# Patient Record
Sex: MALE | Race: WHITE | NOT HISPANIC OR LATINO | Employment: OTHER | URBAN - METROPOLITAN AREA
[De-identification: names, ages, dates, MRNs, and addresses within clinical notes are randomized per-mention and may not be internally consistent; named-entity substitution may affect disease eponyms.]

---

## 2018-05-17 ENCOUNTER — OFFICE VISIT CONVERTED (OUTPATIENT)
Dept: CARDIOLOGY | Facility: CLINIC | Age: 78
End: 2018-05-17
Attending: INTERNAL MEDICINE

## 2018-05-24 ENCOUNTER — OFFICE VISIT CONVERTED (OUTPATIENT)
Dept: PODIATRY | Facility: CLINIC | Age: 78
End: 2018-05-24
Attending: PODIATRIST

## 2018-08-30 ENCOUNTER — OFFICE VISIT CONVERTED (OUTPATIENT)
Dept: SURGERY | Facility: CLINIC | Age: 78
End: 2018-08-30
Attending: NURSE PRACTITIONER

## 2018-12-06 ENCOUNTER — OFFICE VISIT CONVERTED (OUTPATIENT)
Dept: CARDIOLOGY | Facility: CLINIC | Age: 78
End: 2018-12-06
Attending: INTERNAL MEDICINE

## 2019-07-26 ENCOUNTER — HOSPITAL ENCOUNTER (OUTPATIENT)
Dept: URGENT CARE | Facility: CLINIC | Age: 79
Discharge: HOME | End: 2019-07-26
Attending: NURSE PRACTITIONER

## 2019-08-02 ENCOUNTER — OFFICE VISIT CONVERTED (OUTPATIENT)
Dept: CARDIOLOGY | Facility: CLINIC | Age: 79
End: 2019-08-02
Attending: INTERNAL MEDICINE

## 2020-02-26 ENCOUNTER — OFFICE VISIT CONVERTED (OUTPATIENT)
Dept: ORTHOPEDIC SURGERY | Facility: CLINIC | Age: 80
End: 2020-02-26
Attending: ORTHOPAEDIC SURGERY

## 2020-03-03 ENCOUNTER — OFFICE VISIT CONVERTED (OUTPATIENT)
Dept: CARDIOLOGY | Facility: CLINIC | Age: 80
End: 2020-03-03
Attending: INTERNAL MEDICINE

## 2020-03-03 ENCOUNTER — CONVERSION ENCOUNTER (OUTPATIENT)
Dept: CARDIOLOGY | Facility: CLINIC | Age: 80
End: 2020-03-03
Attending: INTERNAL MEDICINE

## 2020-03-05 ENCOUNTER — HOSPITAL ENCOUNTER (OUTPATIENT)
Dept: CARDIOLOGY | Facility: HOSPITAL | Age: 80
Discharge: HOME | End: 2020-03-05
Attending: INTERNAL MEDICINE

## 2020-04-06 ENCOUNTER — TELEMEDICINE CONVERTED (OUTPATIENT)
Dept: ORTHOPEDIC SURGERY | Facility: CLINIC | Age: 80
End: 2020-04-06
Attending: ORTHOPAEDIC SURGERY

## 2020-04-13 ENCOUNTER — OFFICE VISIT CONVERTED (OUTPATIENT)
Dept: ORTHOPEDIC SURGERY | Facility: CLINIC | Age: 80
End: 2020-04-13
Attending: ORTHOPAEDIC SURGERY

## 2020-04-22 ENCOUNTER — HOSPITAL ENCOUNTER (OUTPATIENT)
Dept: GENERAL RADIOLOGY | Facility: HOSPITAL | Age: 80
Discharge: HOME | End: 2020-04-22
Attending: ORTHOPAEDIC SURGERY

## 2020-04-27 ENCOUNTER — OFFICE VISIT CONVERTED (OUTPATIENT)
Dept: ORTHOPEDIC SURGERY | Facility: CLINIC | Age: 80
End: 2020-04-27
Attending: ORTHOPAEDIC SURGERY

## 2020-05-13 ENCOUNTER — OFFICE VISIT CONVERTED (OUTPATIENT)
Dept: NEUROSURGERY | Facility: CLINIC | Age: 80
End: 2020-05-13
Attending: PHYSICIAN ASSISTANT

## 2020-05-13 ENCOUNTER — OFFICE VISIT CONVERTED (OUTPATIENT)
Dept: ORTHOPEDIC SURGERY | Facility: CLINIC | Age: 80
End: 2020-05-13
Attending: ORTHOPAEDIC SURGERY

## 2020-05-15 ENCOUNTER — HOSPITAL ENCOUNTER (OUTPATIENT)
Dept: PERIOP | Facility: HOSPITAL | Age: 80
Setting detail: HOSPITAL OUTPATIENT SURGERY
Discharge: HOME | End: 2020-05-15
Attending: ORTHOPAEDIC SURGERY

## 2020-06-01 ENCOUNTER — HOSPITAL ENCOUNTER (OUTPATIENT)
Dept: ORTHOPEDIC SURGERY | Facility: CLINIC | Age: 80
Setting detail: RECURRING SERIES
Discharge: HOME | End: 2020-07-07
Attending: ORTHOPAEDIC SURGERY

## 2020-06-09 ENCOUNTER — OFFICE VISIT CONVERTED (OUTPATIENT)
Dept: NEUROSURGERY | Facility: CLINIC | Age: 80
End: 2020-06-09
Attending: NEUROLOGICAL SURGERY

## 2020-06-23 ENCOUNTER — HOSPITAL ENCOUNTER (OUTPATIENT)
Dept: PREADMISSION TESTING | Facility: HOSPITAL | Age: 80
Discharge: HOME | End: 2020-06-23
Attending: NEUROLOGICAL SURGERY

## 2020-06-26 ENCOUNTER — HOSPITAL ENCOUNTER (OUTPATIENT)
Dept: PERIOP | Facility: HOSPITAL | Age: 80
Setting detail: HOSPITAL OUTPATIENT SURGERY
Discharge: HOME | End: 2020-06-26
Attending: NEUROLOGICAL SURGERY

## 2020-06-26 LAB — SARS-COV-2 RNA SPEC QL NAA+PROBE: NOT DETECTED

## 2020-07-14 ENCOUNTER — OFFICE VISIT CONVERTED (OUTPATIENT)
Dept: NEUROSURGERY | Facility: CLINIC | Age: 80
End: 2020-07-14
Attending: NEUROLOGICAL SURGERY

## 2020-08-04 ENCOUNTER — OFFICE VISIT CONVERTED (OUTPATIENT)
Dept: NEUROSURGERY | Facility: CLINIC | Age: 80
End: 2020-08-04
Attending: NEUROLOGICAL SURGERY

## 2020-09-16 ENCOUNTER — OFFICE VISIT CONVERTED (OUTPATIENT)
Dept: CARDIOLOGY | Facility: CLINIC | Age: 80
End: 2020-09-16
Attending: INTERNAL MEDICINE

## 2020-09-21 ENCOUNTER — HOSPITAL ENCOUNTER (OUTPATIENT)
Dept: NUCLEAR MEDICINE | Facility: HOSPITAL | Age: 80
Discharge: HOME | End: 2020-09-21
Attending: INTERNAL MEDICINE

## 2020-10-06 ENCOUNTER — OFFICE VISIT CONVERTED (OUTPATIENT)
Dept: NEUROSURGERY | Facility: CLINIC | Age: 80
End: 2020-10-06
Attending: PHYSICIAN ASSISTANT

## 2020-10-23 ENCOUNTER — HOSPITAL ENCOUNTER (OUTPATIENT)
Dept: GENERAL RADIOLOGY | Facility: HOSPITAL | Age: 80
Discharge: HOME | End: 2020-10-23
Attending: INTERNAL MEDICINE

## 2020-10-23 LAB
CREAT BLD-MCNC: 0.9 MG/DL (ref 0.6–1.4)
GFR SERPLBLD BASED ON 1.73 SQ M-ARVRAT: >60 ML/MIN/{1.73_M2}

## 2020-11-03 ENCOUNTER — OFFICE VISIT CONVERTED (OUTPATIENT)
Dept: NEUROSURGERY | Facility: CLINIC | Age: 80
End: 2020-11-03
Attending: PHYSICIAN ASSISTANT

## 2020-12-09 ENCOUNTER — HOSPITAL ENCOUNTER (OUTPATIENT)
Dept: GENERAL RADIOLOGY | Facility: HOSPITAL | Age: 80
Discharge: HOME | End: 2020-12-09
Attending: INTERNAL MEDICINE

## 2021-01-04 ENCOUNTER — OFFICE VISIT CONVERTED (OUTPATIENT)
Dept: PODIATRY | Facility: CLINIC | Age: 81
End: 2021-01-04
Attending: PODIATRIST

## 2021-01-04 ENCOUNTER — CONVERSION ENCOUNTER (OUTPATIENT)
Dept: PODIATRY | Facility: CLINIC | Age: 81
End: 2021-01-04

## 2021-01-26 ENCOUNTER — HOSPITAL ENCOUNTER (OUTPATIENT)
Dept: OTHER | Facility: HOSPITAL | Age: 81
Discharge: HOME | End: 2021-01-26
Attending: INTERNAL MEDICINE

## 2021-02-19 ENCOUNTER — HOSPITAL ENCOUNTER (OUTPATIENT)
Dept: VACCINE CLINIC | Facility: HOSPITAL | Age: 81
Discharge: HOME | End: 2021-02-19
Attending: INTERNAL MEDICINE

## 2021-04-14 ENCOUNTER — OFFICE VISIT CONVERTED (OUTPATIENT)
Dept: CARDIOLOGY | Facility: CLINIC | Age: 81
End: 2021-04-14
Attending: INTERNAL MEDICINE

## 2021-05-14 VITALS
SYSTOLIC BLOOD PRESSURE: 126 MMHG | HEIGHT: 70 IN | BODY MASS INDEX: 26.48 KG/M2 | DIASTOLIC BLOOD PRESSURE: 76 MMHG | WEIGHT: 185 LBS | HEART RATE: 84 BPM

## 2021-05-14 VITALS
HEIGHT: 70 IN | WEIGHT: 193 LBS | SYSTOLIC BLOOD PRESSURE: 137 MMHG | DIASTOLIC BLOOD PRESSURE: 81 MMHG | TEMPERATURE: 97.5 F | HEART RATE: 75 BPM | BODY MASS INDEX: 27.63 KG/M2 | OXYGEN SATURATION: 99 %

## 2021-05-14 VITALS — WEIGHT: 185.31 LBS | HEIGHT: 70 IN | BODY MASS INDEX: 26.53 KG/M2 | TEMPERATURE: 97.5 F

## 2021-05-14 VITALS
DIASTOLIC BLOOD PRESSURE: 88 MMHG | HEIGHT: 70 IN | HEART RATE: 80 BPM | WEIGHT: 190 LBS | SYSTOLIC BLOOD PRESSURE: 136 MMHG | BODY MASS INDEX: 27.2 KG/M2

## 2021-05-14 VITALS — BODY MASS INDEX: 27.09 KG/M2 | TEMPERATURE: 96.9 F | WEIGHT: 189.19 LBS | HEIGHT: 70 IN

## 2021-05-15 VITALS — WEIGHT: 180 LBS | HEIGHT: 71 IN | BODY MASS INDEX: 25.2 KG/M2

## 2021-05-15 VITALS — OXYGEN SATURATION: 97 % | HEIGHT: 70 IN | BODY MASS INDEX: 26.77 KG/M2 | WEIGHT: 187 LBS | HEART RATE: 64 BPM

## 2021-05-15 VITALS — TEMPERATURE: 97.3 F | BODY MASS INDEX: 26.81 KG/M2 | HEIGHT: 70 IN | WEIGHT: 187.25 LBS

## 2021-05-15 VITALS
SYSTOLIC BLOOD PRESSURE: 130 MMHG | DIASTOLIC BLOOD PRESSURE: 80 MMHG | WEIGHT: 180 LBS | HEIGHT: 71 IN | HEART RATE: 78 BPM | BODY MASS INDEX: 25.2 KG/M2

## 2021-05-15 VITALS — BODY MASS INDEX: 25.05 KG/M2 | WEIGHT: 175 LBS | OXYGEN SATURATION: 97 % | HEART RATE: 77 BPM | HEIGHT: 70 IN

## 2021-05-15 VITALS — HEIGHT: 70 IN | BODY MASS INDEX: 25.77 KG/M2 | WEIGHT: 180 LBS

## 2021-05-15 VITALS — HEIGHT: 71 IN | HEART RATE: 61 BPM | BODY MASS INDEX: 24.78 KG/M2 | WEIGHT: 177 LBS | OXYGEN SATURATION: 97 %

## 2021-05-15 VITALS
HEART RATE: 72 BPM | DIASTOLIC BLOOD PRESSURE: 72 MMHG | SYSTOLIC BLOOD PRESSURE: 116 MMHG | HEIGHT: 71 IN | BODY MASS INDEX: 24.78 KG/M2 | WEIGHT: 177 LBS

## 2021-05-15 VITALS — HEIGHT: 70 IN | WEIGHT: 187.5 LBS | BODY MASS INDEX: 26.84 KG/M2 | TEMPERATURE: 97.5 F

## 2021-05-15 VITALS — TEMPERATURE: 98.2 F | WEIGHT: 186.44 LBS | HEIGHT: 70 IN | BODY MASS INDEX: 26.69 KG/M2

## 2021-05-15 VITALS — TEMPERATURE: 97.6 F | HEIGHT: 70 IN | WEIGHT: 189 LBS | BODY MASS INDEX: 27.06 KG/M2

## 2021-05-16 VITALS
DIASTOLIC BLOOD PRESSURE: 80 MMHG | HEIGHT: 71 IN | BODY MASS INDEX: 27.58 KG/M2 | WEIGHT: 197 LBS | SYSTOLIC BLOOD PRESSURE: 146 MMHG | HEART RATE: 56 BPM

## 2021-05-16 VITALS
DIASTOLIC BLOOD PRESSURE: 66 MMHG | BODY MASS INDEX: 23.8 KG/M2 | HEIGHT: 71 IN | WEIGHT: 170 LBS | HEART RATE: 84 BPM | SYSTOLIC BLOOD PRESSURE: 116 MMHG

## 2021-05-16 VITALS — BODY MASS INDEX: 26.63 KG/M2 | HEIGHT: 70 IN | WEIGHT: 186 LBS | HEART RATE: 67 BPM | OXYGEN SATURATION: 95 %

## 2021-05-16 VITALS
DIASTOLIC BLOOD PRESSURE: 80 MMHG | SYSTOLIC BLOOD PRESSURE: 140 MMHG | WEIGHT: 175 LBS | BODY MASS INDEX: 25.05 KG/M2 | HEIGHT: 70 IN

## 2021-05-23 ENCOUNTER — TRANSCRIBE ORDERS (OUTPATIENT)
Dept: CARDIOLOGY | Facility: CLINIC | Age: 81
End: 2021-05-23

## 2021-05-23 DIAGNOSIS — R60.0 EDEMA LEG: Primary | ICD-10-CM

## 2021-06-15 ENCOUNTER — OFFICE VISIT (OUTPATIENT)
Dept: UROLOGY | Facility: CLINIC | Age: 81
End: 2021-06-15

## 2021-06-15 VITALS — BODY MASS INDEX: 27.2 KG/M2 | HEIGHT: 70 IN | WEIGHT: 190 LBS | RESPIRATION RATE: 17 BRPM

## 2021-06-15 DIAGNOSIS — N28.1 RENAL CYST: Primary | ICD-10-CM

## 2021-06-15 DIAGNOSIS — N20.0 NEPHROLITHIASIS: ICD-10-CM

## 2021-06-15 LAB
BILIRUB BLD-MCNC: NEGATIVE MG/DL
CLARITY, POC: CLEAR
COLOR UR: YELLOW
GLUCOSE UR STRIP-MCNC: NEGATIVE MG/DL
KETONES UR QL: NEGATIVE
LEUKOCYTE EST, POC: NEGATIVE
NITRITE UR-MCNC: NEGATIVE MG/ML
PH UR: 5.5 [PH] (ref 5–8)
PROT UR STRIP-MCNC: NEGATIVE MG/DL
RBC # UR STRIP: NEGATIVE /UL
SP GR UR: 1.02 (ref 1–1.03)
SPECIMEN VOL 24H UR: 50 L
UROBILINOGEN UR QL: NORMAL

## 2021-06-15 PROCEDURE — 51798 US URINE CAPACITY MEASURE: CPT | Performed by: UROLOGY

## 2021-06-15 PROCEDURE — 99204 OFFICE O/P NEW MOD 45 MIN: CPT | Performed by: UROLOGY

## 2021-06-15 PROCEDURE — 81003 URINALYSIS AUTO W/O SCOPE: CPT | Performed by: UROLOGY

## 2021-06-15 RX ORDER — CARVEDILOL 12.5 MG/1
TABLET ORAL
COMMUNITY
End: 2021-10-26

## 2021-06-15 RX ORDER — AMLODIPINE BESYLATE 5 MG/1
TABLET ORAL
COMMUNITY
End: 2021-07-28

## 2021-06-15 RX ORDER — DICYCLOMINE HYDROCHLORIDE 10 MG/1
CAPSULE ORAL
COMMUNITY
Start: 2021-06-04 | End: 2021-07-30

## 2021-06-15 RX ORDER — ROSUVASTATIN CALCIUM 10 MG/1
10 TABLET, COATED ORAL NIGHTLY
COMMUNITY
Start: 2021-04-30 | End: 2021-10-26

## 2021-06-15 RX ORDER — VALSARTAN 160 MG/1
TABLET ORAL
COMMUNITY
Start: 2021-04-07 | End: 2021-08-05 | Stop reason: SDUPTHER

## 2021-06-22 ENCOUNTER — OFFICE VISIT (OUTPATIENT)
Dept: NEUROSURGERY | Facility: CLINIC | Age: 81
End: 2021-06-22

## 2021-06-22 VITALS
TEMPERATURE: 96.8 F | DIASTOLIC BLOOD PRESSURE: 83 MMHG | BODY MASS INDEX: 27 KG/M2 | OXYGEN SATURATION: 99 % | HEIGHT: 70 IN | WEIGHT: 188.6 LBS | SYSTOLIC BLOOD PRESSURE: 127 MMHG | HEART RATE: 81 BPM

## 2021-06-22 DIAGNOSIS — M48.061 SPINAL STENOSIS, LUMBAR REGION, WITHOUT NEUROGENIC CLAUDICATION: ICD-10-CM

## 2021-06-22 DIAGNOSIS — M54.16 LUMBAR RADICULOPATHY, RIGHT: ICD-10-CM

## 2021-06-22 DIAGNOSIS — Z98.890 HISTORY OF LUMBAR LAMINECTOMY: Primary | ICD-10-CM

## 2021-06-22 PROCEDURE — 99213 OFFICE O/P EST LOW 20 MIN: CPT | Performed by: NURSE PRACTITIONER

## 2021-06-22 RX ORDER — ROSUVASTATIN CALCIUM 10 MG/1
TABLET, COATED ORAL
COMMUNITY
End: 2021-08-05

## 2021-06-22 RX ORDER — APIXABAN 5 MG/1
TABLET, FILM COATED ORAL
Qty: 180 TABLET | Refills: 3 | Status: SHIPPED | OUTPATIENT
Start: 2021-06-22 | End: 2021-08-05

## 2021-06-22 RX ORDER — CARVEDILOL 12.5 MG/1
TABLET ORAL
COMMUNITY
End: 2021-08-05

## 2021-06-22 RX ORDER — VALSARTAN AND HYDROCHLOROTHIAZIDE 160; 25 MG/1; MG/1
1 TABLET ORAL DAILY
COMMUNITY
Start: 2021-04-14 | End: 2022-04-05

## 2021-07-08 ENCOUNTER — HOSPITAL ENCOUNTER (OUTPATIENT)
Dept: MRI IMAGING | Facility: HOSPITAL | Age: 81
Discharge: HOME | End: 2021-07-08
Admitting: NURSE PRACTITIONER

## 2021-07-08 DIAGNOSIS — M54.16 LUMBAR RADICULOPATHY, RIGHT: ICD-10-CM

## 2021-07-08 DIAGNOSIS — Z98.890 HISTORY OF LUMBAR LAMINECTOMY: ICD-10-CM

## 2021-07-08 DIAGNOSIS — M48.061 SPINAL STENOSIS, LUMBAR REGION, WITHOUT NEUROGENIC CLAUDICATION: ICD-10-CM

## 2021-07-08 LAB — CREAT BLDA-MCNC: 1.3 MG/DL

## 2021-07-08 PROCEDURE — 0 GADOBENATE DIMEGLUMINE 529 MG/ML SOLUTION: Performed by: NURSE PRACTITIONER

## 2021-07-08 PROCEDURE — A9577 INJ MULTIHANCE: HCPCS | Performed by: NURSE PRACTITIONER

## 2021-07-08 PROCEDURE — 82565 ASSAY OF CREATININE: CPT

## 2021-07-08 PROCEDURE — 72158 MRI LUMBAR SPINE W/O & W/DYE: CPT

## 2021-07-08 RX ADMIN — GADOBENATE DIMEGLUMINE 18 ML: 529 INJECTION, SOLUTION INTRAVENOUS at 13:51

## 2021-07-12 ENCOUNTER — HOSPITAL ENCOUNTER (EMERGENCY)
Facility: HOSPITAL | Age: 81
Discharge: HOME | End: 2021-07-12
Attending: EMERGENCY MEDICINE | Admitting: EMERGENCY MEDICINE

## 2021-07-12 ENCOUNTER — APPOINTMENT (OUTPATIENT)
Dept: CT IMAGING | Facility: HOSPITAL | Age: 81
End: 2021-07-12

## 2021-07-12 VITALS
BODY MASS INDEX: 26.51 KG/M2 | TEMPERATURE: 98.2 F | DIASTOLIC BLOOD PRESSURE: 84 MMHG | RESPIRATION RATE: 18 BRPM | OXYGEN SATURATION: 97 % | HEART RATE: 110 BPM | WEIGHT: 185.19 LBS | SYSTOLIC BLOOD PRESSURE: 117 MMHG | HEIGHT: 70 IN

## 2021-07-12 DIAGNOSIS — R19.7 DIARRHEA, UNSPECIFIED TYPE: Primary | ICD-10-CM

## 2021-07-12 DIAGNOSIS — D72.829 LEUKOCYTOSIS, UNSPECIFIED TYPE: ICD-10-CM

## 2021-07-12 LAB
ALBUMIN SERPL-MCNC: 4 G/DL (ref 3.5–5.2)
ALBUMIN/GLOB SERPL: 1.7 G/DL
ALP SERPL-CCNC: 78 U/L (ref 39–117)
ALT SERPL W P-5'-P-CCNC: 89 U/L (ref 1–41)
ANION GAP SERPL CALCULATED.3IONS-SCNC: 6.3 MMOL/L (ref 5–15)
AST SERPL-CCNC: 32 U/L (ref 1–40)
BACTERIA UR QL AUTO: ABNORMAL /HPF
BILIRUB SERPL-MCNC: 0.7 MG/DL (ref 0–1.2)
BILIRUB UR QL STRIP: NEGATIVE
BUN SERPL-MCNC: 31 MG/DL (ref 8–23)
BUN/CREAT SERPL: 29 (ref 7–25)
CALCIUM SPEC-SCNC: 9.2 MG/DL (ref 8.6–10.5)
CHLORIDE SERPL-SCNC: 104 MMOL/L (ref 98–107)
CLARITY UR: CLEAR
CO2 SERPL-SCNC: 27.7 MMOL/L (ref 22–29)
COLOR UR: YELLOW
CREAT SERPL-MCNC: 1.07 MG/DL (ref 0.76–1.27)
DEPRECATED RDW RBC AUTO: 43.8 FL (ref 37–54)
EOSINOPHIL # BLD MANUAL: 0.22 10*3/MM3 (ref 0–0.4)
EOSINOPHIL NFR BLD MANUAL: 1 % (ref 0.3–6.2)
ERYTHROCYTE [DISTWIDTH] IN BLOOD BY AUTOMATED COUNT: 12.9 % (ref 12.3–15.4)
GFR SERPL CREATININE-BSD FRML MDRD: 67 ML/MIN/1.73
GLOBULIN UR ELPH-MCNC: 2.4 GM/DL
GLUCOSE SERPL-MCNC: 102 MG/DL (ref 65–99)
GLUCOSE UR STRIP-MCNC: NEGATIVE MG/DL
HCT VFR BLD AUTO: 48.8 % (ref 37.5–51)
HGB BLD-MCNC: 16.2 G/DL (ref 13–17.7)
HGB UR QL STRIP.AUTO: NEGATIVE
HOLD SPECIMEN: NORMAL
HOLD SPECIMEN: NORMAL
HYALINE CASTS UR QL AUTO: ABNORMAL /LPF
KETONES UR QL STRIP: NEGATIVE
LEUKOCYTE ESTERASE UR QL STRIP.AUTO: NEGATIVE
LIPASE SERPL-CCNC: 36 U/L (ref 13–60)
LYMPHOCYTES # BLD MANUAL: 1.11 10*3/MM3 (ref 0.7–3.1)
LYMPHOCYTES NFR BLD MANUAL: 5 % (ref 19.6–45.3)
LYMPHOCYTES NFR BLD MANUAL: 7 % (ref 5–12)
MCH RBC QN AUTO: 30.6 PG (ref 26.6–33)
MCHC RBC AUTO-ENTMCNC: 33.2 G/DL (ref 31.5–35.7)
MCV RBC AUTO: 92.1 FL (ref 79–97)
MONOCYTES # BLD AUTO: 1.55 10*3/MM3 (ref 0.1–0.9)
MYELOCYTES NFR BLD MANUAL: 2 % (ref 0–0)
NEUTROPHILS # BLD AUTO: 18.81 10*3/MM3 (ref 1.7–7)
NEUTROPHILS NFR BLD MANUAL: 83 % (ref 42.7–76)
NEUTS BAND NFR BLD MANUAL: 2 % (ref 0–5)
NITRITE UR QL STRIP: NEGATIVE
PH UR STRIP.AUTO: 5.5 [PH] (ref 5–8)
PLATELET # BLD AUTO: 230 10*3/MM3 (ref 140–450)
PMV BLD AUTO: 9.3 FL (ref 6–12)
POTASSIUM SERPL-SCNC: 4.9 MMOL/L (ref 3.5–5.2)
PROT SERPL-MCNC: 6.4 G/DL (ref 6–8.5)
PROT UR QL STRIP: NEGATIVE
RBC # BLD AUTO: 5.3 10*6/MM3 (ref 4.14–5.8)
RBC # UR: ABNORMAL /HPF
RBC MORPH BLD: NORMAL
REF LAB TEST METHOD: ABNORMAL
SCAN SLIDE: NORMAL
SMALL PLATELETS BLD QL SMEAR: ADEQUATE
SODIUM SERPL-SCNC: 138 MMOL/L (ref 136–145)
SP GR UR STRIP: 1.02 (ref 1–1.03)
SQUAMOUS #/AREA URNS HPF: ABNORMAL /HPF
UROBILINOGEN UR QL STRIP: NORMAL
WBC # BLD AUTO: 22.13 10*3/MM3 (ref 3.4–10.8)
WBC MORPH BLD: NORMAL
WBC UR QL AUTO: ABNORMAL /HPF
WHOLE BLOOD HOLD SPECIMEN: NORMAL

## 2021-07-12 PROCEDURE — 74177 CT ABD & PELVIS W/CONTRAST: CPT | Performed by: RADIOLOGY

## 2021-07-12 PROCEDURE — 85007 BL SMEAR W/DIFF WBC COUNT: CPT

## 2021-07-12 PROCEDURE — 81001 URINALYSIS AUTO W/SCOPE: CPT | Performed by: EMERGENCY MEDICINE

## 2021-07-12 PROCEDURE — 83690 ASSAY OF LIPASE: CPT

## 2021-07-12 PROCEDURE — 74177 CT ABD & PELVIS W/CONTRAST: CPT

## 2021-07-12 PROCEDURE — 80053 COMPREHEN METABOLIC PANEL: CPT

## 2021-07-12 PROCEDURE — 85025 COMPLETE CBC W/AUTO DIFF WBC: CPT

## 2021-07-12 PROCEDURE — 0 IOPAMIDOL PER 1 ML: Performed by: EMERGENCY MEDICINE

## 2021-07-12 PROCEDURE — 87505 NFCT AGENT DETECTION GI: CPT | Performed by: NURSE PRACTITIONER

## 2021-07-12 PROCEDURE — 99283 EMERGENCY DEPT VISIT LOW MDM: CPT

## 2021-07-12 RX ORDER — SODIUM CHLORIDE 0.9 % (FLUSH) 0.9 %
10 SYRINGE (ML) INJECTION AS NEEDED
Status: DISCONTINUED | OUTPATIENT
Start: 2021-07-12 | End: 2021-07-12 | Stop reason: HOSPADM

## 2021-07-12 RX ORDER — DIPHENOXYLATE HYDROCHLORIDE AND ATROPINE SULFATE 2.5; .025 MG/1; MG/1
1 TABLET ORAL 4 TIMES DAILY PRN
Qty: 12 TABLET | Refills: 0 | Status: SHIPPED | OUTPATIENT
Start: 2021-07-12 | End: 2021-07-15

## 2021-07-12 RX ADMIN — IOPAMIDOL 100 ML: 755 INJECTION, SOLUTION INTRAVENOUS at 16:51

## 2021-07-12 RX ADMIN — SODIUM CHLORIDE 1000 ML: 9 INJECTION, SOLUTION INTRAVENOUS at 16:04

## 2021-07-13 ENCOUNTER — TELEPHONE (OUTPATIENT)
Dept: UROLOGY | Facility: CLINIC | Age: 81
End: 2021-07-13

## 2021-07-14 LAB — LACTOFERRIN STL QL LA: NEGATIVE

## 2021-07-14 PROCEDURE — 83630 LACTOFERRIN FECAL (QUAL): CPT | Performed by: NURSE PRACTITIONER

## 2021-07-15 ENCOUNTER — APPOINTMENT (OUTPATIENT)
Dept: ULTRASOUND IMAGING | Facility: HOSPITAL | Age: 81
End: 2021-07-15

## 2021-07-15 LAB
C COLI+JEJ+UPSA DNA STL QL NAA+NON-PROBE: NOT DETECTED
EC STX1+STX2 GENES STL QL NAA+NON-PROBE: NOT DETECTED
S ENT+BONG DNA STL QL NAA+NON-PROBE: NOT DETECTED
SHIGELLA SP+EIEC IPAH ST NAA+NON-PROBE: NOT DETECTED

## 2021-07-21 PROBLEM — R35.0 BENIGN PROSTATIC HYPERPLASIA WITH URINARY FREQUENCY: Status: ACTIVE | Noted: 2021-07-21

## 2021-07-21 PROBLEM — N40.1 BENIGN PROSTATIC HYPERPLASIA WITH URINARY FREQUENCY: Status: ACTIVE | Noted: 2021-07-21

## 2021-07-23 ENCOUNTER — OFFICE VISIT (OUTPATIENT)
Dept: UROLOGY | Facility: CLINIC | Age: 81
End: 2021-07-23

## 2021-07-23 ENCOUNTER — TELEPHONE (OUTPATIENT)
Dept: INTERNAL MEDICINE | Facility: CLINIC | Age: 81
End: 2021-07-23

## 2021-07-23 VITALS — WEIGHT: 185 LBS | RESPIRATION RATE: 17 BRPM | BODY MASS INDEX: 26.48 KG/M2 | HEIGHT: 70 IN

## 2021-07-23 DIAGNOSIS — N40.1 BENIGN PROSTATIC HYPERPLASIA WITH URINARY FREQUENCY: ICD-10-CM

## 2021-07-23 DIAGNOSIS — R35.0 BENIGN PROSTATIC HYPERPLASIA WITH URINARY FREQUENCY: ICD-10-CM

## 2021-07-23 DIAGNOSIS — N28.1 RENAL CYST: Primary | ICD-10-CM

## 2021-07-23 DIAGNOSIS — D72.829 LEUKOCYTOSIS, UNSPECIFIED TYPE: Primary | ICD-10-CM

## 2021-07-23 PROCEDURE — 99212 OFFICE O/P EST SF 10 MIN: CPT | Performed by: UROLOGY

## 2021-07-26 ENCOUNTER — LAB (OUTPATIENT)
Dept: LAB | Facility: HOSPITAL | Age: 81
End: 2021-07-26

## 2021-07-26 DIAGNOSIS — D72.829 LEUKOCYTOSIS, UNSPECIFIED TYPE: ICD-10-CM

## 2021-07-26 LAB
BASOPHILS # BLD AUTO: 0.07 10*3/MM3 (ref 0–0.2)
BASOPHILS NFR BLD AUTO: 0.6 % (ref 0–1.5)
DEPRECATED RDW RBC AUTO: 41.8 FL (ref 37–54)
EOSINOPHIL # BLD AUTO: 0.13 10*3/MM3 (ref 0–0.4)
EOSINOPHIL NFR BLD AUTO: 1.1 % (ref 0.3–6.2)
ERYTHROCYTE [DISTWIDTH] IN BLOOD BY AUTOMATED COUNT: 12.7 % (ref 12.3–15.4)
HCT VFR BLD AUTO: 44 % (ref 37.5–51)
HGB BLD-MCNC: 14.7 G/DL (ref 13–17.7)
IMM GRANULOCYTES # BLD AUTO: 0.34 10*3/MM3 (ref 0–0.05)
IMM GRANULOCYTES NFR BLD AUTO: 3 % (ref 0–0.5)
LYMPHOCYTES # BLD AUTO: 1.49 10*3/MM3 (ref 0.7–3.1)
LYMPHOCYTES NFR BLD AUTO: 13 % (ref 19.6–45.3)
MCH RBC QN AUTO: 30.4 PG (ref 26.6–33)
MCHC RBC AUTO-ENTMCNC: 33.4 G/DL (ref 31.5–35.7)
MCV RBC AUTO: 91.1 FL (ref 79–97)
MONOCYTES # BLD AUTO: 0.97 10*3/MM3 (ref 0.1–0.9)
MONOCYTES NFR BLD AUTO: 8.5 % (ref 5–12)
NEUTROPHILS NFR BLD AUTO: 73.8 % (ref 42.7–76)
NEUTROPHILS NFR BLD AUTO: 8.46 10*3/MM3 (ref 1.7–7)
NRBC BLD AUTO-RTO: 0 /100 WBC (ref 0–0.2)
PLATELET # BLD AUTO: 192 10*3/MM3 (ref 140–450)
PMV BLD AUTO: 9.5 FL (ref 6–12)
RBC # BLD AUTO: 4.83 10*6/MM3 (ref 4.14–5.8)
WBC # BLD AUTO: 11.46 10*3/MM3 (ref 3.4–10.8)

## 2021-07-26 PROCEDURE — 85025 COMPLETE CBC W/AUTO DIFF WBC: CPT

## 2021-07-26 PROCEDURE — 36415 COLL VENOUS BLD VENIPUNCTURE: CPT

## 2021-07-28 RX ORDER — AMLODIPINE BESYLATE 5 MG/1
TABLET ORAL
Qty: 90 TABLET | Refills: 0 | Status: SHIPPED | OUTPATIENT
Start: 2021-07-28 | End: 2021-10-25

## 2021-07-30 RX ORDER — DICYCLOMINE HYDROCHLORIDE 10 MG/1
CAPSULE ORAL
Qty: 60 CAPSULE | Refills: 2 | Status: SHIPPED | OUTPATIENT
Start: 2021-07-30 | End: 2021-08-05 | Stop reason: SDUPTHER

## 2021-08-05 ENCOUNTER — HOSPITAL ENCOUNTER (OUTPATIENT)
Dept: GENERAL RADIOLOGY | Facility: HOSPITAL | Age: 81
Discharge: HOME | End: 2021-08-05
Admitting: NURSE PRACTITIONER

## 2021-08-05 ENCOUNTER — OFFICE VISIT (OUTPATIENT)
Dept: NEUROSURGERY | Facility: CLINIC | Age: 81
End: 2021-08-05

## 2021-08-05 VITALS
DIASTOLIC BLOOD PRESSURE: 90 MMHG | HEART RATE: 93 BPM | SYSTOLIC BLOOD PRESSURE: 138 MMHG | HEIGHT: 70 IN | WEIGHT: 190 LBS | BODY MASS INDEX: 27.2 KG/M2

## 2021-08-05 DIAGNOSIS — M25.551 HIP PAIN, RIGHT: ICD-10-CM

## 2021-08-05 DIAGNOSIS — M54.16 LUMBAR RADICULOPATHY, RIGHT: ICD-10-CM

## 2021-08-05 DIAGNOSIS — M62.838 MUSCLE SPASM: ICD-10-CM

## 2021-08-05 DIAGNOSIS — Z98.890 HISTORY OF LUMBAR LAMINECTOMY: Primary | ICD-10-CM

## 2021-08-05 DIAGNOSIS — M48.061 SPINAL STENOSIS, LUMBAR REGION, WITHOUT NEUROGENIC CLAUDICATION: ICD-10-CM

## 2021-08-05 PROCEDURE — 73502 X-RAY EXAM HIP UNI 2-3 VIEWS: CPT

## 2021-08-05 PROCEDURE — 99214 OFFICE O/P EST MOD 30 MIN: CPT | Performed by: NURSE PRACTITIONER

## 2021-08-05 RX ORDER — BACLOFEN 10 MG/1
5-10 TABLET ORAL 2 TIMES DAILY PRN
Qty: 60 TABLET | Refills: 1 | Status: SHIPPED | OUTPATIENT
Start: 2021-08-05 | End: 2021-10-14

## 2021-08-17 ENCOUNTER — PREP FOR SURGERY (OUTPATIENT)
Dept: OTHER | Facility: HOSPITAL | Age: 81
End: 2021-08-17

## 2021-08-17 ENCOUNTER — TELEPHONE (OUTPATIENT)
Dept: INTERNAL MEDICINE | Facility: CLINIC | Age: 81
End: 2021-08-17

## 2021-08-17 ENCOUNTER — OFFICE VISIT (OUTPATIENT)
Dept: GASTROENTEROLOGY | Facility: CLINIC | Age: 81
End: 2021-08-17

## 2021-08-17 ENCOUNTER — TELEPHONE (OUTPATIENT)
Dept: CARDIOLOGY | Facility: CLINIC | Age: 81
End: 2021-08-17

## 2021-08-17 VITALS
SYSTOLIC BLOOD PRESSURE: 127 MMHG | BODY MASS INDEX: 26.94 KG/M2 | HEIGHT: 70 IN | TEMPERATURE: 97.3 F | HEART RATE: 92 BPM | WEIGHT: 188.2 LBS | DIASTOLIC BLOOD PRESSURE: 87 MMHG

## 2021-08-17 DIAGNOSIS — R13.10 ODYNOPHAGIA: ICD-10-CM

## 2021-08-17 DIAGNOSIS — R19.7 DIARRHEA, UNSPECIFIED TYPE: ICD-10-CM

## 2021-08-17 DIAGNOSIS — R74.01 ELEVATED ALT MEASUREMENT: ICD-10-CM

## 2021-08-17 DIAGNOSIS — K57.90 DIVERTICULOSIS: ICD-10-CM

## 2021-08-17 DIAGNOSIS — R10.11 RIGHT UPPER QUADRANT ABDOMINAL PAIN: Primary | ICD-10-CM

## 2021-08-17 PROBLEM — E55.9 VITAMIN D DEFICIENCY: Status: ACTIVE | Noted: 2021-08-17

## 2021-08-17 PROBLEM — E78.5 HYPERLIPEMIA: Status: ACTIVE | Noted: 2021-08-17

## 2021-08-17 PROCEDURE — 99214 OFFICE O/P EST MOD 30 MIN: CPT | Performed by: NURSE PRACTITIONER

## 2021-08-17 RX ORDER — POLYETHYLENE GLYCOL 3350, SODIUM SULFATE ANHYDROUS, SODIUM BICARBONATE, SODIUM CHLORIDE, POTASSIUM CHLORIDE 227.1; 21.5; 6.36; 5.53; .754 G/L; G/L; G/L; G/L; G/L
4 POWDER, FOR SOLUTION ORAL DAILY
Qty: 1 EACH | Refills: 0 | Status: SHIPPED | OUTPATIENT
Start: 2021-08-17 | End: 2021-08-18

## 2021-08-20 ENCOUNTER — LAB (OUTPATIENT)
Dept: LAB | Facility: HOSPITAL | Age: 81
End: 2021-08-20

## 2021-08-20 DIAGNOSIS — R10.11 RIGHT UPPER QUADRANT ABDOMINAL PAIN: ICD-10-CM

## 2021-08-20 DIAGNOSIS — R74.01 ELEVATED ALT MEASUREMENT: ICD-10-CM

## 2021-08-20 LAB
ALBUMIN SERPL-MCNC: 3.8 G/DL (ref 3.5–5.2)
ALP SERPL-CCNC: 57 U/L (ref 39–117)
ALT SERPL W P-5'-P-CCNC: 19 U/L (ref 1–41)
AST SERPL-CCNC: 13 U/L (ref 1–40)
BILIRUB CONJ SERPL-MCNC: <0.2 MG/DL (ref 0–0.3)
BILIRUB INDIRECT SERPL-MCNC: NORMAL MG/DL
BILIRUB SERPL-MCNC: 0.5 MG/DL (ref 0–1.2)
PROT SERPL-MCNC: 6.2 G/DL (ref 6–8.5)

## 2021-08-20 PROCEDURE — 80076 HEPATIC FUNCTION PANEL: CPT

## 2021-08-20 PROCEDURE — 36415 COLL VENOUS BLD VENIPUNCTURE: CPT

## 2021-10-04 PROBLEM — R19.7 DIARRHEA: Status: RESOLVED | Noted: 2021-08-17 | Resolved: 2021-10-04

## 2021-10-04 PROBLEM — I48.0 PAROXYSMAL ATRIAL FIBRILLATION: Status: ACTIVE | Noted: 2021-10-04

## 2021-10-04 PROBLEM — R10.11 RIGHT UPPER QUADRANT ABDOMINAL PAIN: Status: RESOLVED | Noted: 2021-08-17 | Resolved: 2021-10-04

## 2021-10-04 PROBLEM — I10 ESSENTIAL HYPERTENSION: Status: ACTIVE | Noted: 2021-10-04

## 2021-10-05 PROBLEM — R19.7 DIARRHEA: Status: ACTIVE | Noted: 2021-08-17

## 2021-10-05 PROBLEM — R10.11 RIGHT UPPER QUADRANT ABDOMINAL PAIN: Status: ACTIVE | Noted: 2021-08-17

## 2021-10-06 ENCOUNTER — TELEPHONE (OUTPATIENT)
Dept: GASTROENTEROLOGY | Facility: CLINIC | Age: 81
End: 2021-10-06

## 2021-10-06 ENCOUNTER — ANESTHESIA EVENT (OUTPATIENT)
Dept: GASTROENTEROLOGY | Facility: HOSPITAL | Age: 81
End: 2021-10-06

## 2021-10-06 ENCOUNTER — ANESTHESIA (OUTPATIENT)
Dept: GASTROENTEROLOGY | Facility: HOSPITAL | Age: 81
End: 2021-10-06

## 2021-10-06 ENCOUNTER — HOSPITAL ENCOUNTER (OUTPATIENT)
Facility: HOSPITAL | Age: 81
Setting detail: HOSPITAL OUTPATIENT SURGERY
Discharge: HOME | End: 2021-10-06
Attending: INTERNAL MEDICINE | Admitting: INTERNAL MEDICINE

## 2021-10-06 VITALS
HEIGHT: 70 IN | RESPIRATION RATE: 14 BRPM | BODY MASS INDEX: 26.7 KG/M2 | OXYGEN SATURATION: 97 % | HEART RATE: 81 BPM | WEIGHT: 186.51 LBS | SYSTOLIC BLOOD PRESSURE: 102 MMHG | TEMPERATURE: 97 F | DIASTOLIC BLOOD PRESSURE: 72 MMHG

## 2021-10-06 DIAGNOSIS — R10.11 RIGHT UPPER QUADRANT ABDOMINAL PAIN: ICD-10-CM

## 2021-10-06 DIAGNOSIS — R13.10 ODYNOPHAGIA: ICD-10-CM

## 2021-10-06 DIAGNOSIS — R19.7 DIARRHEA, UNSPECIFIED TYPE: ICD-10-CM

## 2021-10-06 PROCEDURE — 88342 IMHCHEM/IMCYTCHM 1ST ANTB: CPT | Performed by: INTERNAL MEDICINE

## 2021-10-06 PROCEDURE — 25010000002 PROPOFOL 10 MG/ML EMULSION: Performed by: NURSE ANESTHETIST, CERTIFIED REGISTERED

## 2021-10-06 PROCEDURE — 88305 TISSUE EXAM BY PATHOLOGIST: CPT | Performed by: INTERNAL MEDICINE

## 2021-10-06 PROCEDURE — 45385 COLONOSCOPY W/LESION REMOVAL: CPT | Performed by: INTERNAL MEDICINE

## 2021-10-06 PROCEDURE — 43239 EGD BIOPSY SINGLE/MULTIPLE: CPT | Performed by: INTERNAL MEDICINE

## 2021-10-06 RX ORDER — LIDOCAINE HYDROCHLORIDE 20 MG/ML
INJECTION, SOLUTION INFILTRATION; PERINEURAL AS NEEDED
Status: DISCONTINUED | OUTPATIENT
Start: 2021-10-06 | End: 2021-10-06 | Stop reason: SURG

## 2021-10-06 RX ORDER — PROPOFOL 10 MG/ML
VIAL (ML) INTRAVENOUS AS NEEDED
Status: DISCONTINUED | OUTPATIENT
Start: 2021-10-06 | End: 2021-10-06 | Stop reason: SURG

## 2021-10-06 RX ORDER — SODIUM CHLORIDE, SODIUM LACTATE, POTASSIUM CHLORIDE, CALCIUM CHLORIDE 600; 310; 30; 20 MG/100ML; MG/100ML; MG/100ML; MG/100ML
30 INJECTION, SOLUTION INTRAVENOUS CONTINUOUS
Status: DISCONTINUED | OUTPATIENT
Start: 2021-10-06 | End: 2021-10-06 | Stop reason: HOSPADM

## 2021-10-06 RX ADMIN — LIDOCAINE HYDROCHLORIDE 100 MG: 20 INJECTION, SOLUTION INFILTRATION; PERINEURAL at 07:55

## 2021-10-06 RX ADMIN — PROPOFOL 150 MCG/KG/MIN: 10 INJECTION, EMULSION INTRAVENOUS at 07:55

## 2021-10-06 RX ADMIN — SODIUM CHLORIDE, POTASSIUM CHLORIDE, SODIUM LACTATE AND CALCIUM CHLORIDE 30 ML/HR: 600; 310; 30; 20 INJECTION, SOLUTION INTRAVENOUS at 07:16

## 2021-10-06 RX ADMIN — PROPOFOL 50 MG: 10 INJECTION, EMULSION INTRAVENOUS at 07:55

## 2021-10-07 LAB
CYTO UR: NORMAL
LAB AP CASE REPORT: NORMAL
LAB AP CLINICAL INFORMATION: NORMAL
LAB AP DIAGNOSIS COMMENT: NORMAL
PATH REPORT.FINAL DX SPEC: NORMAL
PATH REPORT.GROSS SPEC: NORMAL

## 2021-10-14 ENCOUNTER — OFFICE VISIT (OUTPATIENT)
Dept: CARDIOLOGY | Facility: CLINIC | Age: 81
End: 2021-10-14

## 2021-10-14 VITALS
HEART RATE: 80 BPM | SYSTOLIC BLOOD PRESSURE: 140 MMHG | WEIGHT: 184 LBS | DIASTOLIC BLOOD PRESSURE: 86 MMHG | BODY MASS INDEX: 26.34 KG/M2 | HEIGHT: 70 IN

## 2021-10-14 DIAGNOSIS — I48.0 PAROXYSMAL ATRIAL FIBRILLATION (HCC): Primary | ICD-10-CM

## 2021-10-14 DIAGNOSIS — I10 ESSENTIAL HYPERTENSION: ICD-10-CM

## 2021-10-14 DIAGNOSIS — E78.5 HYPERLIPIDEMIA LDL GOAL <100: ICD-10-CM

## 2021-10-14 PROCEDURE — 99214 OFFICE O/P EST MOD 30 MIN: CPT | Performed by: INTERNAL MEDICINE

## 2021-10-25 RX ORDER — AMLODIPINE BESYLATE 5 MG/1
TABLET ORAL
Qty: 90 TABLET | Refills: 0 | Status: SHIPPED | OUTPATIENT
Start: 2021-10-25 | End: 2022-01-21

## 2021-10-26 RX ORDER — ROSUVASTATIN CALCIUM 10 MG/1
TABLET, COATED ORAL
Qty: 90 TABLET | Refills: 0 | Status: SHIPPED | OUTPATIENT
Start: 2021-10-26 | End: 2022-01-24

## 2021-10-26 RX ORDER — CARVEDILOL 12.5 MG/1
TABLET ORAL
Qty: 180 TABLET | Refills: 0 | Status: SHIPPED | OUTPATIENT
Start: 2021-10-26 | End: 2021-12-06 | Stop reason: SDUPTHER

## 2021-11-30 ENCOUNTER — LAB (OUTPATIENT)
Dept: LAB | Facility: HOSPITAL | Age: 81
End: 2021-11-30

## 2021-11-30 ENCOUNTER — TRANSCRIBE ORDERS (OUTPATIENT)
Dept: ADMINISTRATIVE | Facility: HOSPITAL | Age: 81
End: 2021-11-30

## 2021-11-30 DIAGNOSIS — E78.2 MIXED HYPERLIPIDEMIA: ICD-10-CM

## 2021-11-30 DIAGNOSIS — G93.32 CHRONIC FATIGUE SYNDROME: ICD-10-CM

## 2021-11-30 DIAGNOSIS — E55.9 VITAMIN D DEFICIENCY, UNSPECIFIED: ICD-10-CM

## 2021-11-30 DIAGNOSIS — I10 HYPERTENSION, ESSENTIAL: ICD-10-CM

## 2021-11-30 DIAGNOSIS — I48.0 PAROXYSMAL ATRIAL FIBRILLATION (HCC): ICD-10-CM

## 2021-11-30 DIAGNOSIS — E78.2 MIXED HYPERLIPIDEMIA: Primary | ICD-10-CM

## 2021-11-30 LAB
25(OH)D3 SERPL-MCNC: 28.9 NG/ML (ref 30–100)
ALBUMIN SERPL-MCNC: 3.9 G/DL (ref 3.5–5.2)
ALBUMIN/GLOB SERPL: 1.4 G/DL
ALP SERPL-CCNC: 64 U/L (ref 39–117)
ALT SERPL W P-5'-P-CCNC: 27 U/L (ref 1–41)
ANION GAP SERPL CALCULATED.3IONS-SCNC: 7.3 MMOL/L (ref 5–15)
AST SERPL-CCNC: 25 U/L (ref 1–40)
BASOPHILS # BLD AUTO: 0.07 10*3/MM3 (ref 0–0.2)
BASOPHILS NFR BLD AUTO: 0.7 % (ref 0–1.5)
BILIRUB SERPL-MCNC: 0.4 MG/DL (ref 0–1.2)
BUN SERPL-MCNC: 24 MG/DL (ref 8–23)
BUN/CREAT SERPL: 23.8 (ref 7–25)
CALCIUM SPEC-SCNC: 9.7 MG/DL (ref 8.6–10.5)
CHLORIDE SERPL-SCNC: 100 MMOL/L (ref 98–107)
CHOLEST SERPL-MCNC: 143 MG/DL (ref 0–200)
CO2 SERPL-SCNC: 30.7 MMOL/L (ref 22–29)
CREAT SERPL-MCNC: 1.01 MG/DL (ref 0.76–1.27)
DEPRECATED RDW RBC AUTO: 40.4 FL (ref 37–54)
EOSINOPHIL # BLD AUTO: 0.14 10*3/MM3 (ref 0–0.4)
EOSINOPHIL NFR BLD AUTO: 1.3 % (ref 0.3–6.2)
ERYTHROCYTE [DISTWIDTH] IN BLOOD BY AUTOMATED COUNT: 12.2 % (ref 12.3–15.4)
FOLATE SERPL-MCNC: 13.8 NG/ML (ref 4.78–24.2)
GFR SERPL CREATININE-BSD FRML MDRD: 71 ML/MIN/1.73
GLOBULIN UR ELPH-MCNC: 2.8 GM/DL
GLUCOSE SERPL-MCNC: 100 MG/DL (ref 65–99)
HCT VFR BLD AUTO: 45.9 % (ref 37.5–51)
HDLC SERPL-MCNC: 43 MG/DL (ref 40–60)
HGB BLD-MCNC: 15.4 G/DL (ref 13–17.7)
IMM GRANULOCYTES # BLD AUTO: 0.18 10*3/MM3 (ref 0–0.05)
IMM GRANULOCYTES NFR BLD AUTO: 1.7 % (ref 0–0.5)
IRON 24H UR-MRATE: 68 MCG/DL (ref 59–158)
IRON SATN MFR SERPL: 16 % (ref 20–50)
LDLC SERPL CALC-MCNC: 84 MG/DL (ref 0–100)
LDLC/HDLC SERPL: 1.94 {RATIO}
LYMPHOCYTES # BLD AUTO: 1.49 10*3/MM3 (ref 0.7–3.1)
LYMPHOCYTES NFR BLD AUTO: 14 % (ref 19.6–45.3)
MCH RBC QN AUTO: 30.7 PG (ref 26.6–33)
MCHC RBC AUTO-ENTMCNC: 33.6 G/DL (ref 31.5–35.7)
MCV RBC AUTO: 91.4 FL (ref 79–97)
MONOCYTES # BLD AUTO: 0.92 10*3/MM3 (ref 0.1–0.9)
MONOCYTES NFR BLD AUTO: 8.7 % (ref 5–12)
NEUTROPHILS NFR BLD AUTO: 7.82 10*3/MM3 (ref 1.7–7)
NEUTROPHILS NFR BLD AUTO: 73.6 % (ref 42.7–76)
NRBC BLD AUTO-RTO: 0 /100 WBC (ref 0–0.2)
PLATELET # BLD AUTO: 244 10*3/MM3 (ref 140–450)
PMV BLD AUTO: 9.9 FL (ref 6–12)
POTASSIUM SERPL-SCNC: 4.1 MMOL/L (ref 3.5–5.2)
PROT SERPL-MCNC: 6.7 G/DL (ref 6–8.5)
RBC # BLD AUTO: 5.02 10*6/MM3 (ref 4.14–5.8)
SODIUM SERPL-SCNC: 138 MMOL/L (ref 136–145)
T4 FREE SERPL-MCNC: 1.35 NG/DL (ref 0.93–1.7)
TIBC SERPL-MCNC: 437 MCG/DL (ref 298–536)
TRANSFERRIN SERPL-MCNC: 293 MG/DL (ref 200–360)
TRIGL SERPL-MCNC: 82 MG/DL (ref 0–150)
TSH SERPL DL<=0.05 MIU/L-ACNC: 1.17 UIU/ML (ref 0.27–4.2)
VIT B12 BLD-MCNC: 376 PG/ML (ref 211–946)
VLDLC SERPL-MCNC: 16 MG/DL (ref 5–40)
WBC NRBC COR # BLD: 10.62 10*3/MM3 (ref 3.4–10.8)

## 2021-11-30 PROCEDURE — 83540 ASSAY OF IRON: CPT

## 2021-11-30 PROCEDURE — 80053 COMPREHEN METABOLIC PANEL: CPT

## 2021-11-30 PROCEDURE — 85025 COMPLETE CBC W/AUTO DIFF WBC: CPT

## 2021-11-30 PROCEDURE — 36415 COLL VENOUS BLD VENIPUNCTURE: CPT

## 2021-11-30 PROCEDURE — 82306 VITAMIN D 25 HYDROXY: CPT

## 2021-11-30 PROCEDURE — 82746 ASSAY OF FOLIC ACID SERUM: CPT

## 2021-11-30 PROCEDURE — 84466 ASSAY OF TRANSFERRIN: CPT

## 2021-11-30 PROCEDURE — 84439 ASSAY OF FREE THYROXINE: CPT

## 2021-11-30 PROCEDURE — 82607 VITAMIN B-12: CPT

## 2021-11-30 PROCEDURE — 84443 ASSAY THYROID STIM HORMONE: CPT

## 2021-11-30 PROCEDURE — 80061 LIPID PANEL: CPT

## 2021-12-05 PROBLEM — K58.0 IRRITABLE BOWEL SYNDROME WITH DIARRHEA: Status: ACTIVE | Noted: 2021-12-05

## 2021-12-05 PROBLEM — M65.319 SNAPPING THUMB SYNDROME: Status: ACTIVE | Noted: 2017-11-01

## 2021-12-05 PROBLEM — M19.90 IDIOPATHIC OSTEOARTHRITIS: Status: ACTIVE | Noted: 2021-12-05

## 2021-12-05 PROBLEM — M48.061 SPINAL STENOSIS OF LUMBAR REGION: Status: ACTIVE | Noted: 2020-07-14

## 2021-12-05 PROBLEM — R19.7 DIARRHEA: Status: RESOLVED | Noted: 2021-08-17 | Resolved: 2021-12-05

## 2021-12-05 PROBLEM — M51.26 DISPLACEMENT OF LUMBAR INTERVERTEBRAL DISC WITHOUT MYELOPATHY: Status: ACTIVE | Noted: 2020-07-14

## 2021-12-06 ENCOUNTER — OFFICE VISIT (OUTPATIENT)
Dept: INTERNAL MEDICINE | Facility: CLINIC | Age: 81
End: 2021-12-06

## 2021-12-06 ENCOUNTER — OFFICE VISIT (OUTPATIENT)
Dept: NEUROSURGERY | Facility: CLINIC | Age: 81
End: 2021-12-06

## 2021-12-06 VITALS
DIASTOLIC BLOOD PRESSURE: 88 MMHG | WEIGHT: 187.4 LBS | HEART RATE: 81 BPM | HEIGHT: 70 IN | TEMPERATURE: 98.4 F | SYSTOLIC BLOOD PRESSURE: 144 MMHG | BODY MASS INDEX: 26.83 KG/M2 | OXYGEN SATURATION: 98 %

## 2021-12-06 VITALS
DIASTOLIC BLOOD PRESSURE: 72 MMHG | BODY MASS INDEX: 26.92 KG/M2 | SYSTOLIC BLOOD PRESSURE: 127 MMHG | HEIGHT: 70 IN | WEIGHT: 188 LBS | HEART RATE: 84 BPM

## 2021-12-06 DIAGNOSIS — I10 ESSENTIAL HYPERTENSION: Primary | ICD-10-CM

## 2021-12-06 DIAGNOSIS — I48.0 PAROXYSMAL ATRIAL FIBRILLATION (HCC): ICD-10-CM

## 2021-12-06 DIAGNOSIS — Z98.890 HISTORY OF LUMBAR LAMINECTOMY: ICD-10-CM

## 2021-12-06 DIAGNOSIS — E55.9 VITAMIN D DEFICIENCY: ICD-10-CM

## 2021-12-06 DIAGNOSIS — G89.29 CHRONIC RIGHT FLANK PAIN: ICD-10-CM

## 2021-12-06 DIAGNOSIS — K29.30 CHRONIC SUPERFICIAL GASTRITIS WITHOUT BLEEDING: ICD-10-CM

## 2021-12-06 DIAGNOSIS — R10.9 CHRONIC RIGHT FLANK PAIN: ICD-10-CM

## 2021-12-06 DIAGNOSIS — M46.1 SACROILIAC INFLAMMATION (HCC): Primary | ICD-10-CM

## 2021-12-06 DIAGNOSIS — E78.5 HYPERLIPIDEMIA LDL GOAL <100: ICD-10-CM

## 2021-12-06 DIAGNOSIS — M54.16 LUMBAR RADICULOPATHY, RIGHT: ICD-10-CM

## 2021-12-06 PROCEDURE — 99214 OFFICE O/P EST MOD 30 MIN: CPT | Performed by: INTERNAL MEDICINE

## 2021-12-06 PROCEDURE — 99213 OFFICE O/P EST LOW 20 MIN: CPT | Performed by: NURSE PRACTITIONER

## 2021-12-06 RX ORDER — CARVEDILOL 12.5 MG/1
TABLET ORAL
Qty: 270 TABLET | Refills: 1 | Status: SHIPPED | OUTPATIENT
Start: 2021-12-06 | End: 2022-01-24

## 2021-12-06 RX ORDER — OMEPRAZOLE 20 MG/1
20 CAPSULE, DELAYED RELEASE ORAL DAILY
COMMUNITY
End: 2022-01-31

## 2021-12-29 ENCOUNTER — OFFICE VISIT (OUTPATIENT)
Dept: GASTROENTEROLOGY | Facility: CLINIC | Age: 81
End: 2021-12-29

## 2021-12-29 VITALS
DIASTOLIC BLOOD PRESSURE: 71 MMHG | HEART RATE: 99 BPM | WEIGHT: 192.8 LBS | BODY MASS INDEX: 27.6 KG/M2 | HEIGHT: 70 IN | SYSTOLIC BLOOD PRESSURE: 121 MMHG

## 2021-12-29 DIAGNOSIS — R15.2 FECAL URGENCY: ICD-10-CM

## 2021-12-29 DIAGNOSIS — R10.84 GENERALIZED ABDOMINAL PAIN: ICD-10-CM

## 2021-12-29 DIAGNOSIS — D12.2 ADENOMATOUS POLYP OF ASCENDING COLON: ICD-10-CM

## 2021-12-29 DIAGNOSIS — R13.10 ODYNOPHAGIA: ICD-10-CM

## 2021-12-29 DIAGNOSIS — K58.0 IRRITABLE BOWEL SYNDROME WITH DIARRHEA: ICD-10-CM

## 2021-12-29 DIAGNOSIS — D12.4 ADENOMATOUS POLYP OF DESCENDING COLON: ICD-10-CM

## 2021-12-29 DIAGNOSIS — R19.7 DIARRHEA, UNSPECIFIED TYPE: Primary | ICD-10-CM

## 2021-12-29 PROCEDURE — 99214 OFFICE O/P EST MOD 30 MIN: CPT | Performed by: NURSE PRACTITIONER

## 2021-12-30 ENCOUNTER — LAB (OUTPATIENT)
Dept: LAB | Facility: HOSPITAL | Age: 81
End: 2021-12-30

## 2021-12-30 DIAGNOSIS — R19.7 DIARRHEA, UNSPECIFIED TYPE: ICD-10-CM

## 2021-12-30 LAB
027 TOXIN: NORMAL
C DIFF TOX GENS STL QL NAA+PROBE: NEGATIVE
LACTOFERRIN STL QL LA: NEGATIVE

## 2021-12-30 PROCEDURE — 87493 C DIFF AMPLIFIED PROBE: CPT

## 2021-12-30 PROCEDURE — 87506 IADNA-DNA/RNA PROBE TQ 6-11: CPT

## 2021-12-30 PROCEDURE — 83630 LACTOFERRIN FECAL (QUAL): CPT

## 2021-12-31 LAB
C COLI+JEJ+UPSA DNA STL QL NAA+NON-PROBE: NOT DETECTED
CRYPTOSP DNA STL QL NAA+NON-PROBE: NOT DETECTED
E HISTOLYT DNA STL QL NAA+NON-PROBE: NOT DETECTED
EC STX1+STX2 GENES STL QL NAA+NON-PROBE: NOT DETECTED
G LAMBLIA DNA STL QL NAA+NON-PROBE: NOT DETECTED
S ENT+BONG DNA STL QL NAA+NON-PROBE: NOT DETECTED
SHIGELLA SP+EIEC IPAH ST NAA+NON-PROBE: NOT DETECTED

## 2022-01-14 ENCOUNTER — TELEPHONE (OUTPATIENT)
Dept: GASTROENTEROLOGY | Facility: CLINIC | Age: 82
End: 2022-01-14

## 2022-01-21 RX ORDER — AMLODIPINE BESYLATE 5 MG/1
5 TABLET ORAL DAILY
Qty: 90 TABLET | Refills: 1 | Status: SHIPPED | OUTPATIENT
Start: 2022-01-21 | End: 2022-07-19

## 2022-01-24 RX ORDER — CARVEDILOL 12.5 MG/1
TABLET ORAL
Qty: 180 TABLET | Refills: 1 | Status: SHIPPED | OUTPATIENT
Start: 2022-01-24 | End: 2022-03-25 | Stop reason: SDUPTHER

## 2022-01-24 RX ORDER — ROSUVASTATIN CALCIUM 10 MG/1
10 TABLET, COATED ORAL NIGHTLY
Qty: 90 TABLET | Refills: 1 | Status: SHIPPED | OUTPATIENT
Start: 2022-01-24 | End: 2022-07-20

## 2022-01-31 ENCOUNTER — OFFICE VISIT (OUTPATIENT)
Dept: NEUROSURGERY | Facility: CLINIC | Age: 82
End: 2022-01-31

## 2022-01-31 VITALS — WEIGHT: 195 LBS | BODY MASS INDEX: 27.92 KG/M2 | HEIGHT: 70 IN

## 2022-01-31 DIAGNOSIS — M25.551 HIP PAIN, RIGHT: ICD-10-CM

## 2022-01-31 DIAGNOSIS — M47.817 SPONDYLOSIS OF LUMBOSACRAL REGION WITHOUT MYELOPATHY OR RADICULOPATHY: Primary | ICD-10-CM

## 2022-01-31 DIAGNOSIS — Z98.890 HISTORY OF LUMBAR LAMINECTOMY: ICD-10-CM

## 2022-01-31 DIAGNOSIS — M48.061 SPINAL STENOSIS, LUMBAR REGION, WITHOUT NEUROGENIC CLAUDICATION: ICD-10-CM

## 2022-01-31 PROCEDURE — 99213 OFFICE O/P EST LOW 20 MIN: CPT | Performed by: NURSE PRACTITIONER

## 2022-02-01 ENCOUNTER — OFFICE VISIT (OUTPATIENT)
Dept: GASTROENTEROLOGY | Facility: CLINIC | Age: 82
End: 2022-02-01

## 2022-02-01 VITALS
BODY MASS INDEX: 28.12 KG/M2 | OXYGEN SATURATION: 97 % | HEIGHT: 70 IN | WEIGHT: 196.4 LBS | DIASTOLIC BLOOD PRESSURE: 74 MMHG | SYSTOLIC BLOOD PRESSURE: 120 MMHG | HEART RATE: 102 BPM

## 2022-02-01 DIAGNOSIS — R19.7 DIARRHEA, UNSPECIFIED TYPE: ICD-10-CM

## 2022-02-01 DIAGNOSIS — R13.19 ESOPHAGEAL DYSPHAGIA: ICD-10-CM

## 2022-02-01 DIAGNOSIS — Z86.010 HISTORY OF COLON POLYPS: ICD-10-CM

## 2022-02-01 DIAGNOSIS — K58.0 IRRITABLE BOWEL SYNDROME WITH DIARRHEA: Primary | ICD-10-CM

## 2022-02-01 PROCEDURE — 99213 OFFICE O/P EST LOW 20 MIN: CPT | Performed by: NURSE PRACTITIONER

## 2022-03-21 ENCOUNTER — TELEPHONE (OUTPATIENT)
Dept: INTERNAL MEDICINE | Facility: CLINIC | Age: 82
End: 2022-03-21

## 2022-03-22 ENCOUNTER — HOSPITAL ENCOUNTER (EMERGENCY)
Facility: HOSPITAL | Age: 82
Discharge: HOME | End: 2022-03-22
Attending: EMERGENCY MEDICINE | Admitting: EMERGENCY MEDICINE

## 2022-03-22 ENCOUNTER — APPOINTMENT (OUTPATIENT)
Dept: GENERAL RADIOLOGY | Facility: HOSPITAL | Age: 82
End: 2022-03-22

## 2022-03-22 VITALS
OXYGEN SATURATION: 97 % | DIASTOLIC BLOOD PRESSURE: 96 MMHG | HEART RATE: 86 BPM | RESPIRATION RATE: 16 BRPM | BODY MASS INDEX: 27.71 KG/M2 | HEIGHT: 70 IN | SYSTOLIC BLOOD PRESSURE: 143 MMHG | TEMPERATURE: 98 F | WEIGHT: 193.56 LBS

## 2022-03-22 DIAGNOSIS — R07.9 CHEST PAIN IN ADULT: ICD-10-CM

## 2022-03-22 DIAGNOSIS — R20.0 BILATERAL LEG NUMBNESS: Primary | ICD-10-CM

## 2022-03-22 LAB
ALBUMIN SERPL-MCNC: 3.7 G/DL (ref 3.5–5.2)
ALBUMIN/GLOB SERPL: 1.3 G/DL
ALP SERPL-CCNC: 77 U/L (ref 39–117)
ALT SERPL W P-5'-P-CCNC: 39 U/L (ref 1–41)
ANION GAP SERPL CALCULATED.3IONS-SCNC: 10.3 MMOL/L (ref 5–15)
AST SERPL-CCNC: 23 U/L (ref 1–40)
BASOPHILS # BLD AUTO: 0.12 10*3/MM3 (ref 0–0.2)
BASOPHILS NFR BLD AUTO: 0.9 % (ref 0–1.5)
BILIRUB SERPL-MCNC: 0.5 MG/DL (ref 0–1.2)
BUN SERPL-MCNC: 26 MG/DL (ref 8–23)
BUN/CREAT SERPL: 25.5 (ref 7–25)
CALCIUM SPEC-SCNC: 9.5 MG/DL (ref 8.6–10.5)
CHLORIDE SERPL-SCNC: 98 MMOL/L (ref 98–107)
CK MB SERPL-CCNC: 1.81 NG/ML
CK SERPL-CCNC: 45 U/L (ref 20–200)
CO2 SERPL-SCNC: 26.7 MMOL/L (ref 22–29)
CREAT SERPL-MCNC: 1.02 MG/DL (ref 0.76–1.27)
DEPRECATED RDW RBC AUTO: 48.6 FL (ref 37–54)
EGFRCR SERPLBLD CKD-EPI 2021: 74.8 ML/MIN/1.73
EOSINOPHIL # BLD AUTO: 0.09 10*3/MM3 (ref 0–0.4)
EOSINOPHIL NFR BLD AUTO: 0.6 % (ref 0.3–6.2)
ERYTHROCYTE [DISTWIDTH] IN BLOOD BY AUTOMATED COUNT: 14.3 % (ref 12.3–15.4)
GLOBULIN UR ELPH-MCNC: 2.8 GM/DL
GLUCOSE SERPL-MCNC: 151 MG/DL (ref 65–99)
HCT VFR BLD AUTO: 44.9 % (ref 37.5–51)
HGB BLD-MCNC: 15.2 G/DL (ref 13–17.7)
HOLD SPECIMEN: NORMAL
IMM GRANULOCYTES # BLD AUTO: 0.98 10*3/MM3 (ref 0–0.05)
IMM GRANULOCYTES NFR BLD AUTO: 7.1 % (ref 0–0.5)
LARGE PLATELETS: NORMAL
LIPASE SERPL-CCNC: 30 U/L (ref 13–60)
LYMPHOCYTES # BLD AUTO: 1.55 10*3/MM3 (ref 0.7–3.1)
LYMPHOCYTES NFR BLD AUTO: 11.2 % (ref 19.6–45.3)
MAGNESIUM SERPL-MCNC: 1.5 MG/DL (ref 1.6–2.4)
MCH RBC QN AUTO: 31.4 PG (ref 26.6–33)
MCHC RBC AUTO-ENTMCNC: 33.9 G/DL (ref 31.5–35.7)
MCV RBC AUTO: 92.8 FL (ref 79–97)
MONOCYTES # BLD AUTO: 1.04 10*3/MM3 (ref 0.1–0.9)
MONOCYTES NFR BLD AUTO: 7.5 % (ref 5–12)
NEUTROPHILS NFR BLD AUTO: 10.07 10*3/MM3 (ref 1.7–7)
NEUTROPHILS NFR BLD AUTO: 72.7 % (ref 42.7–76)
NRBC BLD AUTO-RTO: 0 /100 WBC (ref 0–0.2)
NT-PROBNP SERPL-MCNC: 421.7 PG/ML (ref 0–1800)
PLATELET # BLD AUTO: 291 10*3/MM3 (ref 140–450)
PMV BLD AUTO: 8.8 FL (ref 6–12)
POTASSIUM SERPL-SCNC: 3.7 MMOL/L (ref 3.5–5.2)
PROT SERPL-MCNC: 6.5 G/DL (ref 6–8.5)
QT INTERVAL: 326 MS
QT INTERVAL: 355 MS
RBC # BLD AUTO: 4.84 10*6/MM3 (ref 4.14–5.8)
RBC MORPH BLD: NORMAL
SMALL PLATELETS BLD QL SMEAR: ADEQUATE
SODIUM SERPL-SCNC: 135 MMOL/L (ref 136–145)
TROPONIN I SERPL-MCNC: 0 NG/ML (ref 0–0.6)
TROPONIN I SERPL-MCNC: 0 NG/ML (ref 0–0.6)
WBC MORPH BLD: NORMAL
WBC NRBC COR # BLD: 13.85 10*3/MM3 (ref 3.4–10.8)
WHOLE BLOOD HOLD SPECIMEN: NORMAL
WHOLE BLOOD HOLD SPECIMEN: NORMAL

## 2022-03-22 PROCEDURE — 83880 ASSAY OF NATRIURETIC PEPTIDE: CPT | Performed by: EMERGENCY MEDICINE

## 2022-03-22 PROCEDURE — 93005 ELECTROCARDIOGRAM TRACING: CPT

## 2022-03-22 PROCEDURE — 99284 EMERGENCY DEPT VISIT MOD MDM: CPT

## 2022-03-22 PROCEDURE — 82553 CREATINE MB FRACTION: CPT | Performed by: EMERGENCY MEDICINE

## 2022-03-22 PROCEDURE — 71045 X-RAY EXAM CHEST 1 VIEW: CPT

## 2022-03-22 PROCEDURE — 84484 ASSAY OF TROPONIN QUANT: CPT

## 2022-03-22 PROCEDURE — 85007 BL SMEAR W/DIFF WBC COUNT: CPT | Performed by: EMERGENCY MEDICINE

## 2022-03-22 PROCEDURE — 80053 COMPREHEN METABOLIC PANEL: CPT | Performed by: EMERGENCY MEDICINE

## 2022-03-22 PROCEDURE — 93010 ELECTROCARDIOGRAM REPORT: CPT | Performed by: INTERNAL MEDICINE

## 2022-03-22 PROCEDURE — 93005 ELECTROCARDIOGRAM TRACING: CPT | Performed by: EMERGENCY MEDICINE

## 2022-03-22 PROCEDURE — 83735 ASSAY OF MAGNESIUM: CPT | Performed by: EMERGENCY MEDICINE

## 2022-03-22 PROCEDURE — 83690 ASSAY OF LIPASE: CPT | Performed by: EMERGENCY MEDICINE

## 2022-03-22 PROCEDURE — 82550 ASSAY OF CK (CPK): CPT | Performed by: EMERGENCY MEDICINE

## 2022-03-22 PROCEDURE — 85025 COMPLETE CBC W/AUTO DIFF WBC: CPT | Performed by: EMERGENCY MEDICINE

## 2022-03-22 PROCEDURE — 36415 COLL VENOUS BLD VENIPUNCTURE: CPT

## 2022-03-22 RX ORDER — ASPIRIN 81 MG/1
324 TABLET, CHEWABLE ORAL ONCE
Status: COMPLETED | OUTPATIENT
Start: 2022-03-22 | End: 2022-03-22

## 2022-03-22 RX ORDER — SODIUM CHLORIDE 0.9 % (FLUSH) 0.9 %
10 SYRINGE (ML) INJECTION AS NEEDED
Status: DISCONTINUED | OUTPATIENT
Start: 2022-03-22 | End: 2022-03-22 | Stop reason: HOSPADM

## 2022-03-22 RX ADMIN — ASPIRIN 81 MG CHEWABLE TABLET 324 MG: 81 TABLET CHEWABLE at 09:13

## 2022-03-25 ENCOUNTER — OFFICE VISIT (OUTPATIENT)
Dept: INTERNAL MEDICINE | Facility: CLINIC | Age: 82
End: 2022-03-25

## 2022-03-25 VITALS
HEART RATE: 90 BPM | TEMPERATURE: 98.2 F | BODY MASS INDEX: 27.69 KG/M2 | SYSTOLIC BLOOD PRESSURE: 133 MMHG | DIASTOLIC BLOOD PRESSURE: 87 MMHG | HEIGHT: 70 IN | OXYGEN SATURATION: 100 % | WEIGHT: 193.4 LBS

## 2022-03-25 DIAGNOSIS — E53.8 VITAMIN B12 DEFICIENCY: ICD-10-CM

## 2022-03-25 DIAGNOSIS — R07.89 OTHER CHEST PAIN: ICD-10-CM

## 2022-03-25 DIAGNOSIS — G62.9 NEUROPATHY: ICD-10-CM

## 2022-03-25 DIAGNOSIS — I10 ESSENTIAL HYPERTENSION: ICD-10-CM

## 2022-03-25 DIAGNOSIS — I48.0 PAROXYSMAL ATRIAL FIBRILLATION: Primary | ICD-10-CM

## 2022-03-25 PROBLEM — M65.319 SNAPPING THUMB SYNDROME: Status: RESOLVED | Noted: 2017-11-01 | Resolved: 2022-03-25

## 2022-03-25 PROBLEM — M51.369 DEGENERATION OF LUMBAR INTERVERTEBRAL DISC: Status: ACTIVE | Noted: 2021-12-21

## 2022-03-25 PROBLEM — M47.816 LUMBAR SPONDYLOSIS: Status: ACTIVE | Noted: 2020-07-14

## 2022-03-25 PROBLEM — M51.36 DEGENERATION OF LUMBAR INTERVERTEBRAL DISC: Status: ACTIVE | Noted: 2021-12-21

## 2022-03-25 PROBLEM — R10.11 RIGHT UPPER QUADRANT ABDOMINAL PAIN: Status: RESOLVED | Noted: 2021-08-17 | Resolved: 2022-03-25

## 2022-03-25 PROBLEM — M46.1 INFLAMMATION OF SACROILIAC JOINT: Status: ACTIVE | Noted: 2021-12-21

## 2022-03-25 PROCEDURE — 99214 OFFICE O/P EST MOD 30 MIN: CPT | Performed by: INTERNAL MEDICINE

## 2022-03-25 RX ORDER — CARVEDILOL 25 MG/1
TABLET ORAL
Qty: 180 TABLET | Refills: 1 | Status: SHIPPED | OUTPATIENT
Start: 2022-03-25 | End: 2023-01-12 | Stop reason: SDUPTHER

## 2022-03-25 RX ORDER — ALBUTEROL SULFATE 90 UG/1
AEROSOL, METERED RESPIRATORY (INHALATION)
COMMUNITY
Start: 2022-03-12 | End: 2022-04-27

## 2022-03-25 RX ORDER — BROMPHENIRAMINE MALEATE, PSEUDOEPHEDRINE HYDROCHLORIDE, AND DEXTROMETHORPHAN HYDROBROMIDE 2; 30; 10 MG/5ML; MG/5ML; MG/5ML
5-10 SYRUP ORAL
COMMUNITY
Start: 2022-03-12 | End: 2022-04-27

## 2022-04-05 RX ORDER — VALSARTAN AND HYDROCHLOROTHIAZIDE 160; 25 MG/1; MG/1
TABLET ORAL
Qty: 90 TABLET | Refills: 0 | Status: SHIPPED | OUTPATIENT
Start: 2022-04-05 | End: 2022-07-01

## 2022-04-27 ENCOUNTER — OFFICE VISIT (OUTPATIENT)
Dept: CARDIOLOGY | Facility: CLINIC | Age: 82
End: 2022-04-27

## 2022-04-27 VITALS
SYSTOLIC BLOOD PRESSURE: 132 MMHG | DIASTOLIC BLOOD PRESSURE: 79 MMHG | HEIGHT: 70 IN | WEIGHT: 196 LBS | BODY MASS INDEX: 28.06 KG/M2 | HEART RATE: 92 BPM

## 2022-04-27 DIAGNOSIS — E78.5 HYPERLIPIDEMIA LDL GOAL <100: ICD-10-CM

## 2022-04-27 DIAGNOSIS — I10 ESSENTIAL HYPERTENSION: ICD-10-CM

## 2022-04-27 DIAGNOSIS — I48.19 ATRIAL FIBRILLATION, PERSISTENT: Primary | ICD-10-CM

## 2022-04-27 DIAGNOSIS — R60.0 LOWER EXTREMITY EDEMA: ICD-10-CM

## 2022-04-27 PROCEDURE — 93000 ELECTROCARDIOGRAM COMPLETE: CPT | Performed by: INTERNAL MEDICINE

## 2022-04-27 PROCEDURE — 99214 OFFICE O/P EST MOD 30 MIN: CPT | Performed by: INTERNAL MEDICINE

## 2022-04-27 RX ORDER — POTASSIUM CHLORIDE 750 MG/1
10 TABLET, FILM COATED, EXTENDED RELEASE ORAL DAILY PRN
Qty: 30 TABLET | Refills: 1 | Status: SHIPPED | OUTPATIENT
Start: 2022-04-27 | End: 2022-06-06 | Stop reason: SDUPTHER

## 2022-04-27 RX ORDER — FUROSEMIDE 20 MG/1
20 TABLET ORAL DAILY PRN
Qty: 30 TABLET | Refills: 1 | Status: SHIPPED | OUTPATIENT
Start: 2022-04-27 | End: 2022-06-06 | Stop reason: SDUPTHER

## 2022-06-01 ENCOUNTER — LAB (OUTPATIENT)
Dept: LAB | Facility: HOSPITAL | Age: 82
End: 2022-06-01

## 2022-06-01 DIAGNOSIS — E55.9 VITAMIN D DEFICIENCY: ICD-10-CM

## 2022-06-01 DIAGNOSIS — I10 ESSENTIAL HYPERTENSION: ICD-10-CM

## 2022-06-01 DIAGNOSIS — E78.5 HYPERLIPIDEMIA LDL GOAL <100: ICD-10-CM

## 2022-06-01 DIAGNOSIS — E53.8 VITAMIN B12 DEFICIENCY: ICD-10-CM

## 2022-06-01 LAB
ALBUMIN SERPL-MCNC: 3.9 G/DL (ref 3.5–5.2)
ALBUMIN/GLOB SERPL: 1.6 G/DL
ALP SERPL-CCNC: 63 U/L (ref 39–117)
ALT SERPL W P-5'-P-CCNC: 17 U/L (ref 1–41)
ANION GAP SERPL CALCULATED.3IONS-SCNC: 11 MMOL/L (ref 5–15)
AST SERPL-CCNC: 19 U/L (ref 1–40)
BILIRUB SERPL-MCNC: 0.5 MG/DL (ref 0–1.2)
BUN SERPL-MCNC: 24 MG/DL (ref 8–23)
BUN/CREAT SERPL: 24.5 (ref 7–25)
CALCIUM SPEC-SCNC: 9.6 MG/DL (ref 8.6–10.5)
CHLORIDE SERPL-SCNC: 102 MMOL/L (ref 98–107)
CHOLEST SERPL-MCNC: 129 MG/DL (ref 0–200)
CO2 SERPL-SCNC: 27 MMOL/L (ref 22–29)
CREAT SERPL-MCNC: 0.98 MG/DL (ref 0.76–1.27)
EGFRCR SERPLBLD CKD-EPI 2021: 78.4 ML/MIN/1.73
FOLATE SERPL-MCNC: >20 NG/ML (ref 4.78–24.2)
GLOBULIN UR ELPH-MCNC: 2.5 GM/DL
GLUCOSE SERPL-MCNC: 104 MG/DL (ref 65–99)
HDLC SERPL-MCNC: 37 MG/DL (ref 40–60)
LDLC SERPL CALC-MCNC: 72 MG/DL (ref 0–100)
LDLC/HDLC SERPL: 1.92 {RATIO}
POTASSIUM SERPL-SCNC: 3.6 MMOL/L (ref 3.5–5.2)
PROT SERPL-MCNC: 6.4 G/DL (ref 6–8.5)
SODIUM SERPL-SCNC: 140 MMOL/L (ref 136–145)
TRIGL SERPL-MCNC: 105 MG/DL (ref 0–150)
VIT B12 BLD-MCNC: 1092 PG/ML (ref 211–946)
VLDLC SERPL-MCNC: 20 MG/DL (ref 5–40)

## 2022-06-01 PROCEDURE — 80053 COMPREHEN METABOLIC PANEL: CPT

## 2022-06-01 PROCEDURE — 82652 VIT D 1 25-DIHYDROXY: CPT

## 2022-06-01 PROCEDURE — 80061 LIPID PANEL: CPT

## 2022-06-01 PROCEDURE — 36415 COLL VENOUS BLD VENIPUNCTURE: CPT

## 2022-06-01 PROCEDURE — 82607 VITAMIN B-12: CPT

## 2022-06-01 PROCEDURE — 82746 ASSAY OF FOLIC ACID SERUM: CPT

## 2022-06-04 LAB — 1,25(OH)2D SERPL-MCNC: 28.3 PG/ML (ref 19.9–79.3)

## 2022-06-06 ENCOUNTER — OFFICE VISIT (OUTPATIENT)
Dept: INTERNAL MEDICINE | Facility: CLINIC | Age: 82
End: 2022-06-06

## 2022-06-06 VITALS
TEMPERATURE: 98 F | DIASTOLIC BLOOD PRESSURE: 83 MMHG | WEIGHT: 194.2 LBS | HEART RATE: 92 BPM | BODY MASS INDEX: 27.8 KG/M2 | HEIGHT: 70 IN | SYSTOLIC BLOOD PRESSURE: 131 MMHG | OXYGEN SATURATION: 98 %

## 2022-06-06 DIAGNOSIS — Z12.5 PROSTATE CANCER SCREENING: ICD-10-CM

## 2022-06-06 DIAGNOSIS — I48.19 ATRIAL FIBRILLATION, PERSISTENT: ICD-10-CM

## 2022-06-06 DIAGNOSIS — E78.5 HYPERLIPIDEMIA LDL GOAL <100: Primary | ICD-10-CM

## 2022-06-06 DIAGNOSIS — I10 ESSENTIAL HYPERTENSION: ICD-10-CM

## 2022-06-06 DIAGNOSIS — E55.9 VITAMIN D DEFICIENCY: ICD-10-CM

## 2022-06-06 DIAGNOSIS — R53.83 OTHER FATIGUE: ICD-10-CM

## 2022-06-06 PROBLEM — D12.6 ADENOMATOUS POLYP OF COLON: Status: ACTIVE | Noted: 2022-06-06

## 2022-06-06 PROCEDURE — 99214 OFFICE O/P EST MOD 30 MIN: CPT | Performed by: INTERNAL MEDICINE

## 2022-06-06 RX ORDER — POTASSIUM CHLORIDE 750 MG/1
10 TABLET, FILM COATED, EXTENDED RELEASE ORAL DAILY
Qty: 90 TABLET | Refills: 1 | Status: SHIPPED | OUTPATIENT
Start: 2022-06-06 | End: 2022-10-25

## 2022-06-06 RX ORDER — FUROSEMIDE 20 MG/1
20 TABLET ORAL DAILY
Qty: 90 TABLET | Refills: 1 | Status: SHIPPED | OUTPATIENT
Start: 2022-06-06 | End: 2022-10-25

## 2022-06-06 RX ORDER — OFLOXACIN 3 MG/ML
SOLUTION/ DROPS OPHTHALMIC
COMMUNITY
Start: 2022-05-06 | End: 2022-08-21

## 2022-06-15 ENCOUNTER — OFFICE VISIT (OUTPATIENT)
Dept: NEUROLOGY | Facility: CLINIC | Age: 82
End: 2022-06-15

## 2022-06-15 VITALS
HEART RATE: 90 BPM | SYSTOLIC BLOOD PRESSURE: 128 MMHG | WEIGHT: 196 LBS | BODY MASS INDEX: 28.06 KG/M2 | HEIGHT: 70 IN | DIASTOLIC BLOOD PRESSURE: 81 MMHG

## 2022-06-15 DIAGNOSIS — M76.899 ADDUCTOR TENDINITIS: ICD-10-CM

## 2022-06-15 DIAGNOSIS — G62.9 SENSORY NEUROPATHY: Primary | ICD-10-CM

## 2022-06-15 PROCEDURE — 95886 MUSC TEST DONE W/N TEST COMP: CPT | Performed by: PSYCHIATRY & NEUROLOGY

## 2022-06-15 PROCEDURE — 95885 MUSC TST DONE W/NERV TST LIM: CPT | Performed by: PSYCHIATRY & NEUROLOGY

## 2022-06-15 PROCEDURE — 99203 OFFICE O/P NEW LOW 30 MIN: CPT | Performed by: PSYCHIATRY & NEUROLOGY

## 2022-06-15 PROCEDURE — 95910 NRV CNDJ TEST 7-8 STUDIES: CPT | Performed by: PSYCHIATRY & NEUROLOGY

## 2022-06-29 ENCOUNTER — OFFICE VISIT (OUTPATIENT)
Dept: ORTHOPEDIC SURGERY | Facility: CLINIC | Age: 82
End: 2022-06-29

## 2022-06-29 VITALS — HEIGHT: 70 IN | BODY MASS INDEX: 27.2 KG/M2 | WEIGHT: 190 LBS

## 2022-06-29 DIAGNOSIS — M25.562 LEFT KNEE PAIN, UNSPECIFIED CHRONICITY: Primary | ICD-10-CM

## 2022-06-29 DIAGNOSIS — M70.42 PREPATELLAR BURSITIS OF LEFT KNEE: ICD-10-CM

## 2022-06-29 PROCEDURE — 99213 OFFICE O/P EST LOW 20 MIN: CPT | Performed by: ORTHOPAEDIC SURGERY

## 2022-07-01 RX ORDER — VALSARTAN AND HYDROCHLOROTHIAZIDE 160; 25 MG/1; MG/1
TABLET ORAL
Qty: 90 TABLET | Refills: 1 | Status: SHIPPED | OUTPATIENT
Start: 2022-07-01 | End: 2022-12-28

## 2022-07-19 RX ORDER — AMLODIPINE BESYLATE 5 MG/1
TABLET ORAL
Qty: 90 TABLET | Refills: 1 | Status: SHIPPED | OUTPATIENT
Start: 2022-07-19 | End: 2022-09-20

## 2022-07-20 RX ORDER — CARVEDILOL 12.5 MG/1
TABLET ORAL
Qty: 180 TABLET | Refills: 1 | Status: SHIPPED | OUTPATIENT
Start: 2022-07-20 | End: 2022-08-21

## 2022-07-20 RX ORDER — ROSUVASTATIN CALCIUM 10 MG/1
TABLET, COATED ORAL
Qty: 90 TABLET | Refills: 1 | Status: SHIPPED | OUTPATIENT
Start: 2022-07-20 | End: 2022-08-25 | Stop reason: SDUPTHER

## 2022-07-31 ENCOUNTER — HOSPITAL ENCOUNTER (EMERGENCY)
Facility: HOSPITAL | Age: 82
Discharge: HOME | End: 2022-07-31
Attending: EMERGENCY MEDICINE | Admitting: EMERGENCY MEDICINE

## 2022-07-31 ENCOUNTER — APPOINTMENT (OUTPATIENT)
Dept: GENERAL RADIOLOGY | Facility: HOSPITAL | Age: 82
End: 2022-07-31

## 2022-07-31 VITALS
HEART RATE: 89 BPM | DIASTOLIC BLOOD PRESSURE: 74 MMHG | TEMPERATURE: 97.5 F | WEIGHT: 188 LBS | BODY MASS INDEX: 26.92 KG/M2 | SYSTOLIC BLOOD PRESSURE: 112 MMHG | RESPIRATION RATE: 20 BRPM | OXYGEN SATURATION: 95 % | HEIGHT: 70 IN

## 2022-07-31 DIAGNOSIS — U07.1 COVID-19: Primary | ICD-10-CM

## 2022-07-31 DIAGNOSIS — E86.0 DEHYDRATION: ICD-10-CM

## 2022-07-31 LAB
ALBUMIN SERPL-MCNC: 3.5 G/DL (ref 3.5–5.2)
ALBUMIN/GLOB SERPL: 1.2 G/DL
ALP SERPL-CCNC: 59 U/L (ref 39–117)
ALT SERPL W P-5'-P-CCNC: 26 U/L (ref 1–41)
ANION GAP SERPL CALCULATED.3IONS-SCNC: 11.4 MMOL/L (ref 5–15)
AST SERPL-CCNC: 29 U/L (ref 1–40)
BASOPHILS # BLD AUTO: 0.04 10*3/MM3 (ref 0–0.2)
BASOPHILS NFR BLD AUTO: 0.5 % (ref 0–1.5)
BILIRUB SERPL-MCNC: 0.4 MG/DL (ref 0–1.2)
BILIRUB UR QL STRIP: NEGATIVE
BUN SERPL-MCNC: 25 MG/DL (ref 8–23)
BUN/CREAT SERPL: 15.9 (ref 7–25)
CALCIUM SPEC-SCNC: 9.7 MG/DL (ref 8.6–10.5)
CHLORIDE SERPL-SCNC: 92 MMOL/L (ref 98–107)
CLARITY UR: CLEAR
CO2 SERPL-SCNC: 28.6 MMOL/L (ref 22–29)
COLOR UR: YELLOW
CREAT SERPL-MCNC: 1.57 MG/DL (ref 0.76–1.27)
D-LACTATE SERPL-SCNC: 1.6 MMOL/L (ref 0.5–2)
DEPRECATED RDW RBC AUTO: 46.5 FL (ref 37–54)
EGFRCR SERPLBLD CKD-EPI 2021: 44.6 ML/MIN/1.73
EOSINOPHIL # BLD AUTO: 0.01 10*3/MM3 (ref 0–0.4)
EOSINOPHIL NFR BLD AUTO: 0.1 % (ref 0.3–6.2)
ERYTHROCYTE [DISTWIDTH] IN BLOOD BY AUTOMATED COUNT: 14 % (ref 12.3–15.4)
GLOBULIN UR ELPH-MCNC: 3 GM/DL
GLUCOSE SERPL-MCNC: 118 MG/DL (ref 65–99)
GLUCOSE UR STRIP-MCNC: NEGATIVE MG/DL
HCT VFR BLD AUTO: 43.4 % (ref 37.5–51)
HGB BLD-MCNC: 14.7 G/DL (ref 13–17.7)
HGB UR QL STRIP.AUTO: NEGATIVE
HOLD SPECIMEN: NORMAL
HOLD SPECIMEN: NORMAL
IMM GRANULOCYTES # BLD AUTO: 0.37 10*3/MM3 (ref 0–0.05)
IMM GRANULOCYTES NFR BLD AUTO: 4.2 % (ref 0–0.5)
KETONES UR QL STRIP: NEGATIVE
LEUKOCYTE ESTERASE UR QL STRIP.AUTO: NEGATIVE
LIPASE SERPL-CCNC: 33 U/L (ref 13–60)
LYMPHOCYTES # BLD AUTO: 0.8 10*3/MM3 (ref 0.7–3.1)
LYMPHOCYTES NFR BLD AUTO: 9.1 % (ref 19.6–45.3)
MCH RBC QN AUTO: 30.7 PG (ref 26.6–33)
MCHC RBC AUTO-ENTMCNC: 33.9 G/DL (ref 31.5–35.7)
MCV RBC AUTO: 90.6 FL (ref 79–97)
MONOCYTES # BLD AUTO: 1.31 10*3/MM3 (ref 0.1–0.9)
MONOCYTES NFR BLD AUTO: 14.9 % (ref 5–12)
NEUTROPHILS NFR BLD AUTO: 6.28 10*3/MM3 (ref 1.7–7)
NEUTROPHILS NFR BLD AUTO: 71.2 % (ref 42.7–76)
NITRITE UR QL STRIP: NEGATIVE
NRBC BLD AUTO-RTO: 0 /100 WBC (ref 0–0.2)
PH UR STRIP.AUTO: <=5 [PH] (ref 5–8)
PLATELET # BLD AUTO: 195 10*3/MM3 (ref 140–450)
PMV BLD AUTO: 9.3 FL (ref 6–12)
POTASSIUM SERPL-SCNC: 4.3 MMOL/L (ref 3.5–5.2)
PROT SERPL-MCNC: 6.5 G/DL (ref 6–8.5)
PROT UR QL STRIP: ABNORMAL
RBC # BLD AUTO: 4.79 10*6/MM3 (ref 4.14–5.8)
SODIUM SERPL-SCNC: 132 MMOL/L (ref 136–145)
SP GR UR STRIP: 1.02 (ref 1–1.03)
UROBILINOGEN UR QL STRIP: ABNORMAL
WBC NRBC COR # BLD: 8.81 10*3/MM3 (ref 3.4–10.8)
WHOLE BLOOD HOLD COAG: NORMAL
WHOLE BLOOD HOLD SPECIMEN: NORMAL

## 2022-07-31 PROCEDURE — 83605 ASSAY OF LACTIC ACID: CPT

## 2022-07-31 PROCEDURE — 96374 THER/PROPH/DIAG INJ IV PUSH: CPT

## 2022-07-31 PROCEDURE — 81003 URINALYSIS AUTO W/O SCOPE: CPT | Performed by: EMERGENCY MEDICINE

## 2022-07-31 PROCEDURE — 83690 ASSAY OF LIPASE: CPT

## 2022-07-31 PROCEDURE — 80053 COMPREHEN METABOLIC PANEL: CPT

## 2022-07-31 PROCEDURE — 85025 COMPLETE CBC W/AUTO DIFF WBC: CPT

## 2022-07-31 PROCEDURE — 25010000002 ONDANSETRON PER 1 MG: Performed by: EMERGENCY MEDICINE

## 2022-07-31 PROCEDURE — 36415 COLL VENOUS BLD VENIPUNCTURE: CPT

## 2022-07-31 PROCEDURE — 99283 EMERGENCY DEPT VISIT LOW MDM: CPT

## 2022-07-31 PROCEDURE — 71045 X-RAY EXAM CHEST 1 VIEW: CPT

## 2022-07-31 RX ORDER — SODIUM CHLORIDE 0.9 % (FLUSH) 0.9 %
10 SYRINGE (ML) INJECTION AS NEEDED
Status: DISCONTINUED | OUTPATIENT
Start: 2022-07-31 | End: 2022-07-31 | Stop reason: HOSPADM

## 2022-07-31 RX ORDER — ONDANSETRON 2 MG/ML
4 INJECTION INTRAMUSCULAR; INTRAVENOUS ONCE
Status: COMPLETED | OUTPATIENT
Start: 2022-07-31 | End: 2022-07-31

## 2022-07-31 RX ADMIN — ONDANSETRON 4 MG: 2 INJECTION INTRAMUSCULAR; INTRAVENOUS at 17:37

## 2022-07-31 RX ADMIN — SODIUM CHLORIDE 1000 ML: 9 INJECTION, SOLUTION INTRAVENOUS at 17:36

## 2022-08-01 ENCOUNTER — TRANSCRIBE ORDERS (OUTPATIENT)
Dept: ADMINISTRATIVE | Facility: HOSPITAL | Age: 82
End: 2022-08-01

## 2022-08-01 DIAGNOSIS — U07.1 COVID-19: Primary | ICD-10-CM

## 2022-08-01 RX ORDER — EPINEPHRINE 0.1 MG/ML
0.3 SYRINGE (ML) INJECTION AS NEEDED
Status: CANCELLED | OUTPATIENT
Start: 2022-08-02

## 2022-08-01 RX ORDER — DIPHENHYDRAMINE HCL 50 MG
50 CAPSULE ORAL ONCE AS NEEDED
Status: CANCELLED | OUTPATIENT
Start: 2022-08-02

## 2022-08-01 RX ORDER — METHYLPREDNISOLONE SODIUM SUCCINATE 125 MG/2ML
125 INJECTION, POWDER, LYOPHILIZED, FOR SOLUTION INTRAMUSCULAR; INTRAVENOUS AS NEEDED
Status: CANCELLED | OUTPATIENT
Start: 2022-08-02

## 2022-08-01 RX ORDER — DIPHENHYDRAMINE HYDROCHLORIDE 50 MG/ML
50 INJECTION INTRAMUSCULAR; INTRAVENOUS ONCE AS NEEDED
Status: CANCELLED | OUTPATIENT
Start: 2022-08-02

## 2022-08-02 ENCOUNTER — HOSPITAL ENCOUNTER (OUTPATIENT)
Dept: INFUSION THERAPY | Facility: HOSPITAL | Age: 82
Discharge: HOME | End: 2022-08-02
Admitting: EMERGENCY MEDICINE

## 2022-08-02 VITALS
SYSTOLIC BLOOD PRESSURE: 132 MMHG | TEMPERATURE: 98.3 F | RESPIRATION RATE: 18 BRPM | HEART RATE: 82 BPM | DIASTOLIC BLOOD PRESSURE: 72 MMHG

## 2022-08-02 DIAGNOSIS — U07.1 CLINICAL DIAGNOSIS OF SEVERE ACUTE RESPIRATORY SYNDROME CORONAVIRUS 2 (SARS-COV-2) DISEASE: Primary | ICD-10-CM

## 2022-08-02 PROCEDURE — 25010000002 INJECTION, BEBTELOVIMAB, 175 MG: Performed by: EMERGENCY MEDICINE

## 2022-08-02 PROCEDURE — M0222 HC INJECTION BEBTELOVIMAB: HCPCS | Performed by: EMERGENCY MEDICINE

## 2022-08-02 PROCEDURE — 96374 THER/PROPH/DIAG INJ IV PUSH: CPT

## 2022-08-02 RX ORDER — METHYLPREDNISOLONE SODIUM SUCCINATE 125 MG/2ML
125 INJECTION, POWDER, LYOPHILIZED, FOR SOLUTION INTRAMUSCULAR; INTRAVENOUS AS NEEDED
OUTPATIENT
Start: 2022-08-02

## 2022-08-02 RX ORDER — EPINEPHRINE 0.1 MG/ML
0.3 SYRINGE (ML) INJECTION AS NEEDED
OUTPATIENT
Start: 2022-08-02

## 2022-08-02 RX ORDER — DIPHENHYDRAMINE HYDROCHLORIDE 50 MG/ML
50 INJECTION INTRAMUSCULAR; INTRAVENOUS ONCE AS NEEDED
OUTPATIENT
Start: 2022-08-02

## 2022-08-02 RX ORDER — BEBTELOVIMAB 87.5 MG/ML
175 INJECTION, SOLUTION INTRAVENOUS ONCE
Status: COMPLETED | OUTPATIENT
Start: 2022-08-02 | End: 2022-08-02

## 2022-08-02 RX ORDER — DIPHENHYDRAMINE HCL 50 MG
50 CAPSULE ORAL ONCE AS NEEDED
OUTPATIENT
Start: 2022-08-02

## 2022-08-02 RX ORDER — BEBTELOVIMAB 87.5 MG/ML
175 INJECTION, SOLUTION INTRAVENOUS ONCE
Status: CANCELLED | OUTPATIENT
Start: 2022-08-02

## 2022-08-02 RX ADMIN — BEBTELOVIMAB 175 MG: 87.5 INJECTION, SOLUTION INTRAVENOUS at 17:22

## 2022-08-21 ENCOUNTER — HOSPITAL ENCOUNTER (INPATIENT)
Facility: HOSPITAL | Age: 82
LOS: 1 days | Discharge: HOME | End: 2022-08-24
Attending: EMERGENCY MEDICINE | Admitting: INTERNAL MEDICINE

## 2022-08-21 ENCOUNTER — APPOINTMENT (OUTPATIENT)
Dept: NUCLEAR MEDICINE | Facility: HOSPITAL | Age: 82
End: 2022-08-21

## 2022-08-21 ENCOUNTER — APPOINTMENT (OUTPATIENT)
Dept: GENERAL RADIOLOGY | Facility: HOSPITAL | Age: 82
End: 2022-08-21

## 2022-08-21 DIAGNOSIS — R07.9 CHEST PAIN, UNSPECIFIED TYPE: Primary | ICD-10-CM

## 2022-08-21 PROBLEM — M79.89 SWELLING OF LOWER EXTREMITY: Status: ACTIVE | Noted: 2022-08-21

## 2022-08-21 PROBLEM — R73.9 HYPERGLYCEMIA: Status: ACTIVE | Noted: 2022-08-21

## 2022-08-21 PROBLEM — I48.0 PAROXYSMAL ATRIAL FIBRILLATION (HCC): Status: ACTIVE | Noted: 2022-08-21

## 2022-08-21 LAB
ALBUMIN SERPL-MCNC: 4.2 G/DL (ref 3.5–5.2)
ALBUMIN/GLOB SERPL: 1.6 G/DL
ALP SERPL-CCNC: 85 U/L (ref 39–117)
ALT SERPL W P-5'-P-CCNC: 40 U/L (ref 1–41)
ANION GAP SERPL CALCULATED.3IONS-SCNC: 10.6 MMOL/L (ref 5–15)
ANION GAP SERPL CALCULATED.3IONS-SCNC: 12.5 MMOL/L (ref 5–15)
ANISOCYTOSIS BLD QL: NORMAL
AST SERPL-CCNC: 28 U/L (ref 1–40)
BASOPHILS # BLD AUTO: 0.08 10*3/MM3 (ref 0–0.2)
BASOPHILS # BLD MANUAL: 0.3 10*3/MM3 (ref 0–0.2)
BASOPHILS NFR BLD AUTO: 0.6 % (ref 0–1.5)
BASOPHILS NFR BLD MANUAL: 2 % (ref 0–1.5)
BILIRUB SERPL-MCNC: 0.4 MG/DL (ref 0–1.2)
BUN SERPL-MCNC: 36 MG/DL (ref 8–23)
BUN SERPL-MCNC: 41 MG/DL (ref 8–23)
BUN/CREAT SERPL: 36.9 (ref 7–25)
BUN/CREAT SERPL: 40 (ref 7–25)
CALCIUM SPEC-SCNC: 9.8 MG/DL (ref 8.6–10.5)
CALCIUM SPEC-SCNC: 9.8 MG/DL (ref 8.6–10.5)
CHLORIDE SERPL-SCNC: 103 MMOL/L (ref 98–107)
CHLORIDE SERPL-SCNC: 99 MMOL/L (ref 98–107)
CK MB SERPL-CCNC: 2.1 NG/ML
CK SERPL-CCNC: 54 U/L (ref 20–200)
CO2 SERPL-SCNC: 25.4 MMOL/L (ref 22–29)
CO2 SERPL-SCNC: 28.5 MMOL/L (ref 22–29)
CREAT SERPL-MCNC: 0.9 MG/DL (ref 0.76–1.27)
CREAT SERPL-MCNC: 1.11 MG/DL (ref 0.76–1.27)
DEPRECATED RDW RBC AUTO: 47.4 FL (ref 37–54)
DEPRECATED RDW RBC AUTO: 47.7 FL (ref 37–54)
EGFRCR SERPLBLD CKD-EPI 2021: 67.5 ML/MIN/1.73
EGFRCR SERPLBLD CKD-EPI 2021: 86.9 ML/MIN/1.73
EOSINOPHIL # BLD AUTO: 0.04 10*3/MM3 (ref 0–0.4)
EOSINOPHIL # BLD MANUAL: 0.15 10*3/MM3 (ref 0–0.4)
EOSINOPHIL NFR BLD AUTO: 0.3 % (ref 0.3–6.2)
EOSINOPHIL NFR BLD MANUAL: 1 % (ref 0.3–6.2)
ERYTHROCYTE [DISTWIDTH] IN BLOOD BY AUTOMATED COUNT: 14.1 % (ref 12.3–15.4)
ERYTHROCYTE [DISTWIDTH] IN BLOOD BY AUTOMATED COUNT: 14.3 % (ref 12.3–15.4)
GLOBULIN UR ELPH-MCNC: 2.7 GM/DL
GLUCOSE SERPL-MCNC: 143 MG/DL (ref 65–99)
GLUCOSE SERPL-MCNC: 232 MG/DL (ref 65–99)
HBA1C MFR BLD: 7.3 % (ref 4.8–5.6)
HCT VFR BLD AUTO: 40.3 % (ref 37.5–51)
HCT VFR BLD AUTO: 42.8 % (ref 37.5–51)
HGB BLD-MCNC: 13.5 G/DL (ref 13–17.7)
HGB BLD-MCNC: 14.6 G/DL (ref 13–17.7)
HOLD SPECIMEN: 11
HOLD SPECIMEN: 11
IMM GRANULOCYTES # BLD AUTO: 0.62 10*3/MM3 (ref 0–0.05)
IMM GRANULOCYTES NFR BLD AUTO: 4.4 % (ref 0–0.5)
LARGE PLATELETS: NORMAL
LIPASE SERPL-CCNC: 41 U/L (ref 13–60)
LYMPHOCYTES # BLD AUTO: 1.28 10*3/MM3 (ref 0.7–3.1)
LYMPHOCYTES # BLD MANUAL: 1.82 10*3/MM3 (ref 0.7–3.1)
LYMPHOCYTES NFR BLD AUTO: 9.1 % (ref 19.6–45.3)
LYMPHOCYTES NFR BLD MANUAL: 10 % (ref 5–12)
MAGNESIUM SERPL-MCNC: 1.6 MG/DL (ref 1.6–2.4)
MCH RBC QN AUTO: 31 PG (ref 26.6–33)
MCH RBC QN AUTO: 31.1 PG (ref 26.6–33)
MCHC RBC AUTO-ENTMCNC: 33.5 G/DL (ref 31.5–35.7)
MCHC RBC AUTO-ENTMCNC: 34.1 G/DL (ref 31.5–35.7)
MCV RBC AUTO: 91.3 FL (ref 79–97)
MCV RBC AUTO: 92.4 FL (ref 79–97)
MONOCYTES # BLD AUTO: 1.32 10*3/MM3 (ref 0.1–0.9)
MONOCYTES # BLD: 1.52 10*3/MM3 (ref 0.1–0.9)
MONOCYTES NFR BLD AUTO: 9.4 % (ref 5–12)
MYELOCYTES NFR BLD MANUAL: 3 % (ref 0–0)
NEUTROPHILS # BLD AUTO: 10.94 10*3/MM3 (ref 1.7–7)
NEUTROPHILS NFR BLD AUTO: 10.76 10*3/MM3 (ref 1.7–7)
NEUTROPHILS NFR BLD AUTO: 76.2 % (ref 42.7–76)
NEUTROPHILS NFR BLD MANUAL: 71 % (ref 42.7–76)
NEUTS BAND NFR BLD MANUAL: 1 % (ref 0–5)
NRBC BLD AUTO-RTO: 0 /100 WBC (ref 0–0.2)
NT-PROBNP SERPL-MCNC: 682.4 PG/ML (ref 0–1800)
PLATELET # BLD AUTO: 269 10*3/MM3 (ref 140–450)
PLATELET # BLD AUTO: 299 10*3/MM3 (ref 140–450)
PMV BLD AUTO: 9.6 FL (ref 6–12)
PMV BLD AUTO: 9.9 FL (ref 6–12)
POTASSIUM SERPL-SCNC: 3.8 MMOL/L (ref 3.5–5.2)
POTASSIUM SERPL-SCNC: 4 MMOL/L (ref 3.5–5.2)
PROT SERPL-MCNC: 6.9 G/DL (ref 6–8.5)
QT INTERVAL: 304 MS
QT INTERVAL: 338 MS
QT INTERVAL: 346 MS
QT INTERVAL: 370 MS
RBC # BLD AUTO: 4.36 10*6/MM3 (ref 4.14–5.8)
RBC # BLD AUTO: 4.69 10*6/MM3 (ref 4.14–5.8)
RBC MORPH BLD: NORMAL
SMALL PLATELETS BLD QL SMEAR: ADEQUATE
SMALL PLATELETS BLD QL SMEAR: ADEQUATE
SODIUM SERPL-SCNC: 139 MMOL/L (ref 136–145)
SODIUM SERPL-SCNC: 140 MMOL/L (ref 136–145)
TROPONIN I SERPL-MCNC: 0 NG/ML (ref 0–0.08)
TROPONIN I SERPL-MCNC: 0.01 NG/ML (ref 0–0.08)
TROPONIN T SERPL-MCNC: 0.02 NG/ML (ref 0–0.03)
VARIANT LYMPHS NFR BLD MANUAL: 12 % (ref 19.6–45.3)
WBC MORPH BLD: NORMAL
WBC MORPH BLD: NORMAL
WBC NRBC COR # BLD: 14.1 10*3/MM3 (ref 3.4–10.8)
WBC NRBC COR # BLD: 15.2 10*3/MM3 (ref 3.4–10.8)
WHOLE BLOOD HOLD COAG: NORMAL
WHOLE BLOOD HOLD SPECIMEN: NORMAL

## 2022-08-21 PROCEDURE — 93010 ELECTROCARDIOGRAM REPORT: CPT | Performed by: INTERNAL MEDICINE

## 2022-08-21 PROCEDURE — 82550 ASSAY OF CK (CPK): CPT

## 2022-08-21 PROCEDURE — G0378 HOSPITAL OBSERVATION PER HR: HCPCS

## 2022-08-21 PROCEDURE — 99284 EMERGENCY DEPT VISIT MOD MDM: CPT

## 2022-08-21 PROCEDURE — 99223 1ST HOSP IP/OBS HIGH 75: CPT | Performed by: HOSPITALIST

## 2022-08-21 PROCEDURE — 0 TECHNETIUM TETROFOSMIN KIT: Performed by: STUDENT IN AN ORGANIZED HEALTH CARE EDUCATION/TRAINING PROGRAM

## 2022-08-21 PROCEDURE — 85007 BL SMEAR W/DIFF WBC COUNT: CPT

## 2022-08-21 PROCEDURE — A9502 TC99M TETROFOSMIN: HCPCS | Performed by: STUDENT IN AN ORGANIZED HEALTH CARE EDUCATION/TRAINING PROGRAM

## 2022-08-21 PROCEDURE — 93018 CV STRESS TEST I&R ONLY: CPT | Performed by: INTERNAL MEDICINE

## 2022-08-21 PROCEDURE — 99214 OFFICE O/P EST MOD 30 MIN: CPT | Performed by: INTERNAL MEDICINE

## 2022-08-21 PROCEDURE — 93005 ELECTROCARDIOGRAM TRACING: CPT | Performed by: HOSPITALIST

## 2022-08-21 PROCEDURE — 82553 CREATINE MB FRACTION: CPT

## 2022-08-21 PROCEDURE — 83735 ASSAY OF MAGNESIUM: CPT

## 2022-08-21 PROCEDURE — 78452 HT MUSCLE IMAGE SPECT MULT: CPT

## 2022-08-21 PROCEDURE — 85027 COMPLETE CBC AUTOMATED: CPT | Performed by: HOSPITALIST

## 2022-08-21 PROCEDURE — 85025 COMPLETE CBC W/AUTO DIFF WBC: CPT

## 2022-08-21 PROCEDURE — 83880 ASSAY OF NATRIURETIC PEPTIDE: CPT

## 2022-08-21 PROCEDURE — 71045 X-RAY EXAM CHEST 1 VIEW: CPT

## 2022-08-21 PROCEDURE — 93016 CV STRESS TEST SUPVJ ONLY: CPT | Performed by: NURSE PRACTITIONER

## 2022-08-21 PROCEDURE — 93005 ELECTROCARDIOGRAM TRACING: CPT

## 2022-08-21 PROCEDURE — 93017 CV STRESS TEST TRACING ONLY: CPT

## 2022-08-21 PROCEDURE — 78452 HT MUSCLE IMAGE SPECT MULT: CPT | Performed by: INTERNAL MEDICINE

## 2022-08-21 PROCEDURE — 83690 ASSAY OF LIPASE: CPT

## 2022-08-21 PROCEDURE — 84484 ASSAY OF TROPONIN QUANT: CPT | Performed by: HOSPITALIST

## 2022-08-21 PROCEDURE — 85007 BL SMEAR W/DIFF WBC COUNT: CPT | Performed by: HOSPITALIST

## 2022-08-21 PROCEDURE — 36415 COLL VENOUS BLD VENIPUNCTURE: CPT

## 2022-08-21 PROCEDURE — 84484 ASSAY OF TROPONIN QUANT: CPT

## 2022-08-21 PROCEDURE — 83036 HEMOGLOBIN GLYCOSYLATED A1C: CPT | Performed by: HOSPITALIST

## 2022-08-21 PROCEDURE — 80053 COMPREHEN METABOLIC PANEL: CPT

## 2022-08-21 RX ORDER — ONDANSETRON 2 MG/ML
4 INJECTION INTRAMUSCULAR; INTRAVENOUS EVERY 6 HOURS PRN
Status: DISCONTINUED | OUTPATIENT
Start: 2022-08-21 | End: 2022-08-24 | Stop reason: HOSPADM

## 2022-08-21 RX ORDER — ACETAMINOPHEN 325 MG/1
650 TABLET ORAL EVERY 4 HOURS PRN
Status: DISCONTINUED | OUTPATIENT
Start: 2022-08-21 | End: 2022-08-23 | Stop reason: SDUPTHER

## 2022-08-21 RX ORDER — ASPIRIN 81 MG/1
324 TABLET, CHEWABLE ORAL ONCE
Status: DISCONTINUED | OUTPATIENT
Start: 2022-08-21 | End: 2022-08-23 | Stop reason: SDUPTHER

## 2022-08-21 RX ORDER — AMOXICILLIN 250 MG
2 CAPSULE ORAL 2 TIMES DAILY
Status: DISCONTINUED | OUTPATIENT
Start: 2022-08-21 | End: 2022-08-24 | Stop reason: HOSPADM

## 2022-08-21 RX ORDER — BISACODYL 5 MG/1
5 TABLET, DELAYED RELEASE ORAL DAILY PRN
Status: DISCONTINUED | OUTPATIENT
Start: 2022-08-21 | End: 2022-08-24 | Stop reason: HOSPADM

## 2022-08-21 RX ORDER — CARVEDILOL 25 MG/1
25 TABLET ORAL 2 TIMES DAILY WITH MEALS
Status: DISCONTINUED | OUTPATIENT
Start: 2022-08-21 | End: 2022-08-24 | Stop reason: HOSPADM

## 2022-08-21 RX ORDER — BISACODYL 10 MG
10 SUPPOSITORY, RECTAL RECTAL DAILY PRN
Status: DISCONTINUED | OUTPATIENT
Start: 2022-08-21 | End: 2022-08-24 | Stop reason: HOSPADM

## 2022-08-21 RX ORDER — FUROSEMIDE 20 MG/1
20 TABLET ORAL DAILY
Status: DISCONTINUED | OUTPATIENT
Start: 2022-08-21 | End: 2022-08-24 | Stop reason: HOSPADM

## 2022-08-21 RX ORDER — POTASSIUM CHLORIDE 750 MG/1
10 CAPSULE, EXTENDED RELEASE ORAL DAILY
Status: DISCONTINUED | OUTPATIENT
Start: 2022-08-21 | End: 2022-08-24 | Stop reason: HOSPADM

## 2022-08-21 RX ORDER — SODIUM CHLORIDE 0.9 % (FLUSH) 0.9 %
10 SYRINGE (ML) INJECTION AS NEEDED
Status: DISCONTINUED | OUTPATIENT
Start: 2022-08-21 | End: 2022-08-24 | Stop reason: HOSPADM

## 2022-08-21 RX ORDER — AMLODIPINE BESYLATE 5 MG/1
5 TABLET ORAL DAILY
Status: DISCONTINUED | OUTPATIENT
Start: 2022-08-21 | End: 2022-08-24 | Stop reason: HOSPADM

## 2022-08-21 RX ORDER — ONDANSETRON 4 MG/1
4 TABLET, FILM COATED ORAL EVERY 6 HOURS PRN
Status: DISCONTINUED | OUTPATIENT
Start: 2022-08-21 | End: 2022-08-24 | Stop reason: HOSPADM

## 2022-08-21 RX ORDER — ASPIRIN 81 MG/1
81 TABLET ORAL DAILY
Status: DISCONTINUED | OUTPATIENT
Start: 2022-08-22 | End: 2022-08-23 | Stop reason: SDUPTHER

## 2022-08-21 RX ORDER — SODIUM CHLORIDE 9 MG/ML
40 INJECTION, SOLUTION INTRAVENOUS AS NEEDED
Status: DISCONTINUED | OUTPATIENT
Start: 2022-08-21 | End: 2022-08-24 | Stop reason: HOSPADM

## 2022-08-21 RX ORDER — POLYETHYLENE GLYCOL 3350 17 G/17G
17 POWDER, FOR SOLUTION ORAL DAILY PRN
Status: DISCONTINUED | OUTPATIENT
Start: 2022-08-21 | End: 2022-08-24 | Stop reason: HOSPADM

## 2022-08-21 RX ORDER — ROSUVASTATIN CALCIUM 5 MG/1
10 TABLET, COATED ORAL NIGHTLY
Status: DISCONTINUED | OUTPATIENT
Start: 2022-08-21 | End: 2022-08-24 | Stop reason: HOSPADM

## 2022-08-21 RX ORDER — SODIUM CHLORIDE 0.9 % (FLUSH) 0.9 %
10 SYRINGE (ML) INJECTION EVERY 12 HOURS SCHEDULED
Status: DISCONTINUED | OUTPATIENT
Start: 2022-08-21 | End: 2022-08-24 | Stop reason: HOSPADM

## 2022-08-21 RX ORDER — OFLOXACIN 3 MG/ML
1 SOLUTION/ DROPS OPHTHALMIC 4 TIMES DAILY
Status: DISCONTINUED | OUTPATIENT
Start: 2022-08-21 | End: 2022-08-21

## 2022-08-21 RX ADMIN — FUROSEMIDE 20 MG: 20 TABLET ORAL at 10:38

## 2022-08-21 RX ADMIN — NITROGLYCERIN 0.5 INCH: 20 OINTMENT TOPICAL at 02:35

## 2022-08-21 RX ADMIN — APIXABAN 5 MG: 5 TABLET, FILM COATED ORAL at 10:38

## 2022-08-21 RX ADMIN — HYDROCHLOROTHIAZIDE: 25 TABLET ORAL at 10:39

## 2022-08-21 RX ADMIN — APIXABAN 5 MG: 5 TABLET, FILM COATED ORAL at 20:21

## 2022-08-21 RX ADMIN — CARVEDILOL 25 MG: 25 TABLET, FILM COATED ORAL at 17:13

## 2022-08-21 RX ADMIN — TETROFOSMIN 1 DOSE: 1.38 INJECTION, POWDER, LYOPHILIZED, FOR SOLUTION INTRAVENOUS at 11:24

## 2022-08-21 RX ADMIN — CARVEDILOL 25 MG: 25 TABLET, FILM COATED ORAL at 07:33

## 2022-08-21 RX ADMIN — POTASSIUM CHLORIDE 10 MEQ: 750 CAPSULE, EXTENDED RELEASE ORAL at 10:38

## 2022-08-21 RX ADMIN — Medication 10 ML: at 20:21

## 2022-08-21 RX ADMIN — ROSUVASTATIN CALCIUM 10 MG: 5 TABLET, FILM COATED ORAL at 20:21

## 2022-08-21 RX ADMIN — AMLODIPINE BESYLATE 5 MG: 5 TABLET ORAL at 10:37

## 2022-08-22 ENCOUNTER — APPOINTMENT (OUTPATIENT)
Dept: CARDIOLOGY | Facility: HOSPITAL | Age: 82
End: 2022-08-22

## 2022-08-22 LAB
ALBUMIN SERPL-MCNC: 3.7 G/DL (ref 3.5–5.2)
ALBUMIN/GLOB SERPL: 1.3 G/DL
ALP SERPL-CCNC: 64 U/L (ref 39–117)
ALT SERPL W P-5'-P-CCNC: 31 U/L (ref 1–41)
ANION GAP SERPL CALCULATED.3IONS-SCNC: 12 MMOL/L (ref 5–15)
ASCENDING AORTA: 3.3 CM
AST SERPL-CCNC: 19 U/L (ref 1–40)
BASOPHILS # BLD AUTO: 0.07 10*3/MM3 (ref 0–0.2)
BASOPHILS NFR BLD AUTO: 0.4 % (ref 0–1.5)
BH CV ECHO MEAS - AO ROOT DIAM: 3.8 CM
BH CV ECHO MEAS - EDV(MOD-SP2): 58 ML
BH CV ECHO MEAS - EDV(MOD-SP4): 56 ML
BH CV ECHO MEAS - EF(MOD-BP): 53 %
BH CV ECHO MEAS - ESV(MOD-SP2): 28 ML
BH CV ECHO MEAS - ESV(MOD-SP4): 26 ML
BH CV ECHO MEAS - IVSD: 1.2 CM
BH CV ECHO MEAS - LA DIMENSION: 3.7 CM
BH CV ECHO MEAS - LAT PEAK E' VEL: 6.6 CM/SEC
BH CV ECHO MEAS - LVIDD: 3.7 CM
BH CV ECHO MEAS - LVIDS: 2.4 CM
BH CV ECHO MEAS - LVOT DIAM: 2 CM
BH CV ECHO MEAS - LVPWD: 1.1 CM
BH CV ECHO MEAS - MED PEAK E' VEL: 8.38 CM/SEC
BH CV ECHO MEAS - MV A MAX VEL: 37 CM/SEC
BH CV ECHO MEAS - MV DEC TIME: 137 MSEC
BH CV ECHO MEAS - MV E MAX VEL: 136 CM/SEC
BH CV ECHO MEAS - MV E/A: 3.6
BH CV ECHO MEAS - RAP SYSTOLE: 3 MMHG
BH CV ECHO MEAS - RVDD: 2.5 CM
BH CV ECHO MEAS - RVSP: 29 MMHG
BH CV ECHO MEAS - TR MAX PG: 26 MMHG
BH CV ECHO MEAS - TR MAX VEL: 257 CM/SEC
BH CV ECHO MEASUREMENTS AVERAGE E/E' RATIO: 18.16
BH CV IMMEDIATE POST TECH DATA BLOOD PRESSURE: NORMAL MMHG
BH CV IMMEDIATE POST TECH DATA HEART RATE: 103 BPM
BH CV IMMEDIATE POST TECH DATA OXYGEN SATS: 98 %
BH CV REST NUCLEAR ISOTOPE DOSE: 30.9 MCI
BH CV SIX MINUTE RECOVERY TECH DATA BLOOD PRESSURE: NORMAL
BH CV SIX MINUTE RECOVERY TECH DATA HEART RATE: 98 BPM
BH CV SIX MINUTE RECOVERY TECH DATA OXYGEN SATURATION: 98 %
BH CV STRESS BP STAGE 1: NORMAL
BH CV STRESS COMMENTS STAGE 1: NORMAL
BH CV STRESS DOSE REGADENOSON STAGE 1: 0.4
BH CV STRESS DURATION MIN STAGE 1: 0
BH CV STRESS DURATION SEC STAGE 1: 10
BH CV STRESS HR STAGE 1: 117
BH CV STRESS NUCLEAR ISOTOPE DOSE: 30.9 MCI
BH CV STRESS O2 STAGE 1: 98
BH CV STRESS PROTOCOL 1: NORMAL
BH CV STRESS RECOVERY BP: NORMAL MMHG
BH CV STRESS RECOVERY O2: 98 %
BH CV STRESS STAGE 1: 1
BH CV THREE MINUTE POST TECH DATA BLOOD PRESSURE: NORMAL MMHG
BH CV THREE MINUTE POST TECH DATA HEART RATE: 100 BPM
BH CV THREE MINUTE POST TECH DATA OXYGEN SATURATION: 98 %
BILIRUB SERPL-MCNC: 0.7 MG/DL (ref 0–1.2)
BUN SERPL-MCNC: 25 MG/DL (ref 8–23)
BUN/CREAT SERPL: 28.1 (ref 7–25)
CALCIUM SPEC-SCNC: 10 MG/DL (ref 8.6–10.5)
CHLORIDE SERPL-SCNC: 102 MMOL/L (ref 98–107)
CO2 SERPL-SCNC: 24 MMOL/L (ref 22–29)
CREAT SERPL-MCNC: 0.89 MG/DL (ref 0.76–1.27)
DEPRECATED RDW RBC AUTO: 46 FL (ref 37–54)
EGFRCR SERPLBLD CKD-EPI 2021: 87.2 ML/MIN/1.73
EOSINOPHIL # BLD AUTO: 0.04 10*3/MM3 (ref 0–0.4)
EOSINOPHIL NFR BLD AUTO: 0.2 % (ref 0.3–6.2)
ERYTHROCYTE [DISTWIDTH] IN BLOOD BY AUTOMATED COUNT: 14 % (ref 12.3–15.4)
GLOBULIN UR ELPH-MCNC: 2.9 GM/DL
GLUCOSE SERPL-MCNC: 135 MG/DL (ref 65–99)
HCT VFR BLD AUTO: 43.6 % (ref 37.5–51)
HGB BLD-MCNC: 14.8 G/DL (ref 13–17.7)
IMM GRANULOCYTES # BLD AUTO: 0.7 10*3/MM3 (ref 0–0.05)
IMM GRANULOCYTES NFR BLD AUTO: 3.9 % (ref 0–0.5)
IVRT: 92 MSEC
LEFT ATRIUM VOLUME INDEX: 38.4 ML/M2
LV EF NUC BP: 48 %
LYMPHOCYTES # BLD AUTO: 1.35 10*3/MM3 (ref 0.7–3.1)
LYMPHOCYTES NFR BLD AUTO: 7.5 % (ref 19.6–45.3)
MAGNESIUM SERPL-MCNC: 1.3 MG/DL (ref 1.6–2.4)
MAXIMAL PREDICTED HEART RATE: 141 BPM
MAXIMAL PREDICTED HEART RATE: 141 BPM
MCH RBC QN AUTO: 30.7 PG (ref 26.6–33)
MCHC RBC AUTO-ENTMCNC: 33.9 G/DL (ref 31.5–35.7)
MCV RBC AUTO: 90.5 FL (ref 79–97)
MONOCYTES # BLD AUTO: 1.67 10*3/MM3 (ref 0.1–0.9)
MONOCYTES NFR BLD AUTO: 9.3 % (ref 5–12)
NEUTROPHILS NFR BLD AUTO: 14.17 10*3/MM3 (ref 1.7–7)
NEUTROPHILS NFR BLD AUTO: 78.7 % (ref 42.7–76)
NRBC BLD AUTO-RTO: 0 /100 WBC (ref 0–0.2)
PERCENT MAX PREDICTED HR: 82.98 %
PHOSPHATE SERPL-MCNC: 3 MG/DL (ref 2.5–4.5)
PLATELET # BLD AUTO: 262 10*3/MM3 (ref 140–450)
PMV BLD AUTO: 9.7 FL (ref 6–12)
POTASSIUM SERPL-SCNC: 3.8 MMOL/L (ref 3.5–5.2)
PROT SERPL-MCNC: 6.6 G/DL (ref 6–8.5)
RBC # BLD AUTO: 4.82 10*6/MM3 (ref 4.14–5.8)
RBC MORPH BLD: NORMAL
SMALL PLATELETS BLD QL SMEAR: ADEQUATE
SODIUM SERPL-SCNC: 138 MMOL/L (ref 136–145)
STRESS BASELINE BP: NORMAL MMHG
STRESS BASELINE HR: 86 BPM
STRESS O2 SAT REST: 98 %
STRESS PERCENT HR: 98 %
STRESS POST O2 SAT PEAK: 98 %
STRESS POST PEAK BP: NORMAL MMHG
STRESS POST PEAK HR: 117 BPM
STRESS TARGET HR: 120 BPM
STRESS TARGET HR: 120 BPM
WBC MORPH BLD: NORMAL
WBC NRBC COR # BLD: 18 10*3/MM3 (ref 3.4–10.8)

## 2022-08-22 PROCEDURE — 83735 ASSAY OF MAGNESIUM: CPT | Performed by: STUDENT IN AN ORGANIZED HEALTH CARE EDUCATION/TRAINING PROGRAM

## 2022-08-22 PROCEDURE — 0 TECHNETIUM TETROFOSMIN KIT: Performed by: STUDENT IN AN ORGANIZED HEALTH CARE EDUCATION/TRAINING PROGRAM

## 2022-08-22 PROCEDURE — 84100 ASSAY OF PHOSPHORUS: CPT | Performed by: STUDENT IN AN ORGANIZED HEALTH CARE EDUCATION/TRAINING PROGRAM

## 2022-08-22 PROCEDURE — 25010000002 MAGNESIUM SULFATE IN D5W 1G/100ML (PREMIX) 1-5 GM/100ML-% SOLUTION: Performed by: STUDENT IN AN ORGANIZED HEALTH CARE EDUCATION/TRAINING PROGRAM

## 2022-08-22 PROCEDURE — 85025 COMPLETE CBC W/AUTO DIFF WBC: CPT | Performed by: STUDENT IN AN ORGANIZED HEALTH CARE EDUCATION/TRAINING PROGRAM

## 2022-08-22 PROCEDURE — 85007 BL SMEAR W/DIFF WBC COUNT: CPT | Performed by: STUDENT IN AN ORGANIZED HEALTH CARE EDUCATION/TRAINING PROGRAM

## 2022-08-22 PROCEDURE — 93306 TTE W/DOPPLER COMPLETE: CPT | Performed by: INTERNAL MEDICINE

## 2022-08-22 PROCEDURE — G0378 HOSPITAL OBSERVATION PER HR: HCPCS

## 2022-08-22 PROCEDURE — 80053 COMPREHEN METABOLIC PANEL: CPT | Performed by: STUDENT IN AN ORGANIZED HEALTH CARE EDUCATION/TRAINING PROGRAM

## 2022-08-22 PROCEDURE — 99214 OFFICE O/P EST MOD 30 MIN: CPT | Performed by: INTERNAL MEDICINE

## 2022-08-22 PROCEDURE — A9502 TC99M TETROFOSMIN: HCPCS | Performed by: STUDENT IN AN ORGANIZED HEALTH CARE EDUCATION/TRAINING PROGRAM

## 2022-08-22 PROCEDURE — 99233 SBSQ HOSP IP/OBS HIGH 50: CPT | Performed by: STUDENT IN AN ORGANIZED HEALTH CARE EDUCATION/TRAINING PROGRAM

## 2022-08-22 PROCEDURE — 25010000002 REGADENOSON 0.4 MG/5ML SOLUTION: Performed by: STUDENT IN AN ORGANIZED HEALTH CARE EDUCATION/TRAINING PROGRAM

## 2022-08-22 PROCEDURE — 93306 TTE W/DOPPLER COMPLETE: CPT

## 2022-08-22 RX ORDER — MAGNESIUM SULFATE 1 G/100ML
1 INJECTION INTRAVENOUS ONCE
Status: COMPLETED | OUTPATIENT
Start: 2022-08-22 | End: 2022-08-22

## 2022-08-22 RX ORDER — SODIUM CHLORIDE 9 MG/ML
1-3 INJECTION, SOLUTION INTRAVENOUS CONTINUOUS
Status: DISCONTINUED | OUTPATIENT
Start: 2022-08-23 | End: 2022-08-24 | Stop reason: HOSPADM

## 2022-08-22 RX ORDER — ASPIRIN 325 MG
325 TABLET, DELAYED RELEASE (ENTERIC COATED) ORAL ONCE
Status: COMPLETED | OUTPATIENT
Start: 2022-08-22 | End: 2022-08-22

## 2022-08-22 RX ORDER — CALCIUM CARBONATE 200(500)MG
2 TABLET,CHEWABLE ORAL 3 TIMES DAILY PRN
Status: DISCONTINUED | OUTPATIENT
Start: 2022-08-22 | End: 2022-08-24 | Stop reason: HOSPADM

## 2022-08-22 RX ORDER — SODIUM CHLORIDE 9 MG/ML
1-3 INJECTION, SOLUTION INTRAVENOUS CONTINUOUS
Status: DISCONTINUED | OUTPATIENT
Start: 2022-08-22 | End: 2022-08-22

## 2022-08-22 RX ORDER — ALUMINA, MAGNESIA, AND SIMETHICONE 2400; 2400; 240 MG/30ML; MG/30ML; MG/30ML
15 SUSPENSION ORAL EVERY 6 HOURS PRN
Status: DISCONTINUED | OUTPATIENT
Start: 2022-08-22 | End: 2022-08-24 | Stop reason: HOSPADM

## 2022-08-22 RX ADMIN — CARVEDILOL 25 MG: 25 TABLET, FILM COATED ORAL at 10:40

## 2022-08-22 RX ADMIN — ASPIRIN 325 MG: 325 TABLET, COATED ORAL at 13:57

## 2022-08-22 RX ADMIN — CARVEDILOL 25 MG: 25 TABLET, FILM COATED ORAL at 17:08

## 2022-08-22 RX ADMIN — REGADENOSON 0.4 MG: 0.08 INJECTION, SOLUTION INTRAVENOUS at 08:50

## 2022-08-22 RX ADMIN — HYDROCHLOROTHIAZIDE: 25 TABLET ORAL at 10:39

## 2022-08-22 RX ADMIN — POTASSIUM CHLORIDE 10 MEQ: 750 CAPSULE, EXTENDED RELEASE ORAL at 10:39

## 2022-08-22 RX ADMIN — Medication 10 ML: at 21:04

## 2022-08-22 RX ADMIN — NITROGLYCERIN 1 INCH: 20 OINTMENT TOPICAL at 17:08

## 2022-08-22 RX ADMIN — ROSUVASTATIN CALCIUM 10 MG: 5 TABLET, FILM COATED ORAL at 21:04

## 2022-08-22 RX ADMIN — FUROSEMIDE 20 MG: 20 TABLET ORAL at 10:40

## 2022-08-22 RX ADMIN — AMLODIPINE BESYLATE 5 MG: 5 TABLET ORAL at 10:40

## 2022-08-22 RX ADMIN — MAGNESIUM SULFATE HEPTAHYDRATE 1 G: 10 INJECTION, SOLUTION INTRAVENOUS at 15:24

## 2022-08-22 RX ADMIN — TETROFOSMIN 1 DOSE: 1.38 INJECTION, POWDER, LYOPHILIZED, FOR SOLUTION INTRAVENOUS at 08:50

## 2022-08-22 RX ADMIN — APIXABAN 5 MG: 5 TABLET, FILM COATED ORAL at 10:37

## 2022-08-23 PROBLEM — I24.9 ACS (ACUTE CORONARY SYNDROME) (HCC): Status: ACTIVE | Noted: 2022-08-23

## 2022-08-23 LAB
ACT BLD: 266 SECONDS (ref 89–137)
ACT BLD: 271 SECONDS (ref 89–137)
ACT BLD: 323 SECONDS (ref 89–137)
ALBUMIN SERPL-MCNC: 3.3 G/DL (ref 3.5–5.2)
ALBUMIN/GLOB SERPL: 1.3 G/DL
ALP SERPL-CCNC: 61 U/L (ref 39–117)
ALT SERPL W P-5'-P-CCNC: 26 U/L (ref 1–41)
ANION GAP SERPL CALCULATED.3IONS-SCNC: 10.3 MMOL/L (ref 5–15)
AST SERPL-CCNC: 16 U/L (ref 1–40)
BASOPHILS # BLD MANUAL: 0.16 10*3/MM3 (ref 0–0.2)
BASOPHILS NFR BLD MANUAL: 1 % (ref 0–1.5)
BILIRUB SERPL-MCNC: 0.5 MG/DL (ref 0–1.2)
BUN SERPL-MCNC: 37 MG/DL (ref 8–23)
BUN/CREAT SERPL: 33 (ref 7–25)
CALCIUM SPEC-SCNC: 9.3 MG/DL (ref 8.6–10.5)
CHLORIDE SERPL-SCNC: 102 MMOL/L (ref 98–107)
CO2 SERPL-SCNC: 23.7 MMOL/L (ref 22–29)
CREAT SERPL-MCNC: 1.12 MG/DL (ref 0.76–1.27)
DEPRECATED RDW RBC AUTO: 46.6 FL (ref 37–54)
EGFRCR SERPLBLD CKD-EPI 2021: 66.8 ML/MIN/1.73
EOSINOPHIL # BLD MANUAL: 0.32 10*3/MM3 (ref 0–0.4)
EOSINOPHIL NFR BLD MANUAL: 2 % (ref 0.3–6.2)
ERYTHROCYTE [DISTWIDTH] IN BLOOD BY AUTOMATED COUNT: 14.3 % (ref 12.3–15.4)
GLOBULIN UR ELPH-MCNC: 2.5 GM/DL
GLUCOSE SERPL-MCNC: 142 MG/DL (ref 65–99)
HCT VFR BLD AUTO: 38.7 % (ref 37.5–51)
HGB BLD-MCNC: 13.7 G/DL (ref 13–17.7)
LYMPHOCYTES # BLD MANUAL: 1.43 10*3/MM3 (ref 0.7–3.1)
LYMPHOCYTES NFR BLD MANUAL: 4 % (ref 5–12)
MAGNESIUM SERPL-MCNC: 1.7 MG/DL (ref 1.6–2.4)
MCH RBC QN AUTO: 32 PG (ref 26.6–33)
MCHC RBC AUTO-ENTMCNC: 35.4 G/DL (ref 31.5–35.7)
MCV RBC AUTO: 90.4 FL (ref 79–97)
MONOCYTES # BLD: 0.63 10*3/MM3 (ref 0.1–0.9)
MYELOCYTES NFR BLD MANUAL: 3 % (ref 0–0)
NEUTROPHILS # BLD AUTO: 12.83 10*3/MM3 (ref 1.7–7)
NEUTROPHILS NFR BLD MANUAL: 80 % (ref 42.7–76)
NEUTS BAND NFR BLD MANUAL: 1 % (ref 0–5)
PHOSPHATE SERPL-MCNC: 4.2 MG/DL (ref 2.5–4.5)
PLAT MORPH BLD: NORMAL
PLATELET # BLD AUTO: 241 10*3/MM3 (ref 140–450)
PMV BLD AUTO: 10.1 FL (ref 6–12)
POTASSIUM SERPL-SCNC: 3.9 MMOL/L (ref 3.5–5.2)
PROT SERPL-MCNC: 5.8 G/DL (ref 6–8.5)
RBC # BLD AUTO: 4.28 10*6/MM3 (ref 4.14–5.8)
RBC MORPH BLD: NORMAL
SCAN SLIDE: NORMAL
SODIUM SERPL-SCNC: 136 MMOL/L (ref 136–145)
VARIANT LYMPHS NFR BLD MANUAL: 9 % (ref 19.6–45.3)
WBC MORPH BLD: NORMAL
WBC NRBC COR # BLD: 15.84 10*3/MM3 (ref 3.4–10.8)

## 2022-08-23 PROCEDURE — C1725 CATH, TRANSLUMIN NON-LASER: HCPCS | Performed by: INTERNAL MEDICINE

## 2022-08-23 PROCEDURE — 92928 PRQ TCAT PLMT NTRAC ST 1 LES: CPT | Performed by: INTERNAL MEDICINE

## 2022-08-23 PROCEDURE — 84100 ASSAY OF PHOSPHORUS: CPT | Performed by: STUDENT IN AN ORGANIZED HEALTH CARE EDUCATION/TRAINING PROGRAM

## 2022-08-23 PROCEDURE — C1894 INTRO/SHEATH, NON-LASER: HCPCS | Performed by: INTERNAL MEDICINE

## 2022-08-23 PROCEDURE — C1769 GUIDE WIRE: HCPCS | Performed by: INTERNAL MEDICINE

## 2022-08-23 PROCEDURE — 85007 BL SMEAR W/DIFF WBC COUNT: CPT | Performed by: STUDENT IN AN ORGANIZED HEALTH CARE EDUCATION/TRAINING PROGRAM

## 2022-08-23 PROCEDURE — 99232 SBSQ HOSP IP/OBS MODERATE 35: CPT | Performed by: INTERNAL MEDICINE

## 2022-08-23 PROCEDURE — B2111ZZ FLUOROSCOPY OF MULTIPLE CORONARY ARTERIES USING LOW OSMOLAR CONTRAST: ICD-10-PCS | Performed by: INTERNAL MEDICINE

## 2022-08-23 PROCEDURE — 93458 L HRT ARTERY/VENTRICLE ANGIO: CPT | Performed by: INTERNAL MEDICINE

## 2022-08-23 PROCEDURE — C9600 PERC DRUG-EL COR STENT SING: HCPCS | Performed by: INTERNAL MEDICINE

## 2022-08-23 PROCEDURE — 92978 ENDOLUMINL IVUS OCT C 1ST: CPT | Performed by: INTERNAL MEDICINE

## 2022-08-23 PROCEDURE — 99152 MOD SED SAME PHYS/QHP 5/>YRS: CPT | Performed by: INTERNAL MEDICINE

## 2022-08-23 PROCEDURE — 25010000002 FENTANYL CITRATE (PF) 100 MCG/2ML SOLUTION: Performed by: INTERNAL MEDICINE

## 2022-08-23 PROCEDURE — C1753 CATH, INTRAVAS ULTRASOUND: HCPCS | Performed by: INTERNAL MEDICINE

## 2022-08-23 PROCEDURE — 83735 ASSAY OF MAGNESIUM: CPT | Performed by: STUDENT IN AN ORGANIZED HEALTH CARE EDUCATION/TRAINING PROGRAM

## 2022-08-23 PROCEDURE — C1874 STENT, COATED/COV W/DEL SYS: HCPCS | Performed by: INTERNAL MEDICINE

## 2022-08-23 PROCEDURE — 25010000002 MIDAZOLAM PER 1 MG: Performed by: INTERNAL MEDICINE

## 2022-08-23 PROCEDURE — 80053 COMPREHEN METABOLIC PANEL: CPT | Performed by: STUDENT IN AN ORGANIZED HEALTH CARE EDUCATION/TRAINING PROGRAM

## 2022-08-23 PROCEDURE — 85347 COAGULATION TIME ACTIVATED: CPT

## 2022-08-23 PROCEDURE — C1887 CATHETER, GUIDING: HCPCS | Performed by: INTERNAL MEDICINE

## 2022-08-23 PROCEDURE — 4A023N7 MEASUREMENT OF CARDIAC SAMPLING AND PRESSURE, LEFT HEART, PERCUTANEOUS APPROACH: ICD-10-PCS | Performed by: INTERNAL MEDICINE

## 2022-08-23 PROCEDURE — B240ZZ3 ULTRASONOGRAPHY OF SINGLE CORONARY ARTERY, INTRAVASCULAR: ICD-10-PCS | Performed by: INTERNAL MEDICINE

## 2022-08-23 PROCEDURE — 027034Z DILATION OF CORONARY ARTERY, ONE ARTERY WITH DRUG-ELUTING INTRALUMINAL DEVICE, PERCUTANEOUS APPROACH: ICD-10-PCS | Performed by: INTERNAL MEDICINE

## 2022-08-23 PROCEDURE — 99153 MOD SED SAME PHYS/QHP EA: CPT | Performed by: INTERNAL MEDICINE

## 2022-08-23 PROCEDURE — 0 IOPAMIDOL PER 1 ML: Performed by: INTERNAL MEDICINE

## 2022-08-23 PROCEDURE — 99233 SBSQ HOSP IP/OBS HIGH 50: CPT | Performed by: INTERNAL MEDICINE

## 2022-08-23 PROCEDURE — 85025 COMPLETE CBC W/AUTO DIFF WBC: CPT | Performed by: STUDENT IN AN ORGANIZED HEALTH CARE EDUCATION/TRAINING PROGRAM

## 2022-08-23 PROCEDURE — 25010000002 HEPARIN (PORCINE) PER 1000 UNITS: Performed by: INTERNAL MEDICINE

## 2022-08-23 PROCEDURE — 02C03ZZ EXTIRPATION OF MATTER FROM CORONARY ARTERY, ONE ARTERY, PERCUTANEOUS APPROACH: ICD-10-PCS | Performed by: INTERNAL MEDICINE

## 2022-08-23 DEVICE — XIENCE SKYPOINT™ EVEROLIMUS ELUTING CORONARY STENT SYSTEM 3.25 MM X 23 MM / RAPID-EXCHANGE
Type: IMPLANTABLE DEVICE | Status: FUNCTIONAL
Brand: XIENCE SKYPOINT™

## 2022-08-23 RX ORDER — HEPARIN SODIUM 1000 [USP'U]/ML
INJECTION, SOLUTION INTRAVENOUS; SUBCUTANEOUS AS NEEDED
Status: DISCONTINUED | OUTPATIENT
Start: 2022-08-23 | End: 2022-08-23 | Stop reason: HOSPADM

## 2022-08-23 RX ORDER — CLOPIDOGREL BISULFATE 75 MG/1
TABLET ORAL AS NEEDED
Status: DISCONTINUED | OUTPATIENT
Start: 2022-08-23 | End: 2022-08-23 | Stop reason: HOSPADM

## 2022-08-23 RX ORDER — FENTANYL CITRATE 50 UG/ML
INJECTION, SOLUTION INTRAMUSCULAR; INTRAVENOUS AS NEEDED
Status: DISCONTINUED | OUTPATIENT
Start: 2022-08-23 | End: 2022-08-23 | Stop reason: HOSPADM

## 2022-08-23 RX ORDER — VERAPAMIL HYDROCHLORIDE 2.5 MG/ML
INJECTION, SOLUTION INTRAVENOUS AS NEEDED
Status: DISCONTINUED | OUTPATIENT
Start: 2022-08-23 | End: 2022-08-23 | Stop reason: HOSPADM

## 2022-08-23 RX ORDER — ROSUVASTATIN CALCIUM 5 MG/1
10 TABLET, COATED ORAL NIGHTLY
Status: DISCONTINUED | OUTPATIENT
Start: 2022-08-23 | End: 2022-08-24 | Stop reason: HOSPADM

## 2022-08-23 RX ORDER — DIAPER,BRIEF,INFANT-TODD,DISP
1 EACH MISCELLANEOUS ONCE
Status: COMPLETED | OUTPATIENT
Start: 2022-08-23 | End: 2022-08-23

## 2022-08-23 RX ORDER — MIDAZOLAM HYDROCHLORIDE 1 MG/ML
INJECTION INTRAMUSCULAR; INTRAVENOUS AS NEEDED
Status: DISCONTINUED | OUTPATIENT
Start: 2022-08-23 | End: 2022-08-23 | Stop reason: HOSPADM

## 2022-08-23 RX ORDER — CLOPIDOGREL BISULFATE 75 MG/1
75 TABLET ORAL DAILY
Status: DISCONTINUED | OUTPATIENT
Start: 2022-08-24 | End: 2022-08-24 | Stop reason: HOSPADM

## 2022-08-23 RX ORDER — ACETAMINOPHEN 325 MG/1
650 TABLET ORAL EVERY 4 HOURS PRN
Status: DISCONTINUED | OUTPATIENT
Start: 2022-08-23 | End: 2022-08-24 | Stop reason: HOSPADM

## 2022-08-23 RX ORDER — ASPIRIN 81 MG/1
81 TABLET ORAL DAILY
Status: DISCONTINUED | OUTPATIENT
Start: 2022-08-23 | End: 2022-08-24 | Stop reason: HOSPADM

## 2022-08-23 RX ORDER — LIDOCAINE HYDROCHLORIDE 20 MG/ML
INJECTION, SOLUTION INFILTRATION; PERINEURAL AS NEEDED
Status: DISCONTINUED | OUTPATIENT
Start: 2022-08-23 | End: 2022-08-23 | Stop reason: HOSPADM

## 2022-08-23 RX ADMIN — FUROSEMIDE 20 MG: 20 TABLET ORAL at 10:28

## 2022-08-23 RX ADMIN — HYDROCHLOROTHIAZIDE: 25 TABLET ORAL at 10:27

## 2022-08-23 RX ADMIN — Medication 1 APPLICATION: at 13:02

## 2022-08-23 RX ADMIN — SODIUM CHLORIDE 1.17 ML/KG/HR: 9 INJECTION, SOLUTION INTRAVENOUS at 10:29

## 2022-08-23 RX ADMIN — NITROGLYCERIN 1 INCH: 20 OINTMENT TOPICAL at 02:00

## 2022-08-23 RX ADMIN — POTASSIUM CHLORIDE 10 MEQ: 750 CAPSULE, EXTENDED RELEASE ORAL at 10:27

## 2022-08-23 RX ADMIN — Medication 10 ML: at 20:03

## 2022-08-23 RX ADMIN — CARVEDILOL 25 MG: 25 TABLET, FILM COATED ORAL at 17:22

## 2022-08-23 RX ADMIN — ROSUVASTATIN CALCIUM 10 MG: 5 TABLET, FILM COATED ORAL at 20:02

## 2022-08-23 RX ADMIN — NITROGLYCERIN 1 INCH: 20 OINTMENT TOPICAL at 06:07

## 2022-08-23 RX ADMIN — CARVEDILOL 25 MG: 25 TABLET, FILM COATED ORAL at 10:27

## 2022-08-23 RX ADMIN — ASPIRIN 81 MG: 81 TABLET, COATED ORAL at 10:27

## 2022-08-24 ENCOUNTER — READMISSION MANAGEMENT (OUTPATIENT)
Dept: CALL CENTER | Facility: HOSPITAL | Age: 82
End: 2022-08-24

## 2022-08-24 VITALS
SYSTOLIC BLOOD PRESSURE: 106 MMHG | HEIGHT: 70 IN | BODY MASS INDEX: 26.99 KG/M2 | OXYGEN SATURATION: 97 % | TEMPERATURE: 97.8 F | HEART RATE: 83 BPM | WEIGHT: 188.49 LBS | RESPIRATION RATE: 17 BRPM | DIASTOLIC BLOOD PRESSURE: 72 MMHG

## 2022-08-24 LAB
ANION GAP SERPL CALCULATED.3IONS-SCNC: 11.1 MMOL/L (ref 5–15)
BUN SERPL-MCNC: 31 MG/DL (ref 8–23)
BUN/CREAT SERPL: 29 (ref 7–25)
CALCIUM SPEC-SCNC: 9 MG/DL (ref 8.6–10.5)
CHLORIDE SERPL-SCNC: 98 MMOL/L (ref 98–107)
CHOLEST SERPL-MCNC: 131 MG/DL (ref 0–200)
CO2 SERPL-SCNC: 25.9 MMOL/L (ref 22–29)
CREAT SERPL-MCNC: 1.07 MG/DL (ref 0.76–1.27)
DEPRECATED RDW RBC AUTO: 46.4 FL (ref 37–54)
EGFRCR SERPLBLD CKD-EPI 2021: 70.6 ML/MIN/1.73
ERYTHROCYTE [DISTWIDTH] IN BLOOD BY AUTOMATED COUNT: 14 % (ref 12.3–15.4)
GLUCOSE SERPL-MCNC: 148 MG/DL (ref 65–99)
HCT VFR BLD AUTO: 40.8 % (ref 37.5–51)
HDLC SERPL-MCNC: 40 MG/DL (ref 40–60)
HGB BLD-MCNC: 14.1 G/DL (ref 13–17.7)
LDLC SERPL CALC-MCNC: 72 MG/DL (ref 0–100)
LDLC/HDLC SERPL: 1.76 {RATIO}
MAGNESIUM SERPL-MCNC: 1.7 MG/DL (ref 1.6–2.4)
MCH RBC QN AUTO: 31.3 PG (ref 26.6–33)
MCHC RBC AUTO-ENTMCNC: 34.6 G/DL (ref 31.5–35.7)
MCV RBC AUTO: 90.5 FL (ref 79–97)
PLATELET # BLD AUTO: 237 10*3/MM3 (ref 140–450)
PMV BLD AUTO: 9.8 FL (ref 6–12)
POTASSIUM SERPL-SCNC: 4.3 MMOL/L (ref 3.5–5.2)
QT INTERVAL: 349 MS
RBC # BLD AUTO: 4.51 10*6/MM3 (ref 4.14–5.8)
SODIUM SERPL-SCNC: 135 MMOL/L (ref 136–145)
TRIGL SERPL-MCNC: 103 MG/DL (ref 0–150)
VLDLC SERPL-MCNC: 19 MG/DL (ref 5–40)
WBC NRBC COR # BLD: 16.57 10*3/MM3 (ref 3.4–10.8)

## 2022-08-24 PROCEDURE — 83735 ASSAY OF MAGNESIUM: CPT | Performed by: INTERNAL MEDICINE

## 2022-08-24 PROCEDURE — 80061 LIPID PANEL: CPT | Performed by: INTERNAL MEDICINE

## 2022-08-24 PROCEDURE — 99239 HOSP IP/OBS DSCHRG MGMT >30: CPT | Performed by: INTERNAL MEDICINE

## 2022-08-24 PROCEDURE — 99231 SBSQ HOSP IP/OBS SF/LOW 25: CPT | Performed by: INTERNAL MEDICINE

## 2022-08-24 PROCEDURE — 93005 ELECTROCARDIOGRAM TRACING: CPT | Performed by: INTERNAL MEDICINE

## 2022-08-24 PROCEDURE — 85027 COMPLETE CBC AUTOMATED: CPT | Performed by: INTERNAL MEDICINE

## 2022-08-24 PROCEDURE — 80048 BASIC METABOLIC PNL TOTAL CA: CPT | Performed by: INTERNAL MEDICINE

## 2022-08-24 RX ORDER — CLOPIDOGREL BISULFATE 75 MG/1
75 TABLET ORAL DAILY
Qty: 90 TABLET | Refills: 0 | Status: SHIPPED | OUTPATIENT
Start: 2022-08-25 | End: 2022-11-23

## 2022-08-24 RX ORDER — CLOPIDOGREL BISULFATE 75 MG/1
75 TABLET ORAL DAILY
Qty: 90 TABLET | Refills: 3 | OUTPATIENT
Start: 2022-08-24

## 2022-08-24 RX ORDER — ASPIRIN 81 MG/1
81 TABLET ORAL DAILY
Qty: 30 TABLET | Refills: 0 | OUTPATIENT
Start: 2022-08-24

## 2022-08-24 RX ORDER — ASPIRIN 81 MG/1
81 TABLET ORAL DAILY
Start: 2022-08-25 | End: 2022-10-25

## 2022-08-24 RX ADMIN — ASPIRIN 81 MG: 81 TABLET, COATED ORAL at 09:15

## 2022-08-24 RX ADMIN — Medication 10 ML: at 09:16

## 2022-08-24 RX ADMIN — APIXABAN 5 MG: 5 TABLET, FILM COATED ORAL at 09:15

## 2022-08-24 RX ADMIN — AMLODIPINE BESYLATE 5 MG: 5 TABLET ORAL at 09:15

## 2022-08-24 RX ADMIN — POTASSIUM CHLORIDE 10 MEQ: 750 CAPSULE, EXTENDED RELEASE ORAL at 09:15

## 2022-08-24 RX ADMIN — HYDROCHLOROTHIAZIDE: 25 TABLET ORAL at 09:15

## 2022-08-24 RX ADMIN — CLOPIDOGREL BISULFATE 75 MG: 75 TABLET ORAL at 09:15

## 2022-08-24 RX ADMIN — CARVEDILOL 25 MG: 25 TABLET, FILM COATED ORAL at 09:15

## 2022-08-24 RX ADMIN — FUROSEMIDE 20 MG: 20 TABLET ORAL at 09:15

## 2022-08-25 ENCOUNTER — TRANSITIONAL CARE MANAGEMENT TELEPHONE ENCOUNTER (OUTPATIENT)
Dept: CALL CENTER | Facility: HOSPITAL | Age: 82
End: 2022-08-25

## 2022-08-25 ENCOUNTER — OFFICE VISIT (OUTPATIENT)
Dept: INTERNAL MEDICINE | Facility: CLINIC | Age: 82
End: 2022-08-25

## 2022-08-25 VITALS
DIASTOLIC BLOOD PRESSURE: 66 MMHG | WEIGHT: 186 LBS | SYSTOLIC BLOOD PRESSURE: 101 MMHG | HEIGHT: 70 IN | BODY MASS INDEX: 26.63 KG/M2 | HEART RATE: 91 BPM | TEMPERATURE: 97.2 F

## 2022-08-25 DIAGNOSIS — Z09 HOSPITAL DISCHARGE FOLLOW-UP: ICD-10-CM

## 2022-08-25 DIAGNOSIS — I48.19 ATRIAL FIBRILLATION, PERSISTENT: ICD-10-CM

## 2022-08-25 DIAGNOSIS — I10 ESSENTIAL HYPERTENSION: Primary | ICD-10-CM

## 2022-08-25 DIAGNOSIS — I24.9 ACS (ACUTE CORONARY SYNDROME): ICD-10-CM

## 2022-08-25 DIAGNOSIS — E78.5 HYPERLIPIDEMIA LDL GOAL <100: ICD-10-CM

## 2022-08-25 DIAGNOSIS — E11.9 TYPE 2 DIABETES MELLITUS WITHOUT COMPLICATION, WITHOUT LONG-TERM CURRENT USE OF INSULIN: ICD-10-CM

## 2022-08-25 PROBLEM — R07.9 CHEST PAIN, UNSPECIFIED TYPE: Status: RESOLVED | Noted: 2022-08-21 | Resolved: 2022-08-25

## 2022-08-25 PROCEDURE — 99215 OFFICE O/P EST HI 40 MIN: CPT | Performed by: INTERNAL MEDICINE

## 2022-08-25 RX ORDER — ROSUVASTATIN CALCIUM 20 MG/1
20 TABLET, COATED ORAL NIGHTLY
Qty: 90 TABLET | Refills: 1 | Status: SHIPPED | OUTPATIENT
Start: 2022-08-25 | End: 2023-01-12 | Stop reason: SDUPTHER

## 2022-09-06 ENCOUNTER — OFFICE VISIT (OUTPATIENT)
Dept: CARDIOLOGY | Facility: CLINIC | Age: 82
End: 2022-09-06

## 2022-09-06 VITALS
BODY MASS INDEX: 26.14 KG/M2 | WEIGHT: 182.6 LBS | HEART RATE: 79 BPM | HEIGHT: 70 IN | SYSTOLIC BLOOD PRESSURE: 115 MMHG | DIASTOLIC BLOOD PRESSURE: 72 MMHG

## 2022-09-06 DIAGNOSIS — E78.5 HYPERLIPIDEMIA LDL GOAL <70: ICD-10-CM

## 2022-09-06 DIAGNOSIS — R60.0 LOWER EXTREMITY EDEMA: Primary | ICD-10-CM

## 2022-09-06 DIAGNOSIS — I10 ESSENTIAL HYPERTENSION: ICD-10-CM

## 2022-09-06 DIAGNOSIS — I48.19 ATRIAL FIBRILLATION, PERSISTENT: ICD-10-CM

## 2022-09-06 DIAGNOSIS — Z98.61 CAD S/P PERCUTANEOUS CORONARY ANGIOPLASTY: ICD-10-CM

## 2022-09-06 DIAGNOSIS — I25.10 CAD S/P PERCUTANEOUS CORONARY ANGIOPLASTY: ICD-10-CM

## 2022-09-06 PROCEDURE — 99214 OFFICE O/P EST MOD 30 MIN: CPT | Performed by: INTERNAL MEDICINE

## 2022-09-09 ENCOUNTER — READMISSION MANAGEMENT (OUTPATIENT)
Dept: CALL CENTER | Facility: HOSPITAL | Age: 82
End: 2022-09-09

## 2022-09-15 ENCOUNTER — OFFICE VISIT (OUTPATIENT)
Dept: CARDIAC REHAB | Facility: HOSPITAL | Age: 82
End: 2022-09-15

## 2022-09-15 VITALS
OXYGEN SATURATION: 97 % | DIASTOLIC BLOOD PRESSURE: 70 MMHG | BODY MASS INDEX: 26.26 KG/M2 | WEIGHT: 183.42 LBS | HEIGHT: 70 IN | SYSTOLIC BLOOD PRESSURE: 110 MMHG | HEART RATE: 110 BPM | RESPIRATION RATE: 18 BRPM

## 2022-09-15 DIAGNOSIS — Z98.61 CAD S/P PERCUTANEOUS CORONARY ANGIOPLASTY: Primary | ICD-10-CM

## 2022-09-15 DIAGNOSIS — I25.10 CAD S/P PERCUTANEOUS CORONARY ANGIOPLASTY: Primary | ICD-10-CM

## 2022-09-15 PROCEDURE — 93798 PHYS/QHP OP CAR RHAB W/ECG: CPT

## 2022-09-15 RX ORDER — ALBUTEROL SULFATE 90 UG/1
2 AEROSOL, METERED RESPIRATORY (INHALATION) EVERY 6 HOURS PRN
COMMUNITY
End: 2022-10-25

## 2022-09-20 ENCOUNTER — TREATMENT (OUTPATIENT)
Dept: CARDIAC REHAB | Facility: HOSPITAL | Age: 82
End: 2022-09-20

## 2022-09-20 ENCOUNTER — TELEPHONE (OUTPATIENT)
Dept: CARDIOLOGY | Facility: CLINIC | Age: 82
End: 2022-09-20

## 2022-09-20 DIAGNOSIS — Z98.61 CAD S/P PERCUTANEOUS CORONARY ANGIOPLASTY: Primary | ICD-10-CM

## 2022-09-20 DIAGNOSIS — I25.10 CAD S/P PERCUTANEOUS CORONARY ANGIOPLASTY: Primary | ICD-10-CM

## 2022-09-20 PROCEDURE — 93798 PHYS/QHP OP CAR RHAB W/ECG: CPT

## 2022-09-20 RX ORDER — DILTIAZEM HYDROCHLORIDE 120 MG/1
120 CAPSULE, EXTENDED RELEASE ORAL DAILY
Qty: 90 CAPSULE | Refills: 3 | Status: SHIPPED | OUTPATIENT
Start: 2022-09-20

## 2022-09-22 ENCOUNTER — TREATMENT (OUTPATIENT)
Dept: CARDIAC REHAB | Facility: HOSPITAL | Age: 82
End: 2022-09-22

## 2022-09-22 DIAGNOSIS — I25.10 CAD S/P PERCUTANEOUS CORONARY ANGIOPLASTY: Primary | ICD-10-CM

## 2022-09-22 DIAGNOSIS — Z98.61 CAD S/P PERCUTANEOUS CORONARY ANGIOPLASTY: Primary | ICD-10-CM

## 2022-09-22 PROCEDURE — 93798 PHYS/QHP OP CAR RHAB W/ECG: CPT

## 2022-09-26 RX ORDER — APIXABAN 5 MG/1
TABLET, FILM COATED ORAL
Qty: 180 TABLET | Refills: 0 | Status: SHIPPED | OUTPATIENT
Start: 2022-09-26 | End: 2022-12-21

## 2022-09-27 ENCOUNTER — TREATMENT (OUTPATIENT)
Dept: CARDIAC REHAB | Facility: HOSPITAL | Age: 82
End: 2022-09-27

## 2022-09-27 DIAGNOSIS — I25.10 CAD S/P PERCUTANEOUS CORONARY ANGIOPLASTY: Primary | ICD-10-CM

## 2022-09-27 DIAGNOSIS — Z98.61 CAD S/P PERCUTANEOUS CORONARY ANGIOPLASTY: Primary | ICD-10-CM

## 2022-09-27 PROCEDURE — 93798 PHYS/QHP OP CAR RHAB W/ECG: CPT

## 2022-09-29 ENCOUNTER — TREATMENT (OUTPATIENT)
Dept: CARDIAC REHAB | Facility: HOSPITAL | Age: 82
End: 2022-09-29

## 2022-09-29 ENCOUNTER — TELEPHONE (OUTPATIENT)
Dept: INTERNAL MEDICINE | Facility: CLINIC | Age: 82
End: 2022-09-29

## 2022-09-29 DIAGNOSIS — I25.10 CAD S/P PERCUTANEOUS CORONARY ANGIOPLASTY: Primary | ICD-10-CM

## 2022-09-29 DIAGNOSIS — Z98.61 CAD S/P PERCUTANEOUS CORONARY ANGIOPLASTY: Primary | ICD-10-CM

## 2022-09-29 PROCEDURE — 93798 PHYS/QHP OP CAR RHAB W/ECG: CPT

## 2022-10-04 ENCOUNTER — TELEPHONE (OUTPATIENT)
Dept: INTERNAL MEDICINE | Facility: CLINIC | Age: 82
End: 2022-10-04

## 2022-10-04 ENCOUNTER — TREATMENT (OUTPATIENT)
Dept: CARDIAC REHAB | Facility: HOSPITAL | Age: 82
End: 2022-10-04

## 2022-10-04 DIAGNOSIS — Z98.61 CAD S/P PERCUTANEOUS CORONARY ANGIOPLASTY: Primary | ICD-10-CM

## 2022-10-04 DIAGNOSIS — I25.10 CAD S/P PERCUTANEOUS CORONARY ANGIOPLASTY: Primary | ICD-10-CM

## 2022-10-04 PROCEDURE — 93798 PHYS/QHP OP CAR RHAB W/ECG: CPT

## 2022-10-06 ENCOUNTER — TREATMENT (OUTPATIENT)
Dept: CARDIAC REHAB | Facility: HOSPITAL | Age: 82
End: 2022-10-06

## 2022-10-06 DIAGNOSIS — I25.10 CAD S/P PERCUTANEOUS CORONARY ANGIOPLASTY: Primary | ICD-10-CM

## 2022-10-06 DIAGNOSIS — Z98.61 CAD S/P PERCUTANEOUS CORONARY ANGIOPLASTY: Primary | ICD-10-CM

## 2022-10-06 PROCEDURE — 93798 PHYS/QHP OP CAR RHAB W/ECG: CPT

## 2022-10-11 ENCOUNTER — TREATMENT (OUTPATIENT)
Dept: CARDIAC REHAB | Facility: HOSPITAL | Age: 82
End: 2022-10-11

## 2022-10-11 DIAGNOSIS — I25.10 CAD S/P PERCUTANEOUS CORONARY ANGIOPLASTY: Primary | ICD-10-CM

## 2022-10-11 DIAGNOSIS — Z98.61 CAD S/P PERCUTANEOUS CORONARY ANGIOPLASTY: Primary | ICD-10-CM

## 2022-10-11 PROCEDURE — 93798 PHYS/QHP OP CAR RHAB W/ECG: CPT

## 2022-10-12 ENCOUNTER — LAB (OUTPATIENT)
Dept: LAB | Facility: HOSPITAL | Age: 82
End: 2022-10-12

## 2022-10-12 DIAGNOSIS — E78.5 HYPERLIPIDEMIA LDL GOAL <100: ICD-10-CM

## 2022-10-12 DIAGNOSIS — E11.9 TYPE 2 DIABETES MELLITUS WITHOUT COMPLICATION, WITHOUT LONG-TERM CURRENT USE OF INSULIN: ICD-10-CM

## 2022-10-12 DIAGNOSIS — I10 ESSENTIAL HYPERTENSION: ICD-10-CM

## 2022-10-12 LAB
ALBUMIN SERPL-MCNC: 3.5 G/DL (ref 3.5–5.2)
ALBUMIN/GLOB SERPL: 1.5 G/DL
ALP SERPL-CCNC: 57 U/L (ref 39–117)
ALT SERPL W P-5'-P-CCNC: 23 U/L (ref 1–41)
ANION GAP SERPL CALCULATED.3IONS-SCNC: 5.9 MMOL/L (ref 5–15)
AST SERPL-CCNC: 19 U/L (ref 1–40)
BILIRUB SERPL-MCNC: 0.3 MG/DL (ref 0–1.2)
BUN SERPL-MCNC: 22 MG/DL (ref 8–23)
BUN/CREAT SERPL: 19.6 (ref 7–25)
CALCIUM SPEC-SCNC: 9.7 MG/DL (ref 8.6–10.5)
CHLORIDE SERPL-SCNC: 107 MMOL/L (ref 98–107)
CHOLEST SERPL-MCNC: 122 MG/DL (ref 0–200)
CO2 SERPL-SCNC: 27.1 MMOL/L (ref 22–29)
CREAT SERPL-MCNC: 1.12 MG/DL (ref 0.76–1.27)
EGFRCR SERPLBLD CKD-EPI 2021: 66.4 ML/MIN/1.73
GLOBULIN UR ELPH-MCNC: 2.3 GM/DL
GLUCOSE SERPL-MCNC: 98 MG/DL (ref 65–99)
HBA1C MFR BLD: 6.3 % (ref 4.8–5.6)
HDLC SERPL-MCNC: 40 MG/DL (ref 40–60)
LDLC SERPL CALC-MCNC: 64 MG/DL (ref 0–100)
LDLC/HDLC SERPL: 1.6 {RATIO}
POTASSIUM SERPL-SCNC: 3.7 MMOL/L (ref 3.5–5.2)
PROT SERPL-MCNC: 5.8 G/DL (ref 6–8.5)
SODIUM SERPL-SCNC: 140 MMOL/L (ref 136–145)
TRIGL SERPL-MCNC: 91 MG/DL (ref 0–150)
VLDLC SERPL-MCNC: 18 MG/DL (ref 5–40)

## 2022-10-12 PROCEDURE — 83036 HEMOGLOBIN GLYCOSYLATED A1C: CPT

## 2022-10-12 PROCEDURE — 36415 COLL VENOUS BLD VENIPUNCTURE: CPT

## 2022-10-12 PROCEDURE — 80053 COMPREHEN METABOLIC PANEL: CPT

## 2022-10-12 PROCEDURE — 80061 LIPID PANEL: CPT

## 2022-10-13 ENCOUNTER — TREATMENT (OUTPATIENT)
Dept: CARDIAC REHAB | Facility: HOSPITAL | Age: 82
End: 2022-10-13

## 2022-10-13 DIAGNOSIS — Z98.61 CAD S/P PERCUTANEOUS CORONARY ANGIOPLASTY: Primary | ICD-10-CM

## 2022-10-13 DIAGNOSIS — I25.10 CAD S/P PERCUTANEOUS CORONARY ANGIOPLASTY: Primary | ICD-10-CM

## 2022-10-13 PROCEDURE — 93798 PHYS/QHP OP CAR RHAB W/ECG: CPT

## 2022-10-18 ENCOUNTER — TREATMENT (OUTPATIENT)
Dept: CARDIAC REHAB | Facility: HOSPITAL | Age: 82
End: 2022-10-18

## 2022-10-18 DIAGNOSIS — Z98.61 CAD S/P PERCUTANEOUS CORONARY ANGIOPLASTY: Primary | ICD-10-CM

## 2022-10-18 DIAGNOSIS — I25.10 CAD S/P PERCUTANEOUS CORONARY ANGIOPLASTY: Primary | ICD-10-CM

## 2022-10-18 PROCEDURE — 93798 PHYS/QHP OP CAR RHAB W/ECG: CPT

## 2022-10-20 ENCOUNTER — TREATMENT (OUTPATIENT)
Dept: CARDIAC REHAB | Facility: HOSPITAL | Age: 82
End: 2022-10-20

## 2022-10-20 DIAGNOSIS — Z98.61 CAD S/P PERCUTANEOUS CORONARY ANGIOPLASTY: Primary | ICD-10-CM

## 2022-10-20 DIAGNOSIS — I25.10 CAD S/P PERCUTANEOUS CORONARY ANGIOPLASTY: Primary | ICD-10-CM

## 2022-10-20 PROCEDURE — 93798 PHYS/QHP OP CAR RHAB W/ECG: CPT

## 2022-10-25 ENCOUNTER — TREATMENT (OUTPATIENT)
Dept: CARDIAC REHAB | Facility: HOSPITAL | Age: 82
End: 2022-10-25

## 2022-10-25 ENCOUNTER — OFFICE VISIT (OUTPATIENT)
Dept: INTERNAL MEDICINE | Facility: CLINIC | Age: 82
End: 2022-10-25

## 2022-10-25 VITALS
OXYGEN SATURATION: 99 % | BODY MASS INDEX: 25.2 KG/M2 | SYSTOLIC BLOOD PRESSURE: 119 MMHG | HEART RATE: 80 BPM | DIASTOLIC BLOOD PRESSURE: 80 MMHG | WEIGHT: 176 LBS | TEMPERATURE: 98.1 F | HEIGHT: 70 IN

## 2022-10-25 DIAGNOSIS — Z23 NEED FOR VACCINATION: ICD-10-CM

## 2022-10-25 DIAGNOSIS — I25.10 CAD S/P PERCUTANEOUS CORONARY ANGIOPLASTY: Primary | ICD-10-CM

## 2022-10-25 DIAGNOSIS — E11.9 TYPE 2 DIABETES MELLITUS WITHOUT COMPLICATION, WITHOUT LONG-TERM CURRENT USE OF INSULIN: ICD-10-CM

## 2022-10-25 DIAGNOSIS — I24.9 ACS (ACUTE CORONARY SYNDROME): Primary | ICD-10-CM

## 2022-10-25 DIAGNOSIS — I10 ESSENTIAL HYPERTENSION: ICD-10-CM

## 2022-10-25 DIAGNOSIS — Z98.61 CAD S/P PERCUTANEOUS CORONARY ANGIOPLASTY: Primary | ICD-10-CM

## 2022-10-25 DIAGNOSIS — I48.19 ATRIAL FIBRILLATION, PERSISTENT: ICD-10-CM

## 2022-10-25 DIAGNOSIS — E78.5 HYPERLIPIDEMIA LDL GOAL <70: ICD-10-CM

## 2022-10-25 PROCEDURE — 99214 OFFICE O/P EST MOD 30 MIN: CPT | Performed by: INTERNAL MEDICINE

## 2022-10-25 PROCEDURE — 91312 COVID-19 (PFIZER) BIVALENT BOOSTER 12+YRS: CPT | Performed by: INTERNAL MEDICINE

## 2022-10-25 PROCEDURE — 93798 PHYS/QHP OP CAR RHAB W/ECG: CPT

## 2022-10-25 PROCEDURE — 0124A COVID-19 (PFIZER) BIVALENT BOOSTER 12+YRS: CPT | Performed by: INTERNAL MEDICINE

## 2022-10-27 ENCOUNTER — TREATMENT (OUTPATIENT)
Dept: CARDIAC REHAB | Facility: HOSPITAL | Age: 82
End: 2022-10-27

## 2022-10-27 DIAGNOSIS — I25.10 CAD S/P PERCUTANEOUS CORONARY ANGIOPLASTY: Primary | ICD-10-CM

## 2022-10-27 DIAGNOSIS — Z98.61 CAD S/P PERCUTANEOUS CORONARY ANGIOPLASTY: Primary | ICD-10-CM

## 2022-10-27 PROCEDURE — 93798 PHYS/QHP OP CAR RHAB W/ECG: CPT

## 2022-11-01 ENCOUNTER — TREATMENT (OUTPATIENT)
Dept: CARDIAC REHAB | Facility: HOSPITAL | Age: 82
End: 2022-11-01

## 2022-11-01 DIAGNOSIS — I25.10 CAD S/P PERCUTANEOUS CORONARY ANGIOPLASTY: Primary | ICD-10-CM

## 2022-11-01 DIAGNOSIS — Z98.61 CAD S/P PERCUTANEOUS CORONARY ANGIOPLASTY: Primary | ICD-10-CM

## 2022-11-01 PROCEDURE — 93798 PHYS/QHP OP CAR RHAB W/ECG: CPT

## 2022-11-03 ENCOUNTER — TREATMENT (OUTPATIENT)
Dept: CARDIAC REHAB | Facility: HOSPITAL | Age: 82
End: 2022-11-03

## 2022-11-03 DIAGNOSIS — Z98.61 CAD S/P PERCUTANEOUS CORONARY ANGIOPLASTY: Primary | ICD-10-CM

## 2022-11-03 DIAGNOSIS — I25.10 CAD S/P PERCUTANEOUS CORONARY ANGIOPLASTY: Primary | ICD-10-CM

## 2022-11-03 PROCEDURE — 93798 PHYS/QHP OP CAR RHAB W/ECG: CPT

## 2022-11-08 ENCOUNTER — TREATMENT (OUTPATIENT)
Dept: CARDIAC REHAB | Facility: HOSPITAL | Age: 82
End: 2022-11-08

## 2022-11-08 DIAGNOSIS — Z98.61 CAD S/P PERCUTANEOUS CORONARY ANGIOPLASTY: Primary | ICD-10-CM

## 2022-11-08 DIAGNOSIS — I25.10 CAD S/P PERCUTANEOUS CORONARY ANGIOPLASTY: Primary | ICD-10-CM

## 2022-11-08 PROCEDURE — 93798 PHYS/QHP OP CAR RHAB W/ECG: CPT

## 2022-11-09 ENCOUNTER — OFFICE VISIT (OUTPATIENT)
Dept: CARDIOLOGY | Facility: CLINIC | Age: 82
End: 2022-11-09

## 2022-11-09 VITALS
HEIGHT: 70 IN | SYSTOLIC BLOOD PRESSURE: 122 MMHG | HEART RATE: 85 BPM | WEIGHT: 173.6 LBS | BODY MASS INDEX: 24.85 KG/M2 | DIASTOLIC BLOOD PRESSURE: 74 MMHG

## 2022-11-09 DIAGNOSIS — I25.10 CAD S/P PERCUTANEOUS CORONARY ANGIOPLASTY: ICD-10-CM

## 2022-11-09 DIAGNOSIS — I10 ESSENTIAL HYPERTENSION: ICD-10-CM

## 2022-11-09 DIAGNOSIS — E78.5 HYPERLIPIDEMIA LDL GOAL <70: ICD-10-CM

## 2022-11-09 DIAGNOSIS — R60.0 LOWER EXTREMITY EDEMA: ICD-10-CM

## 2022-11-09 DIAGNOSIS — Z98.61 CAD S/P PERCUTANEOUS CORONARY ANGIOPLASTY: ICD-10-CM

## 2022-11-09 DIAGNOSIS — I48.19 ATRIAL FIBRILLATION, PERSISTENT: Primary | ICD-10-CM

## 2022-11-09 PROCEDURE — 99214 OFFICE O/P EST MOD 30 MIN: CPT | Performed by: INTERNAL MEDICINE

## 2022-11-10 ENCOUNTER — TREATMENT (OUTPATIENT)
Dept: CARDIAC REHAB | Facility: HOSPITAL | Age: 82
End: 2022-11-10

## 2022-11-10 DIAGNOSIS — Z98.61 CAD S/P PERCUTANEOUS CORONARY ANGIOPLASTY: Primary | ICD-10-CM

## 2022-11-10 DIAGNOSIS — I25.10 CAD S/P PERCUTANEOUS CORONARY ANGIOPLASTY: Primary | ICD-10-CM

## 2022-11-10 PROCEDURE — 93798 PHYS/QHP OP CAR RHAB W/ECG: CPT

## 2022-11-15 ENCOUNTER — TREATMENT (OUTPATIENT)
Dept: CARDIAC REHAB | Facility: HOSPITAL | Age: 82
End: 2022-11-15

## 2022-11-15 DIAGNOSIS — I25.10 CAD S/P PERCUTANEOUS CORONARY ANGIOPLASTY: Primary | ICD-10-CM

## 2022-11-15 DIAGNOSIS — Z98.61 CAD S/P PERCUTANEOUS CORONARY ANGIOPLASTY: Primary | ICD-10-CM

## 2022-11-15 PROCEDURE — 93798 PHYS/QHP OP CAR RHAB W/ECG: CPT

## 2022-11-17 ENCOUNTER — TREATMENT (OUTPATIENT)
Dept: CARDIAC REHAB | Facility: HOSPITAL | Age: 82
End: 2022-11-17

## 2022-11-17 DIAGNOSIS — I25.10 CAD S/P PERCUTANEOUS CORONARY ANGIOPLASTY: Primary | ICD-10-CM

## 2022-11-17 DIAGNOSIS — Z98.61 CAD S/P PERCUTANEOUS CORONARY ANGIOPLASTY: Primary | ICD-10-CM

## 2022-11-17 PROCEDURE — 93798 PHYS/QHP OP CAR RHAB W/ECG: CPT

## 2022-11-22 ENCOUNTER — TREATMENT (OUTPATIENT)
Dept: CARDIAC REHAB | Facility: HOSPITAL | Age: 82
End: 2022-11-22

## 2022-11-22 DIAGNOSIS — I25.10 CAD S/P PERCUTANEOUS CORONARY ANGIOPLASTY: Primary | ICD-10-CM

## 2022-11-22 DIAGNOSIS — Z98.61 CAD S/P PERCUTANEOUS CORONARY ANGIOPLASTY: Primary | ICD-10-CM

## 2022-11-22 PROCEDURE — 93798 PHYS/QHP OP CAR RHAB W/ECG: CPT

## 2022-11-23 RX ORDER — CLOPIDOGREL BISULFATE 75 MG/1
75 TABLET ORAL DAILY
Qty: 90 TABLET | Refills: 3 | Status: SHIPPED | OUTPATIENT
Start: 2022-11-23 | End: 2023-01-12 | Stop reason: SDUPTHER

## 2022-11-24 ENCOUNTER — APPOINTMENT (OUTPATIENT)
Dept: CARDIAC REHAB | Facility: HOSPITAL | Age: 82
End: 2022-11-24

## 2022-11-29 ENCOUNTER — TREATMENT (OUTPATIENT)
Dept: CARDIAC REHAB | Facility: HOSPITAL | Age: 82
End: 2022-11-29

## 2022-11-29 DIAGNOSIS — I25.10 CAD S/P PERCUTANEOUS CORONARY ANGIOPLASTY: Primary | ICD-10-CM

## 2022-11-29 DIAGNOSIS — Z98.61 CAD S/P PERCUTANEOUS CORONARY ANGIOPLASTY: Primary | ICD-10-CM

## 2022-11-29 PROCEDURE — 93798 PHYS/QHP OP CAR RHAB W/ECG: CPT

## 2022-12-01 ENCOUNTER — TREATMENT (OUTPATIENT)
Dept: CARDIAC REHAB | Facility: HOSPITAL | Age: 82
End: 2022-12-01
Payer: MEDICARE

## 2022-12-01 DIAGNOSIS — I25.10 CAD S/P PERCUTANEOUS CORONARY ANGIOPLASTY: Primary | ICD-10-CM

## 2022-12-01 DIAGNOSIS — Z98.61 CAD S/P PERCUTANEOUS CORONARY ANGIOPLASTY: Primary | ICD-10-CM

## 2022-12-01 PROCEDURE — 93798 PHYS/QHP OP CAR RHAB W/ECG: CPT

## 2022-12-06 ENCOUNTER — TREATMENT (OUTPATIENT)
Dept: CARDIAC REHAB | Facility: HOSPITAL | Age: 82
End: 2022-12-06
Payer: MEDICARE

## 2022-12-06 DIAGNOSIS — I25.10 CAD S/P PERCUTANEOUS CORONARY ANGIOPLASTY: Primary | ICD-10-CM

## 2022-12-06 DIAGNOSIS — Z98.61 CAD S/P PERCUTANEOUS CORONARY ANGIOPLASTY: Primary | ICD-10-CM

## 2022-12-06 PROCEDURE — 93798 PHYS/QHP OP CAR RHAB W/ECG: CPT

## 2022-12-08 ENCOUNTER — APPOINTMENT (OUTPATIENT)
Dept: CARDIAC REHAB | Facility: HOSPITAL | Age: 82
End: 2022-12-08
Payer: MEDICARE

## 2022-12-13 ENCOUNTER — TREATMENT (OUTPATIENT)
Dept: CARDIAC REHAB | Facility: HOSPITAL | Age: 82
End: 2022-12-13
Payer: MEDICARE

## 2022-12-13 DIAGNOSIS — I25.10 CAD S/P PERCUTANEOUS CORONARY ANGIOPLASTY: Primary | ICD-10-CM

## 2022-12-13 DIAGNOSIS — Z98.61 CAD S/P PERCUTANEOUS CORONARY ANGIOPLASTY: Primary | ICD-10-CM

## 2022-12-13 PROCEDURE — 93798 PHYS/QHP OP CAR RHAB W/ECG: CPT

## 2022-12-15 ENCOUNTER — TREATMENT (OUTPATIENT)
Dept: CARDIAC REHAB | Facility: HOSPITAL | Age: 82
End: 2022-12-15
Payer: MEDICARE

## 2022-12-15 DIAGNOSIS — I25.10 CAD S/P PERCUTANEOUS CORONARY ANGIOPLASTY: Primary | ICD-10-CM

## 2022-12-15 DIAGNOSIS — Z98.61 CAD S/P PERCUTANEOUS CORONARY ANGIOPLASTY: Primary | ICD-10-CM

## 2022-12-15 PROCEDURE — 93798 PHYS/QHP OP CAR RHAB W/ECG: CPT

## 2022-12-20 ENCOUNTER — TREATMENT (OUTPATIENT)
Dept: CARDIAC REHAB | Facility: HOSPITAL | Age: 82
End: 2022-12-20
Payer: MEDICARE

## 2022-12-20 DIAGNOSIS — I25.10 CAD S/P PERCUTANEOUS CORONARY ANGIOPLASTY: Primary | ICD-10-CM

## 2022-12-20 DIAGNOSIS — Z98.61 CAD S/P PERCUTANEOUS CORONARY ANGIOPLASTY: Primary | ICD-10-CM

## 2022-12-20 PROCEDURE — 93798 PHYS/QHP OP CAR RHAB W/ECG: CPT

## 2022-12-21 RX ORDER — APIXABAN 5 MG/1
TABLET, FILM COATED ORAL
Qty: 180 TABLET | Refills: 1 | Status: SHIPPED | OUTPATIENT
Start: 2022-12-21

## 2022-12-22 ENCOUNTER — TREATMENT (OUTPATIENT)
Dept: CARDIAC REHAB | Facility: HOSPITAL | Age: 82
End: 2022-12-22
Payer: MEDICARE

## 2022-12-22 DIAGNOSIS — I25.10 CAD S/P PERCUTANEOUS CORONARY ANGIOPLASTY: Primary | ICD-10-CM

## 2022-12-22 DIAGNOSIS — Z98.61 CAD S/P PERCUTANEOUS CORONARY ANGIOPLASTY: Primary | ICD-10-CM

## 2022-12-22 PROCEDURE — 93798 PHYS/QHP OP CAR RHAB W/ECG: CPT

## 2022-12-27 ENCOUNTER — TREATMENT (OUTPATIENT)
Dept: CARDIAC REHAB | Facility: HOSPITAL | Age: 82
End: 2022-12-27
Payer: MEDICARE

## 2022-12-27 DIAGNOSIS — Z98.61 CAD S/P PERCUTANEOUS CORONARY ANGIOPLASTY: Primary | ICD-10-CM

## 2022-12-27 DIAGNOSIS — I25.10 CAD S/P PERCUTANEOUS CORONARY ANGIOPLASTY: Primary | ICD-10-CM

## 2022-12-27 PROCEDURE — 93798 PHYS/QHP OP CAR RHAB W/ECG: CPT

## 2022-12-27 RX ORDER — FUROSEMIDE 20 MG/1
TABLET ORAL
Qty: 90 TABLET | Refills: 1 | Status: SHIPPED | OUTPATIENT
Start: 2022-12-27 | End: 2023-01-12

## 2022-12-28 RX ORDER — VALSARTAN AND HYDROCHLOROTHIAZIDE 160; 25 MG/1; MG/1
TABLET ORAL
Qty: 90 TABLET | Refills: 1 | Status: SHIPPED | OUTPATIENT
Start: 2022-12-28 | End: 2023-01-12 | Stop reason: SDUPTHER

## 2022-12-29 ENCOUNTER — TREATMENT (OUTPATIENT)
Dept: CARDIAC REHAB | Facility: HOSPITAL | Age: 82
End: 2022-12-29
Payer: MEDICARE

## 2022-12-29 DIAGNOSIS — Z98.61 CAD S/P PERCUTANEOUS CORONARY ANGIOPLASTY: Primary | ICD-10-CM

## 2022-12-29 DIAGNOSIS — I25.10 CAD S/P PERCUTANEOUS CORONARY ANGIOPLASTY: Primary | ICD-10-CM

## 2022-12-29 PROCEDURE — 93798 PHYS/QHP OP CAR RHAB W/ECG: CPT

## 2023-01-03 ENCOUNTER — TREATMENT (OUTPATIENT)
Dept: CARDIAC REHAB | Facility: HOSPITAL | Age: 83
End: 2023-01-03
Payer: MEDICARE

## 2023-01-03 DIAGNOSIS — Z98.61 CAD S/P PERCUTANEOUS CORONARY ANGIOPLASTY: Primary | ICD-10-CM

## 2023-01-03 DIAGNOSIS — I25.10 CAD S/P PERCUTANEOUS CORONARY ANGIOPLASTY: Primary | ICD-10-CM

## 2023-01-03 PROCEDURE — 93798 PHYS/QHP OP CAR RHAB W/ECG: CPT

## 2023-01-05 ENCOUNTER — TREATMENT (OUTPATIENT)
Dept: CARDIAC REHAB | Facility: HOSPITAL | Age: 83
End: 2023-01-05
Payer: MEDICARE

## 2023-01-05 DIAGNOSIS — I25.10 CAD S/P PERCUTANEOUS CORONARY ANGIOPLASTY: Primary | ICD-10-CM

## 2023-01-05 DIAGNOSIS — Z98.61 CAD S/P PERCUTANEOUS CORONARY ANGIOPLASTY: Primary | ICD-10-CM

## 2023-01-05 PROCEDURE — 93798 PHYS/QHP OP CAR RHAB W/ECG: CPT

## 2023-01-10 ENCOUNTER — TELEPHONE (OUTPATIENT)
Dept: INTERNAL MEDICINE | Facility: CLINIC | Age: 83
End: 2023-01-10

## 2023-01-10 ENCOUNTER — TREATMENT (OUTPATIENT)
Dept: CARDIAC REHAB | Facility: HOSPITAL | Age: 83
End: 2023-01-10
Payer: MEDICARE

## 2023-01-10 DIAGNOSIS — I25.10 CAD S/P PERCUTANEOUS CORONARY ANGIOPLASTY: Primary | ICD-10-CM

## 2023-01-10 DIAGNOSIS — Z98.61 CAD S/P PERCUTANEOUS CORONARY ANGIOPLASTY: Primary | ICD-10-CM

## 2023-01-10 PROCEDURE — 93798 PHYS/QHP OP CAR RHAB W/ECG: CPT

## 2023-01-11 ENCOUNTER — LAB (OUTPATIENT)
Dept: LAB | Facility: HOSPITAL | Age: 83
End: 2023-01-11
Payer: MEDICARE

## 2023-01-11 DIAGNOSIS — E55.9 VITAMIN D DEFICIENCY: ICD-10-CM

## 2023-01-11 DIAGNOSIS — R53.83 OTHER FATIGUE: ICD-10-CM

## 2023-01-11 DIAGNOSIS — Z12.5 PROSTATE CANCER SCREENING: ICD-10-CM

## 2023-01-11 DIAGNOSIS — E78.5 HYPERLIPIDEMIA LDL GOAL <100: ICD-10-CM

## 2023-01-11 DIAGNOSIS — I10 ESSENTIAL HYPERTENSION: ICD-10-CM

## 2023-01-11 DIAGNOSIS — E11.9 TYPE 2 DIABETES MELLITUS WITHOUT COMPLICATION, WITHOUT LONG-TERM CURRENT USE OF INSULIN: ICD-10-CM

## 2023-01-11 LAB
25(OH)D3 SERPL-MCNC: 29.8 NG/ML (ref 30–100)
ALBUMIN SERPL-MCNC: 3.9 G/DL (ref 3.5–5.2)
ALBUMIN UR-MCNC: <1.2 MG/DL
ALBUMIN/GLOB SERPL: 1.6 G/DL
ALP SERPL-CCNC: 68 U/L (ref 39–117)
ALT SERPL W P-5'-P-CCNC: 39 U/L (ref 1–41)
ANION GAP SERPL CALCULATED.3IONS-SCNC: 7.9 MMOL/L (ref 5–15)
AST SERPL-CCNC: 22 U/L (ref 1–40)
BASOPHILS # BLD AUTO: 0.06 10*3/MM3 (ref 0–0.2)
BASOPHILS NFR BLD AUTO: 0.5 % (ref 0–1.5)
BILIRUB SERPL-MCNC: 0.5 MG/DL (ref 0–1.2)
BUN SERPL-MCNC: 30 MG/DL (ref 8–23)
BUN/CREAT SERPL: 27.8 (ref 7–25)
CALCIUM SPEC-SCNC: 9.4 MG/DL (ref 8.6–10.5)
CHLORIDE SERPL-SCNC: 105 MMOL/L (ref 98–107)
CHOLEST SERPL-MCNC: 129 MG/DL (ref 0–200)
CO2 SERPL-SCNC: 31.1 MMOL/L (ref 22–29)
CREAT SERPL-MCNC: 1.08 MG/DL (ref 0.76–1.27)
CREAT UR-MCNC: 65.8 MG/DL
DEPRECATED RDW RBC AUTO: 41.6 FL (ref 37–54)
EGFRCR SERPLBLD CKD-EPI 2021: 69.4 ML/MIN/1.73
EOSINOPHIL # BLD AUTO: 0.13 10*3/MM3 (ref 0–0.4)
EOSINOPHIL NFR BLD AUTO: 1.1 % (ref 0.3–6.2)
ERYTHROCYTE [DISTWIDTH] IN BLOOD BY AUTOMATED COUNT: 12.5 % (ref 12.3–15.4)
GLOBULIN UR ELPH-MCNC: 2.4 GM/DL
GLUCOSE SERPL-MCNC: 98 MG/DL (ref 65–99)
HBA1C MFR BLD: 6.8 % (ref 4.8–5.6)
HCT VFR BLD AUTO: 48.2 % (ref 37.5–51)
HDLC SERPL-MCNC: 43 MG/DL (ref 40–60)
HGB BLD-MCNC: 16.1 G/DL (ref 13–17.7)
IMM GRANULOCYTES # BLD AUTO: 0.27 10*3/MM3 (ref 0–0.05)
IMM GRANULOCYTES NFR BLD AUTO: 2.3 % (ref 0–0.5)
LDLC SERPL CALC-MCNC: 70 MG/DL (ref 0–100)
LDLC/HDLC SERPL: 1.62 {RATIO}
LYMPHOCYTES # BLD AUTO: 1.22 10*3/MM3 (ref 0.7–3.1)
LYMPHOCYTES NFR BLD AUTO: 10.3 % (ref 19.6–45.3)
MCH RBC QN AUTO: 30.4 PG (ref 26.6–33)
MCHC RBC AUTO-ENTMCNC: 33.4 G/DL (ref 31.5–35.7)
MCV RBC AUTO: 91.1 FL (ref 79–97)
MICROALBUMIN/CREAT UR: NORMAL MG/G{CREAT}
MONOCYTES # BLD AUTO: 0.93 10*3/MM3 (ref 0.1–0.9)
MONOCYTES NFR BLD AUTO: 7.9 % (ref 5–12)
NEUTROPHILS NFR BLD AUTO: 77.9 % (ref 42.7–76)
NEUTROPHILS NFR BLD AUTO: 9.19 10*3/MM3 (ref 1.7–7)
NRBC BLD AUTO-RTO: 0 /100 WBC (ref 0–0.2)
PLATELET # BLD AUTO: 181 10*3/MM3 (ref 140–450)
PMV BLD AUTO: 10.2 FL (ref 6–12)
POTASSIUM SERPL-SCNC: 4.6 MMOL/L (ref 3.5–5.2)
PROT SERPL-MCNC: 6.3 G/DL (ref 6–8.5)
PSA SERPL-MCNC: 1.69 NG/ML (ref 0–4)
RBC # BLD AUTO: 5.29 10*6/MM3 (ref 4.14–5.8)
SODIUM SERPL-SCNC: 144 MMOL/L (ref 136–145)
T4 FREE SERPL-MCNC: 1.39 NG/DL (ref 0.93–1.7)
TRIGL SERPL-MCNC: 81 MG/DL (ref 0–150)
TSH SERPL DL<=0.05 MIU/L-ACNC: 0.65 UIU/ML (ref 0.27–4.2)
VLDLC SERPL-MCNC: 16 MG/DL (ref 5–40)
WBC NRBC COR # BLD: 11.8 10*3/MM3 (ref 3.4–10.8)

## 2023-01-11 PROCEDURE — 36415 COLL VENOUS BLD VENIPUNCTURE: CPT

## 2023-01-11 PROCEDURE — 85025 COMPLETE CBC W/AUTO DIFF WBC: CPT

## 2023-01-11 PROCEDURE — 83036 HEMOGLOBIN GLYCOSYLATED A1C: CPT

## 2023-01-11 PROCEDURE — 80053 COMPREHEN METABOLIC PANEL: CPT

## 2023-01-11 PROCEDURE — 80061 LIPID PANEL: CPT

## 2023-01-11 PROCEDURE — G0103 PSA SCREENING: HCPCS

## 2023-01-11 PROCEDURE — 82043 UR ALBUMIN QUANTITATIVE: CPT

## 2023-01-11 PROCEDURE — 84439 ASSAY OF FREE THYROXINE: CPT

## 2023-01-11 PROCEDURE — 82306 VITAMIN D 25 HYDROXY: CPT

## 2023-01-11 PROCEDURE — 82570 ASSAY OF URINE CREATININE: CPT

## 2023-01-11 PROCEDURE — 84443 ASSAY THYROID STIM HORMONE: CPT

## 2023-01-12 ENCOUNTER — OFFICE VISIT (OUTPATIENT)
Dept: INTERNAL MEDICINE | Facility: CLINIC | Age: 83
End: 2023-01-12
Payer: MEDICARE

## 2023-01-12 ENCOUNTER — TREATMENT (OUTPATIENT)
Dept: CARDIAC REHAB | Facility: HOSPITAL | Age: 83
End: 2023-01-12
Payer: MEDICARE

## 2023-01-12 VITALS
DIASTOLIC BLOOD PRESSURE: 68 MMHG | HEIGHT: 70 IN | OXYGEN SATURATION: 96 % | WEIGHT: 168 LBS | TEMPERATURE: 97.7 F | SYSTOLIC BLOOD PRESSURE: 111 MMHG | HEART RATE: 90 BPM | BODY MASS INDEX: 24.05 KG/M2

## 2023-01-12 DIAGNOSIS — I25.10 CAD S/P PERCUTANEOUS CORONARY ANGIOPLASTY: Primary | ICD-10-CM

## 2023-01-12 DIAGNOSIS — E78.5 HYPERLIPIDEMIA LDL GOAL <70: ICD-10-CM

## 2023-01-12 DIAGNOSIS — K29.30 CHRONIC SUPERFICIAL GASTRITIS WITHOUT BLEEDING: ICD-10-CM

## 2023-01-12 DIAGNOSIS — E11.9 TYPE 2 DIABETES MELLITUS WITHOUT COMPLICATION, WITHOUT LONG-TERM CURRENT USE OF INSULIN: ICD-10-CM

## 2023-01-12 DIAGNOSIS — E55.9 VITAMIN D DEFICIENCY: ICD-10-CM

## 2023-01-12 DIAGNOSIS — I10 ESSENTIAL HYPERTENSION: Primary | ICD-10-CM

## 2023-01-12 DIAGNOSIS — Z98.61 CAD S/P PERCUTANEOUS CORONARY ANGIOPLASTY: Primary | ICD-10-CM

## 2023-01-12 DIAGNOSIS — R63.4 WEIGHT LOSS: ICD-10-CM

## 2023-01-12 DIAGNOSIS — I48.19 ATRIAL FIBRILLATION, PERSISTENT: ICD-10-CM

## 2023-01-12 PROCEDURE — 93798 PHYS/QHP OP CAR RHAB W/ECG: CPT

## 2023-01-12 PROCEDURE — 99214 OFFICE O/P EST MOD 30 MIN: CPT | Performed by: INTERNAL MEDICINE

## 2023-01-12 RX ORDER — CARVEDILOL 25 MG/1
TABLET ORAL
Qty: 180 TABLET | Refills: 1 | Status: SHIPPED | OUTPATIENT
Start: 2023-01-12

## 2023-01-12 RX ORDER — VALSARTAN AND HYDROCHLOROTHIAZIDE 160; 25 MG/1; MG/1
1 TABLET ORAL DAILY
Qty: 90 TABLET | Refills: 1 | Status: SHIPPED | OUTPATIENT
Start: 2023-01-12 | End: 2023-03-13

## 2023-01-12 RX ORDER — CLOPIDOGREL BISULFATE 75 MG/1
75 TABLET ORAL DAILY
Qty: 90 TABLET | Refills: 1 | Status: SHIPPED | OUTPATIENT
Start: 2023-01-12

## 2023-01-12 RX ORDER — ROSUVASTATIN CALCIUM 20 MG/1
20 TABLET, COATED ORAL NIGHTLY
Qty: 90 TABLET | Refills: 1 | Status: SHIPPED | OUTPATIENT
Start: 2023-01-12

## 2023-01-13 RX ORDER — CARVEDILOL 12.5 MG/1
TABLET ORAL
Qty: 180 TABLET | OUTPATIENT
Start: 2023-01-13

## 2023-01-13 RX ORDER — ROSUVASTATIN CALCIUM 10 MG/1
TABLET, COATED ORAL
Qty: 90 TABLET | OUTPATIENT
Start: 2023-01-13

## 2023-01-17 ENCOUNTER — TREATMENT (OUTPATIENT)
Dept: CARDIAC REHAB | Facility: HOSPITAL | Age: 83
End: 2023-01-17
Payer: MEDICARE

## 2023-01-17 DIAGNOSIS — Z98.61 CAD S/P PERCUTANEOUS CORONARY ANGIOPLASTY: Primary | ICD-10-CM

## 2023-01-17 DIAGNOSIS — I25.10 CAD S/P PERCUTANEOUS CORONARY ANGIOPLASTY: Primary | ICD-10-CM

## 2023-01-17 PROCEDURE — 93798 PHYS/QHP OP CAR RHAB W/ECG: CPT

## 2023-01-19 ENCOUNTER — TREATMENT (OUTPATIENT)
Dept: CARDIAC REHAB | Facility: HOSPITAL | Age: 83
End: 2023-01-19
Payer: MEDICARE

## 2023-01-19 DIAGNOSIS — I25.10 CAD S/P PERCUTANEOUS CORONARY ANGIOPLASTY: Primary | ICD-10-CM

## 2023-01-19 DIAGNOSIS — Z98.61 CAD S/P PERCUTANEOUS CORONARY ANGIOPLASTY: Primary | ICD-10-CM

## 2023-01-19 PROCEDURE — 93798 PHYS/QHP OP CAR RHAB W/ECG: CPT

## 2023-01-24 ENCOUNTER — TREATMENT (OUTPATIENT)
Dept: CARDIAC REHAB | Facility: HOSPITAL | Age: 83
End: 2023-01-24
Payer: MEDICARE

## 2023-01-24 DIAGNOSIS — I25.10 CAD S/P PERCUTANEOUS CORONARY ANGIOPLASTY: Primary | ICD-10-CM

## 2023-01-24 DIAGNOSIS — Z98.61 CAD S/P PERCUTANEOUS CORONARY ANGIOPLASTY: Primary | ICD-10-CM

## 2023-01-24 PROCEDURE — 93798 PHYS/QHP OP CAR RHAB W/ECG: CPT

## 2023-01-26 ENCOUNTER — APPOINTMENT (OUTPATIENT)
Dept: CARDIAC REHAB | Facility: HOSPITAL | Age: 83
End: 2023-01-26
Payer: MEDICARE

## 2023-01-31 ENCOUNTER — APPOINTMENT (OUTPATIENT)
Dept: CARDIAC REHAB | Facility: HOSPITAL | Age: 83
End: 2023-01-31
Payer: MEDICARE

## 2023-02-27 ENCOUNTER — TELEPHONE (OUTPATIENT)
Dept: INTERNAL MEDICINE | Facility: CLINIC | Age: 83
End: 2023-02-27
Payer: MEDICARE

## 2023-02-27 DIAGNOSIS — M25.552 HIP PAIN, LEFT: Primary | ICD-10-CM

## 2023-03-07 ENCOUNTER — LAB (OUTPATIENT)
Dept: LAB | Facility: HOSPITAL | Age: 83
End: 2023-03-07
Payer: MEDICARE

## 2023-03-07 DIAGNOSIS — E11.9 TYPE 2 DIABETES MELLITUS WITHOUT COMPLICATION, WITHOUT LONG-TERM CURRENT USE OF INSULIN: ICD-10-CM

## 2023-03-07 DIAGNOSIS — I10 ESSENTIAL HYPERTENSION: ICD-10-CM

## 2023-03-07 LAB
ANION GAP SERPL CALCULATED.3IONS-SCNC: 6.5 MMOL/L (ref 5–15)
BUN SERPL-MCNC: 27 MG/DL (ref 8–23)
BUN/CREAT SERPL: 30 (ref 7–25)
CALCIUM SPEC-SCNC: 9.2 MG/DL (ref 8.6–10.5)
CHLORIDE SERPL-SCNC: 105 MMOL/L (ref 98–107)
CO2 SERPL-SCNC: 26.5 MMOL/L (ref 22–29)
CREAT SERPL-MCNC: 0.9 MG/DL (ref 0.76–1.27)
EGFRCR SERPLBLD CKD-EPI 2021: 86.3 ML/MIN/1.73
GLUCOSE SERPL-MCNC: 127 MG/DL (ref 65–99)
HBA1C MFR BLD: 7.1 % (ref 4.8–5.6)
POTASSIUM SERPL-SCNC: 4.4 MMOL/L (ref 3.5–5.2)
SODIUM SERPL-SCNC: 138 MMOL/L (ref 136–145)

## 2023-03-07 PROCEDURE — 83036 HEMOGLOBIN GLYCOSYLATED A1C: CPT

## 2023-03-07 PROCEDURE — 82985 ASSAY OF GLYCATED PROTEIN: CPT

## 2023-03-07 PROCEDURE — 36415 COLL VENOUS BLD VENIPUNCTURE: CPT

## 2023-03-07 PROCEDURE — 80048 BASIC METABOLIC PNL TOTAL CA: CPT

## 2023-03-08 LAB — FRUCTOSAMINE SERPL-SCNC: 223 UMOL/L (ref 0–285)

## 2023-03-13 ENCOUNTER — OFFICE VISIT (OUTPATIENT)
Dept: INTERNAL MEDICINE | Facility: CLINIC | Age: 83
End: 2023-03-13
Payer: MEDICARE

## 2023-03-13 VITALS
DIASTOLIC BLOOD PRESSURE: 80 MMHG | TEMPERATURE: 97.8 F | HEIGHT: 70 IN | WEIGHT: 171 LBS | BODY MASS INDEX: 24.48 KG/M2 | OXYGEN SATURATION: 99 % | SYSTOLIC BLOOD PRESSURE: 126 MMHG | RESPIRATION RATE: 18 BRPM | HEART RATE: 91 BPM

## 2023-03-13 DIAGNOSIS — I48.19 ATRIAL FIBRILLATION, PERSISTENT: ICD-10-CM

## 2023-03-13 DIAGNOSIS — E78.5 HYPERLIPIDEMIA LDL GOAL <70: ICD-10-CM

## 2023-03-13 DIAGNOSIS — I10 ESSENTIAL HYPERTENSION: ICD-10-CM

## 2023-03-13 DIAGNOSIS — K29.30 CHRONIC SUPERFICIAL GASTRITIS WITHOUT BLEEDING: ICD-10-CM

## 2023-03-13 DIAGNOSIS — E11.9 TYPE 2 DIABETES MELLITUS WITHOUT COMPLICATION, WITHOUT LONG-TERM CURRENT USE OF INSULIN: ICD-10-CM

## 2023-03-13 DIAGNOSIS — Z00.00 MEDICARE ANNUAL WELLNESS VISIT, SUBSEQUENT: Primary | ICD-10-CM

## 2023-03-13 PROCEDURE — 99214 OFFICE O/P EST MOD 30 MIN: CPT | Performed by: INTERNAL MEDICINE

## 2023-03-13 PROCEDURE — 1170F FXNL STATUS ASSESSED: CPT | Performed by: INTERNAL MEDICINE

## 2023-03-13 PROCEDURE — 1159F MED LIST DOCD IN RCRD: CPT | Performed by: INTERNAL MEDICINE

## 2023-03-13 PROCEDURE — G0439 PPPS, SUBSEQ VISIT: HCPCS | Performed by: INTERNAL MEDICINE

## 2023-03-13 RX ORDER — VALSARTAN AND HYDROCHLOROTHIAZIDE 320; 25 MG/1; MG/1
1 TABLET, FILM COATED ORAL DAILY
Qty: 90 TABLET | Refills: 1 | Status: SHIPPED | OUTPATIENT
Start: 2023-03-13 | End: 2024-03-12

## 2023-03-14 ENCOUNTER — APPOINTMENT (OUTPATIENT)
Dept: GENERAL RADIOLOGY | Facility: HOSPITAL | Age: 83
End: 2023-03-14
Payer: MEDICARE

## 2023-03-14 ENCOUNTER — APPOINTMENT (OUTPATIENT)
Dept: CT IMAGING | Facility: HOSPITAL | Age: 83
End: 2023-03-14
Payer: MEDICARE

## 2023-03-14 ENCOUNTER — HOSPITAL ENCOUNTER (EMERGENCY)
Facility: HOSPITAL | Age: 83
Discharge: HOME | End: 2023-03-15
Attending: EMERGENCY MEDICINE | Admitting: EMERGENCY MEDICINE
Payer: MEDICARE

## 2023-03-14 DIAGNOSIS — S81.812A LACERATION OF LEFT LOWER EXTREMITY, INITIAL ENCOUNTER: ICD-10-CM

## 2023-03-14 DIAGNOSIS — S22.42XA CLOSED FRACTURE OF MULTIPLE RIBS OF LEFT SIDE, INITIAL ENCOUNTER: ICD-10-CM
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
## 2023-03-14 DIAGNOSIS — S81.812A SKIN TEAR OF LEFT LOWER LEG WITHOUT COMPLICATION, INITIAL ENCOUNTER: ICD-10-CM

## 2023-03-14 LAB
ALBUMIN SERPL-MCNC: 3.4 G/DL (ref 3.5–5.2)
ALBUMIN/GLOB SERPL: 1.5 G/DL
ALP SERPL-CCNC: 72 U/L (ref 39–117)
ALT SERPL W P-5'-P-CCNC: 62 U/L (ref 1–41)
ANION GAP SERPL CALCULATED.3IONS-SCNC: 9.3 MMOL/L (ref 5–15)
APTT PPP: 29.1 SECONDS (ref 24.2–34.2)
AST SERPL-CCNC: 28 U/L (ref 1–40)
BASOPHILS # BLD AUTO: 0.14 10*3/MM3 (ref 0–0.2)
BASOPHILS NFR BLD AUTO: 0.9 % (ref 0–1.5)
BILIRUB SERPL-MCNC: 0.3 MG/DL (ref 0–1.2)
BUN SERPL-MCNC: 39 MG/DL (ref 8–23)
BUN/CREAT SERPL: 33.1 (ref 7–25)
CALCIUM SPEC-SCNC: 9.1 MG/DL (ref 8.6–10.5)
CHLORIDE SERPL-SCNC: 100 MMOL/L (ref 98–107)
CO2 SERPL-SCNC: 29.7 MMOL/L (ref 22–29)
CREAT SERPL-MCNC: 1.18 MG/DL (ref 0.76–1.27)
DEPRECATED RDW RBC AUTO: 51 FL (ref 37–54)
EGFRCR SERPLBLD CKD-EPI 2021: 62.4 ML/MIN/1.73
EOSINOPHIL # BLD AUTO: 0.05 10*3/MM3 (ref 0–0.4)
EOSINOPHIL NFR BLD AUTO: 0.3 % (ref 0.3–6.2)
ERYTHROCYTE [DISTWIDTH] IN BLOOD BY AUTOMATED COUNT: 15.2 % (ref 12.3–15.4)
GLOBULIN UR ELPH-MCNC: 2.3 GM/DL
GLUCOSE SERPL-MCNC: 310 MG/DL (ref 65–99)
HCT VFR BLD AUTO: 41 % (ref 37.5–51)
HGB BLD-MCNC: 14.1 G/DL (ref 13–17.7)
IMM GRANULOCYTES # BLD AUTO: 0.9 10*3/MM3 (ref 0–0.05)
IMM GRANULOCYTES NFR BLD AUTO: 5.5 % (ref 0–0.5)
INR PPP: 1.54 (ref 0.86–1.15)
LYMPHOCYTES # BLD AUTO: 1.03 10*3/MM3 (ref 0.7–3.1)
LYMPHOCYTES NFR BLD AUTO: 6.3 % (ref 19.6–45.3)
MCH RBC QN AUTO: 31.8 PG (ref 26.6–33)
MCHC RBC AUTO-ENTMCNC: 34.4 G/DL (ref 31.5–35.7)
MCV RBC AUTO: 92.3 FL (ref 79–97)
MONOCYTES # BLD AUTO: 1.15 10*3/MM3 (ref 0.1–0.9)
MONOCYTES NFR BLD AUTO: 7 % (ref 5–12)
NEUTROPHILS NFR BLD AUTO: 13.19 10*3/MM3 (ref 1.7–7)
NEUTROPHILS NFR BLD AUTO: 80 % (ref 42.7–76)
NRBC BLD AUTO-RTO: 0 /100 WBC (ref 0–0.2)
PLAT MORPH BLD: NORMAL
PLATELET # BLD AUTO: 183 10*3/MM3 (ref 140–450)
PMV BLD AUTO: 9.6 FL (ref 6–12)
POTASSIUM SERPL-SCNC: 4.5 MMOL/L (ref 3.5–5.2)
PROT SERPL-MCNC: 5.7 G/DL (ref 6–8.5)
PROTHROMBIN TIME: 18.4 SECONDS (ref 11.8–14.9)
RBC # BLD AUTO: 4.44 10*6/MM3 (ref 4.14–5.8)
RBC MORPH BLD: NORMAL
SODIUM SERPL-SCNC: 139 MMOL/L (ref 136–145)
WBC MORPH BLD: NORMAL
WBC NRBC COR # BLD: 16.46 10*3/MM3 (ref 3.4–10.8)

## 2023-03-14 PROCEDURE — 70450 CT HEAD/BRAIN W/O DYE: CPT

## 2023-03-14 PROCEDURE — 85025 COMPLETE CBC W/AUTO DIFF WBC: CPT | Performed by: PHYSICIAN ASSISTANT

## 2023-03-14 PROCEDURE — 36415 COLL VENOUS BLD VENIPUNCTURE: CPT | Performed by: PHYSICIAN ASSISTANT

## 2023-03-14 PROCEDURE — 85007 BL SMEAR W/DIFF WBC COUNT: CPT | Performed by: PHYSICIAN ASSISTANT

## 2023-03-14 PROCEDURE — 85730 THROMBOPLASTIN TIME PARTIAL: CPT | Performed by: PHYSICIAN ASSISTANT

## 2023-03-14 PROCEDURE — 99282 EMERGENCY DEPT VISIT SF MDM: CPT

## 2023-03-14 PROCEDURE — 71101 X-RAY EXAM UNILAT RIBS/CHEST: CPT

## 2023-03-14 PROCEDURE — 80053 COMPREHEN METABOLIC PANEL: CPT | Performed by: PHYSICIAN ASSISTANT

## 2023-03-14 PROCEDURE — 85610 PROTHROMBIN TIME: CPT | Performed by: PHYSICIAN ASSISTANT

## 2023-03-14 PROCEDURE — 73590 X-RAY EXAM OF LOWER LEG: CPT

## 2023-03-15 VITALS
WEIGHT: 164.9 LBS | RESPIRATION RATE: 18 BRPM | DIASTOLIC BLOOD PRESSURE: 114 MMHG | HEIGHT: 70 IN | HEART RATE: 91 BPM | BODY MASS INDEX: 23.61 KG/M2 | OXYGEN SATURATION: 96 % | TEMPERATURE: 98.1 F | SYSTOLIC BLOOD PRESSURE: 160 MMHG

## 2023-03-15 RX ORDER — LIDOCAINE HYDROCHLORIDE AND EPINEPHRINE 10; 10 MG/ML; UG/ML
10 INJECTION, SOLUTION INFILTRATION; PERINEURAL ONCE
Status: COMPLETED | OUTPATIENT
Start: 2023-03-15 | End: 2023-03-15

## 2023-03-15 RX ORDER — HYDROCODONE BITARTRATE AND ACETAMINOPHEN 7.5; 325 MG/1; MG/1
1 TABLET ORAL EVERY 8 HOURS PRN
Qty: 12 TABLET | Refills: 0 | Status: SHIPPED | OUTPATIENT
Start: 2023-03-15

## 2023-03-15 RX ORDER — HYDROCODONE BITARTRATE AND ACETAMINOPHEN 5; 325 MG/1; MG/1
1 TABLET ORAL EVERY 6 HOURS PRN
Qty: 15 TABLET | Refills: 0 | Status: SHIPPED | OUTPATIENT
Start: 2023-03-15 | End: 2023-03-15

## 2023-03-15 RX ADMIN — LIDOCAINE HYDROCHLORIDE,EPINEPHRINE BITARTRATE 10 ML: 10; .01 INJECTION, SOLUTION INFILTRATION; PERINEURAL at 02:19

## 2023-03-18 ENCOUNTER — HOSPITAL ENCOUNTER (EMERGENCY)
Facility: HOSPITAL | Age: 83
Discharge: HOME | End: 2023-03-18
Attending: EMERGENCY MEDICINE | Admitting: EMERGENCY MEDICINE
Payer: MEDICARE

## 2023-03-18 VITALS
HEART RATE: 82 BPM | SYSTOLIC BLOOD PRESSURE: 135 MMHG | OXYGEN SATURATION: 98 % | RESPIRATION RATE: 18 BRPM | TEMPERATURE: 98.6 F | BODY MASS INDEX: 24.62 KG/M2 | WEIGHT: 171.96 LBS | DIASTOLIC BLOOD PRESSURE: 91 MMHG | HEIGHT: 70 IN

## 2023-03-18 DIAGNOSIS — S81.812D SKIN TEAR OF LEFT LOWER LEG WITHOUT COMPLICATION, SUBSEQUENT ENCOUNTER: Primary | ICD-10-CM

## 2023-03-18 PROCEDURE — 99283 EMERGENCY DEPT VISIT LOW MDM: CPT

## 2023-03-18 RX ADMIN — SILVER NITRATE APPLICATORS 1 APPLICATION: 25; 75 STICK TOPICAL at 10:32

## 2023-03-21 PROBLEM — Z48.02 VISIT FOR SUTURE REMOVAL: Status: ACTIVE | Noted: 2023-03-21
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
## 2023-03-22 ENCOUNTER — OFFICE VISIT (OUTPATIENT)
Dept: INTERNAL MEDICINE | Facility: CLINIC | Age: 83
End: 2023-03-22
Payer: MEDICARE

## 2023-03-22 VITALS
BODY MASS INDEX: 25.11 KG/M2 | SYSTOLIC BLOOD PRESSURE: 130 MMHG | HEIGHT: 70 IN | DIASTOLIC BLOOD PRESSURE: 76 MMHG | OXYGEN SATURATION: 98 % | HEART RATE: 90 BPM | WEIGHT: 175.4 LBS | TEMPERATURE: 98.4 F

## 2023-03-22 DIAGNOSIS — Z48.02 VISIT FOR SUTURE REMOVAL: ICD-10-CM

## 2023-03-22 DIAGNOSIS — I10 ESSENTIAL HYPERTENSION: ICD-10-CM
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
## 2023-04-17 ENCOUNTER — TELEPHONE (OUTPATIENT)
Dept: INTERNAL MEDICINE | Facility: CLINIC | Age: 83
End: 2023-04-17

## 2023-05-23 ENCOUNTER — LAB (OUTPATIENT)
Dept: LAB | Facility: HOSPITAL | Age: 83
End: 2023-05-23
Payer: MEDICARE

## 2023-05-23 DIAGNOSIS — E78.5 HYPERLIPIDEMIA LDL GOAL <70: ICD-10-CM

## 2023-05-23 LAB
ALBUMIN SERPL-MCNC: 3.6 G/DL (ref 3.5–5.2)
ALP SERPL-CCNC: 56 U/L (ref 39–117)
ALT SERPL W P-5'-P-CCNC: 45 U/L (ref 1–41)
AST SERPL-CCNC: 21 U/L (ref 1–40)
BILIRUB CONJ SERPL-MCNC: <0.2 MG/DL (ref 0–0.3)
BILIRUB INDIRECT SERPL-MCNC: ABNORMAL MG/DL
BILIRUB SERPL-MCNC: 0.5 MG/DL (ref 0–1.2)
CHOLEST SERPL-MCNC: 155 MG/DL (ref 0–200)
HDLC SERPL-MCNC: 53 MG/DL (ref 40–60)
LDLC SERPL CALC-MCNC: 85 MG/DL (ref 0–100)
LDLC/HDLC SERPL: 1.58 {RATIO}
PROT SERPL-MCNC: 5.7 G/DL (ref 6–8.5)
TRIGL SERPL-MCNC: 91 MG/DL (ref 0–150)
VLDLC SERPL-MCNC: 17 MG/DL (ref 5–40)

## 2023-05-23 PROCEDURE — 36415 COLL VENOUS BLD VENIPUNCTURE: CPT

## 2023-05-23 PROCEDURE — 80061 LIPID PANEL: CPT

## 2023-05-23 PROCEDURE — 80076 HEPATIC FUNCTION PANEL: CPT

## 2023-05-24 ENCOUNTER — TELEPHONE (OUTPATIENT)
Dept: CARDIOLOGY | Facility: CLINIC | Age: 83
End: 2023-05-24
Payer: MEDICARE

## 2023-05-24 DIAGNOSIS — E78.5 HYPERLIPIDEMIA LDL GOAL <70: Primary | ICD-10-CM

## 2023-05-24 RX ORDER — EZETIMIBE 10 MG/1
10 TABLET ORAL DAILY
Qty: 90 TABLET | Refills: 3 | Status: SHIPPED | OUTPATIENT
Start: 2023-05-24

## 2023-05-25 RX ORDER — APIXABAN 5 MG/1
TABLET, FILM COATED ORAL
Qty: 180 TABLET | Refills: 1 | Status: SHIPPED | OUTPATIENT
Start: 2023-05-25

## 2023-06-01 ENCOUNTER — OFFICE VISIT (OUTPATIENT)
Dept: INTERNAL MEDICINE | Facility: CLINIC | Age: 83
End: 2023-06-01

## 2023-06-01 VITALS
DIASTOLIC BLOOD PRESSURE: 78 MMHG | HEIGHT: 70 IN | OXYGEN SATURATION: 97 % | WEIGHT: 176.2 LBS | SYSTOLIC BLOOD PRESSURE: 116 MMHG | BODY MASS INDEX: 25.22 KG/M2 | RESPIRATION RATE: 18 BRPM | TEMPERATURE: 97.8 F | HEART RATE: 68 BPM

## 2023-06-01 DIAGNOSIS — R05.8 PRODUCTIVE COUGH: ICD-10-CM

## 2023-06-01 DIAGNOSIS — J40 BRONCHITIS: Primary | ICD-10-CM

## 2023-06-01 LAB
EXPIRATION DATE: NORMAL
FLUAV AG UPPER RESP QL IA.RAPID: NOT DETECTED
FLUBV AG UPPER RESP QL IA.RAPID: NOT DETECTED
INTERNAL CONTROL: NORMAL
Lab: NORMAL
SARS-COV-2 AG UPPER RESP QL IA.RAPID: NOT DETECTED

## 2023-06-01 PROCEDURE — 3078F DIAST BP <80 MM HG: CPT | Performed by: INTERNAL MEDICINE

## 2023-06-01 PROCEDURE — 1160F RVW MEDS BY RX/DR IN RCRD: CPT | Performed by: INTERNAL MEDICINE

## 2023-06-01 PROCEDURE — 3074F SYST BP LT 130 MM HG: CPT | Performed by: INTERNAL MEDICINE

## 2023-06-01 PROCEDURE — 99213 OFFICE O/P EST LOW 20 MIN: CPT | Performed by: INTERNAL MEDICINE

## 2023-06-01 PROCEDURE — 1159F MED LIST DOCD IN RCRD: CPT | Performed by: INTERNAL MEDICINE

## 2023-06-01 PROCEDURE — 87428 SARSCOV & INF VIR A&B AG IA: CPT | Performed by: INTERNAL MEDICINE

## 2023-06-01 RX ORDER — METHYLPREDNISOLONE 4 MG/1
TABLET ORAL
Qty: 21 TABLET | Refills: 0 | Status: SHIPPED | OUTPATIENT
Start: 2023-06-01 | End: 2023-06-07

## 2023-06-01 RX ORDER — ALBUTEROL SULFATE 90 UG/1
2 AEROSOL, METERED RESPIRATORY (INHALATION) EVERY 4 HOURS PRN
Qty: 18 G | Refills: 1 | Status: SHIPPED | OUTPATIENT
Start: 2023-06-01 | End: 2023-07-01

## 2023-06-01 RX ORDER — LEVOFLOXACIN 500 MG/1
500 TABLET, FILM COATED ORAL DAILY
Qty: 7 TABLET | Refills: 0 | Status: SHIPPED | OUTPATIENT
Start: 2023-06-01

## 2023-06-02 ENCOUNTER — HOSPITAL ENCOUNTER (OUTPATIENT)
Dept: GENERAL RADIOLOGY | Facility: HOSPITAL | Age: 83
Discharge: HOME | End: 2023-06-02

## 2023-06-02 ENCOUNTER — OFFICE VISIT (OUTPATIENT)
Dept: CARDIOLOGY | Facility: CLINIC | Age: 83
End: 2023-06-02
Payer: MEDICARE

## 2023-06-02 VITALS
DIASTOLIC BLOOD PRESSURE: 69 MMHG | SYSTOLIC BLOOD PRESSURE: 126 MMHG | HEART RATE: 98 BPM | HEIGHT: 70 IN | BODY MASS INDEX: 25.25 KG/M2 | WEIGHT: 176.4 LBS

## 2023-06-02 DIAGNOSIS — R05.8 PRODUCTIVE COUGH: ICD-10-CM

## 2023-06-02 DIAGNOSIS — I10 ESSENTIAL HYPERTENSION: ICD-10-CM

## 2023-06-02 DIAGNOSIS — E78.5 HYPERLIPIDEMIA LDL GOAL <70: ICD-10-CM

## 2023-06-02 DIAGNOSIS — I25.10 CAD S/P PERCUTANEOUS CORONARY ANGIOPLASTY: Primary | ICD-10-CM

## 2023-06-02 DIAGNOSIS — Z98.61 CAD S/P PERCUTANEOUS CORONARY ANGIOPLASTY: Primary | ICD-10-CM

## 2023-06-02 DIAGNOSIS — J40 BRONCHITIS: ICD-10-CM

## 2023-06-02 DIAGNOSIS — I48.19 ATRIAL FIBRILLATION, PERSISTENT: ICD-10-CM

## 2023-06-02 DIAGNOSIS — I50.32 CHRONIC HEART FAILURE WITH PRESERVED EJECTION FRACTION (HFPEF): ICD-10-CM

## 2023-06-02 PROBLEM — R60.0 LOWER EXTREMITY EDEMA: Status: RESOLVED | Noted: 2022-04-27 | Resolved: 2023-06-02

## 2023-06-02 PROBLEM — M79.89 SWELLING OF LOWER EXTREMITY: Status: RESOLVED | Noted: 2022-08-21 | Resolved: 2023-06-02

## 2023-06-02 PROBLEM — R73.9 HYPERGLYCEMIA: Status: RESOLVED | Noted: 2022-08-21 | Resolved: 2023-06-02

## 2023-06-02 PROBLEM — Z48.02 VISIT FOR SUTURE REMOVAL: Status: RESOLVED | Noted: 2023-03-21 | Resolved: 2023-06-02

## 2023-06-02 PROBLEM — Z09 HOSPITAL DISCHARGE FOLLOW-UP: Status: RESOLVED | Noted: 2022-08-25 | Resolved: 2023-06-02

## 2023-06-02 PROBLEM — I48.0 PAROXYSMAL ATRIAL FIBRILLATION: Status: RESOLVED | Noted: 2022-08-21 | Resolved: 2023-06-02

## 2023-06-02 PROBLEM — I24.9 ACS (ACUTE CORONARY SYNDROME): Status: RESOLVED | Noted: 2022-08-23 | Resolved: 2023-06-02

## 2023-06-02 PROCEDURE — 71046 X-RAY EXAM CHEST 2 VIEWS: CPT

## 2023-06-02 RX ORDER — FUROSEMIDE 20 MG/1
1 TABLET ORAL DAILY
COMMUNITY
Start: 2023-03-26

## 2023-06-02 RX ORDER — POTASSIUM CHLORIDE 750 MG/1
1 TABLET, FILM COATED, EXTENDED RELEASE ORAL DAILY
COMMUNITY
Start: 2023-03-23

## 2023-06-06 ENCOUNTER — TELEPHONE (OUTPATIENT)
Dept: INTERNAL MEDICINE | Facility: CLINIC | Age: 83
End: 2023-06-06
Payer: MEDICARE

## 2023-06-06 ENCOUNTER — LAB (OUTPATIENT)
Dept: LAB | Facility: HOSPITAL | Age: 83
End: 2023-06-06
Payer: MEDICARE

## 2023-06-06 DIAGNOSIS — E11.9 TYPE 2 DIABETES MELLITUS WITHOUT COMPLICATION, WITHOUT LONG-TERM CURRENT USE OF INSULIN: ICD-10-CM

## 2023-06-06 DIAGNOSIS — E78.5 HYPERLIPIDEMIA LDL GOAL <70: ICD-10-CM

## 2023-06-06 DIAGNOSIS — I10 ESSENTIAL HYPERTENSION: ICD-10-CM

## 2023-06-06 LAB
ALBUMIN SERPL-MCNC: 3.5 G/DL (ref 3.5–5.2)
ALBUMIN/GLOB SERPL: 1.8 G/DL
ALP SERPL-CCNC: 71 U/L (ref 39–117)
ALT SERPL W P-5'-P-CCNC: 99 U/L (ref 1–41)
ANION GAP SERPL CALCULATED.3IONS-SCNC: 9.1 MMOL/L (ref 5–15)
AST SERPL-CCNC: 45 U/L (ref 1–40)
BILIRUB SERPL-MCNC: 0.4 MG/DL (ref 0–1.2)
BUN SERPL-MCNC: 66 MG/DL (ref 8–23)
BUN/CREAT SERPL: 53.2 (ref 7–25)
CALCIUM SPEC-SCNC: 9.7 MG/DL (ref 8.6–10.5)
CHLORIDE SERPL-SCNC: 102 MMOL/L (ref 98–107)
CHOLEST SERPL-MCNC: 112 MG/DL (ref 0–200)
CO2 SERPL-SCNC: 26.9 MMOL/L (ref 22–29)
CREAT SERPL-MCNC: 1.24 MG/DL (ref 0.76–1.27)
EGFRCR SERPLBLD CKD-EPI 2021: 58.8 ML/MIN/1.73
GLOBULIN UR ELPH-MCNC: 2 GM/DL
GLUCOSE SERPL-MCNC: 225 MG/DL (ref 65–99)
HBA1C MFR BLD: 8.4 % (ref 4.8–5.6)
HDLC SERPL-MCNC: 45 MG/DL (ref 40–60)
LDLC SERPL CALC-MCNC: 50 MG/DL (ref 0–100)
LDLC/HDLC SERPL: 1.09 {RATIO}
POTASSIUM SERPL-SCNC: 4.8 MMOL/L (ref 3.5–5.2)
PROT SERPL-MCNC: 5.5 G/DL (ref 6–8.5)
SODIUM SERPL-SCNC: 138 MMOL/L (ref 136–145)
TRIGL SERPL-MCNC: 90 MG/DL (ref 0–150)
VLDLC SERPL-MCNC: 17 MG/DL (ref 5–40)

## 2023-06-06 PROCEDURE — 80061 LIPID PANEL: CPT

## 2023-06-06 PROCEDURE — 80053 COMPREHEN METABOLIC PANEL: CPT

## 2023-06-06 PROCEDURE — 83036 HEMOGLOBIN GLYCOSYLATED A1C: CPT

## 2023-06-06 PROCEDURE — 36415 COLL VENOUS BLD VENIPUNCTURE: CPT

## 2023-06-13 ENCOUNTER — OFFICE VISIT (OUTPATIENT)
Dept: INTERNAL MEDICINE | Facility: CLINIC | Age: 83
End: 2023-06-13
Payer: MEDICARE

## 2023-06-13 VITALS
BODY MASS INDEX: 24.65 KG/M2 | HEIGHT: 70 IN | DIASTOLIC BLOOD PRESSURE: 83 MMHG | TEMPERATURE: 97.7 F | SYSTOLIC BLOOD PRESSURE: 125 MMHG | HEART RATE: 95 BPM | WEIGHT: 172.2 LBS | OXYGEN SATURATION: 98 %

## 2023-06-13 DIAGNOSIS — R74.8 ELEVATED LIVER ENZYMES: ICD-10-CM

## 2023-06-13 DIAGNOSIS — R79.89 ELEVATED LFTS: ICD-10-CM

## 2023-06-13 DIAGNOSIS — R06.09 DYSPNEA ON EXERTION: ICD-10-CM

## 2023-06-13 DIAGNOSIS — R79.89 PRERENAL AZOTEMIA: ICD-10-CM

## 2023-06-13 DIAGNOSIS — K75.9 HEPATITIS: ICD-10-CM

## 2023-06-13 DIAGNOSIS — I48.19 ATRIAL FIBRILLATION, PERSISTENT: ICD-10-CM

## 2023-06-13 DIAGNOSIS — I10 ESSENTIAL HYPERTENSION: ICD-10-CM

## 2023-06-13 DIAGNOSIS — E11.9 TYPE 2 DIABETES MELLITUS WITHOUT COMPLICATION, WITHOUT LONG-TERM CURRENT USE OF INSULIN: Primary | ICD-10-CM

## 2023-06-13 RX ORDER — GLIMEPIRIDE 1 MG/1
1 TABLET ORAL
Qty: 90 TABLET | Refills: 1 | Status: SHIPPED | OUTPATIENT
Start: 2023-06-13

## 2023-06-15 ENCOUNTER — OFFICE VISIT (OUTPATIENT)
Dept: GASTROENTEROLOGY | Facility: CLINIC | Age: 83
End: 2023-06-15
Payer: MEDICARE

## 2023-06-15 ENCOUNTER — PREP FOR SURGERY (OUTPATIENT)
Dept: GASTROENTEROLOGY | Facility: CLINIC | Age: 83
End: 2023-06-15

## 2023-06-15 VITALS
OXYGEN SATURATION: 98 % | BODY MASS INDEX: 25.51 KG/M2 | DIASTOLIC BLOOD PRESSURE: 51 MMHG | SYSTOLIC BLOOD PRESSURE: 108 MMHG | WEIGHT: 178.2 LBS | HEART RATE: 62 BPM | HEIGHT: 70 IN

## 2023-06-15 DIAGNOSIS — K21.9 GASTROESOPHAGEAL REFLUX DISEASE, UNSPECIFIED WHETHER ESOPHAGITIS PRESENT: ICD-10-CM

## 2023-06-15 DIAGNOSIS — R07.89 ATYPICAL CHEST PAIN: ICD-10-CM

## 2023-06-15 DIAGNOSIS — Z86.010 HISTORY OF COLON POLYPS: ICD-10-CM

## 2023-06-15 DIAGNOSIS — R74.9 ABNORMAL SERUM ENZYME LEVEL, UNSPECIFIED: ICD-10-CM

## 2023-06-15 DIAGNOSIS — R13.10 DYSPHAGIA, UNSPECIFIED TYPE: Primary | ICD-10-CM

## 2023-06-15 DIAGNOSIS — R94.5 ABNORMAL RESULTS OF LIVER FUNCTION STUDIES: ICD-10-CM

## 2023-06-15 DIAGNOSIS — Z12.11 ENCOUNTER FOR SCREENING FOR MALIGNANT NEOPLASM OF COLON: ICD-10-CM

## 2023-06-15 DIAGNOSIS — R93.2 ABNORMAL FINDINGS ON DIAGNOSTIC IMAGING OF LIVER AND BILIARY TRACT: ICD-10-CM

## 2023-06-15 DIAGNOSIS — R74.8 ELEVATED LIVER ENZYMES: ICD-10-CM

## 2023-06-15 PROBLEM — Z86.0100 HISTORY OF COLON POLYPS: Status: ACTIVE | Noted: 2023-06-15

## 2023-06-15 RX ORDER — POLYETHYLENE GLYCOL 3350, SODIUM SULFATE ANHYDROUS, SODIUM BICARBONATE, SODIUM CHLORIDE, POTASSIUM CHLORIDE 227.1; 21.5; 6.36; 5.53; .754 G/L; G/L; G/L; G/L; G/L
4000 POWDER, FOR SOLUTION ORAL ONCE
Qty: 1 EACH | Refills: 0 | Status: SHIPPED | OUTPATIENT
Start: 2023-06-15 | End: 2023-06-15

## 2023-06-15 RX ORDER — PANTOPRAZOLE SODIUM 40 MG/1
40 TABLET, DELAYED RELEASE ORAL DAILY
Qty: 90 TABLET | Refills: 0 | Status: SHIPPED | OUTPATIENT
Start: 2023-06-15 | End: 2023-09-13

## 2023-06-29 PROBLEM — N28.1 RENAL CYST: Status: RESOLVED | Noted: 2021-06-15 | Resolved: 2023-06-29

## 2023-06-29 PROBLEM — U07.1 CLINICAL DIAGNOSIS OF SEVERE ACUTE RESPIRATORY SYNDROME CORONAVIRUS 2 (SARS-COV-2) DISEASE: Status: RESOLVED | Noted: 2022-08-01 | Resolved: 2023-06-29

## 2023-06-29 PROBLEM — K58.0 IRRITABLE BOWEL SYNDROME WITH DIARRHEA: Status: RESOLVED | Noted: 2021-12-05 | Resolved: 2023-06-29

## 2023-06-29 PROBLEM — G62.9 SENSORY NEUROPATHY: Status: RESOLVED | Noted: 2022-06-15 | Resolved: 2023-06-29

## 2023-06-29 PROBLEM — M19.90 IDIOPATHIC OSTEOARTHRITIS: Status: RESOLVED | Noted: 2021-12-05 | Resolved: 2023-06-29

## 2023-06-29 PROBLEM — N40.1 BENIGN PROSTATIC HYPERPLASIA WITH URINARY FREQUENCY: Status: RESOLVED | Noted: 2021-07-21 | Resolved: 2023-06-29

## 2023-06-29 PROBLEM — L03.116 CELLULITIS OF LEFT LOWER EXTREMITY: Status: ACTIVE | Noted: 2023-06-29

## 2023-06-29 PROBLEM — M51.369 DEGENERATION OF LUMBAR INTERVERTEBRAL DISC: Status: RESOLVED | Noted: 2021-12-21 | Resolved: 2023-06-29

## 2023-06-29 PROBLEM — R13.10 ODYNOPHAGIA: Status: RESOLVED | Noted: 2021-08-17 | Resolved: 2023-06-29

## 2023-06-29 PROBLEM — M51.36 DEGENERATION OF LUMBAR INTERVERTEBRAL DISC: Status: RESOLVED | Noted: 2021-12-21 | Resolved: 2023-06-29

## 2023-06-29 PROBLEM — R35.0 BENIGN PROSTATIC HYPERPLASIA WITH URINARY FREQUENCY: Status: RESOLVED | Noted: 2021-07-21 | Resolved: 2023-06-29

## 2023-06-29 PROBLEM — M76.899 ADDUCTOR TENDINITIS: Status: RESOLVED | Noted: 2022-06-15 | Resolved: 2023-06-29

## 2023-06-29 PROBLEM — M47.816 LUMBAR SPONDYLOSIS: Status: RESOLVED | Noted: 2020-07-14 | Resolved: 2023-06-29

## 2023-06-29 PROBLEM — M51.26 DISPLACEMENT OF LUMBAR INTERVERTEBRAL DISC WITHOUT MYELOPATHY: Status: RESOLVED | Noted: 2020-07-14 | Resolved: 2023-06-29

## 2023-06-29 PROBLEM — M46.1 INFLAMMATION OF SACROILIAC JOINT: Status: RESOLVED | Noted: 2021-12-21 | Resolved: 2023-06-29

## 2023-07-03 PROBLEM — R13.10 DYSPHAGIA: Status: ACTIVE | Noted: 2023-06-15

## 2023-07-25 ENCOUNTER — OFFICE VISIT (OUTPATIENT)
Dept: INTERNAL MEDICINE | Facility: CLINIC | Age: 83
End: 2023-07-25
Payer: MEDICARE

## 2023-07-25 VITALS
HEART RATE: 82 BPM | DIASTOLIC BLOOD PRESSURE: 82 MMHG | TEMPERATURE: 98 F | SYSTOLIC BLOOD PRESSURE: 124 MMHG | OXYGEN SATURATION: 98 % | HEIGHT: 70 IN | BODY MASS INDEX: 25.2 KG/M2 | WEIGHT: 176 LBS

## 2023-07-25 DIAGNOSIS — L03.116 CELLULITIS OF LEFT LOWER EXTREMITY: ICD-10-CM

## 2023-07-25 DIAGNOSIS — I10 ESSENTIAL HYPERTENSION: ICD-10-CM

## 2023-07-25 DIAGNOSIS — E11.9 TYPE 2 DIABETES MELLITUS WITHOUT COMPLICATION, WITHOUT LONG-TERM CURRENT USE OF INSULIN: Primary | ICD-10-CM

## 2023-07-25 DIAGNOSIS — I48.19 ATRIAL FIBRILLATION, PERSISTENT: ICD-10-CM

## 2023-07-25 DIAGNOSIS — N18.31 STAGE 3A CHRONIC KIDNEY DISEASE: ICD-10-CM

## 2023-07-25 DIAGNOSIS — D12.6 ADENOMATOUS POLYP OF COLON, UNSPECIFIED PART OF COLON: ICD-10-CM

## 2023-07-25 DIAGNOSIS — R63.4 WEIGHT LOSS: ICD-10-CM

## 2023-07-25 PROBLEM — R07.89 ATYPICAL CHEST PAIN: Status: RESOLVED | Noted: 2023-06-15 | Resolved: 2023-07-25

## 2023-07-25 PROBLEM — R13.10 DYSPHAGIA: Status: RESOLVED | Noted: 2023-06-15 | Resolved: 2023-07-25

## 2023-07-25 PROBLEM — K21.9 GASTROESOPHAGEAL REFLUX DISEASE: Status: RESOLVED | Noted: 2023-06-15 | Resolved: 2023-07-25

## 2023-07-25 PROBLEM — Z86.010 HISTORY OF COLON POLYPS: Status: RESOLVED | Noted: 2023-06-15 | Resolved: 2023-07-25

## 2023-07-25 PROBLEM — Z86.0100 HISTORY OF COLON POLYPS: Status: RESOLVED | Noted: 2023-06-15 | Resolved: 2023-07-25

## 2023-07-25 PROCEDURE — 99215 OFFICE O/P EST HI 40 MIN: CPT | Performed by: INTERNAL MEDICINE

## 2023-07-25 PROCEDURE — 1170F FXNL STATUS ASSESSED: CPT | Performed by: INTERNAL MEDICINE

## 2023-07-25 PROCEDURE — 1159F MED LIST DOCD IN RCRD: CPT | Performed by: INTERNAL MEDICINE

## 2023-07-25 PROCEDURE — 1160F RVW MEDS BY RX/DR IN RCRD: CPT | Performed by: INTERNAL MEDICINE

## 2023-07-25 PROCEDURE — 3079F DIAST BP 80-89 MM HG: CPT | Performed by: INTERNAL MEDICINE

## 2023-07-25 PROCEDURE — 3074F SYST BP LT 130 MM HG: CPT | Performed by: INTERNAL MEDICINE

## 2023-08-02 ENCOUNTER — HOSPITAL ENCOUNTER (OUTPATIENT)
Dept: ULTRASOUND IMAGING | Facility: HOSPITAL | Age: 83
Discharge: HOME | End: 2023-08-02
Payer: MEDICARE

## 2023-08-02 DIAGNOSIS — R74.8 ELEVATED LIVER ENZYMES: ICD-10-CM

## 2023-08-02 PROCEDURE — 76705 ECHO EXAM OF ABDOMEN: CPT

## 2023-08-04 ENCOUNTER — TELEPHONE (OUTPATIENT)
Dept: GASTROENTEROLOGY | Facility: CLINIC | Age: 83
End: 2023-08-04
Payer: MEDICARE

## 2023-08-24 ENCOUNTER — LAB (OUTPATIENT)
Dept: LAB | Facility: HOSPITAL | Age: 83
End: 2023-08-24
Payer: MEDICARE

## 2023-08-24 DIAGNOSIS — N18.31 STAGE 3A CHRONIC KIDNEY DISEASE: ICD-10-CM

## 2023-08-24 LAB
ALBUMIN SERPL-MCNC: 3.6 G/DL (ref 3.5–5.2)
ANION GAP SERPL CALCULATED.3IONS-SCNC: 10 MMOL/L (ref 5–15)
BUN SERPL-MCNC: 37 MG/DL (ref 8–23)
BUN/CREAT SERPL: 32.7 (ref 7–25)
CALCIUM SPEC-SCNC: 9.2 MG/DL (ref 8.6–10.5)
CHLORIDE SERPL-SCNC: 104 MMOL/L (ref 98–107)
CO2 SERPL-SCNC: 28 MMOL/L (ref 22–29)
CREAT SERPL-MCNC: 1.13 MG/DL (ref 0.76–1.27)
EGFRCR SERPLBLD CKD-EPI 2021: 65.7 ML/MIN/1.73
GLUCOSE SERPL-MCNC: 170 MG/DL (ref 65–99)
PHOSPHATE SERPL-MCNC: 3.6 MG/DL (ref 2.5–4.5)
POTASSIUM SERPL-SCNC: 3.8 MMOL/L (ref 3.5–5.2)
SODIUM SERPL-SCNC: 142 MMOL/L (ref 136–145)

## 2023-08-24 PROCEDURE — 36415 COLL VENOUS BLD VENIPUNCTURE: CPT

## 2023-08-24 PROCEDURE — 80069 RENAL FUNCTION PANEL: CPT

## 2023-09-07 RX ORDER — VALSARTAN AND HYDROCHLOROTHIAZIDE 320; 25 MG/1; MG/1
TABLET, FILM COATED ORAL
Qty: 90 TABLET | Refills: 1 | Status: SHIPPED | OUTPATIENT
Start: 2023-09-07

## 2023-09-08 RX ORDER — PANTOPRAZOLE SODIUM 40 MG/1
TABLET, DELAYED RELEASE ORAL
Qty: 90 TABLET | Refills: 1 | Status: SHIPPED | OUTPATIENT
Start: 2023-09-08

## 2023-09-18 RX ORDER — DILTIAZEM HYDROCHLORIDE 120 MG/1
CAPSULE, EXTENDED RELEASE ORAL
Qty: 90 CAPSULE | Refills: 3 | Status: SHIPPED | OUTPATIENT
Start: 2023-09-18

## 2023-10-05 RX ORDER — CLOPIDOGREL BISULFATE 75 MG/1
TABLET ORAL
Qty: 90 TABLET | Refills: 1 | Status: SHIPPED | OUTPATIENT
Start: 2023-10-05

## 2023-10-16 RX ORDER — CARVEDILOL 25 MG/1
TABLET ORAL
Qty: 180 TABLET | Refills: 1 | Status: SHIPPED | OUTPATIENT
Start: 2023-10-16

## 2023-10-18 ENCOUNTER — LAB (OUTPATIENT)
Dept: LAB | Facility: HOSPITAL | Age: 83
End: 2023-10-18
Payer: MEDICARE

## 2023-10-18 DIAGNOSIS — I10 ESSENTIAL HYPERTENSION: ICD-10-CM

## 2023-10-18 DIAGNOSIS — E11.9 TYPE 2 DIABETES MELLITUS WITHOUT COMPLICATION, WITHOUT LONG-TERM CURRENT USE OF INSULIN: ICD-10-CM

## 2023-10-18 LAB
ANION GAP SERPL CALCULATED.3IONS-SCNC: 6.6 MMOL/L (ref 5–15)
BUN SERPL-MCNC: 30 MG/DL (ref 8–23)
BUN/CREAT SERPL: 24.4 (ref 7–25)
CALCIUM SPEC-SCNC: 8.9 MG/DL (ref 8.6–10.5)
CHLORIDE SERPL-SCNC: 108 MMOL/L (ref 98–107)
CO2 SERPL-SCNC: 29.4 MMOL/L (ref 22–29)
CREAT SERPL-MCNC: 1.23 MG/DL (ref 0.76–1.27)
EGFRCR SERPLBLD CKD-EPI 2021: 59 ML/MIN/1.73
GLUCOSE SERPL-MCNC: 122 MG/DL (ref 65–99)
HBA1C MFR BLD: 7.5 % (ref 4.8–5.6)
POTASSIUM SERPL-SCNC: 4.5 MMOL/L (ref 3.5–5.2)
SODIUM SERPL-SCNC: 144 MMOL/L (ref 136–145)

## 2023-10-18 PROCEDURE — 80048 BASIC METABOLIC PNL TOTAL CA: CPT

## 2023-10-18 PROCEDURE — 36415 COLL VENOUS BLD VENIPUNCTURE: CPT

## 2023-10-18 PROCEDURE — 83036 HEMOGLOBIN GLYCOSYLATED A1C: CPT
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
## 2023-10-24 PROBLEM — L03.116 CELLULITIS OF LEFT LOWER EXTREMITY: Status: RESOLVED | Noted: 2023-06-29 | Resolved: 2023-10-24

## 2023-10-24 PROBLEM — N20.0 NEPHROLITHIASIS: Status: RESOLVED | Noted: 2021-06-15 | Resolved: 2023-10-24

## 2023-10-25 ENCOUNTER — OFFICE VISIT (OUTPATIENT)
Dept: INTERNAL MEDICINE | Facility: CLINIC | Age: 83
End: 2023-10-25
Payer: MEDICARE

## 2023-10-25 VITALS
DIASTOLIC BLOOD PRESSURE: 80 MMHG | RESPIRATION RATE: 18 BRPM | TEMPERATURE: 95.3 F | OXYGEN SATURATION: 98 % | HEIGHT: 70 IN | BODY MASS INDEX: 25.94 KG/M2 | WEIGHT: 181.2 LBS | SYSTOLIC BLOOD PRESSURE: 120 MMHG | HEART RATE: 92 BPM

## 2023-10-25 DIAGNOSIS — I25.10 CAD S/P PERCUTANEOUS CORONARY ANGIOPLASTY: ICD-10-CM

## 2023-10-25 DIAGNOSIS — N18.31 STAGE 3A CHRONIC KIDNEY DISEASE: ICD-10-CM

## 2023-10-25 DIAGNOSIS — E78.5 HYPERLIPIDEMIA LDL GOAL <70: ICD-10-CM

## 2023-10-25 DIAGNOSIS — Z98.61 CAD S/P PERCUTANEOUS CORONARY ANGIOPLASTY: ICD-10-CM

## 2023-10-25 DIAGNOSIS — I50.32 CHRONIC HEART FAILURE WITH PRESERVED EJECTION FRACTION (HFPEF): Primary | ICD-10-CM

## 2023-10-25 DIAGNOSIS — I10 ESSENTIAL HYPERTENSION: ICD-10-CM

## 2023-10-25 DIAGNOSIS — I48.19 ATRIAL FIBRILLATION, PERSISTENT: ICD-10-CM

## 2023-10-25 DIAGNOSIS — E11.9 TYPE 2 DIABETES MELLITUS WITHOUT COMPLICATION, WITHOUT LONG-TERM CURRENT USE OF INSULIN: ICD-10-CM

## 2023-11-16 ENCOUNTER — LAB (OUTPATIENT)
Dept: LAB | Facility: HOSPITAL | Age: 83
End: 2023-11-16
Payer: MEDICARE

## 2023-11-16 DIAGNOSIS — N18.31 STAGE 3A CHRONIC KIDNEY DISEASE: ICD-10-CM

## 2023-11-16 DIAGNOSIS — E11.9 TYPE 2 DIABETES MELLITUS WITHOUT COMPLICATION, WITHOUT LONG-TERM CURRENT USE OF INSULIN: ICD-10-CM

## 2023-11-16 DIAGNOSIS — I50.32 CHRONIC HEART FAILURE WITH PRESERVED EJECTION FRACTION (HFPEF): ICD-10-CM

## 2023-11-16 DIAGNOSIS — E78.5 HYPERLIPIDEMIA LDL GOAL <70: ICD-10-CM

## 2023-11-16 LAB
ALBUMIN SERPL-MCNC: 3.4 G/DL (ref 3.5–5.2)
ALBUMIN/GLOB SERPL: 1.6 G/DL
ALP SERPL-CCNC: 63 U/L (ref 39–117)
ALT SERPL W P-5'-P-CCNC: 51 U/L (ref 1–41)
ANION GAP SERPL CALCULATED.3IONS-SCNC: 8 MMOL/L (ref 5–15)
AST SERPL-CCNC: 27 U/L (ref 1–40)
BILIRUB CONJ SERPL-MCNC: <0.2 MG/DL (ref 0–0.3)
BILIRUB SERPL-MCNC: 0.4 MG/DL (ref 0–1.2)
BUN SERPL-MCNC: 40 MG/DL (ref 8–23)
BUN/CREAT SERPL: 41.2 (ref 7–25)
CALCIUM SPEC-SCNC: 9.3 MG/DL (ref 8.6–10.5)
CHLORIDE SERPL-SCNC: 106 MMOL/L (ref 98–107)
CHOLEST SERPL-MCNC: 104 MG/DL (ref 0–200)
CO2 SERPL-SCNC: 27 MMOL/L (ref 22–29)
CREAT SERPL-MCNC: 0.97 MG/DL (ref 0.76–1.27)
EGFRCR SERPLBLD CKD-EPI 2021: 78.4 ML/MIN/1.73
GLOBULIN UR ELPH-MCNC: 2.1 GM/DL
GLUCOSE SERPL-MCNC: 89 MG/DL (ref 65–99)
HBA1C MFR BLD: 7.8 % (ref 4.8–5.6)
HDLC SERPL-MCNC: 40 MG/DL (ref 40–60)
LDLC SERPL CALC-MCNC: 47 MG/DL (ref 0–100)
LDLC/HDLC SERPL: 1.19 {RATIO}
NT-PROBNP SERPL-MCNC: 451 PG/ML (ref 0–1800)
POTASSIUM SERPL-SCNC: 3.9 MMOL/L (ref 3.5–5.2)
PROT SERPL-MCNC: 5.5 G/DL (ref 6–8.5)
SODIUM SERPL-SCNC: 141 MMOL/L (ref 136–145)
TRIGL SERPL-MCNC: 82 MG/DL (ref 0–150)
VLDLC SERPL-MCNC: 17 MG/DL (ref 5–40)

## 2023-11-16 PROCEDURE — 83036 HEMOGLOBIN GLYCOSYLATED A1C: CPT

## 2023-11-16 PROCEDURE — 82248 BILIRUBIN DIRECT: CPT

## 2023-11-16 PROCEDURE — 80053 COMPREHEN METABOLIC PANEL: CPT

## 2023-11-16 PROCEDURE — 80061 LIPID PANEL: CPT

## 2023-11-16 PROCEDURE — 83880 ASSAY OF NATRIURETIC PEPTIDE: CPT

## 2023-11-16 PROCEDURE — 36415 COLL VENOUS BLD VENIPUNCTURE: CPT

## 2023-11-17 ENCOUNTER — TELEPHONE (OUTPATIENT)
Dept: CARDIOLOGY | Facility: CLINIC | Age: 83
End: 2023-11-17
Payer: MEDICARE

## 2023-11-20 ENCOUNTER — PROCEDURE VISIT (OUTPATIENT)
Dept: OTOLARYNGOLOGY | Facility: CLINIC | Age: 83
End: 2023-11-20
Payer: MEDICARE

## 2023-11-20 ENCOUNTER — OFFICE VISIT (OUTPATIENT)
Dept: OTOLARYNGOLOGY | Facility: CLINIC | Age: 83
End: 2023-11-20
Payer: MEDICARE

## 2023-11-20 VITALS
SYSTOLIC BLOOD PRESSURE: 110 MMHG | BODY MASS INDEX: 26 KG/M2 | TEMPERATURE: 97.8 F | DIASTOLIC BLOOD PRESSURE: 69 MMHG | HEIGHT: 70 IN | HEART RATE: 111 BPM

## 2023-11-20 DIAGNOSIS — H90.3 ASYMMETRICAL SENSORINEURAL HEARING LOSS: ICD-10-CM

## 2023-11-20 DIAGNOSIS — H90.A32 MIXED CONDUCTIVE AND SENSORINEURAL HEARING LOSS OF LEFT EAR WITH RESTRICTED HEARING OF RIGHT EAR: ICD-10-CM

## 2023-11-20 DIAGNOSIS — H90.3 SENSORINEURAL HEARING LOSS, BILATERAL: ICD-10-CM

## 2023-11-20 DIAGNOSIS — H90.A21 SENSORINEURAL HEARING LOSS (SNHL) OF RIGHT EAR WITH RESTRICTED HEARING OF LEFT EAR: Primary | ICD-10-CM

## 2023-11-20 DIAGNOSIS — H69.93 EUSTACHIAN TUBE DYSFUNCTION, BILATERAL: Primary | ICD-10-CM

## 2023-11-20 PROCEDURE — 92557 COMPREHENSIVE HEARING TEST: CPT | Performed by: AUDIOLOGIST

## 2023-11-20 PROCEDURE — 3074F SYST BP LT 130 MM HG: CPT | Performed by: OTOLARYNGOLOGY

## 2023-11-20 PROCEDURE — 92567 TYMPANOMETRY: CPT | Performed by: AUDIOLOGIST

## 2023-11-20 PROCEDURE — 1159F MED LIST DOCD IN RCRD: CPT | Performed by: OTOLARYNGOLOGY

## 2023-11-20 PROCEDURE — 1160F RVW MEDS BY RX/DR IN RCRD: CPT | Performed by: OTOLARYNGOLOGY

## 2023-11-20 PROCEDURE — 3078F DIAST BP <80 MM HG: CPT | Performed by: OTOLARYNGOLOGY

## 2023-11-20 PROCEDURE — 99213 OFFICE O/P EST LOW 20 MIN: CPT | Performed by: OTOLARYNGOLOGY

## 2023-11-20 RX ORDER — AMLODIPINE BESYLATE 5 MG/1
TABLET ORAL
COMMUNITY

## 2023-11-20 RX ORDER — ALBUTEROL SULFATE 90 UG/1
2 AEROSOL, METERED RESPIRATORY (INHALATION) 4 TIMES DAILY
COMMUNITY
Start: 2023-05-28

## 2023-11-27 ENCOUNTER — OFFICE VISIT (OUTPATIENT)
Dept: GASTROENTEROLOGY | Facility: CLINIC | Age: 83
End: 2023-11-27
Payer: MEDICARE

## 2023-11-27 VITALS
DIASTOLIC BLOOD PRESSURE: 63 MMHG | HEIGHT: 70 IN | HEART RATE: 99 BPM | SYSTOLIC BLOOD PRESSURE: 102 MMHG | WEIGHT: 182.8 LBS | BODY MASS INDEX: 26.17 KG/M2

## 2023-11-27 DIAGNOSIS — R13.10 DYSPHAGIA, UNSPECIFIED TYPE: Primary | ICD-10-CM

## 2023-11-27 DIAGNOSIS — R74.01 ELEVATED ALT MEASUREMENT: ICD-10-CM

## 2023-11-27 DIAGNOSIS — K22.2 SCHATZKI'S RING: ICD-10-CM

## 2023-11-27 DIAGNOSIS — Z86.010 HISTORY OF COLON POLYPS: ICD-10-CM

## 2023-11-27 DIAGNOSIS — K44.9 HIATAL HERNIA: ICD-10-CM

## 2023-11-27 DIAGNOSIS — R93.2 ABNORMAL GALLBLADDER ULTRASOUND: ICD-10-CM

## 2023-11-27 PROCEDURE — 1159F MED LIST DOCD IN RCRD: CPT | Performed by: NURSE PRACTITIONER

## 2023-11-27 PROCEDURE — 3074F SYST BP LT 130 MM HG: CPT | Performed by: NURSE PRACTITIONER

## 2023-11-27 PROCEDURE — 99214 OFFICE O/P EST MOD 30 MIN: CPT | Performed by: NURSE PRACTITIONER

## 2023-11-27 PROCEDURE — 3078F DIAST BP <80 MM HG: CPT | Performed by: NURSE PRACTITIONER

## 2023-11-27 PROCEDURE — 1160F RVW MEDS BY RX/DR IN RCRD: CPT | Performed by: NURSE PRACTITIONER

## 2023-11-29 ENCOUNTER — OFFICE VISIT (OUTPATIENT)
Dept: CARDIOLOGY | Facility: CLINIC | Age: 83
End: 2023-11-29
Payer: MEDICARE

## 2023-11-29 VITALS
HEART RATE: 85 BPM | SYSTOLIC BLOOD PRESSURE: 109 MMHG | WEIGHT: 179 LBS | BODY MASS INDEX: 25.62 KG/M2 | DIASTOLIC BLOOD PRESSURE: 67 MMHG | HEIGHT: 70 IN

## 2023-11-29 DIAGNOSIS — I10 ESSENTIAL HYPERTENSION: ICD-10-CM

## 2023-11-29 DIAGNOSIS — I25.10 CAD S/P PERCUTANEOUS CORONARY ANGIOPLASTY: ICD-10-CM

## 2023-11-29 DIAGNOSIS — Z98.61 CAD S/P PERCUTANEOUS CORONARY ANGIOPLASTY: ICD-10-CM

## 2023-11-29 DIAGNOSIS — I48.19 ATRIAL FIBRILLATION, PERSISTENT: ICD-10-CM

## 2023-11-29 DIAGNOSIS — E78.5 HYPERLIPIDEMIA LDL GOAL <70: ICD-10-CM

## 2023-11-29 DIAGNOSIS — I50.32 CHRONIC HEART FAILURE WITH PRESERVED EJECTION FRACTION (HFPEF): ICD-10-CM

## 2023-11-29 DIAGNOSIS — R06.09 DOE (DYSPNEA ON EXERTION): Primary | ICD-10-CM

## 2023-11-29 RX ORDER — DILTIAZEM HYDROCHLORIDE 120 MG/1
120 CAPSULE, COATED, EXTENDED RELEASE ORAL DAILY
COMMUNITY
End: 2023-11-29 | Stop reason: ALTCHOICE

## 2023-11-30 ENCOUNTER — TELEPHONE (OUTPATIENT)
Dept: INTERNAL MEDICINE | Facility: CLINIC | Age: 83
End: 2023-11-30
Payer: MEDICARE

## 2023-11-30 DIAGNOSIS — G89.29 OTHER CHRONIC BACK PAIN: ICD-10-CM

## 2023-11-30 DIAGNOSIS — M25.552 HIP PAIN, LEFT: Primary | ICD-10-CM

## 2023-11-30 DIAGNOSIS — M54.9 OTHER CHRONIC BACK PAIN: ICD-10-CM

## 2023-12-01 ENCOUNTER — HOSPITAL ENCOUNTER (OUTPATIENT)
Dept: GENERAL RADIOLOGY | Facility: HOSPITAL | Age: 83
Discharge: HOME | End: 2023-12-01
Admitting: INTERNAL MEDICINE
Payer: MEDICARE

## 2023-12-01 DIAGNOSIS — R06.09 DOE (DYSPNEA ON EXERTION): ICD-10-CM

## 2023-12-01 PROCEDURE — 71046 X-RAY EXAM CHEST 2 VIEWS: CPT

## 2023-12-01 RX ORDER — AZITHROMYCIN 1 G/1
1 POWDER, FOR SUSPENSION ORAL ONCE
Qty: 1 PACKET | Refills: 0 | Status: SHIPPED | OUTPATIENT
Start: 2023-12-01 | End: 2023-12-01

## 2023-12-01 RX ORDER — ROSUVASTATIN CALCIUM 20 MG/1
20 TABLET, COATED ORAL
Qty: 90 TABLET | Refills: 1 | Status: SHIPPED | OUTPATIENT
Start: 2023-12-01

## 2023-12-01 RX ORDER — METHYLPREDNISOLONE 4 MG/1
TABLET ORAL
Qty: 21 TABLET | Refills: 0 | Status: SHIPPED | OUTPATIENT
Start: 2023-12-01

## 2023-12-04 ENCOUNTER — TELEPHONE (OUTPATIENT)
Dept: INTERNAL MEDICINE | Facility: CLINIC | Age: 83
End: 2023-12-04
Payer: MEDICARE

## 2023-12-04 RX ORDER — GLIMEPIRIDE 1 MG/1
1 TABLET ORAL
Qty: 90 TABLET | Refills: 1 | Status: SHIPPED | OUTPATIENT
Start: 2023-12-04

## 2023-12-12 ENCOUNTER — OFFICE VISIT (OUTPATIENT)
Dept: INTERNAL MEDICINE | Facility: CLINIC | Age: 83
End: 2023-12-12
Payer: MEDICARE

## 2023-12-12 ENCOUNTER — LAB (OUTPATIENT)
Dept: LAB | Facility: HOSPITAL | Age: 83
End: 2023-12-12
Payer: MEDICARE

## 2023-12-12 ENCOUNTER — HOSPITAL ENCOUNTER (OUTPATIENT)
Dept: GENERAL RADIOLOGY | Facility: HOSPITAL | Age: 83
Discharge: HOME | End: 2023-12-12
Payer: MEDICARE

## 2023-12-12 VITALS
DIASTOLIC BLOOD PRESSURE: 58 MMHG | OXYGEN SATURATION: 97 % | HEIGHT: 70 IN | WEIGHT: 177.6 LBS | HEART RATE: 108 BPM | TEMPERATURE: 98.4 F | BODY MASS INDEX: 25.43 KG/M2 | SYSTOLIC BLOOD PRESSURE: 90 MMHG

## 2023-12-12 DIAGNOSIS — I10 ESSENTIAL HYPERTENSION: ICD-10-CM

## 2023-12-12 DIAGNOSIS — I50.32 CHRONIC HEART FAILURE WITH PRESERVED EJECTION FRACTION (HFPEF): ICD-10-CM

## 2023-12-12 DIAGNOSIS — M25.572 ACUTE LEFT ANKLE PAIN: ICD-10-CM

## 2023-12-12 DIAGNOSIS — R06.09 DOE (DYSPNEA ON EXERTION): ICD-10-CM

## 2023-12-12 DIAGNOSIS — E11.9 TYPE 2 DIABETES MELLITUS WITHOUT COMPLICATION, WITHOUT LONG-TERM CURRENT USE OF INSULIN: ICD-10-CM

## 2023-12-12 DIAGNOSIS — J18.9 PNEUMONIA OF RIGHT LOWER LOBE DUE TO INFECTIOUS ORGANISM: ICD-10-CM

## 2023-12-12 DIAGNOSIS — R06.09 DYSPNEA ON EXERTION: ICD-10-CM

## 2023-12-12 DIAGNOSIS — J18.9 PNEUMONIA OF RIGHT LOWER LOBE DUE TO INFECTIOUS ORGANISM: Primary | ICD-10-CM

## 2023-12-12 LAB
DEPRECATED RDW RBC AUTO: 48.7 FL (ref 37–54)
ERYTHROCYTE [DISTWIDTH] IN BLOOD BY AUTOMATED COUNT: 14.9 % (ref 12.3–15.4)
HCT VFR BLD AUTO: 41 % (ref 37.5–51)
HGB BLD-MCNC: 13.3 G/DL (ref 13–17.7)
MCH RBC QN AUTO: 29.6 PG (ref 26.6–33)
MCHC RBC AUTO-ENTMCNC: 32.4 G/DL (ref 31.5–35.7)
MCV RBC AUTO: 91.1 FL (ref 79–97)
PLATELET # BLD AUTO: 194 10*3/MM3 (ref 140–450)
PMV BLD AUTO: 10.1 FL (ref 6–12)
RBC # BLD AUTO: 4.5 10*6/MM3 (ref 4.14–5.8)
T-UPTAKE NFR SERPL: 0.94 TBI (ref 0.8–1.3)
T4 SERPL-MCNC: 6.59 MCG/DL (ref 4.5–11.7)
TSH SERPL DL<=0.05 MIU/L-ACNC: 0.53 UIU/ML (ref 0.27–4.2)
WBC NRBC COR # BLD AUTO: 15.27 10*3/MM3 (ref 3.4–10.8)

## 2023-12-12 PROCEDURE — 36415 COLL VENOUS BLD VENIPUNCTURE: CPT

## 2023-12-12 PROCEDURE — 71046 X-RAY EXAM CHEST 2 VIEWS: CPT

## 2023-12-12 PROCEDURE — 84436 ASSAY OF TOTAL THYROXINE: CPT

## 2023-12-12 PROCEDURE — 85027 COMPLETE CBC AUTOMATED: CPT

## 2023-12-12 PROCEDURE — 84443 ASSAY THYROID STIM HORMONE: CPT

## 2023-12-12 PROCEDURE — 84479 ASSAY OF THYROID (T3 OR T4): CPT

## 2023-12-12 RX ORDER — METFORMIN HYDROCHLORIDE 500 MG/1
TABLET, EXTENDED RELEASE ORAL
Qty: 60 TABLET | Refills: 1 | Status: SHIPPED | OUTPATIENT
Start: 2023-12-12

## 2023-12-12 RX ORDER — SPIRONOLACTONE 25 MG/1
25 TABLET ORAL DAILY
Qty: 90 TABLET | Refills: 1 | Status: SHIPPED | OUTPATIENT
Start: 2023-12-12

## 2023-12-12 RX ORDER — VALSARTAN AND HYDROCHLOROTHIAZIDE 320; 25 MG/1; MG/1
0.5 TABLET, FILM COATED ORAL DAILY
Qty: 90 TABLET | Refills: 1 | Status: SHIPPED | OUTPATIENT
Start: 2023-12-12

## 2023-12-13 ENCOUNTER — TELEPHONE (OUTPATIENT)
Dept: CARDIOLOGY | Facility: CLINIC | Age: 83
End: 2023-12-13
Payer: MEDICARE

## 2023-12-14 ENCOUNTER — HOSPITAL ENCOUNTER (OUTPATIENT)
Dept: RESPIRATORY THERAPY | Facility: HOSPITAL | Age: 83
Discharge: HOME | End: 2023-12-14
Payer: MEDICARE

## 2023-12-14 ENCOUNTER — HOSPITAL ENCOUNTER (OUTPATIENT)
Dept: ULTRASOUND IMAGING | Facility: HOSPITAL | Age: 83
Discharge: HOME | End: 2023-12-14
Payer: MEDICARE

## 2023-12-14 DIAGNOSIS — R06.09 DOE (DYSPNEA ON EXERTION): ICD-10-CM

## 2023-12-14 DIAGNOSIS — R93.2 ABNORMAL GALLBLADDER ULTRASOUND: ICD-10-CM

## 2023-12-14 PROCEDURE — 94060 EVALUATION OF WHEEZING: CPT

## 2023-12-14 PROCEDURE — 94729 DIFFUSING CAPACITY: CPT

## 2023-12-14 PROCEDURE — 76705 ECHO EXAM OF ABDOMEN: CPT

## 2023-12-14 PROCEDURE — 94726 PLETHYSMOGRAPHY LUNG VOLUMES: CPT

## 2023-12-14 RX ORDER — ALBUTEROL SULFATE 2.5 MG/3ML
2.5 SOLUTION RESPIRATORY (INHALATION) ONCE
Status: COMPLETED | OUTPATIENT
Start: 2023-12-14 | End: 2023-12-14

## 2023-12-14 RX ADMIN — ALBUTEROL SULFATE 2.5 MG: 2.5 SOLUTION RESPIRATORY (INHALATION) at 10:15

## 2023-12-18 ENCOUNTER — TELEPHONE (OUTPATIENT)
Dept: CARDIOLOGY | Facility: CLINIC | Age: 83
End: 2023-12-18
Payer: MEDICARE

## 2023-12-18 DIAGNOSIS — J44.9 COPD SUGGESTED BY INITIAL EVALUATION: Primary | ICD-10-CM

## 2023-12-19 ENCOUNTER — TELEPHONE (OUTPATIENT)
Dept: GASTROENTEROLOGY | Facility: CLINIC | Age: 83
End: 2023-12-19
Payer: MEDICARE

## 2024-01-02 ENCOUNTER — TELEPHONE (OUTPATIENT)
Dept: CARDIOLOGY | Facility: CLINIC | Age: 84
End: 2024-01-02
Payer: MEDICARE

## 2024-01-02 ENCOUNTER — HOSPITAL ENCOUNTER (OUTPATIENT)
Dept: NUCLEAR MEDICINE | Facility: HOSPITAL | Age: 84
Discharge: HOME | End: 2024-01-02
Payer: MEDICARE

## 2024-01-02 ENCOUNTER — HOSPITAL ENCOUNTER (OUTPATIENT)
Dept: CARDIOLOGY | Facility: HOSPITAL | Age: 84
Discharge: HOME | End: 2024-01-02
Payer: MEDICARE

## 2024-01-02 DIAGNOSIS — R06.09 DOE (DYSPNEA ON EXERTION): ICD-10-CM

## 2024-01-02 LAB
BH CV ECHO MEAS - ACS: 1.7 CM
BH CV ECHO MEAS - AO MAX PG: 5 MMHG
BH CV ECHO MEAS - AO MEAN PG: 3 MMHG
BH CV ECHO MEAS - AO ROOT DIAM: 3.5 CM
BH CV ECHO MEAS - AO V2 MAX: 117 CM/SEC
BH CV ECHO MEAS - AO V2 VTI: 25.1 CM
BH CV ECHO MEAS - AVA(I,D): 1.79 CM2
BH CV ECHO MEAS - EDV(CUBED): 64 ML
BH CV ECHO MEAS - EDV(MOD-SP2): 58.8 ML
BH CV ECHO MEAS - EDV(MOD-SP4): 42.3 ML
BH CV ECHO MEAS - EF(MOD-BP): 57.4 %
BH CV ECHO MEAS - EF(MOD-SP2): 58.3 %
BH CV ECHO MEAS - EF(MOD-SP4): 56.7 %
BH CV ECHO MEAS - ESV(CUBED): 22 ML
BH CV ECHO MEAS - ESV(MOD-SP2): 24.5 ML
BH CV ECHO MEAS - ESV(MOD-SP4): 18.3 ML
BH CV ECHO MEAS - FS: 30 %
BH CV ECHO MEAS - IVS/LVPW: 0.92 CM
BH CV ECHO MEAS - IVSD: 1.1 CM
BH CV ECHO MEAS - LA DIMENSION: 3.8 CM
BH CV ECHO MEAS - LAT PEAK E' VEL: 8.5 CM/SEC
BH CV ECHO MEAS - LV DIASTOLIC VOL/BSA (35-75): 21.2 CM2
BH CV ECHO MEAS - LV MASS(C)D: 155.4 GRAMS
BH CV ECHO MEAS - LV MAX PG: 2.16 MMHG
BH CV ECHO MEAS - LV MEAN PG: 1 MMHG
BH CV ECHO MEAS - LV SYSTOLIC VOL/BSA (12-30): 9.2 CM2
BH CV ECHO MEAS - LV V1 MAX: 73.4 CM/SEC
BH CV ECHO MEAS - LV V1 VTI: 14.3 CM
BH CV ECHO MEAS - LVIDD: 4 CM
BH CV ECHO MEAS - LVIDS: 2.8 CM
BH CV ECHO MEAS - LVOT AREA: 3.1 CM2
BH CV ECHO MEAS - LVOT DIAM: 2 CM
BH CV ECHO MEAS - LVPWD: 1.2 CM
BH CV ECHO MEAS - MED PEAK E' VEL: 8.6 CM/SEC
BH CV ECHO MEAS - MV A MAX VEL: 56.1 CM/SEC
BH CV ECHO MEAS - MV DEC TIME: 0.14 SEC
BH CV ECHO MEAS - MV E MAX VEL: 128 CM/SEC
BH CV ECHO MEAS - MV E/A: 2.28
BH CV ECHO MEAS - SI(MOD-SP2): 17.2 ML/M2
BH CV ECHO MEAS - SI(MOD-SP4): 12 ML/M2
BH CV ECHO MEAS - SV(LVOT): 44.9 ML
BH CV ECHO MEAS - SV(MOD-SP2): 34.3 ML
BH CV ECHO MEAS - SV(MOD-SP4): 24 ML
BH CV ECHO MEAS - TAPSE (>1.6): 1.65 CM
BH CV ECHO MEASUREMENTS AVERAGE E/E' RATIO: 14.97
BH CV IMMEDIATE POST TECH DATA BLOOD PRESSURE: NORMAL MMHG
BH CV IMMEDIATE POST TECH DATA HEART RATE: 95 BPM
BH CV IMMEDIATE POST TECH DATA OXYGEN SATS: 98 %
BH CV REST NUCLEAR ISOTOPE DOSE: 9.3 MCI
BH CV SIX MINUTE RECOVERY TECH DATA BLOOD PRESSURE: NORMAL
BH CV SIX MINUTE RECOVERY TECH DATA HEART RATE: 86 BPM
BH CV SIX MINUTE RECOVERY TECH DATA OXYGEN SATURATION: 98 %
BH CV STRESS BP STAGE 1: NORMAL
BH CV STRESS COMMENTS STAGE 1: NORMAL
BH CV STRESS DOSE REGADENOSON STAGE 1: 0.4
BH CV STRESS DURATION MIN STAGE 1: 0
BH CV STRESS DURATION SEC STAGE 1: 10
BH CV STRESS HR STAGE 1: 95
BH CV STRESS NUCLEAR ISOTOPE DOSE: 34.1 MCI
BH CV STRESS O2 STAGE 1: 98
BH CV STRESS PROTOCOL 1: NORMAL
BH CV STRESS RECOVERY BP: NORMAL MMHG
BH CV STRESS RECOVERY HR: 86 BPM
BH CV STRESS RECOVERY O2: 96 %
BH CV STRESS STAGE 1: 1
BH CV THREE MINUTE POST TECH DATA BLOOD PRESSURE: NORMAL MMHG
BH CV THREE MINUTE POST TECH DATA HEART RATE: 86 BPM
BH CV THREE MINUTE POST TECH DATA OXYGEN SATURATION: 96 %
LEFT ATRIUM VOLUME INDEX: 29.6 ML/M2
LV EF NUC BP: 55 %
MAXIMAL PREDICTED HEART RATE: 139 BPM
PERCENT MAX PREDICTED HR: 68.35 %
STRESS BASELINE BP: NORMAL MMHG
STRESS BASELINE HR: 76 BPM
STRESS O2 SAT REST: 98 %
STRESS PERCENT HR: 80 %
STRESS POST O2 SAT PEAK: 98 %
STRESS POST PEAK BP: NORMAL MMHG
STRESS POST PEAK HR: 95 BPM
STRESS TARGET HR: 118 BPM

## 2024-01-02 PROCEDURE — 25010000002 REGADENOSON 0.4 MG/5ML SOLUTION: Performed by: INTERNAL MEDICINE

## 2024-01-02 PROCEDURE — 0 TECHNETIUM TETROFOSMIN KIT: Performed by: INTERNAL MEDICINE

## 2024-01-02 PROCEDURE — 78452 HT MUSCLE IMAGE SPECT MULT: CPT

## 2024-01-02 PROCEDURE — 93017 CV STRESS TEST TRACING ONLY: CPT

## 2024-01-02 PROCEDURE — 93306 TTE W/DOPPLER COMPLETE: CPT

## 2024-01-02 PROCEDURE — A9502 TC99M TETROFOSMIN: HCPCS | Performed by: INTERNAL MEDICINE

## 2024-01-02 RX ORDER — REGADENOSON 0.08 MG/ML
0.4 INJECTION, SOLUTION INTRAVENOUS
Status: COMPLETED | OUTPATIENT
Start: 2024-01-02 | End: 2024-01-02

## 2024-01-02 RX ADMIN — REGADENOSON 0.4 MG: 0.08 INJECTION, SOLUTION INTRAVENOUS at 09:03

## 2024-01-02 RX ADMIN — TETROFOSMIN 1 DOSE: 1.38 INJECTION, POWDER, LYOPHILIZED, FOR SOLUTION INTRAVENOUS at 09:03

## 2024-01-02 RX ADMIN — TETROFOSMIN 1 DOSE: 1.38 INJECTION, POWDER, LYOPHILIZED, FOR SOLUTION INTRAVENOUS at 07:52

## 2024-01-03 ENCOUNTER — TELEPHONE (OUTPATIENT)
Dept: INTERNAL MEDICINE | Facility: CLINIC | Age: 84
End: 2024-01-03
Payer: MEDICARE

## 2024-01-03 ENCOUNTER — OFFICE VISIT (OUTPATIENT)
Dept: CARDIOLOGY | Facility: CLINIC | Age: 84
End: 2024-01-03
Payer: MEDICARE

## 2024-01-03 VITALS
BODY MASS INDEX: 25.31 KG/M2 | SYSTOLIC BLOOD PRESSURE: 131 MMHG | HEART RATE: 88 BPM | WEIGHT: 176.8 LBS | DIASTOLIC BLOOD PRESSURE: 74 MMHG | HEIGHT: 70 IN

## 2024-01-03 DIAGNOSIS — I31.39 PERICARDIAL EFFUSION: Primary | ICD-10-CM

## 2024-01-10 ENCOUNTER — OFFICE VISIT (OUTPATIENT)
Dept: PULMONOLOGY | Facility: CLINIC | Age: 84
End: 2024-01-10
Payer: MEDICARE

## 2024-01-10 VITALS
HEART RATE: 94 BPM | HEIGHT: 70 IN | DIASTOLIC BLOOD PRESSURE: 85 MMHG | SYSTOLIC BLOOD PRESSURE: 126 MMHG | BODY MASS INDEX: 25.62 KG/M2 | WEIGHT: 179 LBS | RESPIRATION RATE: 18 BRPM | OXYGEN SATURATION: 99 % | TEMPERATURE: 97.3 F

## 2024-01-10 DIAGNOSIS — G47.33 OBSTRUCTIVE SLEEP APNEA: ICD-10-CM

## 2024-01-10 DIAGNOSIS — R06.09 DYSPNEA ON EXERTION: Primary | ICD-10-CM

## 2024-01-10 DIAGNOSIS — I48.20 CHRONIC ATRIAL FIBRILLATION: ICD-10-CM

## 2024-01-10 PROCEDURE — 3074F SYST BP LT 130 MM HG: CPT | Performed by: INTERNAL MEDICINE

## 2024-01-10 PROCEDURE — 1159F MED LIST DOCD IN RCRD: CPT | Performed by: INTERNAL MEDICINE

## 2024-01-10 PROCEDURE — 99203 OFFICE O/P NEW LOW 30 MIN: CPT | Performed by: INTERNAL MEDICINE

## 2024-01-10 PROCEDURE — 3079F DIAST BP 80-89 MM HG: CPT | Performed by: INTERNAL MEDICINE

## 2024-01-10 PROCEDURE — 1160F RVW MEDS BY RX/DR IN RCRD: CPT | Performed by: INTERNAL MEDICINE

## 2024-01-18 ENCOUNTER — HOSPITAL ENCOUNTER (OUTPATIENT)
Dept: SLEEP MEDICINE | Facility: HOSPITAL | Age: 84
Discharge: HOME | End: 2024-01-18
Admitting: INTERNAL MEDICINE
Payer: MEDICARE

## 2024-01-18 ENCOUNTER — HOSPITAL ENCOUNTER (OUTPATIENT)
Dept: GENERAL RADIOLOGY | Facility: HOSPITAL | Age: 84
Discharge: HOME | End: 2024-01-18
Payer: MEDICARE

## 2024-01-18 DIAGNOSIS — R06.09 DYSPNEA ON EXERTION: ICD-10-CM

## 2024-01-18 DIAGNOSIS — G47.33 OBSTRUCTIVE SLEEP APNEA: ICD-10-CM

## 2024-01-18 DIAGNOSIS — I48.20 CHRONIC ATRIAL FIBRILLATION: ICD-10-CM

## 2024-01-18 PROCEDURE — 71046 X-RAY EXAM CHEST 2 VIEWS: CPT

## 2024-01-25 DIAGNOSIS — R06.09 DYSPNEA ON EXERTION: ICD-10-CM

## 2024-01-25 DIAGNOSIS — G47.33 OBSTRUCTIVE SLEEP APNEA: Primary | ICD-10-CM

## 2024-01-29 ENCOUNTER — OFFICE VISIT (OUTPATIENT)
Dept: INTERNAL MEDICINE | Facility: CLINIC | Age: 84
End: 2024-01-29
Payer: MEDICARE

## 2024-01-29 VITALS
HEART RATE: 108 BPM | DIASTOLIC BLOOD PRESSURE: 56 MMHG | HEIGHT: 70 IN | TEMPERATURE: 98.4 F | WEIGHT: 175 LBS | SYSTOLIC BLOOD PRESSURE: 80 MMHG | BODY MASS INDEX: 25.05 KG/M2 | OXYGEN SATURATION: 97 %

## 2024-01-29 DIAGNOSIS — R09.89 ABSENT PERIPHERAL PULSE: ICD-10-CM

## 2024-01-29 DIAGNOSIS — I10 ESSENTIAL HYPERTENSION: Primary | ICD-10-CM

## 2024-01-29 DIAGNOSIS — N18.31 STAGE 3A CHRONIC KIDNEY DISEASE: ICD-10-CM

## 2024-01-29 DIAGNOSIS — I50.32 CHRONIC HEART FAILURE WITH PRESERVED EJECTION FRACTION (HFPEF): ICD-10-CM

## 2024-01-29 PROBLEM — L03.116 CELLULITIS OF LEFT LOWER EXTREMITY: Status: RESOLVED | Noted: 2023-06-29 | Resolved: 2024-01-29

## 2024-01-29 PROCEDURE — 3078F DIAST BP <80 MM HG: CPT | Performed by: INTERNAL MEDICINE

## 2024-01-29 PROCEDURE — 1159F MED LIST DOCD IN RCRD: CPT | Performed by: INTERNAL MEDICINE

## 2024-01-29 PROCEDURE — 3074F SYST BP LT 130 MM HG: CPT | Performed by: INTERNAL MEDICINE

## 2024-01-29 PROCEDURE — 1160F RVW MEDS BY RX/DR IN RCRD: CPT | Performed by: INTERNAL MEDICINE

## 2024-01-29 PROCEDURE — 99214 OFFICE O/P EST MOD 30 MIN: CPT | Performed by: INTERNAL MEDICINE

## 2024-02-16 ENCOUNTER — TELEPHONE (OUTPATIENT)
Dept: INTERNAL MEDICINE | Facility: CLINIC | Age: 84
End: 2024-02-16
Payer: MEDICARE

## 2024-02-19 ENCOUNTER — LAB (OUTPATIENT)
Dept: LAB | Facility: HOSPITAL | Age: 84
End: 2024-02-19
Payer: MEDICARE

## 2024-02-19 DIAGNOSIS — I50.32 CHRONIC HEART FAILURE WITH PRESERVED EJECTION FRACTION (HFPEF): ICD-10-CM

## 2024-02-19 DIAGNOSIS — I10 ESSENTIAL HYPERTENSION: ICD-10-CM

## 2024-02-19 DIAGNOSIS — E11.9 TYPE 2 DIABETES MELLITUS WITHOUT COMPLICATION, WITHOUT LONG-TERM CURRENT USE OF INSULIN: ICD-10-CM

## 2024-02-19 DIAGNOSIS — N18.31 STAGE 3A CHRONIC KIDNEY DISEASE: ICD-10-CM

## 2024-02-19 DIAGNOSIS — I48.19 ATRIAL FIBRILLATION, PERSISTENT: ICD-10-CM

## 2024-02-19 DIAGNOSIS — R06.09 DYSPNEA ON EXERTION: ICD-10-CM

## 2024-02-19 DIAGNOSIS — M25.572 ACUTE LEFT ANKLE PAIN: ICD-10-CM

## 2024-02-19 DIAGNOSIS — E78.5 HYPERLIPIDEMIA LDL GOAL <70: ICD-10-CM

## 2024-02-19 LAB
ALBUMIN SERPL-MCNC: 3.4 G/DL (ref 3.5–5.2)
ALBUMIN/GLOB SERPL: 1.4 G/DL
ALP SERPL-CCNC: 62 U/L (ref 39–117)
ALT SERPL W P-5'-P-CCNC: 21 U/L (ref 1–41)
ANION GAP SERPL CALCULATED.3IONS-SCNC: 7.8 MMOL/L (ref 5–15)
ANISOCYTOSIS BLD QL: ABNORMAL
AST SERPL-CCNC: 17 U/L (ref 1–40)
BILIRUB SERPL-MCNC: 0.3 MG/DL (ref 0–1.2)
BUN SERPL-MCNC: 33 MG/DL (ref 8–23)
BUN/CREAT SERPL: 29.2 (ref 7–25)
BURR CELLS BLD QL SMEAR: ABNORMAL
CALCIUM SPEC-SCNC: 9.3 MG/DL (ref 8.6–10.5)
CHLORIDE SERPL-SCNC: 107 MMOL/L (ref 98–107)
CHOLEST SERPL-MCNC: 96 MG/DL (ref 0–200)
CO2 SERPL-SCNC: 26.2 MMOL/L (ref 22–29)
CREAT SERPL-MCNC: 1.13 MG/DL (ref 0.76–1.27)
DEPRECATED RDW RBC AUTO: 46.4 FL (ref 37–54)
EGFRCR SERPLBLD CKD-EPI 2021: 65.3 ML/MIN/1.73
ERYTHROCYTE [DISTWIDTH] IN BLOOD BY AUTOMATED COUNT: 13.9 % (ref 12.3–15.4)
GLOBULIN UR ELPH-MCNC: 2.4 GM/DL
GLUCOSE SERPL-MCNC: 83 MG/DL (ref 65–99)
HBA1C MFR BLD: 7.1 % (ref 4.8–5.6)
HCT VFR BLD AUTO: 40.4 % (ref 37.5–51)
HDLC SERPL-MCNC: 42 MG/DL (ref 40–60)
HGB BLD-MCNC: 13.1 G/DL (ref 13–17.7)
LDLC SERPL CALC-MCNC: 40 MG/DL (ref 0–100)
LDLC/HDLC SERPL: 1 {RATIO}
LYMPHOCYTES # BLD MANUAL: 1.2 10*3/MM3 (ref 0.7–3.1)
LYMPHOCYTES NFR BLD MANUAL: 15 % (ref 5–12)
MCH RBC QN AUTO: 29.8 PG (ref 26.6–33)
MCHC RBC AUTO-ENTMCNC: 32.4 G/DL (ref 31.5–35.7)
MCV RBC AUTO: 91.8 FL (ref 79–97)
MICROCYTES BLD QL: ABNORMAL
MONOCYTES # BLD: 1.79 10*3/MM3 (ref 0.1–0.9)
MYELOCYTES NFR BLD MANUAL: 2 % (ref 0–0)
NEUTROPHILS # BLD AUTO: 8.72 10*3/MM3 (ref 1.7–7)
NEUTROPHILS NFR BLD MANUAL: 73 % (ref 42.7–76)
NT-PROBNP SERPL-MCNC: 698 PG/ML (ref 0–1800)
OVALOCYTES BLD QL SMEAR: ABNORMAL
PHOSPHATE SERPL-MCNC: 3.3 MG/DL (ref 2.5–4.5)
PLAT MORPH BLD: NORMAL
PLATELET # BLD AUTO: 264 10*3/MM3 (ref 140–450)
PMV BLD AUTO: 9.5 FL (ref 6–12)
POIKILOCYTOSIS BLD QL SMEAR: ABNORMAL
POTASSIUM SERPL-SCNC: 5 MMOL/L (ref 3.5–5.2)
PROT SERPL-MCNC: 5.8 G/DL (ref 6–8.5)
RBC # BLD AUTO: 4.4 10*6/MM3 (ref 4.14–5.8)
SODIUM SERPL-SCNC: 141 MMOL/L (ref 136–145)
TRIGL SERPL-MCNC: 60 MG/DL (ref 0–150)
TSH SERPL DL<=0.05 MIU/L-ACNC: 0.76 UIU/ML (ref 0.27–4.2)
URATE SERPL-MCNC: 7.8 MG/DL (ref 3.4–7)
VARIANT LYMPHS NFR BLD MANUAL: 10 % (ref 19.6–45.3)
VLDLC SERPL-MCNC: 14 MG/DL (ref 5–40)
WBC MORPH BLD: NORMAL
WBC NRBC COR # BLD AUTO: 11.95 10*3/MM3 (ref 3.4–10.8)

## 2024-02-19 PROCEDURE — 84550 ASSAY OF BLOOD/URIC ACID: CPT

## 2024-02-19 PROCEDURE — 85025 COMPLETE CBC W/AUTO DIFF WBC: CPT

## 2024-02-19 PROCEDURE — 36415 COLL VENOUS BLD VENIPUNCTURE: CPT

## 2024-02-19 PROCEDURE — 80061 LIPID PANEL: CPT

## 2024-02-19 PROCEDURE — 83880 ASSAY OF NATRIURETIC PEPTIDE: CPT

## 2024-02-19 PROCEDURE — 84100 ASSAY OF PHOSPHORUS: CPT

## 2024-02-19 PROCEDURE — 83036 HEMOGLOBIN GLYCOSYLATED A1C: CPT

## 2024-02-19 PROCEDURE — 85007 BL SMEAR W/DIFF WBC COUNT: CPT

## 2024-02-19 PROCEDURE — 80053 COMPREHEN METABOLIC PANEL: CPT

## 2024-02-19 PROCEDURE — 84443 ASSAY THYROID STIM HORMONE: CPT

## 2024-02-20 ENCOUNTER — OFFICE VISIT (OUTPATIENT)
Dept: INTERNAL MEDICINE | Facility: CLINIC | Age: 84
End: 2024-02-20
Payer: MEDICARE

## 2024-02-20 VITALS
OXYGEN SATURATION: 98 % | HEART RATE: 91 BPM | SYSTOLIC BLOOD PRESSURE: 108 MMHG | BODY MASS INDEX: 24.97 KG/M2 | DIASTOLIC BLOOD PRESSURE: 70 MMHG | WEIGHT: 174.4 LBS | HEIGHT: 70 IN | TEMPERATURE: 98.2 F

## 2024-02-20 DIAGNOSIS — I50.32 CHRONIC HEART FAILURE WITH PRESERVED EJECTION FRACTION (HFPEF): ICD-10-CM

## 2024-02-20 DIAGNOSIS — I10 ESSENTIAL HYPERTENSION: ICD-10-CM

## 2024-02-20 DIAGNOSIS — N18.31 STAGE 3A CHRONIC KIDNEY DISEASE: ICD-10-CM

## 2024-02-20 DIAGNOSIS — E11.9 TYPE 2 DIABETES MELLITUS WITHOUT COMPLICATION, WITHOUT LONG-TERM CURRENT USE OF INSULIN: Primary | ICD-10-CM

## 2024-02-20 DIAGNOSIS — I48.19 ATRIAL FIBRILLATION, PERSISTENT: ICD-10-CM

## 2024-02-20 DIAGNOSIS — E78.5 HYPERLIPIDEMIA LDL GOAL <70: ICD-10-CM

## 2024-02-20 DIAGNOSIS — L03.116 CELLULITIS OF LEFT LOWER EXTREMITY: ICD-10-CM

## 2024-02-20 DIAGNOSIS — R09.89 ABSENT PERIPHERAL PULSE: ICD-10-CM

## 2024-02-20 RX ORDER — FUROSEMIDE 20 MG/1
40 TABLET ORAL DAILY
Qty: 180 TABLET | Refills: 1 | Status: SHIPPED | OUTPATIENT
Start: 2024-02-20 | End: 2024-08-18

## 2024-02-20 RX ORDER — VALSARTAN 40 MG/1
TABLET ORAL
Qty: 30 TABLET | Refills: 1 | Status: SHIPPED | OUTPATIENT
Start: 2024-02-20

## 2024-02-22 ENCOUNTER — HOSPITAL ENCOUNTER (OUTPATIENT)
Dept: CARDIOLOGY | Facility: HOSPITAL | Age: 84
Discharge: HOME | End: 2024-02-22
Payer: MEDICARE

## 2024-02-22 DIAGNOSIS — R09.89 ABSENT PERIPHERAL PULSE: ICD-10-CM

## 2024-02-22 LAB
BH CV LOWER ARTERIAL LEFT ABI RATIO: 1.28
BH CV LOWER ARTERIAL LEFT DORSALIS PEDIS SYS MAX: 167
BH CV LOWER ARTERIAL LEFT GREAT TOE SYS MAX: 117
BH CV LOWER ARTERIAL LEFT POST TIBIAL SYS MAX: 167
BH CV LOWER ARTERIAL LEFT TBI RATIO: 0.9
BH CV LOWER ARTERIAL RIGHT ABI RATIO: 1.3
BH CV LOWER ARTERIAL RIGHT DORSALIS PEDIS SYS MAX: 147
BH CV LOWER ARTERIAL RIGHT GREAT TOE SYS MAX: 149
BH CV LOWER ARTERIAL RIGHT POST TIBIAL SYS MAX: 169
BH CV LOWER ARTERIAL RIGHT TBI RATIO: 1.15
UPPER ARTERIAL LEFT ARM BRACHIAL SYS MAX: 128
UPPER ARTERIAL RIGHT ARM BRACHIAL SYS MAX: 130

## 2024-02-22 PROCEDURE — 93922 UPR/L XTREMITY ART 2 LEVELS: CPT

## 2024-02-27 ENCOUNTER — OFFICE VISIT (OUTPATIENT)
Dept: GASTROENTEROLOGY | Facility: CLINIC | Age: 84
End: 2024-02-27
Payer: MEDICARE

## 2024-02-27 VITALS
WEIGHT: 178.8 LBS | SYSTOLIC BLOOD PRESSURE: 113 MMHG | DIASTOLIC BLOOD PRESSURE: 73 MMHG | HEIGHT: 70 IN | BODY MASS INDEX: 25.6 KG/M2 | HEART RATE: 91 BPM

## 2024-02-27 DIAGNOSIS — K22.2 SCHATZKI'S RING: ICD-10-CM

## 2024-02-27 DIAGNOSIS — R93.2 ABNORMAL GALLBLADDER ULTRASOUND: Primary | ICD-10-CM

## 2024-02-27 DIAGNOSIS — R13.10 DYSPHAGIA, UNSPECIFIED TYPE: ICD-10-CM

## 2024-02-27 DIAGNOSIS — Z86.010 HISTORY OF COLON POLYPS: ICD-10-CM

## 2024-02-27 PROBLEM — Z86.0100 HISTORY OF COLON POLYPS: Status: ACTIVE | Noted: 2024-02-27

## 2024-02-27 PROCEDURE — 3078F DIAST BP <80 MM HG: CPT | Performed by: NURSE PRACTITIONER

## 2024-02-27 PROCEDURE — 99214 OFFICE O/P EST MOD 30 MIN: CPT | Performed by: NURSE PRACTITIONER

## 2024-02-27 PROCEDURE — 1159F MED LIST DOCD IN RCRD: CPT | Performed by: NURSE PRACTITIONER

## 2024-02-27 PROCEDURE — 1160F RVW MEDS BY RX/DR IN RCRD: CPT | Performed by: NURSE PRACTITIONER

## 2024-02-27 PROCEDURE — 3074F SYST BP LT 130 MM HG: CPT | Performed by: NURSE PRACTITIONER

## 2024-03-07 ENCOUNTER — LAB (OUTPATIENT)
Dept: LAB | Facility: HOSPITAL | Age: 84
End: 2024-03-07
Payer: MEDICARE

## 2024-03-07 DIAGNOSIS — N18.31 STAGE 3A CHRONIC KIDNEY DISEASE: ICD-10-CM

## 2024-03-07 DIAGNOSIS — L03.116 CELLULITIS OF LEFT LOWER EXTREMITY: ICD-10-CM

## 2024-03-07 LAB
ALBUMIN SERPL-MCNC: 3.7 G/DL (ref 3.5–5.2)
ANION GAP SERPL CALCULATED.3IONS-SCNC: 8.4 MMOL/L (ref 5–15)
BUN SERPL-MCNC: 53 MG/DL (ref 8–23)
BUN/CREAT SERPL: 39 (ref 7–25)
CALCIUM SPEC-SCNC: 9.2 MG/DL (ref 8.6–10.5)
CHLORIDE SERPL-SCNC: 102 MMOL/L (ref 98–107)
CO2 SERPL-SCNC: 24.6 MMOL/L (ref 22–29)
CREAT SERPL-MCNC: 1.36 MG/DL (ref 0.76–1.27)
EGFRCR SERPLBLD CKD-EPI 2021: 52.3 ML/MIN/1.73
GLUCOSE SERPL-MCNC: 107 MG/DL (ref 65–99)
PHOSPHATE SERPL-MCNC: 3.4 MG/DL (ref 2.5–4.5)
POTASSIUM SERPL-SCNC: 5.5 MMOL/L (ref 3.5–5.2)
SODIUM SERPL-SCNC: 135 MMOL/L (ref 136–145)

## 2024-03-07 PROCEDURE — 87040 BLOOD CULTURE FOR BACTERIA: CPT

## 2024-03-07 PROCEDURE — 80069 RENAL FUNCTION PANEL: CPT

## 2024-03-07 PROCEDURE — 36415 COLL VENOUS BLD VENIPUNCTURE: CPT

## 2024-03-07 RX ORDER — PANTOPRAZOLE SODIUM 40 MG/1
TABLET, DELAYED RELEASE ORAL
Qty: 90 TABLET | Refills: 1 | Status: SHIPPED | OUTPATIENT
Start: 2024-03-07

## 2024-03-08 ENCOUNTER — TELEPHONE (OUTPATIENT)
Dept: INTERNAL MEDICINE | Facility: CLINIC | Age: 84
End: 2024-03-08
Payer: MEDICARE

## 2024-03-12 ENCOUNTER — OFFICE VISIT (OUTPATIENT)
Dept: INTERNAL MEDICINE | Age: 84
End: 2024-03-12
Payer: MEDICARE

## 2024-03-12 VITALS
BODY MASS INDEX: 24.79 KG/M2 | SYSTOLIC BLOOD PRESSURE: 130 MMHG | TEMPERATURE: 97.8 F | DIASTOLIC BLOOD PRESSURE: 76 MMHG | HEART RATE: 72 BPM | WEIGHT: 173.2 LBS | OXYGEN SATURATION: 96 % | HEIGHT: 70 IN

## 2024-03-12 DIAGNOSIS — I50.32 CHRONIC HEART FAILURE WITH PRESERVED EJECTION FRACTION (HFPEF): ICD-10-CM

## 2024-03-12 DIAGNOSIS — I48.19 ATRIAL FIBRILLATION, PERSISTENT: ICD-10-CM

## 2024-03-12 DIAGNOSIS — I10 ESSENTIAL HYPERTENSION: ICD-10-CM

## 2024-03-12 DIAGNOSIS — N18.31 STAGE 3A CHRONIC KIDNEY DISEASE: ICD-10-CM

## 2024-03-12 DIAGNOSIS — E11.9 TYPE 2 DIABETES MELLITUS WITHOUT COMPLICATION, WITHOUT LONG-TERM CURRENT USE OF INSULIN: Primary | ICD-10-CM

## 2024-03-12 DIAGNOSIS — L03.116 CELLULITIS OF LEFT LOWER EXTREMITY: ICD-10-CM

## 2024-03-12 DIAGNOSIS — E78.5 HYPERLIPIDEMIA LDL GOAL <70: ICD-10-CM

## 2024-03-12 LAB
ALBUMIN SERPL-MCNC: 3.7 G/DL (ref 3.5–5.2)
ANION GAP SERPL CALCULATED.3IONS-SCNC: 9.7 MMOL/L (ref 5–15)
BACTERIA SPEC AEROBE CULT: NORMAL
BACTERIA SPEC AEROBE CULT: NORMAL
BUN SERPL-MCNC: 42 MG/DL (ref 8–23)
BUN/CREAT SERPL: 35 (ref 7–25)
CALCIUM SPEC-SCNC: 9.1 MG/DL (ref 8.6–10.5)
CHLORIDE SERPL-SCNC: 103 MMOL/L (ref 98–107)
CO2 SERPL-SCNC: 24.3 MMOL/L (ref 22–29)
CREAT SERPL-MCNC: 1.2 MG/DL (ref 0.76–1.27)
EGFRCR SERPLBLD CKD-EPI 2021: 60.8 ML/MIN/1.73
GLUCOSE SERPL-MCNC: 139 MG/DL (ref 65–99)
PHOSPHATE SERPL-MCNC: 2.9 MG/DL (ref 2.5–4.5)
POTASSIUM SERPL-SCNC: 5.1 MMOL/L (ref 3.5–5.2)
SODIUM SERPL-SCNC: 137 MMOL/L (ref 136–145)

## 2024-03-12 PROCEDURE — 80069 RENAL FUNCTION PANEL: CPT | Performed by: INTERNAL MEDICINE

## 2024-03-12 RX ORDER — METFORMIN HYDROCHLORIDE 500 MG/1
500 TABLET, EXTENDED RELEASE ORAL
Qty: 90 TABLET | Refills: 1 | Status: SHIPPED | OUTPATIENT
Start: 2024-03-12

## 2024-03-12 RX ORDER — FUROSEMIDE 20 MG/1
20 TABLET ORAL DAILY
Qty: 90 TABLET | Refills: 1 | Status: SHIPPED | OUTPATIENT
Start: 2024-03-12 | End: 2024-09-08

## 2024-03-13 ENCOUNTER — TELEPHONE (OUTPATIENT)
Dept: INTERNAL MEDICINE | Age: 84
End: 2024-03-13
Payer: MEDICARE

## 2024-03-13 DIAGNOSIS — N18.31 STAGE 3A CHRONIC KIDNEY DISEASE: Primary | ICD-10-CM

## 2024-03-27 RX ORDER — CLOPIDOGREL BISULFATE 75 MG/1
TABLET ORAL
Qty: 90 TABLET | Refills: 1 | Status: SHIPPED | OUTPATIENT
Start: 2024-03-27 | End: 2024-03-29 | Stop reason: SDUPTHER

## 2024-03-28 ENCOUNTER — TELEPHONE (OUTPATIENT)
Dept: INTERNAL MEDICINE | Age: 84
End: 2024-03-28
Payer: MEDICARE

## 2024-03-29 DIAGNOSIS — N18.31 STAGE 3A CHRONIC KIDNEY DISEASE: Primary | ICD-10-CM

## 2024-03-29 RX ORDER — FUROSEMIDE 20 MG/1
TABLET ORAL
Qty: 135 TABLET | Refills: 1 | Status: SHIPPED | OUTPATIENT
Start: 2024-03-29

## 2024-03-29 RX ORDER — GLIMEPIRIDE 2 MG/1
2 TABLET ORAL
Qty: 90 TABLET | Refills: 1 | Status: SHIPPED | OUTPATIENT
Start: 2024-03-29

## 2024-03-29 RX ORDER — CLOPIDOGREL BISULFATE 75 MG/1
75 TABLET ORAL DAILY
Qty: 90 TABLET | Refills: 1 | Status: SHIPPED | OUTPATIENT
Start: 2024-03-29

## 2024-04-04 ENCOUNTER — OFFICE VISIT (OUTPATIENT)
Dept: INTERNAL MEDICINE | Age: 84
End: 2024-04-04
Payer: MEDICARE

## 2024-04-04 VITALS
HEART RATE: 98 BPM | SYSTOLIC BLOOD PRESSURE: 114 MMHG | BODY MASS INDEX: 25.74 KG/M2 | RESPIRATION RATE: 18 BRPM | WEIGHT: 179.8 LBS | OXYGEN SATURATION: 96 % | DIASTOLIC BLOOD PRESSURE: 64 MMHG | TEMPERATURE: 98.4 F | HEIGHT: 70 IN

## 2024-04-04 DIAGNOSIS — I50.32 CHRONIC HEART FAILURE WITH PRESERVED EJECTION FRACTION (HFPEF): Primary | ICD-10-CM

## 2024-04-04 DIAGNOSIS — E11.9 TYPE 2 DIABETES MELLITUS WITHOUT COMPLICATION, WITHOUT LONG-TERM CURRENT USE OF INSULIN: ICD-10-CM

## 2024-04-04 DIAGNOSIS — I87.2 VENOUS INSUFFICIENCY OF BOTH LOWER EXTREMITIES: ICD-10-CM

## 2024-04-04 RX ORDER — METFORMIN HYDROCHLORIDE 500 MG/1
500 TABLET, EXTENDED RELEASE ORAL 2 TIMES DAILY WITH MEALS
Qty: 180 TABLET | Refills: 1 | Status: SHIPPED | OUTPATIENT
Start: 2024-04-04

## 2024-04-04 RX ORDER — PRENATAL VIT 91/IRON/FOLIC/DHA 28-975-200
1 COMBINATION PACKAGE (EA) ORAL 2 TIMES DAILY
Qty: 42 G | Refills: 2 | Status: SHIPPED | OUTPATIENT
Start: 2024-04-04

## 2024-04-04 RX ORDER — FUROSEMIDE 20 MG/1
40 TABLET ORAL DAILY
Qty: 180 TABLET | Refills: 1 | Status: SHIPPED | OUTPATIENT
Start: 2024-04-04

## 2024-04-10 RX ORDER — DILTIAZEM HYDROCHLORIDE 120 MG/1
120 CAPSULE, EXTENDED RELEASE ORAL DAILY
Qty: 90 CAPSULE | Refills: 1 | Status: SHIPPED | OUTPATIENT
Start: 2024-04-10

## 2024-04-10 RX ORDER — CARVEDILOL 25 MG/1
TABLET ORAL
Qty: 180 TABLET | Refills: 1 | Status: SHIPPED | OUTPATIENT
Start: 2024-04-10

## 2024-04-16 ENCOUNTER — LAB (OUTPATIENT)
Dept: INTERNAL MEDICINE | Age: 84
End: 2024-04-16
Payer: MEDICARE

## 2024-04-16 DIAGNOSIS — N18.31 STAGE 3A CHRONIC KIDNEY DISEASE: ICD-10-CM

## 2024-04-16 DIAGNOSIS — I50.32 CHRONIC HEART FAILURE WITH PRESERVED EJECTION FRACTION (HFPEF): ICD-10-CM

## 2024-04-16 LAB
ALBUMIN SERPL-MCNC: 3.4 G/DL (ref 3.5–5.2)
ANION GAP SERPL CALCULATED.3IONS-SCNC: 8.5 MMOL/L (ref 5–15)
BUN SERPL-MCNC: 23 MG/DL (ref 8–23)
BUN/CREAT SERPL: 21.7 (ref 7–25)
CALCIUM SPEC-SCNC: 8.9 MG/DL (ref 8.6–10.5)
CHLORIDE SERPL-SCNC: 104 MMOL/L (ref 98–107)
CO2 SERPL-SCNC: 26.5 MMOL/L (ref 22–29)
CREAT SERPL-MCNC: 1.06 MG/DL (ref 0.76–1.27)
EGFRCR SERPLBLD CKD-EPI 2021: 70.5 ML/MIN/1.73
GLUCOSE SERPL-MCNC: 237 MG/DL (ref 65–99)
NT-PROBNP SERPL-MCNC: 950 PG/ML (ref 0–1800)
PHOSPHATE SERPL-MCNC: 2.9 MG/DL (ref 2.5–4.5)
POTASSIUM SERPL-SCNC: 4.2 MMOL/L (ref 3.5–5.2)
SODIUM SERPL-SCNC: 139 MMOL/L (ref 136–145)

## 2024-04-16 PROCEDURE — 83880 ASSAY OF NATRIURETIC PEPTIDE: CPT | Performed by: INTERNAL MEDICINE

## 2024-04-16 PROCEDURE — 80069 RENAL FUNCTION PANEL: CPT | Performed by: INTERNAL MEDICINE

## 2024-04-16 PROCEDURE — 36415 COLL VENOUS BLD VENIPUNCTURE: CPT | Performed by: INTERNAL MEDICINE

## 2024-05-07 ENCOUNTER — LAB (OUTPATIENT)
Dept: LAB | Facility: HOSPITAL | Age: 84
End: 2024-05-07
Payer: MEDICARE

## 2024-05-07 DIAGNOSIS — E11.9 TYPE 2 DIABETES MELLITUS WITHOUT COMPLICATION, WITHOUT LONG-TERM CURRENT USE OF INSULIN: ICD-10-CM

## 2024-05-07 DIAGNOSIS — I50.32 CHRONIC HEART FAILURE WITH PRESERVED EJECTION FRACTION (HFPEF): ICD-10-CM

## 2024-05-07 DIAGNOSIS — N18.31 STAGE 3A CHRONIC KIDNEY DISEASE: ICD-10-CM

## 2024-05-07 LAB
ANION GAP SERPL CALCULATED.3IONS-SCNC: 10 MMOL/L (ref 5–15)
BUN SERPL-MCNC: 24 MG/DL (ref 8–23)
BUN/CREAT SERPL: 26.1 (ref 7–25)
CALCIUM SPEC-SCNC: 9.8 MG/DL (ref 8.6–10.5)
CHLORIDE SERPL-SCNC: 105 MMOL/L (ref 98–107)
CO2 SERPL-SCNC: 29 MMOL/L (ref 22–29)
CREAT SERPL-MCNC: 0.92 MG/DL (ref 0.76–1.27)
EGFRCR SERPLBLD CKD-EPI 2021: 83.6 ML/MIN/1.73
GLUCOSE SERPL-MCNC: 109 MG/DL (ref 65–99)
HBA1C MFR BLD: 7.6 % (ref 4.8–5.6)
NT-PROBNP SERPL-MCNC: 821 PG/ML (ref 0–1800)
POTASSIUM SERPL-SCNC: 4.5 MMOL/L (ref 3.5–5.2)
SODIUM SERPL-SCNC: 144 MMOL/L (ref 136–145)

## 2024-05-07 PROCEDURE — 80048 BASIC METABOLIC PNL TOTAL CA: CPT

## 2024-05-07 PROCEDURE — 36415 COLL VENOUS BLD VENIPUNCTURE: CPT

## 2024-05-07 PROCEDURE — 83036 HEMOGLOBIN GLYCOSYLATED A1C: CPT

## 2024-05-07 PROCEDURE — 83880 ASSAY OF NATRIURETIC PEPTIDE: CPT

## 2024-05-13 ENCOUNTER — OFFICE VISIT (OUTPATIENT)
Dept: INTERNAL MEDICINE | Age: 84
End: 2024-05-13
Payer: MEDICARE

## 2024-05-13 VITALS
TEMPERATURE: 98.4 F | WEIGHT: 181.2 LBS | BODY MASS INDEX: 25.94 KG/M2 | HEIGHT: 70 IN | DIASTOLIC BLOOD PRESSURE: 82 MMHG | HEART RATE: 91 BPM | SYSTOLIC BLOOD PRESSURE: 118 MMHG | OXYGEN SATURATION: 97 %

## 2024-05-13 DIAGNOSIS — Z00.00 MEDICARE ANNUAL WELLNESS VISIT, SUBSEQUENT: Primary | ICD-10-CM

## 2024-05-13 DIAGNOSIS — E78.5 HYPERLIPIDEMIA LDL GOAL <70: ICD-10-CM

## 2024-05-13 DIAGNOSIS — E11.9 TYPE 2 DIABETES MELLITUS WITHOUT COMPLICATION, WITHOUT LONG-TERM CURRENT USE OF INSULIN: ICD-10-CM

## 2024-05-13 DIAGNOSIS — I87.2 VENOUS INSUFFICIENCY OF BOTH LOWER EXTREMITIES: ICD-10-CM

## 2024-05-13 DIAGNOSIS — N18.31 STAGE 3A CHRONIC KIDNEY DISEASE: ICD-10-CM

## 2024-05-13 DIAGNOSIS — I50.32 CHRONIC HEART FAILURE WITH PRESERVED EJECTION FRACTION (HFPEF): ICD-10-CM

## 2024-05-13 DIAGNOSIS — I10 ESSENTIAL HYPERTENSION: ICD-10-CM

## 2024-05-13 PROCEDURE — 3074F SYST BP LT 130 MM HG: CPT | Performed by: INTERNAL MEDICINE

## 2024-05-13 PROCEDURE — 1160F RVW MEDS BY RX/DR IN RCRD: CPT | Performed by: INTERNAL MEDICINE

## 2024-05-13 PROCEDURE — 1170F FXNL STATUS ASSESSED: CPT | Performed by: INTERNAL MEDICINE

## 2024-05-13 PROCEDURE — 3079F DIAST BP 80-89 MM HG: CPT | Performed by: INTERNAL MEDICINE

## 2024-05-13 PROCEDURE — 1159F MED LIST DOCD IN RCRD: CPT | Performed by: INTERNAL MEDICINE

## 2024-05-13 PROCEDURE — G0439 PPPS, SUBSEQ VISIT: HCPCS | Performed by: INTERNAL MEDICINE

## 2024-05-13 PROCEDURE — 99214 OFFICE O/P EST MOD 30 MIN: CPT | Performed by: INTERNAL MEDICINE

## 2024-05-13 PROCEDURE — 1126F AMNT PAIN NOTED NONE PRSNT: CPT | Performed by: INTERNAL MEDICINE

## 2024-05-13 RX ORDER — CLARITHROMYCIN 500 MG/1
500 TABLET, COATED ORAL 2 TIMES DAILY
COMMUNITY
Start: 2024-05-08

## 2024-05-20 ENCOUNTER — APPOINTMENT (OUTPATIENT)
Dept: GENERAL RADIOLOGY | Facility: HOSPITAL | Age: 84
End: 2024-05-20
Payer: MEDICARE

## 2024-05-20 ENCOUNTER — HOSPITAL ENCOUNTER (INPATIENT)
Facility: HOSPITAL | Age: 84
LOS: 3 days | Discharge: NURSING, REHAB OR LONG-TERM CARE | End: 2024-05-24
Attending: EMERGENCY MEDICINE | Admitting: STUDENT IN AN ORGANIZED HEALTH CARE EDUCATION/TRAINING PROGRAM
Payer: MEDICARE

## 2024-05-20 ENCOUNTER — APPOINTMENT (OUTPATIENT)
Dept: CT IMAGING | Facility: HOSPITAL | Age: 84
End: 2024-05-20
Payer: MEDICARE

## 2024-05-20 DIAGNOSIS — T14.8XXA AVULSION OF SKIN: ICD-10-CM

## 2024-05-20 DIAGNOSIS — S32.591A CLOSED FRACTURE OF RAMUS OF RIGHT PUBIS, INITIAL ENCOUNTER: Primary | ICD-10-CM

## 2024-05-20 DIAGNOSIS — R26.2 DIFFICULTY IN WALKING: ICD-10-CM

## 2024-05-20 PROBLEM — S32.9XXA PELVIC FRACTURE: Status: ACTIVE | Noted: 2024-05-20

## 2024-05-20 PROCEDURE — 73060 X-RAY EXAM OF HUMERUS: CPT

## 2024-05-20 PROCEDURE — 73090 X-RAY EXAM OF FOREARM: CPT

## 2024-05-20 PROCEDURE — 85007 BL SMEAR W/DIFF WBC COUNT: CPT

## 2024-05-20 PROCEDURE — 73502 X-RAY EXAM HIP UNI 2-3 VIEWS: CPT

## 2024-05-20 PROCEDURE — 80053 COMPREHEN METABOLIC PANEL: CPT

## 2024-05-20 PROCEDURE — 99285 EMERGENCY DEPT VISIT HI MDM: CPT

## 2024-05-20 PROCEDURE — 85025 COMPLETE CBC W/AUTO DIFF WBC: CPT

## 2024-05-20 PROCEDURE — 72192 CT PELVIS W/O DYE: CPT

## 2024-05-20 PROCEDURE — 99222 1ST HOSP IP/OBS MODERATE 55: CPT | Performed by: STUDENT IN AN ORGANIZED HEALTH CARE EDUCATION/TRAINING PROGRAM

## 2024-05-20 RX ORDER — HYDROCODONE BITARTRATE AND ACETAMINOPHEN 5; 325 MG/1; MG/1
1 TABLET ORAL EVERY 6 HOURS PRN
Status: DISCONTINUED | OUTPATIENT
Start: 2024-05-20 | End: 2024-05-21

## 2024-05-20 RX ORDER — GINSENG 100 MG
1 CAPSULE ORAL ONCE
Status: COMPLETED | OUTPATIENT
Start: 2024-05-21 | End: 2024-05-20

## 2024-05-20 RX ADMIN — BACITRACIN 0.9 G: 500 OINTMENT TOPICAL at 23:36

## 2024-05-20 RX ADMIN — HYDROCODONE BITARTRATE AND ACETAMINOPHEN 1 TABLET: 5; 325 TABLET ORAL at 23:55

## 2024-05-21 PROBLEM — E11.9 TYPE 2 DIABETES MELLITUS WITHOUT COMPLICATION, WITHOUT LONG-TERM CURRENT USE OF INSULIN: Chronic | Status: ACTIVE | Noted: 2022-08-25

## 2024-05-21 LAB
ALBUMIN SERPL-MCNC: 3.6 G/DL (ref 3.5–5.2)
ALBUMIN/GLOB SERPL: 1.4 G/DL
ALP SERPL-CCNC: 69 U/L (ref 39–117)
ALT SERPL W P-5'-P-CCNC: 17 U/L (ref 1–41)
ANION GAP SERPL CALCULATED.3IONS-SCNC: 11.9 MMOL/L (ref 5–15)
ANION GAP SERPL CALCULATED.3IONS-SCNC: 9.9 MMOL/L (ref 5–15)
AST SERPL-CCNC: 15 U/L (ref 1–40)
BASOPHILS # BLD AUTO: 0.1 10*3/MM3 (ref 0–0.2)
BASOPHILS # BLD AUTO: 0.14 10*3/MM3 (ref 0–0.2)
BASOPHILS NFR BLD AUTO: 0.5 % (ref 0–1.5)
BASOPHILS NFR BLD AUTO: 0.9 % (ref 0–1.5)
BILIRUB SERPL-MCNC: 0.6 MG/DL (ref 0–1.2)
BUN SERPL-MCNC: 25 MG/DL (ref 8–23)
BUN SERPL-MCNC: 26 MG/DL (ref 8–23)
BUN/CREAT SERPL: 26.3 (ref 7–25)
BUN/CREAT SERPL: 28.1 (ref 7–25)
CALCIUM SPEC-SCNC: 9.1 MG/DL (ref 8.6–10.5)
CALCIUM SPEC-SCNC: 9.2 MG/DL (ref 8.6–10.5)
CHLORIDE SERPL-SCNC: 104 MMOL/L (ref 98–107)
CHLORIDE SERPL-SCNC: 104 MMOL/L (ref 98–107)
CO2 SERPL-SCNC: 24.1 MMOL/L (ref 22–29)
CO2 SERPL-SCNC: 27.1 MMOL/L (ref 22–29)
CREAT SERPL-MCNC: 0.89 MG/DL (ref 0.76–1.27)
CREAT SERPL-MCNC: 0.99 MG/DL (ref 0.76–1.27)
DEPRECATED RDW RBC AUTO: 50.2 FL (ref 37–54)
DEPRECATED RDW RBC AUTO: 50.3 FL (ref 37–54)
EGFRCR SERPLBLD CKD-EPI 2021: 76.5 ML/MIN/1.73
EGFRCR SERPLBLD CKD-EPI 2021: 86.1 ML/MIN/1.73
EOSINOPHIL # BLD AUTO: 0.15 10*3/MM3 (ref 0–0.4)
EOSINOPHIL # BLD AUTO: 0.39 10*3/MM3 (ref 0–0.4)
EOSINOPHIL NFR BLD AUTO: 0.8 % (ref 0.3–6.2)
EOSINOPHIL NFR BLD AUTO: 2.4 % (ref 0.3–6.2)
ERYTHROCYTE [DISTWIDTH] IN BLOOD BY AUTOMATED COUNT: 15.2 % (ref 12.3–15.4)
ERYTHROCYTE [DISTWIDTH] IN BLOOD BY AUTOMATED COUNT: 15.3 % (ref 12.3–15.4)
GLOBULIN UR ELPH-MCNC: 2.6 GM/DL
GLUCOSE BLDC GLUCOMTR-MCNC: 101 MG/DL (ref 70–99)
GLUCOSE BLDC GLUCOMTR-MCNC: 84 MG/DL (ref 70–99)
GLUCOSE SERPL-MCNC: 84 MG/DL (ref 65–99)
GLUCOSE SERPL-MCNC: 85 MG/DL (ref 65–99)
HCT VFR BLD AUTO: 34.2 % (ref 37.5–51)
HCT VFR BLD AUTO: 34.5 % (ref 37.5–51)
HGB BLD-MCNC: 10.7 G/DL (ref 13–17.7)
HGB BLD-MCNC: 11 G/DL (ref 13–17.7)
HOLD SPECIMEN: NORMAL
HOLD SPECIMEN: NORMAL
IMM GRANULOCYTES # BLD AUTO: 0.52 10*3/MM3 (ref 0–0.05)
IMM GRANULOCYTES # BLD AUTO: 0.58 10*3/MM3 (ref 0–0.05)
IMM GRANULOCYTES NFR BLD AUTO: 3 % (ref 0–0.5)
IMM GRANULOCYTES NFR BLD AUTO: 3.2 % (ref 0–0.5)
LYMPHOCYTES # BLD AUTO: 0.83 10*3/MM3 (ref 0.7–3.1)
LYMPHOCYTES # BLD AUTO: 1.02 10*3/MM3 (ref 0.7–3.1)
LYMPHOCYTES NFR BLD AUTO: 5.1 % (ref 19.6–45.3)
LYMPHOCYTES NFR BLD AUTO: 5.2 % (ref 19.6–45.3)
MCH RBC QN AUTO: 28.2 PG (ref 26.6–33)
MCH RBC QN AUTO: 28.5 PG (ref 26.6–33)
MCHC RBC AUTO-ENTMCNC: 31.3 G/DL (ref 31.5–35.7)
MCHC RBC AUTO-ENTMCNC: 31.9 G/DL (ref 31.5–35.7)
MCV RBC AUTO: 89.4 FL (ref 79–97)
MCV RBC AUTO: 90 FL (ref 79–97)
MONOCYTES # BLD AUTO: 1.65 10*3/MM3 (ref 0.1–0.9)
MONOCYTES # BLD AUTO: 1.98 10*3/MM3 (ref 0.1–0.9)
MONOCYTES NFR BLD AUTO: 10.1 % (ref 5–12)
MONOCYTES NFR BLD AUTO: 10.2 % (ref 5–12)
NEUTROPHILS NFR BLD AUTO: 12.64 10*3/MM3 (ref 1.7–7)
NEUTROPHILS NFR BLD AUTO: 15.81 10*3/MM3 (ref 1.7–7)
NEUTROPHILS NFR BLD AUTO: 78.2 % (ref 42.7–76)
NEUTROPHILS NFR BLD AUTO: 80.4 % (ref 42.7–76)
NRBC BLD AUTO-RTO: 0 /100 WBC (ref 0–0.2)
NRBC BLD AUTO-RTO: 0 /100 WBC (ref 0–0.2)
PLAT MORPH BLD: NORMAL
PLATELET # BLD AUTO: 298 10*3/MM3 (ref 140–450)
PLATELET # BLD AUTO: 312 10*3/MM3 (ref 140–450)
PMV BLD AUTO: 9.1 FL (ref 6–12)
PMV BLD AUTO: 9.4 FL (ref 6–12)
POTASSIUM SERPL-SCNC: 3.9 MMOL/L (ref 3.5–5.2)
POTASSIUM SERPL-SCNC: 3.9 MMOL/L (ref 3.5–5.2)
PROT SERPL-MCNC: 6.2 G/DL (ref 6–8.5)
RBC # BLD AUTO: 3.8 10*6/MM3 (ref 4.14–5.8)
RBC # BLD AUTO: 3.86 10*6/MM3 (ref 4.14–5.8)
RBC MORPH BLD: NORMAL
SODIUM SERPL-SCNC: 140 MMOL/L (ref 136–145)
SODIUM SERPL-SCNC: 141 MMOL/L (ref 136–145)
WBC MORPH BLD: NORMAL
WBC NRBC COR # BLD AUTO: 16.17 10*3/MM3 (ref 3.4–10.8)
WBC NRBC COR # BLD AUTO: 19.64 10*3/MM3 (ref 3.4–10.8)
WHOLE BLOOD HOLD COAG: NORMAL
WHOLE BLOOD HOLD SPECIMEN: NORMAL

## 2024-05-21 PROCEDURE — 99233 SBSQ HOSP IP/OBS HIGH 50: CPT | Performed by: INTERNAL MEDICINE

## 2024-05-21 PROCEDURE — 97530 THERAPEUTIC ACTIVITIES: CPT

## 2024-05-21 PROCEDURE — 80048 BASIC METABOLIC PNL TOTAL CA: CPT | Performed by: STUDENT IN AN ORGANIZED HEALTH CARE EDUCATION/TRAINING PROGRAM

## 2024-05-21 PROCEDURE — 90715 TDAP VACCINE 7 YRS/> IM: CPT | Performed by: EMERGENCY MEDICINE

## 2024-05-21 PROCEDURE — 97161 PT EVAL LOW COMPLEX 20 MIN: CPT

## 2024-05-21 PROCEDURE — 25010000002 TETANUS-DIPHTH-ACELL PERTUSSIS 5-2.5-18.5 LF-MCG/0.5 SUSPENSION PREFILLED SYRINGE: Performed by: EMERGENCY MEDICINE

## 2024-05-21 PROCEDURE — 97165 OT EVAL LOW COMPLEX 30 MIN: CPT

## 2024-05-21 PROCEDURE — 90471 IMMUNIZATION ADMIN: CPT | Performed by: EMERGENCY MEDICINE

## 2024-05-21 PROCEDURE — 85025 COMPLETE CBC W/AUTO DIFF WBC: CPT | Performed by: STUDENT IN AN ORGANIZED HEALTH CARE EDUCATION/TRAINING PROGRAM

## 2024-05-21 PROCEDURE — 82948 REAGENT STRIP/BLOOD GLUCOSE: CPT | Performed by: INTERNAL MEDICINE

## 2024-05-21 RX ORDER — GLIPIZIDE 5 MG/1
5 TABLET ORAL
Status: DISCONTINUED | OUTPATIENT
Start: 2024-05-21 | End: 2024-05-22

## 2024-05-21 RX ORDER — HYDROCODONE BITARTRATE AND ACETAMINOPHEN 5; 325 MG/1; MG/1
1 TABLET ORAL EVERY 4 HOURS PRN
Status: DISCONTINUED | OUTPATIENT
Start: 2024-05-21 | End: 2024-05-24 | Stop reason: HOSPADM

## 2024-05-21 RX ORDER — SODIUM CHLORIDE 0.9 % (FLUSH) 0.9 %
10 SYRINGE (ML) INJECTION EVERY 12 HOURS SCHEDULED
Status: DISCONTINUED | OUTPATIENT
Start: 2024-05-21 | End: 2024-05-24 | Stop reason: HOSPADM

## 2024-05-21 RX ORDER — SODIUM CHLORIDE 9 MG/ML
40 INJECTION, SOLUTION INTRAVENOUS AS NEEDED
Status: DISCONTINUED | OUTPATIENT
Start: 2024-05-21 | End: 2024-05-24 | Stop reason: HOSPADM

## 2024-05-21 RX ORDER — ONDANSETRON 2 MG/ML
4 INJECTION INTRAMUSCULAR; INTRAVENOUS EVERY 6 HOURS PRN
Status: DISCONTINUED | OUTPATIENT
Start: 2024-05-21 | End: 2024-05-21 | Stop reason: SDUPTHER

## 2024-05-21 RX ORDER — BISACODYL 5 MG/1
5 TABLET, DELAYED RELEASE ORAL DAILY PRN
Status: DISCONTINUED | OUTPATIENT
Start: 2024-05-21 | End: 2024-05-24 | Stop reason: HOSPADM

## 2024-05-21 RX ORDER — POLYETHYLENE GLYCOL 3350 17 G/17G
17 POWDER, FOR SOLUTION ORAL DAILY PRN
Status: DISCONTINUED | OUTPATIENT
Start: 2024-05-21 | End: 2024-05-21

## 2024-05-21 RX ORDER — ONDANSETRON 2 MG/ML
4 INJECTION INTRAMUSCULAR; INTRAVENOUS EVERY 4 HOURS PRN
Status: DISCONTINUED | OUTPATIENT
Start: 2024-05-21 | End: 2024-05-24 | Stop reason: HOSPADM

## 2024-05-21 RX ORDER — FUROSEMIDE 40 MG/1
40 TABLET ORAL DAILY
Status: DISCONTINUED | OUTPATIENT
Start: 2024-05-21 | End: 2024-05-24 | Stop reason: HOSPADM

## 2024-05-21 RX ORDER — NICOTINE 21 MG/24HR
1 PATCH, TRANSDERMAL 24 HOURS TRANSDERMAL DAILY PRN
Status: DISCONTINUED | OUTPATIENT
Start: 2024-05-21 | End: 2024-05-24 | Stop reason: HOSPADM

## 2024-05-21 RX ORDER — POLYETHYLENE GLYCOL 3350 17 G/17G
17 POWDER, FOR SOLUTION ORAL DAILY
Status: DISCONTINUED | OUTPATIENT
Start: 2024-05-21 | End: 2024-05-24 | Stop reason: HOSPADM

## 2024-05-21 RX ORDER — ALUMINA, MAGNESIA, AND SIMETHICONE 2400; 2400; 240 MG/30ML; MG/30ML; MG/30ML
15 SUSPENSION ORAL EVERY 6 HOURS PRN
Status: DISCONTINUED | OUTPATIENT
Start: 2024-05-21 | End: 2024-05-24 | Stop reason: HOSPADM

## 2024-05-21 RX ORDER — SODIUM CHLORIDE 0.9 % (FLUSH) 0.9 %
10 SYRINGE (ML) INJECTION AS NEEDED
Status: DISCONTINUED | OUTPATIENT
Start: 2024-05-21 | End: 2024-05-24 | Stop reason: HOSPADM

## 2024-05-21 RX ORDER — ACETAMINOPHEN 500 MG
1000 TABLET ORAL 3 TIMES DAILY
Status: DISCONTINUED | OUTPATIENT
Start: 2024-05-21 | End: 2024-05-21

## 2024-05-21 RX ORDER — VALSARTAN 80 MG/1
40 TABLET ORAL
Status: DISCONTINUED | OUTPATIENT
Start: 2024-05-21 | End: 2024-05-24 | Stop reason: HOSPADM

## 2024-05-21 RX ORDER — LIDOCAINE 4 G/G
1 PATCH TOPICAL DAILY PRN
Status: DISCONTINUED | OUTPATIENT
Start: 2024-05-21 | End: 2024-05-24 | Stop reason: HOSPADM

## 2024-05-21 RX ORDER — ROSUVASTATIN CALCIUM 20 MG/1
20 TABLET, COATED ORAL NIGHTLY
Status: DISCONTINUED | OUTPATIENT
Start: 2024-05-21 | End: 2024-05-24 | Stop reason: HOSPADM

## 2024-05-21 RX ORDER — HYDROCODONE BITARTRATE AND ACETAMINOPHEN 10; 325 MG/1; MG/1
1 TABLET ORAL EVERY 6 HOURS PRN
Status: DISCONTINUED | OUTPATIENT
Start: 2024-05-21 | End: 2024-05-24 | Stop reason: HOSPADM

## 2024-05-21 RX ORDER — DILTIAZEM HYDROCHLORIDE 120 MG/1
120 CAPSULE, COATED, EXTENDED RELEASE ORAL
Status: DISCONTINUED | OUTPATIENT
Start: 2024-05-21 | End: 2024-05-24 | Stop reason: HOSPADM

## 2024-05-21 RX ORDER — AMOXICILLIN 250 MG
2 CAPSULE ORAL 2 TIMES DAILY PRN
Status: DISCONTINUED | OUTPATIENT
Start: 2024-05-21 | End: 2024-05-21

## 2024-05-21 RX ORDER — ACETAMINOPHEN 500 MG
1000 TABLET ORAL 3 TIMES DAILY
Status: DISCONTINUED | OUTPATIENT
Start: 2024-05-21 | End: 2024-05-24 | Stop reason: HOSPADM

## 2024-05-21 RX ORDER — ACETAMINOPHEN 325 MG/1
650 TABLET ORAL EVERY 6 HOURS PRN
Status: DISCONTINUED | OUTPATIENT
Start: 2024-05-21 | End: 2024-05-24 | Stop reason: HOSPADM

## 2024-05-21 RX ORDER — BISACODYL 10 MG
10 SUPPOSITORY, RECTAL RECTAL DAILY PRN
Status: DISCONTINUED | OUTPATIENT
Start: 2024-05-21 | End: 2024-05-24 | Stop reason: HOSPADM

## 2024-05-21 RX ORDER — ONDANSETRON 4 MG/1
4 TABLET, ORALLY DISINTEGRATING ORAL EVERY 6 HOURS PRN
Status: DISCONTINUED | OUTPATIENT
Start: 2024-05-21 | End: 2024-05-24 | Stop reason: HOSPADM

## 2024-05-21 RX ORDER — PROCHLORPERAZINE EDISYLATE 5 MG/ML
5 INJECTION INTRAMUSCULAR; INTRAVENOUS EVERY 6 HOURS PRN
Status: DISCONTINUED | OUTPATIENT
Start: 2024-05-21 | End: 2024-05-24 | Stop reason: HOSPADM

## 2024-05-21 RX ORDER — CARVEDILOL 25 MG/1
25 TABLET ORAL 2 TIMES DAILY WITH MEALS
Status: DISCONTINUED | OUTPATIENT
Start: 2024-05-21 | End: 2024-05-24 | Stop reason: HOSPADM

## 2024-05-21 RX ORDER — CLOPIDOGREL BISULFATE 75 MG/1
75 TABLET ORAL DAILY
Status: DISCONTINUED | OUTPATIENT
Start: 2024-05-21 | End: 2024-05-24 | Stop reason: HOSPADM

## 2024-05-21 RX ORDER — PANTOPRAZOLE SODIUM 40 MG/1
40 TABLET, DELAYED RELEASE ORAL DAILY
Status: DISCONTINUED | OUTPATIENT
Start: 2024-05-21 | End: 2024-05-24 | Stop reason: HOSPADM

## 2024-05-21 RX ORDER — HYDROXYZINE HYDROCHLORIDE 25 MG/1
25 TABLET, FILM COATED ORAL 3 TIMES DAILY PRN
Status: DISCONTINUED | OUTPATIENT
Start: 2024-05-21 | End: 2024-05-24 | Stop reason: HOSPADM

## 2024-05-21 RX ORDER — UREA 10 %
5 LOTION (ML) TOPICAL NIGHTLY PRN
Status: DISCONTINUED | OUTPATIENT
Start: 2024-05-21 | End: 2024-05-24 | Stop reason: HOSPADM

## 2024-05-21 RX ORDER — POLYETHYLENE GLYCOL 3350 17 G/17G
17 POWDER, FOR SOLUTION ORAL 2 TIMES DAILY PRN
Status: DISCONTINUED | OUTPATIENT
Start: 2024-05-21 | End: 2024-05-24 | Stop reason: HOSPADM

## 2024-05-21 RX ADMIN — ACETAMINOPHEN 1000 MG: 500 TABLET ORAL at 21:09

## 2024-05-21 RX ADMIN — Medication 10 ML: at 21:32

## 2024-05-21 RX ADMIN — APIXABAN 5 MG: 5 TABLET, FILM COATED ORAL at 21:09

## 2024-05-21 RX ADMIN — Medication 10 ML: at 09:09

## 2024-05-21 RX ADMIN — GLIPIZIDE 5 MG: 5 TABLET ORAL at 07:51

## 2024-05-21 RX ADMIN — POLYETHYLENE GLYCOL 3350 17 G: 17 POWDER, FOR SOLUTION ORAL at 09:58

## 2024-05-21 RX ADMIN — HYDROCODONE BITARTRATE AND ACETAMINOPHEN 1 TABLET: 5; 325 TABLET ORAL at 21:09

## 2024-05-21 RX ADMIN — CARVEDILOL 25 MG: 25 TABLET, FILM COATED ORAL at 07:51

## 2024-05-21 RX ADMIN — TETANUS TOXOID, REDUCED DIPHTHERIA TOXOID AND ACELLULAR PERTUSSIS VACCINE, ADSORBED 0.5 ML: 5; 2.5; 8; 8; 2.5 SUSPENSION INTRAMUSCULAR at 00:32

## 2024-05-21 RX ADMIN — POLYETHYLENE GLYCOL 3350 17 G: 17 POWDER, FOR SOLUTION ORAL at 09:17

## 2024-05-21 RX ADMIN — CARVEDILOL 25 MG: 25 TABLET, FILM COATED ORAL at 18:09

## 2024-05-21 RX ADMIN — ACETAMINOPHEN 1000 MG: 500 TABLET ORAL at 16:43

## 2024-05-21 RX ADMIN — APIXABAN 5 MG: 5 TABLET, FILM COATED ORAL at 09:07

## 2024-05-21 RX ADMIN — FUROSEMIDE 40 MG: 40 TABLET ORAL at 09:08

## 2024-05-21 RX ADMIN — PANTOPRAZOLE SODIUM 40 MG: 40 TABLET, DELAYED RELEASE ORAL at 09:06

## 2024-05-21 RX ADMIN — VALSARTAN 40 MG: 80 TABLET, FILM COATED ORAL at 09:05

## 2024-05-21 RX ADMIN — ROSUVASTATIN 20 MG: 20 TABLET, FILM COATED ORAL at 21:09

## 2024-05-21 RX ADMIN — HYDROCODONE BITARTRATE AND ACETAMINOPHEN 1 TABLET: 5; 325 TABLET ORAL at 12:45

## 2024-05-21 RX ADMIN — Medication 10 ML: at 01:34

## 2024-05-21 RX ADMIN — ACETAMINOPHEN 1000 MG: 500 TABLET ORAL at 09:05

## 2024-05-21 RX ADMIN — HYDROCODONE BITARTRATE AND ACETAMINOPHEN 1 TABLET: 5; 325 TABLET ORAL at 08:18

## 2024-05-22 LAB
GLUCOSE BLDC GLUCOMTR-MCNC: 160 MG/DL (ref 70–99)
GLUCOSE BLDC GLUCOMTR-MCNC: 161 MG/DL (ref 70–99)
GLUCOSE BLDC GLUCOMTR-MCNC: 199 MG/DL (ref 70–99)
GLUCOSE BLDC GLUCOMTR-MCNC: 54 MG/DL (ref 70–99)

## 2024-05-22 PROCEDURE — 97530 THERAPEUTIC ACTIVITIES: CPT

## 2024-05-22 PROCEDURE — 99221 1ST HOSP IP/OBS SF/LOW 40: CPT | Performed by: ORTHOPAEDIC SURGERY

## 2024-05-22 PROCEDURE — 82948 REAGENT STRIP/BLOOD GLUCOSE: CPT

## 2024-05-22 PROCEDURE — 82948 REAGENT STRIP/BLOOD GLUCOSE: CPT | Performed by: INTERNAL MEDICINE

## 2024-05-22 PROCEDURE — 97116 GAIT TRAINING THERAPY: CPT

## 2024-05-22 PROCEDURE — 63710000001 INSULIN LISPRO (HUMAN) PER 5 UNITS: Performed by: INTERNAL MEDICINE

## 2024-05-22 PROCEDURE — 99233 SBSQ HOSP IP/OBS HIGH 50: CPT | Performed by: INTERNAL MEDICINE

## 2024-05-22 PROCEDURE — 97110 THERAPEUTIC EXERCISES: CPT

## 2024-05-22 RX ORDER — IBUPROFEN 600 MG/1
1 TABLET ORAL
Status: DISCONTINUED | OUTPATIENT
Start: 2024-05-22 | End: 2024-05-24 | Stop reason: HOSPADM

## 2024-05-22 RX ORDER — ROSUVASTATIN CALCIUM 20 MG/1
20 TABLET, COATED ORAL NIGHTLY
Qty: 90 TABLET | Refills: 1 | Status: ON HOLD | OUTPATIENT
Start: 2024-05-22

## 2024-05-22 RX ORDER — DEXTROSE MONOHYDRATE 25 G/50ML
25 INJECTION, SOLUTION INTRAVENOUS
Status: DISCONTINUED | OUTPATIENT
Start: 2024-05-22 | End: 2024-05-24 | Stop reason: HOSPADM

## 2024-05-22 RX ORDER — AMOXICILLIN 250 MG
2 CAPSULE ORAL 2 TIMES DAILY
Status: DISCONTINUED | OUTPATIENT
Start: 2024-05-22 | End: 2024-05-24 | Stop reason: HOSPADM

## 2024-05-22 RX ORDER — NICOTINE POLACRILEX 4 MG
15 LOZENGE BUCCAL
Status: DISCONTINUED | OUTPATIENT
Start: 2024-05-22 | End: 2024-05-24 | Stop reason: HOSPADM

## 2024-05-22 RX ORDER — INSULIN LISPRO 100 [IU]/ML
2-7 INJECTION, SOLUTION INTRAVENOUS; SUBCUTANEOUS
Status: DISCONTINUED | OUTPATIENT
Start: 2024-05-22 | End: 2024-05-24 | Stop reason: HOSPADM

## 2024-05-22 RX ORDER — EZETIMIBE 10 MG/1
10 TABLET ORAL DAILY
Qty: 90 TABLET | Refills: 3 | Status: ON HOLD | OUTPATIENT
Start: 2024-05-22

## 2024-05-22 RX ADMIN — SENNOSIDES AND DOCUSATE SODIUM 2 TABLET: 50; 8.6 TABLET ORAL at 13:43

## 2024-05-22 RX ADMIN — FUROSEMIDE 40 MG: 40 TABLET ORAL at 08:22

## 2024-05-22 RX ADMIN — CLOPIDOGREL BISULFATE 75 MG: 75 TABLET ORAL at 08:22

## 2024-05-22 RX ADMIN — INSULIN LISPRO 2 UNITS: 100 INJECTION, SOLUTION INTRAVENOUS; SUBCUTANEOUS at 17:45

## 2024-05-22 RX ADMIN — GLIPIZIDE 5 MG: 5 TABLET ORAL at 07:32

## 2024-05-22 RX ADMIN — VALSARTAN 40 MG: 80 TABLET, FILM COATED ORAL at 08:21

## 2024-05-22 RX ADMIN — DEXTROSE AND SODIUM CHLORIDE 100 ML/HR: 5; 450 INJECTION, SOLUTION INTRAVENOUS at 09:46

## 2024-05-22 RX ADMIN — DILTIAZEM HYDROCHLORIDE 120 MG: 120 CAPSULE, EXTENDED RELEASE ORAL at 08:22

## 2024-05-22 RX ADMIN — ACETAMINOPHEN 650 MG: 500 TABLET ORAL at 21:01

## 2024-05-22 RX ADMIN — CARVEDILOL 25 MG: 25 TABLET, FILM COATED ORAL at 17:44

## 2024-05-22 RX ADMIN — PANTOPRAZOLE SODIUM 40 MG: 40 TABLET, DELAYED RELEASE ORAL at 08:22

## 2024-05-22 RX ADMIN — ROSUVASTATIN 20 MG: 20 TABLET, FILM COATED ORAL at 21:01

## 2024-05-22 RX ADMIN — CARVEDILOL 25 MG: 25 TABLET, FILM COATED ORAL at 07:32

## 2024-05-22 RX ADMIN — POLYETHYLENE GLYCOL 3350 17 G: 17 POWDER, FOR SOLUTION ORAL at 08:19

## 2024-05-22 RX ADMIN — APIXABAN 5 MG: 5 TABLET, FILM COATED ORAL at 08:22

## 2024-05-22 RX ADMIN — Medication 10 ML: at 08:21

## 2024-05-22 RX ADMIN — APIXABAN 5 MG: 5 TABLET, FILM COATED ORAL at 21:01

## 2024-05-22 RX ADMIN — ACETAMINOPHEN 1000 MG: 500 TABLET ORAL at 16:27

## 2024-05-22 RX ADMIN — ACETAMINOPHEN 1000 MG: 500 TABLET ORAL at 21:02

## 2024-05-23 LAB
GLUCOSE BLDC GLUCOMTR-MCNC: 115 MG/DL (ref 70–99)
GLUCOSE BLDC GLUCOMTR-MCNC: 162 MG/DL (ref 70–99)
GLUCOSE BLDC GLUCOMTR-MCNC: 188 MG/DL (ref 70–99)
GLUCOSE BLDC GLUCOMTR-MCNC: 50 MG/DL (ref 70–99)
GLUCOSE BLDC GLUCOMTR-MCNC: 77 MG/DL (ref 70–99)

## 2024-05-23 PROCEDURE — 82948 REAGENT STRIP/BLOOD GLUCOSE: CPT | Performed by: INTERNAL MEDICINE

## 2024-05-23 PROCEDURE — 97530 THERAPEUTIC ACTIVITIES: CPT

## 2024-05-23 PROCEDURE — 97116 GAIT TRAINING THERAPY: CPT

## 2024-05-23 PROCEDURE — 63710000001 INSULIN LISPRO (HUMAN) PER 5 UNITS: Performed by: INTERNAL MEDICINE

## 2024-05-23 PROCEDURE — 99232 SBSQ HOSP IP/OBS MODERATE 35: CPT | Performed by: INTERNAL MEDICINE

## 2024-05-23 PROCEDURE — 82948 REAGENT STRIP/BLOOD GLUCOSE: CPT

## 2024-05-23 RX ADMIN — APIXABAN 5 MG: 5 TABLET, FILM COATED ORAL at 08:01

## 2024-05-23 RX ADMIN — DILTIAZEM HYDROCHLORIDE 120 MG: 120 CAPSULE, EXTENDED RELEASE ORAL at 08:01

## 2024-05-23 RX ADMIN — SENNOSIDES AND DOCUSATE SODIUM 2 TABLET: 50; 8.6 TABLET ORAL at 21:23

## 2024-05-23 RX ADMIN — ACETAMINOPHEN 1000 MG: 500 TABLET ORAL at 17:46

## 2024-05-23 RX ADMIN — SENNOSIDES AND DOCUSATE SODIUM 2 TABLET: 50; 8.6 TABLET ORAL at 08:01

## 2024-05-23 RX ADMIN — CLOPIDOGREL BISULFATE 75 MG: 75 TABLET ORAL at 08:02

## 2024-05-23 RX ADMIN — INSULIN LISPRO 2 UNITS: 100 INJECTION, SOLUTION INTRAVENOUS; SUBCUTANEOUS at 21:23

## 2024-05-23 RX ADMIN — AVOBENZONE, HOMOSALATE, OCTISALATE, OCTOCRYLENE, AND OXYBENZONE 1 PACKET: 29.4; 147; 49; 25.4; 58.8 LOTION TOPICAL at 11:10

## 2024-05-23 RX ADMIN — PANTOPRAZOLE SODIUM 40 MG: 40 TABLET, DELAYED RELEASE ORAL at 08:00

## 2024-05-23 RX ADMIN — ACETAMINOPHEN 1000 MG: 500 TABLET ORAL at 08:00

## 2024-05-23 RX ADMIN — Medication 10 ML: at 08:06

## 2024-05-23 RX ADMIN — INSULIN LISPRO 2 UNITS: 100 INJECTION, SOLUTION INTRAVENOUS; SUBCUTANEOUS at 11:59

## 2024-05-23 RX ADMIN — FUROSEMIDE 40 MG: 40 TABLET ORAL at 08:01

## 2024-05-23 RX ADMIN — APIXABAN 5 MG: 5 TABLET, FILM COATED ORAL at 21:23

## 2024-05-23 RX ADMIN — CARVEDILOL 25 MG: 25 TABLET, FILM COATED ORAL at 17:46

## 2024-05-23 RX ADMIN — VALSARTAN 40 MG: 80 TABLET, FILM COATED ORAL at 08:00

## 2024-05-23 RX ADMIN — ACETAMINOPHEN 1000 MG: 500 TABLET ORAL at 21:23

## 2024-05-23 RX ADMIN — ROSUVASTATIN 20 MG: 20 TABLET, FILM COATED ORAL at 21:23

## 2024-05-23 RX ADMIN — CARVEDILOL 25 MG: 25 TABLET, FILM COATED ORAL at 08:00

## 2024-05-24 ENCOUNTER — HOSPITAL ENCOUNTER (INPATIENT)
Facility: HOSPITAL | Age: 84
Discharge: NURSING, REHAB OR LONG-TERM CARE | DRG: 292 | End: 2024-05-24
Attending: INTERNAL MEDICINE | Admitting: FAMILY MEDICINE
Payer: MEDICARE

## 2024-05-24 VITALS
HEIGHT: 70 IN | BODY MASS INDEX: 26.04 KG/M2 | WEIGHT: 181.88 LBS | HEART RATE: 88 BPM | SYSTOLIC BLOOD PRESSURE: 121 MMHG | TEMPERATURE: 98.2 F | OXYGEN SATURATION: 97 % | DIASTOLIC BLOOD PRESSURE: 82 MMHG | RESPIRATION RATE: 18 BRPM

## 2024-05-24 DIAGNOSIS — Z74.09 IMPAIRED MOBILITY AND ADLS: ICD-10-CM

## 2024-05-24 DIAGNOSIS — R26.2 DIFFICULTY WALKING: Primary | ICD-10-CM

## 2024-05-24 DIAGNOSIS — Z78.9 IMPAIRED MOBILITY AND ADLS: ICD-10-CM

## 2024-05-24 PROBLEM — T14.8XXA FRACTURE: Status: ACTIVE | Noted: 2024-05-24

## 2024-05-24 LAB
GLUCOSE BLDC GLUCOMTR-MCNC: 143 MG/DL (ref 70–99)
GLUCOSE BLDC GLUCOMTR-MCNC: 191 MG/DL (ref 70–99)
GLUCOSE BLDC GLUCOMTR-MCNC: 193 MG/DL (ref 70–99)
GLUCOSE BLDC GLUCOMTR-MCNC: 82 MG/DL (ref 70–99)

## 2024-05-24 PROCEDURE — 82948 REAGENT STRIP/BLOOD GLUCOSE: CPT | Performed by: INTERNAL MEDICINE

## 2024-05-24 PROCEDURE — 99239 HOSP IP/OBS DSCHRG MGMT >30: CPT | Performed by: INTERNAL MEDICINE

## 2024-05-24 PROCEDURE — 82948 REAGENT STRIP/BLOOD GLUCOSE: CPT

## 2024-05-24 PROCEDURE — 97116 GAIT TRAINING THERAPY: CPT

## 2024-05-24 PROCEDURE — 97110 THERAPEUTIC EXERCISES: CPT

## 2024-05-24 RX ORDER — NICOTINE POLACRILEX 4 MG
15 LOZENGE BUCCAL
Status: DISCONTINUED | OUTPATIENT
Start: 2024-05-24 | End: 2024-05-24

## 2024-05-24 RX ORDER — CLOPIDOGREL BISULFATE 75 MG/1
75 TABLET ORAL DAILY
Status: DISPENSED | OUTPATIENT
Start: 2024-05-25

## 2024-05-24 RX ORDER — ACETAMINOPHEN 325 MG/1
650 TABLET ORAL EVERY 6 HOURS PRN
Status: CANCELLED | OUTPATIENT
Start: 2024-05-24

## 2024-05-24 RX ORDER — HYDROXYZINE HYDROCHLORIDE 25 MG/1
25 TABLET, FILM COATED ORAL 3 TIMES DAILY PRN
Status: CANCELLED | OUTPATIENT
Start: 2024-05-24

## 2024-05-24 RX ORDER — INSULIN LISPRO 100 [IU]/ML
2-7 INJECTION, SOLUTION INTRAVENOUS; SUBCUTANEOUS
Status: DISPENSED | OUTPATIENT
Start: 2024-05-25

## 2024-05-24 RX ORDER — VALSARTAN 40 MG/1
40 TABLET ORAL
Status: DISPENSED | OUTPATIENT
Start: 2024-05-25

## 2024-05-24 RX ORDER — ALUMINA, MAGNESIA, AND SIMETHICONE 2400; 2400; 240 MG/30ML; MG/30ML; MG/30ML
15 SUSPENSION ORAL EVERY 6 HOURS PRN
Status: CANCELLED | OUTPATIENT
Start: 2024-05-24

## 2024-05-24 RX ORDER — ACETAMINOPHEN 500 MG
1000 TABLET ORAL 3 TIMES DAILY
Status: COMPLETED | OUTPATIENT
Start: 2024-05-24 | End: 2024-05-24

## 2024-05-24 RX ORDER — ONDANSETRON 4 MG/1
4 TABLET, ORALLY DISINTEGRATING ORAL EVERY 6 HOURS PRN
Status: ACTIVE | OUTPATIENT
Start: 2024-05-24

## 2024-05-24 RX ORDER — VALSARTAN 80 MG/1
40 TABLET ORAL
Status: CANCELLED | OUTPATIENT
Start: 2024-05-25

## 2024-05-24 RX ORDER — ACETAMINOPHEN 500 MG
1000 TABLET ORAL 3 TIMES DAILY
Status: CANCELLED | OUTPATIENT
Start: 2024-05-24 | End: 2024-05-25

## 2024-05-24 RX ORDER — IBUPROFEN 600 MG/1
1 TABLET ORAL
Status: ACTIVE | OUTPATIENT
Start: 2024-05-24

## 2024-05-24 RX ORDER — IBUPROFEN 600 MG/1
1 TABLET ORAL
Status: DISCONTINUED | OUTPATIENT
Start: 2024-05-24 | End: 2024-05-24

## 2024-05-24 RX ORDER — ALUMINA, MAGNESIA, AND SIMETHICONE 2400; 2400; 240 MG/30ML; MG/30ML; MG/30ML
15 SUSPENSION ORAL EVERY 6 HOURS PRN
Status: DISPENSED | OUTPATIENT
Start: 2024-05-24

## 2024-05-24 RX ORDER — CARVEDILOL 25 MG/1
25 TABLET ORAL 2 TIMES DAILY WITH MEALS
Status: CANCELLED | OUTPATIENT
Start: 2024-05-24

## 2024-05-24 RX ORDER — PANTOPRAZOLE SODIUM 40 MG/1
40 TABLET, DELAYED RELEASE ORAL DAILY
Status: CANCELLED | OUTPATIENT
Start: 2024-05-25

## 2024-05-24 RX ORDER — NICOTINE POLACRILEX 4 MG
15 LOZENGE BUCCAL
Status: CANCELLED | OUTPATIENT
Start: 2024-05-24

## 2024-05-24 RX ORDER — HYDROCODONE BITARTRATE AND ACETAMINOPHEN 5; 325 MG/1; MG/1
1 TABLET ORAL EVERY 4 HOURS PRN
Status: DISPENSED | OUTPATIENT
Start: 2024-05-24 | End: 2024-06-01

## 2024-05-24 RX ORDER — DEXTROSE MONOHYDRATE 25 G/50ML
25 INJECTION, SOLUTION INTRAVENOUS
Status: ACTIVE | OUTPATIENT
Start: 2024-05-24

## 2024-05-24 RX ORDER — IBUPROFEN 600 MG/1
1 TABLET ORAL
Status: CANCELLED | OUTPATIENT
Start: 2024-05-24

## 2024-05-24 RX ORDER — DILTIAZEM HYDROCHLORIDE 120 MG/1
120 CAPSULE, COATED, EXTENDED RELEASE ORAL
Status: DISPENSED | OUTPATIENT
Start: 2024-05-25

## 2024-05-24 RX ORDER — LIDOCAINE 4 G/G
1 PATCH TOPICAL DAILY PRN
Status: ACTIVE | OUTPATIENT
Start: 2024-05-24

## 2024-05-24 RX ORDER — ROSUVASTATIN CALCIUM 20 MG/1
20 TABLET, COATED ORAL NIGHTLY
Status: DISPENSED | OUTPATIENT
Start: 2024-05-24

## 2024-05-24 RX ORDER — UREA 10 %
5 LOTION (ML) TOPICAL NIGHTLY PRN
Status: ACTIVE | OUTPATIENT
Start: 2024-05-24

## 2024-05-24 RX ORDER — PANTOPRAZOLE SODIUM 40 MG/1
40 TABLET, DELAYED RELEASE ORAL DAILY
Status: DISPENSED | OUTPATIENT
Start: 2024-05-25

## 2024-05-24 RX ORDER — DILTIAZEM HYDROCHLORIDE 120 MG/1
120 CAPSULE, COATED, EXTENDED RELEASE ORAL
Status: CANCELLED | OUTPATIENT
Start: 2024-05-25

## 2024-05-24 RX ORDER — ACETAMINOPHEN 325 MG/1
650 TABLET ORAL EVERY 6 HOURS PRN
Status: DISPENSED | OUTPATIENT
Start: 2024-05-24

## 2024-05-24 RX ORDER — POLYETHYLENE GLYCOL 3350 17 G/17G
17 POWDER, FOR SOLUTION ORAL DAILY
Status: DISPENSED | OUTPATIENT
Start: 2024-05-25

## 2024-05-24 RX ORDER — CARVEDILOL 25 MG/1
25 TABLET ORAL 2 TIMES DAILY WITH MEALS
Status: DISPENSED | OUTPATIENT
Start: 2024-05-24

## 2024-05-24 RX ORDER — FUROSEMIDE 20 MG/1
40 TABLET ORAL DAILY
Status: ON HOLD
Start: 2024-05-24

## 2024-05-24 RX ORDER — FUROSEMIDE 40 MG/1
40 TABLET ORAL DAILY
Status: DISPENSED | OUTPATIENT
Start: 2024-05-25

## 2024-05-24 RX ORDER — POLYETHYLENE GLYCOL 3350 17 G/17G
17 POWDER, FOR SOLUTION ORAL DAILY
Status: CANCELLED | OUTPATIENT
Start: 2024-05-25

## 2024-05-24 RX ORDER — CLOPIDOGREL BISULFATE 75 MG/1
75 TABLET ORAL DAILY
Status: CANCELLED | OUTPATIENT
Start: 2024-05-25

## 2024-05-24 RX ORDER — HYDROXYZINE HYDROCHLORIDE 25 MG/1
25 TABLET, FILM COATED ORAL 3 TIMES DAILY PRN
Status: ACTIVE | OUTPATIENT
Start: 2024-05-24

## 2024-05-24 RX ORDER — ROSUVASTATIN CALCIUM 20 MG/1
20 TABLET, COATED ORAL NIGHTLY
Status: CANCELLED | OUTPATIENT
Start: 2024-05-24

## 2024-05-24 RX ORDER — FUROSEMIDE 40 MG/1
40 TABLET ORAL DAILY
Status: CANCELLED | OUTPATIENT
Start: 2024-05-25

## 2024-05-24 RX ORDER — NICOTINE POLACRILEX 4 MG
15 LOZENGE BUCCAL
Status: ACTIVE | OUTPATIENT
Start: 2024-05-24

## 2024-05-24 RX ORDER — LIDOCAINE 4 G/G
1 PATCH TOPICAL DAILY PRN
Status: CANCELLED | OUTPATIENT
Start: 2024-05-24

## 2024-05-24 RX ORDER — ONDANSETRON 4 MG/1
4 TABLET, ORALLY DISINTEGRATING ORAL EVERY 6 HOURS PRN
Status: CANCELLED | OUTPATIENT
Start: 2024-05-24

## 2024-05-24 RX ORDER — AMOXICILLIN 250 MG
2 CAPSULE ORAL 2 TIMES DAILY
Status: CANCELLED | OUTPATIENT
Start: 2024-05-24

## 2024-05-24 RX ORDER — UREA 10 %
5 LOTION (ML) TOPICAL NIGHTLY PRN
Status: CANCELLED | OUTPATIENT
Start: 2024-05-24

## 2024-05-24 RX ORDER — AMOXICILLIN 250 MG
2 CAPSULE ORAL 2 TIMES DAILY
Status: DISPENSED | OUTPATIENT
Start: 2024-05-24

## 2024-05-24 RX ORDER — HYDROCODONE BITARTRATE AND ACETAMINOPHEN 5; 325 MG/1; MG/1
1 TABLET ORAL EVERY 4 HOURS PRN
Status: CANCELLED | OUTPATIENT
Start: 2024-05-24 | End: 2024-05-26

## 2024-05-24 RX ADMIN — ACETAMINOPHEN 1000 MG: 500 TABLET ORAL at 16:44

## 2024-05-24 RX ADMIN — CLOPIDOGREL BISULFATE 75 MG: 75 TABLET ORAL at 08:28

## 2024-05-24 RX ADMIN — APIXABAN 5 MG: 5 TABLET, FILM COATED ORAL at 08:28

## 2024-05-24 RX ADMIN — CARVEDILOL 25 MG: 25 TABLET, FILM COATED ORAL at 08:28

## 2024-05-24 RX ADMIN — ACETAMINOPHEN 1000 MG: 500 TABLET ORAL at 08:27

## 2024-05-24 RX ADMIN — SENNOSIDES AND DOCUSATE SODIUM 2 TABLET: 50; 8.6 TABLET ORAL at 21:22

## 2024-05-24 RX ADMIN — HYDROCODONE BITARTRATE AND ACETAMINOPHEN 1 TABLET: 5; 325 TABLET ORAL at 10:12

## 2024-05-24 RX ADMIN — FUROSEMIDE 40 MG: 40 TABLET ORAL at 08:28

## 2024-05-24 RX ADMIN — TUBERCULIN PURIFIED PROTEIN DERIVATIVE 5 UNITS: 5 INJECTION INTRADERMAL at 21:22

## 2024-05-24 RX ADMIN — APIXABAN 5 MG: 5 TABLET, FILM COATED ORAL at 21:22

## 2024-05-24 RX ADMIN — CARVEDILOL 25 MG: 25 TABLET, FILM COATED ORAL at 18:14

## 2024-05-24 RX ADMIN — VALSARTAN 40 MG: 80 TABLET, FILM COATED ORAL at 08:27

## 2024-05-24 RX ADMIN — PANTOPRAZOLE SODIUM 40 MG: 40 TABLET, DELAYED RELEASE ORAL at 08:27

## 2024-05-24 RX ADMIN — ACETAMINOPHEN 1000 MG: 500 TABLET ORAL at 23:03

## 2024-05-24 RX ADMIN — SENNOSIDES AND DOCUSATE SODIUM 2 TABLET: 50; 8.6 TABLET ORAL at 08:27

## 2024-05-24 RX ADMIN — ROSUVASTATIN 20 MG: 20 TABLET, FILM COATED ORAL at 21:22

## 2024-05-24 RX ADMIN — AVOBENZONE, HOMOSALATE, OCTISALATE, OCTOCRYLENE, AND OXYBENZONE 1 PACKET: 29.4; 147; 49; 25.4; 58.8 LOTION TOPICAL at 08:28

## 2024-05-24 RX ADMIN — DILTIAZEM HYDROCHLORIDE 120 MG: 120 CAPSULE, EXTENDED RELEASE ORAL at 08:28

## 2024-05-25 LAB
GLUCOSE BLDC GLUCOMTR-MCNC: 111 MG/DL (ref 70–99)
GLUCOSE BLDC GLUCOMTR-MCNC: 153 MG/DL (ref 70–99)
GLUCOSE BLDC GLUCOMTR-MCNC: 175 MG/DL (ref 70–99)
GLUCOSE BLDC GLUCOMTR-MCNC: 72 MG/DL (ref 70–99)

## 2024-05-25 PROCEDURE — 99305 1ST NF CARE MODERATE MDM 35: CPT | Performed by: INTERNAL MEDICINE

## 2024-05-25 PROCEDURE — 97530 THERAPEUTIC ACTIVITIES: CPT

## 2024-05-25 PROCEDURE — 63710000001 INSULIN LISPRO (HUMAN) PER 5 UNITS: Performed by: STUDENT IN AN ORGANIZED HEALTH CARE EDUCATION/TRAINING PROGRAM

## 2024-05-25 PROCEDURE — 97116 GAIT TRAINING THERAPY: CPT

## 2024-05-25 PROCEDURE — 97161 PT EVAL LOW COMPLEX 20 MIN: CPT

## 2024-05-25 PROCEDURE — 82948 REAGENT STRIP/BLOOD GLUCOSE: CPT

## 2024-05-25 RX ORDER — CLARITHROMYCIN 250 MG/1
500 TABLET, FILM COATED ORAL 2 TIMES DAILY
Qty: 1800 TABLET | Refills: 0 | Status: DISCONTINUED | OUTPATIENT
Start: 2024-05-25 | End: 2024-05-26

## 2024-05-25 RX ADMIN — ROSUVASTATIN 20 MG: 20 TABLET, FILM COATED ORAL at 21:25

## 2024-05-25 RX ADMIN — DILTIAZEM HYDROCHLORIDE 120 MG: 120 CAPSULE, EXTENDED RELEASE ORAL at 09:04

## 2024-05-25 RX ADMIN — INSULIN LISPRO 2 UNITS: 100 INJECTION, SOLUTION INTRAVENOUS; SUBCUTANEOUS at 17:53

## 2024-05-25 RX ADMIN — VALSARTAN 40 MG: 40 TABLET, FILM COATED ORAL at 09:04

## 2024-05-25 RX ADMIN — APIXABAN 5 MG: 5 TABLET, FILM COATED ORAL at 09:04

## 2024-05-25 RX ADMIN — CARVEDILOL 25 MG: 25 TABLET, FILM COATED ORAL at 17:53

## 2024-05-25 RX ADMIN — AVOBENZONE, HOMOSALATE, OCTISALATE, OCTOCRYLENE, AND OXYBENZONE 1 PACKET: 29.4; 147; 49; 25.4; 58.8 LOTION TOPICAL at 09:04

## 2024-05-25 RX ADMIN — POLYETHYLENE GLYCOL 3350 17 G: 17 POWDER, FOR SOLUTION ORAL at 09:04

## 2024-05-25 RX ADMIN — HYDROCODONE BITARTRATE AND ACETAMINOPHEN 1 TABLET: 5; 325 TABLET ORAL at 09:04

## 2024-05-25 RX ADMIN — INSULIN LISPRO 2 UNITS: 100 INJECTION, SOLUTION INTRAVENOUS; SUBCUTANEOUS at 12:12

## 2024-05-25 RX ADMIN — FUROSEMIDE 40 MG: 40 TABLET ORAL at 09:04

## 2024-05-25 RX ADMIN — CARVEDILOL 25 MG: 25 TABLET, FILM COATED ORAL at 08:45

## 2024-05-25 RX ADMIN — APIXABAN 5 MG: 5 TABLET, FILM COATED ORAL at 21:25

## 2024-05-25 RX ADMIN — SENNOSIDES AND DOCUSATE SODIUM 2 TABLET: 50; 8.6 TABLET ORAL at 09:05

## 2024-05-25 RX ADMIN — PANTOPRAZOLE SODIUM 40 MG: 40 TABLET, DELAYED RELEASE ORAL at 09:05

## 2024-05-25 RX ADMIN — HYDROCODONE BITARTRATE AND ACETAMINOPHEN 1 TABLET: 5; 325 TABLET ORAL at 14:58

## 2024-05-25 RX ADMIN — CLOPIDOGREL BISULFATE 75 MG: 75 TABLET ORAL at 09:04

## 2024-05-26 LAB
GLUCOSE BLDC GLUCOMTR-MCNC: 113 MG/DL (ref 70–99)
GLUCOSE BLDC GLUCOMTR-MCNC: 202 MG/DL (ref 70–99)
GLUCOSE BLDC GLUCOMTR-MCNC: 78 MG/DL (ref 70–99)
GLUCOSE BLDC GLUCOMTR-MCNC: 98 MG/DL (ref 70–99)
INDURATION: 0 MM (ref 0–10)
Lab: NORMAL
Lab: NORMAL
TB SKIN TEST: NEGATIVE

## 2024-05-26 PROCEDURE — 97116 GAIT TRAINING THERAPY: CPT

## 2024-05-26 PROCEDURE — 97165 OT EVAL LOW COMPLEX 30 MIN: CPT

## 2024-05-26 PROCEDURE — 97530 THERAPEUTIC ACTIVITIES: CPT

## 2024-05-26 PROCEDURE — 82948 REAGENT STRIP/BLOOD GLUCOSE: CPT

## 2024-05-26 PROCEDURE — 63710000001 INSULIN LISPRO (HUMAN) PER 5 UNITS: Performed by: STUDENT IN AN ORGANIZED HEALTH CARE EDUCATION/TRAINING PROGRAM

## 2024-05-26 PROCEDURE — 97110 THERAPEUTIC EXERCISES: CPT

## 2024-05-26 RX ORDER — CLARITHROMYCIN 500 MG/1
500 TABLET, COATED ORAL 2 TIMES DAILY
Qty: 32 TABLET | Refills: 0 | Status: ACTIVE | OUTPATIENT
Start: 2024-05-26 | End: 2024-06-11

## 2024-05-26 RX ADMIN — HYDROCODONE BITARTRATE AND ACETAMINOPHEN 1 TABLET: 5; 325 TABLET ORAL at 20:12

## 2024-05-26 RX ADMIN — ALUMINUM HYDROXIDE, MAGNESIUM HYDROXIDE, AND DIMETHICONE 15 ML: 400; 400; 40 SUSPENSION ORAL at 18:17

## 2024-05-26 RX ADMIN — INSULIN LISPRO 3 UNITS: 100 INJECTION, SOLUTION INTRAVENOUS; SUBCUTANEOUS at 11:55

## 2024-05-26 RX ADMIN — APIXABAN 5 MG: 5 TABLET, FILM COATED ORAL at 20:12

## 2024-05-26 RX ADMIN — VALSARTAN 40 MG: 40 TABLET, FILM COATED ORAL at 08:18

## 2024-05-26 RX ADMIN — CARVEDILOL 25 MG: 25 TABLET, FILM COATED ORAL at 08:17

## 2024-05-26 RX ADMIN — FUROSEMIDE 40 MG: 40 TABLET ORAL at 08:17

## 2024-05-26 RX ADMIN — HYDROCODONE BITARTRATE AND ACETAMINOPHEN 1 TABLET: 5; 325 TABLET ORAL at 09:07

## 2024-05-26 RX ADMIN — SENNOSIDES AND DOCUSATE SODIUM 2 TABLET: 50; 8.6 TABLET ORAL at 08:17

## 2024-05-26 RX ADMIN — DILTIAZEM HYDROCHLORIDE 120 MG: 120 CAPSULE, EXTENDED RELEASE ORAL at 08:17

## 2024-05-26 RX ADMIN — CARVEDILOL 25 MG: 25 TABLET, FILM COATED ORAL at 18:13

## 2024-05-26 RX ADMIN — CLARITHROMYCIN 500 MG: 500 TABLET, COATED ORAL at 20:11

## 2024-05-26 RX ADMIN — ROSUVASTATIN 20 MG: 20 TABLET, FILM COATED ORAL at 20:12

## 2024-05-26 RX ADMIN — AVOBENZONE, HOMOSALATE, OCTISALATE, OCTOCRYLENE, AND OXYBENZONE 1 PACKET: 29.4; 147; 49; 25.4; 58.8 LOTION TOPICAL at 08:17

## 2024-05-26 RX ADMIN — PANTOPRAZOLE SODIUM 40 MG: 40 TABLET, DELAYED RELEASE ORAL at 08:17

## 2024-05-26 RX ADMIN — APIXABAN 5 MG: 5 TABLET, FILM COATED ORAL at 08:17

## 2024-05-26 RX ADMIN — CLOPIDOGREL BISULFATE 75 MG: 75 TABLET ORAL at 08:17

## 2024-05-27 LAB
GLUCOSE BLDC GLUCOMTR-MCNC: 137 MG/DL (ref 70–99)
GLUCOSE BLDC GLUCOMTR-MCNC: 180 MG/DL (ref 70–99)
GLUCOSE BLDC GLUCOMTR-MCNC: 184 MG/DL (ref 70–99)
GLUCOSE BLDC GLUCOMTR-MCNC: 81 MG/DL (ref 70–99)

## 2024-05-27 PROCEDURE — 63710000001 INSULIN LISPRO (HUMAN) PER 5 UNITS: Performed by: STUDENT IN AN ORGANIZED HEALTH CARE EDUCATION/TRAINING PROGRAM

## 2024-05-27 PROCEDURE — 82948 REAGENT STRIP/BLOOD GLUCOSE: CPT

## 2024-05-27 PROCEDURE — 82948 REAGENT STRIP/BLOOD GLUCOSE: CPT | Performed by: STUDENT IN AN ORGANIZED HEALTH CARE EDUCATION/TRAINING PROGRAM

## 2024-05-27 RX ADMIN — CLOPIDOGREL BISULFATE 75 MG: 75 TABLET ORAL at 08:51

## 2024-05-27 RX ADMIN — VALSARTAN 40 MG: 40 TABLET, FILM COATED ORAL at 08:51

## 2024-05-27 RX ADMIN — CLARITHROMYCIN 500 MG: 500 TABLET, COATED ORAL at 20:15

## 2024-05-27 RX ADMIN — AVOBENZONE, HOMOSALATE, OCTISALATE, OCTOCRYLENE, AND OXYBENZONE 1 PACKET: 29.4; 147; 49; 25.4; 58.8 LOTION TOPICAL at 08:50

## 2024-05-27 RX ADMIN — DILTIAZEM HYDROCHLORIDE 120 MG: 120 CAPSULE, EXTENDED RELEASE ORAL at 08:51

## 2024-05-27 RX ADMIN — ACETAMINOPHEN 650 MG: 325 TABLET ORAL at 15:59

## 2024-05-27 RX ADMIN — SENNOSIDES AND DOCUSATE SODIUM 2 TABLET: 50; 8.6 TABLET ORAL at 20:16

## 2024-05-27 RX ADMIN — PANTOPRAZOLE SODIUM 40 MG: 40 TABLET, DELAYED RELEASE ORAL at 08:51

## 2024-05-27 RX ADMIN — FUROSEMIDE 40 MG: 40 TABLET ORAL at 08:50

## 2024-05-27 RX ADMIN — INSULIN LISPRO 2 UNITS: 100 INJECTION, SOLUTION INTRAVENOUS; SUBCUTANEOUS at 20:16

## 2024-05-27 RX ADMIN — HYDROCODONE BITARTRATE AND ACETAMINOPHEN 1 TABLET: 5; 325 TABLET ORAL at 11:00

## 2024-05-27 RX ADMIN — CARVEDILOL 25 MG: 25 TABLET, FILM COATED ORAL at 18:00

## 2024-05-27 RX ADMIN — APIXABAN 5 MG: 5 TABLET, FILM COATED ORAL at 20:16

## 2024-05-27 RX ADMIN — CLARITHROMYCIN 500 MG: 500 TABLET, COATED ORAL at 08:49

## 2024-05-27 RX ADMIN — CARVEDILOL 25 MG: 25 TABLET, FILM COATED ORAL at 08:51

## 2024-05-27 RX ADMIN — INSULIN LISPRO 2 UNITS: 100 INJECTION, SOLUTION INTRAVENOUS; SUBCUTANEOUS at 12:12

## 2024-05-27 RX ADMIN — ROSUVASTATIN 20 MG: 20 TABLET, FILM COATED ORAL at 20:16

## 2024-05-27 RX ADMIN — APIXABAN 5 MG: 5 TABLET, FILM COATED ORAL at 08:51

## 2024-05-28 LAB
GLUCOSE BLDC GLUCOMTR-MCNC: 118 MG/DL (ref 70–99)
GLUCOSE BLDC GLUCOMTR-MCNC: 142 MG/DL (ref 70–99)
GLUCOSE BLDC GLUCOMTR-MCNC: 166 MG/DL (ref 70–99)
GLUCOSE BLDC GLUCOMTR-MCNC: 98 MG/DL (ref 70–99)

## 2024-05-28 PROCEDURE — 99309 SBSQ NF CARE MODERATE MDM 30: CPT | Performed by: PHYSICIAN ASSISTANT

## 2024-05-28 PROCEDURE — 97110 THERAPEUTIC EXERCISES: CPT

## 2024-05-28 PROCEDURE — 82948 REAGENT STRIP/BLOOD GLUCOSE: CPT

## 2024-05-28 PROCEDURE — 97530 THERAPEUTIC ACTIVITIES: CPT

## 2024-05-28 PROCEDURE — 97116 GAIT TRAINING THERAPY: CPT

## 2024-05-28 PROCEDURE — 82948 REAGENT STRIP/BLOOD GLUCOSE: CPT | Performed by: STUDENT IN AN ORGANIZED HEALTH CARE EDUCATION/TRAINING PROGRAM

## 2024-05-28 PROCEDURE — 97535 SELF CARE MNGMENT TRAINING: CPT

## 2024-05-28 PROCEDURE — 63710000001 INSULIN LISPRO (HUMAN) PER 5 UNITS: Performed by: STUDENT IN AN ORGANIZED HEALTH CARE EDUCATION/TRAINING PROGRAM

## 2024-05-28 RX ORDER — BENZOCAINE/MENTHOL 6 MG-10 MG
1 LOZENGE MUCOUS MEMBRANE EVERY 8 HOURS SCHEDULED
Status: DISCONTINUED | OUTPATIENT
Start: 2024-05-28 | End: 2024-06-02

## 2024-05-28 RX ADMIN — CLARITHROMYCIN 500 MG: 500 TABLET, COATED ORAL at 08:11

## 2024-05-28 RX ADMIN — AVOBENZONE, HOMOSALATE, OCTISALATE, OCTOCRYLENE, AND OXYBENZONE 1 PACKET: 29.4; 147; 49; 25.4; 58.8 LOTION TOPICAL at 08:13

## 2024-05-28 RX ADMIN — APIXABAN 5 MG: 5 TABLET, FILM COATED ORAL at 21:10

## 2024-05-28 RX ADMIN — DILTIAZEM HYDROCHLORIDE 120 MG: 120 CAPSULE, EXTENDED RELEASE ORAL at 08:13

## 2024-05-28 RX ADMIN — CLOPIDOGREL BISULFATE 75 MG: 75 TABLET ORAL at 08:13

## 2024-05-28 RX ADMIN — PANTOPRAZOLE SODIUM 40 MG: 40 TABLET, DELAYED RELEASE ORAL at 08:13

## 2024-05-28 RX ADMIN — HYDROCORTISONE 1 APPLICATION: 10 CREAM TOPICAL at 17:00

## 2024-05-28 RX ADMIN — CARVEDILOL 25 MG: 25 TABLET, FILM COATED ORAL at 08:13

## 2024-05-28 RX ADMIN — HYDROCODONE BITARTRATE AND ACETAMINOPHEN 1 TABLET: 5; 325 TABLET ORAL at 14:43

## 2024-05-28 RX ADMIN — CLARITHROMYCIN 500 MG: 500 TABLET, COATED ORAL at 21:14

## 2024-05-28 RX ADMIN — ACETAMINOPHEN 650 MG: 325 TABLET ORAL at 10:09

## 2024-05-28 RX ADMIN — HYDROCORTISONE 1 APPLICATION: 10 CREAM TOPICAL at 21:14

## 2024-05-28 RX ADMIN — INSULIN LISPRO 2 UNITS: 100 INJECTION, SOLUTION INTRAVENOUS; SUBCUTANEOUS at 21:09

## 2024-05-28 RX ADMIN — ROSUVASTATIN 20 MG: 20 TABLET, FILM COATED ORAL at 21:10

## 2024-05-28 RX ADMIN — FUROSEMIDE 40 MG: 40 TABLET ORAL at 08:13

## 2024-05-28 RX ADMIN — VALSARTAN 40 MG: 40 TABLET, FILM COATED ORAL at 08:13

## 2024-05-28 RX ADMIN — CARVEDILOL 25 MG: 25 TABLET, FILM COATED ORAL at 17:30

## 2024-05-28 RX ADMIN — APIXABAN 5 MG: 5 TABLET, FILM COATED ORAL at 08:13

## 2024-05-29 LAB
ALBUMIN SERPL-MCNC: 2.8 G/DL (ref 3.5–5.2)
ANION GAP SERPL CALCULATED.3IONS-SCNC: 9.3 MMOL/L (ref 5–15)
BUN SERPL-MCNC: 23 MG/DL (ref 8–23)
BUN/CREAT SERPL: 32.9 (ref 7–25)
CALCIUM SPEC-SCNC: 8.6 MG/DL (ref 8.6–10.5)
CHLORIDE SERPL-SCNC: 104 MMOL/L (ref 98–107)
CO2 SERPL-SCNC: 25.7 MMOL/L (ref 22–29)
CREAT SERPL-MCNC: 0.7 MG/DL (ref 0.76–1.27)
DEPRECATED RDW RBC AUTO: 51.6 FL (ref 37–54)
EGFRCR SERPLBLD CKD-EPI 2021: 92.6 ML/MIN/1.73
ERYTHROCYTE [DISTWIDTH] IN BLOOD BY AUTOMATED COUNT: 15.9 % (ref 12.3–15.4)
GLUCOSE BLDC GLUCOMTR-MCNC: 122 MG/DL (ref 70–99)
GLUCOSE BLDC GLUCOMTR-MCNC: 166 MG/DL (ref 70–99)
GLUCOSE BLDC GLUCOMTR-MCNC: 75 MG/DL (ref 70–99)
GLUCOSE BLDC GLUCOMTR-MCNC: 97 MG/DL (ref 70–99)
GLUCOSE SERPL-MCNC: 86 MG/DL (ref 65–99)
HCT VFR BLD AUTO: 32.6 % (ref 37.5–51)
HGB BLD-MCNC: 10.2 G/DL (ref 13–17.7)
MCH RBC QN AUTO: 28.3 PG (ref 26.6–33)
MCHC RBC AUTO-ENTMCNC: 31.3 G/DL (ref 31.5–35.7)
MCV RBC AUTO: 90.3 FL (ref 79–97)
PHOSPHATE SERPL-MCNC: 3.8 MG/DL (ref 2.5–4.5)
PLATELET # BLD AUTO: 325 10*3/MM3 (ref 140–450)
PMV BLD AUTO: 9.3 FL (ref 6–12)
POTASSIUM SERPL-SCNC: 3.4 MMOL/L (ref 3.5–5.2)
RBC # BLD AUTO: 3.61 10*6/MM3 (ref 4.14–5.8)
SODIUM SERPL-SCNC: 139 MMOL/L (ref 136–145)
WBC NRBC COR # BLD AUTO: 10.13 10*3/MM3 (ref 3.4–10.8)

## 2024-05-29 PROCEDURE — 80069 RENAL FUNCTION PANEL: CPT | Performed by: FAMILY MEDICINE

## 2024-05-29 PROCEDURE — 85027 COMPLETE CBC AUTOMATED: CPT | Performed by: FAMILY MEDICINE

## 2024-05-29 PROCEDURE — 97530 THERAPEUTIC ACTIVITIES: CPT

## 2024-05-29 PROCEDURE — 97116 GAIT TRAINING THERAPY: CPT

## 2024-05-29 PROCEDURE — 82948 REAGENT STRIP/BLOOD GLUCOSE: CPT

## 2024-05-29 PROCEDURE — 97110 THERAPEUTIC EXERCISES: CPT

## 2024-05-29 PROCEDURE — 63710000001 INSULIN LISPRO (HUMAN) PER 5 UNITS: Performed by: STUDENT IN AN ORGANIZED HEALTH CARE EDUCATION/TRAINING PROGRAM

## 2024-05-29 PROCEDURE — 97535 SELF CARE MNGMENT TRAINING: CPT

## 2024-05-29 RX ADMIN — CARVEDILOL 25 MG: 25 TABLET, FILM COATED ORAL at 08:24

## 2024-05-29 RX ADMIN — CLOPIDOGREL BISULFATE 75 MG: 75 TABLET ORAL at 08:25

## 2024-05-29 RX ADMIN — HYDROCORTISONE 1 APPLICATION: 10 CREAM TOPICAL at 08:24

## 2024-05-29 RX ADMIN — APIXABAN 5 MG: 5 TABLET, FILM COATED ORAL at 20:19

## 2024-05-29 RX ADMIN — CARVEDILOL 25 MG: 25 TABLET, FILM COATED ORAL at 17:54

## 2024-05-29 RX ADMIN — INSULIN LISPRO 2 UNITS: 100 INJECTION, SOLUTION INTRAVENOUS; SUBCUTANEOUS at 12:24

## 2024-05-29 RX ADMIN — ROSUVASTATIN 20 MG: 20 TABLET, FILM COATED ORAL at 20:19

## 2024-05-29 RX ADMIN — HYDROCODONE BITARTRATE AND ACETAMINOPHEN 1 TABLET: 5; 325 TABLET ORAL at 08:25

## 2024-05-29 RX ADMIN — AVOBENZONE, HOMOSALATE, OCTISALATE, OCTOCRYLENE, AND OXYBENZONE 1 PACKET: 29.4; 147; 49; 25.4; 58.8 LOTION TOPICAL at 11:09

## 2024-05-29 RX ADMIN — DILTIAZEM HYDROCHLORIDE 120 MG: 120 CAPSULE, EXTENDED RELEASE ORAL at 08:25

## 2024-05-29 RX ADMIN — SENNOSIDES AND DOCUSATE SODIUM 2 TABLET: 50; 8.6 TABLET ORAL at 20:19

## 2024-05-29 RX ADMIN — APIXABAN 5 MG: 5 TABLET, FILM COATED ORAL at 08:25

## 2024-05-29 RX ADMIN — FUROSEMIDE 40 MG: 40 TABLET ORAL at 08:25

## 2024-05-29 RX ADMIN — VALSARTAN 40 MG: 40 TABLET, FILM COATED ORAL at 08:24

## 2024-05-29 RX ADMIN — POLYETHYLENE GLYCOL 3350 17 G: 17 POWDER, FOR SOLUTION ORAL at 08:26

## 2024-05-29 RX ADMIN — PANTOPRAZOLE SODIUM 40 MG: 40 TABLET, DELAYED RELEASE ORAL at 08:24

## 2024-05-29 RX ADMIN — CLARITHROMYCIN 500 MG: 500 TABLET, COATED ORAL at 08:29

## 2024-05-29 RX ADMIN — SENNOSIDES AND DOCUSATE SODIUM 2 TABLET: 50; 8.6 TABLET ORAL at 08:24

## 2024-05-29 RX ADMIN — CLARITHROMYCIN 500 MG: 500 TABLET, COATED ORAL at 20:20

## 2024-05-30 LAB
GLUCOSE BLDC GLUCOMTR-MCNC: 110 MG/DL (ref 70–99)
GLUCOSE BLDC GLUCOMTR-MCNC: 117 MG/DL (ref 70–99)
GLUCOSE BLDC GLUCOMTR-MCNC: 144 MG/DL (ref 70–99)
GLUCOSE BLDC GLUCOMTR-MCNC: 157 MG/DL (ref 70–99)
GLUCOSE BLDC GLUCOMTR-MCNC: 84 MG/DL (ref 70–99)

## 2024-05-30 PROCEDURE — 97110 THERAPEUTIC EXERCISES: CPT

## 2024-05-30 PROCEDURE — 82948 REAGENT STRIP/BLOOD GLUCOSE: CPT

## 2024-05-30 PROCEDURE — 99309 SBSQ NF CARE MODERATE MDM 30: CPT | Performed by: PHYSICIAN ASSISTANT

## 2024-05-30 PROCEDURE — 82948 REAGENT STRIP/BLOOD GLUCOSE: CPT | Performed by: STUDENT IN AN ORGANIZED HEALTH CARE EDUCATION/TRAINING PROGRAM

## 2024-05-30 PROCEDURE — 97530 THERAPEUTIC ACTIVITIES: CPT

## 2024-05-30 PROCEDURE — 25010000002 TRIAMCINOLONE PER 10 MG: Performed by: PHYSICIAN ASSISTANT

## 2024-05-30 PROCEDURE — 97116 GAIT TRAINING THERAPY: CPT

## 2024-05-30 RX ORDER — TRIAMCINOLONE ACETONIDE 40 MG/ML
40 INJECTION, SUSPENSION INTRA-ARTICULAR; INTRAMUSCULAR ONCE
Status: COMPLETED | OUTPATIENT
Start: 2024-05-30 | End: 2024-05-30

## 2024-05-30 RX ADMIN — CLARITHROMYCIN 500 MG: 500 TABLET, COATED ORAL at 21:07

## 2024-05-30 RX ADMIN — VALSARTAN 40 MG: 40 TABLET, FILM COATED ORAL at 09:59

## 2024-05-30 RX ADMIN — CLOPIDOGREL BISULFATE 75 MG: 75 TABLET ORAL at 09:58

## 2024-05-30 RX ADMIN — FUROSEMIDE 40 MG: 40 TABLET ORAL at 09:58

## 2024-05-30 RX ADMIN — CARVEDILOL 25 MG: 25 TABLET, FILM COATED ORAL at 08:15

## 2024-05-30 RX ADMIN — HYDROCODONE BITARTRATE AND ACETAMINOPHEN 1 TABLET: 5; 325 TABLET ORAL at 10:04

## 2024-05-30 RX ADMIN — PANTOPRAZOLE SODIUM 40 MG: 40 TABLET, DELAYED RELEASE ORAL at 09:58

## 2024-05-30 RX ADMIN — AVOBENZONE, HOMOSALATE, OCTISALATE, OCTOCRYLENE, AND OXYBENZONE 1 PACKET: 29.4; 147; 49; 25.4; 58.8 LOTION TOPICAL at 09:58

## 2024-05-30 RX ADMIN — APIXABAN 5 MG: 5 TABLET, FILM COATED ORAL at 09:58

## 2024-05-30 RX ADMIN — APIXABAN 5 MG: 5 TABLET, FILM COATED ORAL at 21:08

## 2024-05-30 RX ADMIN — TRIAMCINOLONE ACETONIDE 40 MG: 40 INJECTION, SUSPENSION INTRA-ARTICULAR; INTRAMUSCULAR at 18:25

## 2024-05-30 RX ADMIN — CARVEDILOL 25 MG: 25 TABLET, FILM COATED ORAL at 17:39

## 2024-05-30 RX ADMIN — ROSUVASTATIN 20 MG: 20 TABLET, FILM COATED ORAL at 21:08

## 2024-05-30 RX ADMIN — DILTIAZEM HYDROCHLORIDE 120 MG: 120 CAPSULE, EXTENDED RELEASE ORAL at 09:58

## 2024-05-30 RX ADMIN — SENNOSIDES AND DOCUSATE SODIUM 2 TABLET: 50; 8.6 TABLET ORAL at 09:58

## 2024-05-30 RX ADMIN — CLARITHROMYCIN 500 MG: 500 TABLET, COATED ORAL at 09:59

## 2024-05-31 LAB
GLUCOSE BLDC GLUCOMTR-MCNC: 111 MG/DL (ref 70–99)
GLUCOSE BLDC GLUCOMTR-MCNC: 122 MG/DL (ref 70–99)
GLUCOSE BLDC GLUCOMTR-MCNC: 136 MG/DL (ref 70–99)
GLUCOSE BLDC GLUCOMTR-MCNC: 146 MG/DL (ref 70–99)

## 2024-05-31 PROCEDURE — 97110 THERAPEUTIC EXERCISES: CPT

## 2024-05-31 PROCEDURE — 82948 REAGENT STRIP/BLOOD GLUCOSE: CPT | Performed by: STUDENT IN AN ORGANIZED HEALTH CARE EDUCATION/TRAINING PROGRAM

## 2024-05-31 PROCEDURE — 82948 REAGENT STRIP/BLOOD GLUCOSE: CPT

## 2024-05-31 PROCEDURE — 97535 SELF CARE MNGMENT TRAINING: CPT

## 2024-05-31 PROCEDURE — 99308 SBSQ NF CARE LOW MDM 20: CPT | Performed by: PHYSICIAN ASSISTANT

## 2024-05-31 PROCEDURE — 97116 GAIT TRAINING THERAPY: CPT

## 2024-05-31 RX ADMIN — FUROSEMIDE 40 MG: 40 TABLET ORAL at 09:26

## 2024-05-31 RX ADMIN — AVOBENZONE, HOMOSALATE, OCTISALATE, OCTOCRYLENE, AND OXYBENZONE 1 PACKET: 29.4; 147; 49; 25.4; 58.8 LOTION TOPICAL at 09:26

## 2024-05-31 RX ADMIN — CARVEDILOL 25 MG: 25 TABLET, FILM COATED ORAL at 17:49

## 2024-05-31 RX ADMIN — SENNOSIDES AND DOCUSATE SODIUM 2 TABLET: 50; 8.6 TABLET ORAL at 21:26

## 2024-05-31 RX ADMIN — VALSARTAN 40 MG: 40 TABLET, FILM COATED ORAL at 09:32

## 2024-05-31 RX ADMIN — APIXABAN 5 MG: 5 TABLET, FILM COATED ORAL at 21:26

## 2024-05-31 RX ADMIN — CLARITHROMYCIN 500 MG: 500 TABLET, COATED ORAL at 21:32

## 2024-05-31 RX ADMIN — PANTOPRAZOLE SODIUM 40 MG: 40 TABLET, DELAYED RELEASE ORAL at 09:26

## 2024-05-31 RX ADMIN — DILTIAZEM HYDROCHLORIDE 120 MG: 120 CAPSULE, EXTENDED RELEASE ORAL at 09:26

## 2024-05-31 RX ADMIN — CLARITHROMYCIN 500 MG: 500 TABLET, COATED ORAL at 09:26

## 2024-05-31 RX ADMIN — SENNOSIDES AND DOCUSATE SODIUM 2 TABLET: 50; 8.6 TABLET ORAL at 09:32

## 2024-05-31 RX ADMIN — CLOPIDOGREL BISULFATE 75 MG: 75 TABLET ORAL at 09:26

## 2024-05-31 RX ADMIN — APIXABAN 5 MG: 5 TABLET, FILM COATED ORAL at 09:26

## 2024-05-31 RX ADMIN — TUBERCULIN PURIFIED PROTEIN DERIVATIVE 5 UNITS: 5 INJECTION INTRADERMAL at 21:26

## 2024-05-31 RX ADMIN — ROSUVASTATIN 20 MG: 20 TABLET, FILM COATED ORAL at 21:27

## 2024-05-31 RX ADMIN — CARVEDILOL 25 MG: 25 TABLET, FILM COATED ORAL at 08:16

## 2024-06-01 LAB
GLUCOSE BLDC GLUCOMTR-MCNC: 100 MG/DL (ref 70–99)
GLUCOSE BLDC GLUCOMTR-MCNC: 110 MG/DL (ref 70–99)
GLUCOSE BLDC GLUCOMTR-MCNC: 137 MG/DL (ref 70–99)
GLUCOSE BLDC GLUCOMTR-MCNC: 190 MG/DL (ref 70–99)

## 2024-06-01 PROCEDURE — 97116 GAIT TRAINING THERAPY: CPT

## 2024-06-01 PROCEDURE — 82948 REAGENT STRIP/BLOOD GLUCOSE: CPT | Performed by: STUDENT IN AN ORGANIZED HEALTH CARE EDUCATION/TRAINING PROGRAM

## 2024-06-01 PROCEDURE — 97110 THERAPEUTIC EXERCISES: CPT

## 2024-06-01 PROCEDURE — 82948 REAGENT STRIP/BLOOD GLUCOSE: CPT

## 2024-06-01 PROCEDURE — 63710000001 INSULIN LISPRO (HUMAN) PER 5 UNITS: Performed by: STUDENT IN AN ORGANIZED HEALTH CARE EDUCATION/TRAINING PROGRAM

## 2024-06-01 RX ADMIN — ROSUVASTATIN 20 MG: 20 TABLET, FILM COATED ORAL at 20:33

## 2024-06-01 RX ADMIN — VALSARTAN 40 MG: 40 TABLET, FILM COATED ORAL at 09:28

## 2024-06-01 RX ADMIN — CARVEDILOL 25 MG: 25 TABLET, FILM COATED ORAL at 17:17

## 2024-06-01 RX ADMIN — CARVEDILOL 25 MG: 25 TABLET, FILM COATED ORAL at 09:28

## 2024-06-01 RX ADMIN — SENNOSIDES AND DOCUSATE SODIUM 2 TABLET: 50; 8.6 TABLET ORAL at 09:28

## 2024-06-01 RX ADMIN — APIXABAN 5 MG: 5 TABLET, FILM COATED ORAL at 09:28

## 2024-06-01 RX ADMIN — AVOBENZONE, HOMOSALATE, OCTISALATE, OCTOCRYLENE, AND OXYBENZONE 1 PACKET: 29.4; 147; 49; 25.4; 58.8 LOTION TOPICAL at 09:28

## 2024-06-01 RX ADMIN — FUROSEMIDE 40 MG: 40 TABLET ORAL at 09:28

## 2024-06-01 RX ADMIN — CLOPIDOGREL BISULFATE 75 MG: 75 TABLET ORAL at 09:28

## 2024-06-01 RX ADMIN — PANTOPRAZOLE SODIUM 40 MG: 40 TABLET, DELAYED RELEASE ORAL at 09:28

## 2024-06-01 RX ADMIN — DILTIAZEM HYDROCHLORIDE 120 MG: 120 CAPSULE, EXTENDED RELEASE ORAL at 09:28

## 2024-06-01 RX ADMIN — APIXABAN 5 MG: 5 TABLET, FILM COATED ORAL at 20:33

## 2024-06-01 RX ADMIN — INSULIN LISPRO 2 UNITS: 100 INJECTION, SOLUTION INTRAVENOUS; SUBCUTANEOUS at 20:33

## 2024-06-01 RX ADMIN — CLARITHROMYCIN 500 MG: 500 TABLET, COATED ORAL at 09:29

## 2024-06-01 RX ADMIN — CLARITHROMYCIN 500 MG: 500 TABLET, COATED ORAL at 20:35

## 2024-06-01 RX ADMIN — ACETAMINOPHEN 650 MG: 325 TABLET ORAL at 10:10

## 2024-06-02 LAB
GLUCOSE BLDC GLUCOMTR-MCNC: 101 MG/DL (ref 70–99)
GLUCOSE BLDC GLUCOMTR-MCNC: 150 MG/DL (ref 70–99)
GLUCOSE BLDC GLUCOMTR-MCNC: 193 MG/DL (ref 70–99)
GLUCOSE BLDC GLUCOMTR-MCNC: 81 MG/DL (ref 70–99)
INDURATION: 0 MM (ref 0–10)
Lab: NORMAL
Lab: NORMAL
TB SKIN TEST: NEGATIVE

## 2024-06-02 PROCEDURE — 82948 REAGENT STRIP/BLOOD GLUCOSE: CPT | Performed by: STUDENT IN AN ORGANIZED HEALTH CARE EDUCATION/TRAINING PROGRAM

## 2024-06-02 PROCEDURE — 82948 REAGENT STRIP/BLOOD GLUCOSE: CPT

## 2024-06-02 PROCEDURE — 63710000001 INSULIN LISPRO (HUMAN) PER 5 UNITS: Performed by: STUDENT IN AN ORGANIZED HEALTH CARE EDUCATION/TRAINING PROGRAM

## 2024-06-02 RX ADMIN — INSULIN LISPRO 2 UNITS: 100 INJECTION, SOLUTION INTRAVENOUS; SUBCUTANEOUS at 21:02

## 2024-06-02 RX ADMIN — PANTOPRAZOLE SODIUM 40 MG: 40 TABLET, DELAYED RELEASE ORAL at 09:12

## 2024-06-02 RX ADMIN — FUROSEMIDE 40 MG: 40 TABLET ORAL at 09:12

## 2024-06-02 RX ADMIN — VALSARTAN 40 MG: 40 TABLET, FILM COATED ORAL at 09:13

## 2024-06-02 RX ADMIN — CARVEDILOL 25 MG: 25 TABLET, FILM COATED ORAL at 09:13

## 2024-06-02 RX ADMIN — INSULIN LISPRO 2 UNITS: 100 INJECTION, SOLUTION INTRAVENOUS; SUBCUTANEOUS at 11:55

## 2024-06-02 RX ADMIN — ROSUVASTATIN 20 MG: 20 TABLET, FILM COATED ORAL at 21:02

## 2024-06-02 RX ADMIN — CLARITHROMYCIN 500 MG: 500 TABLET, COATED ORAL at 21:02

## 2024-06-02 RX ADMIN — APIXABAN 5 MG: 5 TABLET, FILM COATED ORAL at 09:13

## 2024-06-02 RX ADMIN — SENNOSIDES AND DOCUSATE SODIUM 2 TABLET: 50; 8.6 TABLET ORAL at 09:12

## 2024-06-02 RX ADMIN — AVOBENZONE, HOMOSALATE, OCTISALATE, OCTOCRYLENE, AND OXYBENZONE 1 PACKET: 29.4; 147; 49; 25.4; 58.8 LOTION TOPICAL at 09:12

## 2024-06-02 RX ADMIN — DILTIAZEM HYDROCHLORIDE 120 MG: 120 CAPSULE, EXTENDED RELEASE ORAL at 09:13

## 2024-06-02 RX ADMIN — CLARITHROMYCIN 500 MG: 500 TABLET, COATED ORAL at 09:15

## 2024-06-02 RX ADMIN — CLOPIDOGREL BISULFATE 75 MG: 75 TABLET ORAL at 09:13

## 2024-06-02 RX ADMIN — CARVEDILOL 25 MG: 25 TABLET, FILM COATED ORAL at 18:07

## 2024-06-02 RX ADMIN — APIXABAN 5 MG: 5 TABLET, FILM COATED ORAL at 21:02

## 2024-06-03 VITALS
BODY MASS INDEX: 24.62 KG/M2 | SYSTOLIC BLOOD PRESSURE: 127 MMHG | DIASTOLIC BLOOD PRESSURE: 76 MMHG | WEIGHT: 171.96 LBS | TEMPERATURE: 97.9 F | OXYGEN SATURATION: 97 % | RESPIRATION RATE: 16 BRPM | HEART RATE: 105 BPM | HEIGHT: 70 IN

## 2024-06-03 LAB
ANION GAP SERPL CALCULATED.3IONS-SCNC: 8.8 MMOL/L (ref 5–15)
BASOPHILS # BLD AUTO: 0.08 10*3/MM3 (ref 0–0.2)
BASOPHILS NFR BLD AUTO: 0.7 % (ref 0–1.5)
BUN SERPL-MCNC: 24 MG/DL (ref 8–23)
BUN/CREAT SERPL: 30 (ref 7–25)
CALCIUM SPEC-SCNC: 8.5 MG/DL (ref 8.6–10.5)
CHLORIDE SERPL-SCNC: 105 MMOL/L (ref 98–107)
CO2 SERPL-SCNC: 27.2 MMOL/L (ref 22–29)
CREAT SERPL-MCNC: 0.8 MG/DL (ref 0.76–1.27)
DEPRECATED RDW RBC AUTO: 52.9 FL (ref 37–54)
EGFRCR SERPLBLD CKD-EPI 2021: 88.9 ML/MIN/1.73
EOSINOPHIL # BLD AUTO: 0.09 10*3/MM3 (ref 0–0.4)
EOSINOPHIL NFR BLD AUTO: 0.7 % (ref 0.3–6.2)
ERYTHROCYTE [DISTWIDTH] IN BLOOD BY AUTOMATED COUNT: 16 % (ref 12.3–15.4)
GLUCOSE BLDC GLUCOMTR-MCNC: 101 MG/DL (ref 70–99)
GLUCOSE BLDC GLUCOMTR-MCNC: 168 MG/DL (ref 70–99)
GLUCOSE BLDC GLUCOMTR-MCNC: 178 MG/DL (ref 70–99)
GLUCOSE BLDC GLUCOMTR-MCNC: 85 MG/DL (ref 70–99)
GLUCOSE SERPL-MCNC: 118 MG/DL (ref 65–99)
HCT VFR BLD AUTO: 35.3 % (ref 37.5–51)
HGB BLD-MCNC: 11.2 G/DL (ref 13–17.7)
IMM GRANULOCYTES # BLD AUTO: 0.37 10*3/MM3 (ref 0–0.05)
IMM GRANULOCYTES NFR BLD AUTO: 3 % (ref 0–0.5)
LYMPHOCYTES # BLD AUTO: 1.06 10*3/MM3 (ref 0.7–3.1)
LYMPHOCYTES NFR BLD AUTO: 8.6 % (ref 19.6–45.3)
MCH RBC QN AUTO: 28.8 PG (ref 26.6–33)
MCHC RBC AUTO-ENTMCNC: 31.7 G/DL (ref 31.5–35.7)
MCV RBC AUTO: 90.7 FL (ref 79–97)
MONOCYTES # BLD AUTO: 1.31 10*3/MM3 (ref 0.1–0.9)
MONOCYTES NFR BLD AUTO: 10.7 % (ref 5–12)
NEUTROPHILS NFR BLD AUTO: 76.3 % (ref 42.7–76)
NEUTROPHILS NFR BLD AUTO: 9.37 10*3/MM3 (ref 1.7–7)
NRBC BLD AUTO-RTO: 0 /100 WBC (ref 0–0.2)
PLATELET # BLD AUTO: 392 10*3/MM3 (ref 140–450)
PMV BLD AUTO: 9.1 FL (ref 6–12)
POTASSIUM SERPL-SCNC: 4.2 MMOL/L (ref 3.5–5.2)
RBC # BLD AUTO: 3.89 10*6/MM3 (ref 4.14–5.8)
SODIUM SERPL-SCNC: 141 MMOL/L (ref 136–145)
WBC NRBC COR # BLD AUTO: 12.28 10*3/MM3 (ref 3.4–10.8)

## 2024-06-03 PROCEDURE — 97530 THERAPEUTIC ACTIVITIES: CPT

## 2024-06-03 PROCEDURE — 97535 SELF CARE MNGMENT TRAINING: CPT

## 2024-06-03 PROCEDURE — 97116 GAIT TRAINING THERAPY: CPT

## 2024-06-03 PROCEDURE — 82948 REAGENT STRIP/BLOOD GLUCOSE: CPT

## 2024-06-03 PROCEDURE — 82948 REAGENT STRIP/BLOOD GLUCOSE: CPT | Performed by: STUDENT IN AN ORGANIZED HEALTH CARE EDUCATION/TRAINING PROGRAM

## 2024-06-03 PROCEDURE — 80048 BASIC METABOLIC PNL TOTAL CA: CPT | Performed by: PHYSICIAN ASSISTANT

## 2024-06-03 PROCEDURE — 85025 COMPLETE CBC W/AUTO DIFF WBC: CPT | Performed by: PHYSICIAN ASSISTANT

## 2024-06-03 PROCEDURE — 97110 THERAPEUTIC EXERCISES: CPT

## 2024-06-03 RX ADMIN — APIXABAN 5 MG: 5 TABLET, FILM COATED ORAL at 08:23

## 2024-06-03 RX ADMIN — PANTOPRAZOLE SODIUM 40 MG: 40 TABLET, DELAYED RELEASE ORAL at 08:23

## 2024-06-03 RX ADMIN — FUROSEMIDE 40 MG: 40 TABLET ORAL at 08:23

## 2024-06-03 RX ADMIN — ROSUVASTATIN 20 MG: 20 TABLET, FILM COATED ORAL at 21:12

## 2024-06-03 RX ADMIN — CLARITHROMYCIN 500 MG: 500 TABLET, COATED ORAL at 21:13

## 2024-06-03 RX ADMIN — SENNOSIDES AND DOCUSATE SODIUM 2 TABLET: 50; 8.6 TABLET ORAL at 21:12

## 2024-06-03 RX ADMIN — VALSARTAN 40 MG: 40 TABLET, FILM COATED ORAL at 08:23

## 2024-06-03 RX ADMIN — DILTIAZEM HYDROCHLORIDE 120 MG: 120 CAPSULE, EXTENDED RELEASE ORAL at 08:23

## 2024-06-03 RX ADMIN — AVOBENZONE, HOMOSALATE, OCTISALATE, OCTOCRYLENE, AND OXYBENZONE 1 PACKET: 29.4; 147; 49; 25.4; 58.8 LOTION TOPICAL at 08:23

## 2024-06-03 RX ADMIN — CLOPIDOGREL BISULFATE 75 MG: 75 TABLET ORAL at 08:23

## 2024-06-03 RX ADMIN — SENNOSIDES AND DOCUSATE SODIUM 2 TABLET: 50; 8.6 TABLET ORAL at 08:23

## 2024-06-03 RX ADMIN — CARVEDILOL 25 MG: 25 TABLET, FILM COATED ORAL at 17:48

## 2024-06-03 RX ADMIN — CLARITHROMYCIN 500 MG: 500 TABLET, COATED ORAL at 08:23

## 2024-06-03 RX ADMIN — APIXABAN 5 MG: 5 TABLET, FILM COATED ORAL at 21:12

## 2024-06-03 RX ADMIN — CARVEDILOL 25 MG: 25 TABLET, FILM COATED ORAL at 08:23

## 2024-06-04 VITALS
HEIGHT: 70 IN | RESPIRATION RATE: 18 BRPM | DIASTOLIC BLOOD PRESSURE: 97 MMHG | TEMPERATURE: 98.1 F | SYSTOLIC BLOOD PRESSURE: 141 MMHG | OXYGEN SATURATION: 97 % | WEIGHT: 172.18 LBS | BODY MASS INDEX: 24.65 KG/M2 | HEART RATE: 91 BPM

## 2024-06-04 LAB
ALBUMIN SERPL-MCNC: 3.1 G/DL (ref 3.5–5.2)
ALP SERPL-CCNC: 150 U/L (ref 39–117)
ALT SERPL W P-5'-P-CCNC: 28 U/L (ref 1–41)
ANION GAP SERPL CALCULATED.3IONS-SCNC: 9.2 MMOL/L (ref 5–15)
AST SERPL-CCNC: 18 U/L (ref 1–40)
BASOPHILS # BLD AUTO: 0.1 10*3/MM3 (ref 0–0.2)
BASOPHILS NFR BLD AUTO: 0.8 % (ref 0–1.5)
BILIRUB CONJ SERPL-MCNC: <0.2 MG/DL (ref 0–0.3)
BILIRUB INDIRECT SERPL-MCNC: ABNORMAL MG/DL
BILIRUB SERPL-MCNC: 0.4 MG/DL (ref 0–1.2)
BUN SERPL-MCNC: 27 MG/DL (ref 8–23)
BUN/CREAT SERPL: 31 (ref 7–25)
CALCIUM SPEC-SCNC: 8.6 MG/DL (ref 8.6–10.5)
CHLORIDE SERPL-SCNC: 107 MMOL/L (ref 98–107)
CO2 SERPL-SCNC: 25.8 MMOL/L (ref 22–29)
CREAT SERPL-MCNC: 0.87 MG/DL (ref 0.76–1.27)
DEPRECATED RDW RBC AUTO: 51.8 FL (ref 37–54)
EGFRCR SERPLBLD CKD-EPI 2021: 86.7 ML/MIN/1.73
EOSINOPHIL # BLD AUTO: 0.11 10*3/MM3 (ref 0–0.4)
EOSINOPHIL NFR BLD AUTO: 0.9 % (ref 0.3–6.2)
ERYTHROCYTE [DISTWIDTH] IN BLOOD BY AUTOMATED COUNT: 15.7 % (ref 12.3–15.4)
GLUCOSE BLDC GLUCOMTR-MCNC: 113 MG/DL (ref 70–99)
GLUCOSE SERPL-MCNC: 123 MG/DL (ref 65–99)
HCT VFR BLD AUTO: 34 % (ref 37.5–51)
HGB BLD-MCNC: 10.6 G/DL (ref 13–17.7)
IMM GRANULOCYTES # BLD AUTO: 0.33 10*3/MM3 (ref 0–0.05)
IMM GRANULOCYTES NFR BLD AUTO: 2.7 % (ref 0–0.5)
LYMPHOCYTES # BLD AUTO: 1.13 10*3/MM3 (ref 0.7–3.1)
LYMPHOCYTES NFR BLD AUTO: 9.1 % (ref 19.6–45.3)
MAGNESIUM SERPL-MCNC: 1.7 MG/DL (ref 1.6–2.4)
MCH RBC QN AUTO: 28 PG (ref 26.6–33)
MCHC RBC AUTO-ENTMCNC: 31.2 G/DL (ref 31.5–35.7)
MCV RBC AUTO: 89.7 FL (ref 79–97)
MONOCYTES # BLD AUTO: 1.19 10*3/MM3 (ref 0.1–0.9)
MONOCYTES NFR BLD AUTO: 9.6 % (ref 5–12)
NEUTROPHILS NFR BLD AUTO: 76.9 % (ref 42.7–76)
NEUTROPHILS NFR BLD AUTO: 9.49 10*3/MM3 (ref 1.7–7)
NRBC BLD AUTO-RTO: 0 /100 WBC (ref 0–0.2)
PHOSPHATE SERPL-MCNC: 3.3 MG/DL (ref 2.5–4.5)
PLATELET # BLD AUTO: 382 10*3/MM3 (ref 140–450)
PMV BLD AUTO: 9.2 FL (ref 6–12)
POTASSIUM SERPL-SCNC: 3.8 MMOL/L (ref 3.5–5.2)
PROT SERPL-MCNC: 5.1 G/DL (ref 6–8.5)
RBC # BLD AUTO: 3.79 10*6/MM3 (ref 4.14–5.8)
SODIUM SERPL-SCNC: 142 MMOL/L (ref 136–145)
WBC NRBC COR # BLD AUTO: 12.35 10*3/MM3 (ref 3.4–10.8)

## 2024-06-04 PROCEDURE — 85025 COMPLETE CBC W/AUTO DIFF WBC: CPT | Performed by: FAMILY MEDICINE

## 2024-06-04 PROCEDURE — 84100 ASSAY OF PHOSPHORUS: CPT | Performed by: FAMILY MEDICINE

## 2024-06-04 PROCEDURE — 82948 REAGENT STRIP/BLOOD GLUCOSE: CPT | Performed by: STUDENT IN AN ORGANIZED HEALTH CARE EDUCATION/TRAINING PROGRAM

## 2024-06-04 PROCEDURE — 80076 HEPATIC FUNCTION PANEL: CPT | Performed by: FAMILY MEDICINE

## 2024-06-04 PROCEDURE — 99316 NF DSCHRG MGMT 30 MIN+: CPT | Performed by: FAMILY MEDICINE

## 2024-06-04 PROCEDURE — 80048 BASIC METABOLIC PNL TOTAL CA: CPT | Performed by: FAMILY MEDICINE

## 2024-06-04 PROCEDURE — 83735 ASSAY OF MAGNESIUM: CPT | Performed by: FAMILY MEDICINE

## 2024-06-04 RX ORDER — CARVEDILOL 25 MG/1
25 TABLET ORAL 2 TIMES DAILY WITH MEALS
Qty: 60 TABLET | Refills: 0 | Status: SHIPPED | OUTPATIENT
Start: 2024-06-04 | End: 2024-07-04

## 2024-06-04 RX ORDER — VALSARTAN 40 MG/1
40 TABLET ORAL
Qty: 30 TABLET | Refills: 0 | Status: SHIPPED | OUTPATIENT
Start: 2024-06-05 | End: 2024-07-05

## 2024-06-04 RX ORDER — PANTOPRAZOLE SODIUM 40 MG/1
40 TABLET, DELAYED RELEASE ORAL DAILY
Qty: 30 TABLET | Refills: 0 | Status: SHIPPED | OUTPATIENT
Start: 2024-06-04 | End: 2024-07-04

## 2024-06-04 RX ORDER — CLOPIDOGREL BISULFATE 75 MG/1
75 TABLET ORAL DAILY
Qty: 30 TABLET | Refills: 0 | Status: SHIPPED | OUTPATIENT
Start: 2024-06-04 | End: 2024-07-04

## 2024-06-04 RX ORDER — HYDROXYZINE HYDROCHLORIDE 25 MG/1
25 TABLET, FILM COATED ORAL 3 TIMES DAILY PRN
Qty: 30 TABLET | Refills: 0 | Status: SHIPPED | OUTPATIENT
Start: 2024-06-04

## 2024-06-04 RX ORDER — DILTIAZEM HYDROCHLORIDE 120 MG/1
120 CAPSULE, EXTENDED RELEASE ORAL DAILY
Qty: 30 CAPSULE | Refills: 0 | Status: SHIPPED | OUTPATIENT
Start: 2024-06-04 | End: 2024-07-04

## 2024-06-04 RX ORDER — ROSUVASTATIN CALCIUM 20 MG/1
20 TABLET, COATED ORAL NIGHTLY
Qty: 30 TABLET | Refills: 0 | Status: SHIPPED | OUTPATIENT
Start: 2024-06-04 | End: 2024-07-04

## 2024-06-04 RX ADMIN — SENNOSIDES AND DOCUSATE SODIUM 2 TABLET: 50; 8.6 TABLET ORAL at 08:46

## 2024-06-04 RX ADMIN — CLARITHROMYCIN 500 MG: 500 TABLET, COATED ORAL at 08:47

## 2024-06-04 RX ADMIN — APIXABAN 5 MG: 5 TABLET, FILM COATED ORAL at 08:46

## 2024-06-04 RX ADMIN — CARVEDILOL 25 MG: 25 TABLET, FILM COATED ORAL at 08:46

## 2024-06-04 RX ADMIN — VALSARTAN 40 MG: 40 TABLET, FILM COATED ORAL at 08:46

## 2024-06-04 RX ADMIN — FUROSEMIDE 40 MG: 40 TABLET ORAL at 08:46

## 2024-06-04 RX ADMIN — AVOBENZONE, HOMOSALATE, OCTISALATE, OCTOCRYLENE, AND OXYBENZONE 1 PACKET: 29.4; 147; 49; 25.4; 58.8 LOTION TOPICAL at 08:46

## 2024-06-04 RX ADMIN — PANTOPRAZOLE SODIUM 40 MG: 40 TABLET, DELAYED RELEASE ORAL at 08:46

## 2024-06-04 RX ADMIN — DILTIAZEM HYDROCHLORIDE 120 MG: 120 CAPSULE, EXTENDED RELEASE ORAL at 08:46

## 2024-06-04 RX ADMIN — CLOPIDOGREL BISULFATE 75 MG: 75 TABLET ORAL at 08:46

## 2024-06-05 ENCOUNTER — READMISSION MANAGEMENT (OUTPATIENT)
Dept: CALL CENTER | Facility: HOSPITAL | Age: 84
End: 2024-06-05
Payer: MEDICARE

## 2024-06-05 ENCOUNTER — TRANSITIONAL CARE MANAGEMENT TELEPHONE ENCOUNTER (OUTPATIENT)
Dept: CALL CENTER | Facility: HOSPITAL | Age: 84
End: 2024-06-05
Payer: MEDICARE

## 2024-06-07 ENCOUNTER — TELEPHONE (OUTPATIENT)
Dept: GASTROENTEROLOGY | Facility: CLINIC | Age: 84
End: 2024-06-07
Payer: MEDICARE

## 2024-06-07 ENCOUNTER — TELEPHONE (OUTPATIENT)
Dept: INTERNAL MEDICINE | Age: 84
End: 2024-06-07
Payer: MEDICARE

## 2024-06-07 DIAGNOSIS — Z87.81 HISTORY OF FRACTURED PELVIS: Primary | ICD-10-CM

## 2024-06-19 ENCOUNTER — LAB (OUTPATIENT)
Dept: LAB | Facility: HOSPITAL | Age: 84
End: 2024-06-19
Payer: MEDICARE

## 2024-06-19 DIAGNOSIS — E11.9 TYPE 2 DIABETES MELLITUS WITHOUT COMPLICATION, WITHOUT LONG-TERM CURRENT USE OF INSULIN: ICD-10-CM

## 2024-06-19 DIAGNOSIS — N18.31 STAGE 3A CHRONIC KIDNEY DISEASE: ICD-10-CM

## 2024-06-19 DIAGNOSIS — E78.5 HYPERLIPIDEMIA LDL GOAL <70: ICD-10-CM

## 2024-06-19 LAB
ALBUMIN SERPL-MCNC: 3.7 G/DL (ref 3.5–5.2)
ALBUMIN/GLOB SERPL: 1.5 G/DL
ALP SERPL-CCNC: 129 U/L (ref 39–117)
ALT SERPL W P-5'-P-CCNC: 22 U/L (ref 1–41)
ANION GAP SERPL CALCULATED.3IONS-SCNC: 8 MMOL/L (ref 5–15)
AST SERPL-CCNC: 15 U/L (ref 1–40)
BILIRUB SERPL-MCNC: 0.4 MG/DL (ref 0–1.2)
BUN SERPL-MCNC: 26 MG/DL (ref 8–23)
BUN/CREAT SERPL: 31 (ref 7–25)
CALCIUM SPEC-SCNC: 9.3 MG/DL (ref 8.6–10.5)
CHLORIDE SERPL-SCNC: 106 MMOL/L (ref 98–107)
CHOLEST SERPL-MCNC: 101 MG/DL (ref 0–200)
CO2 SERPL-SCNC: 27 MMOL/L (ref 22–29)
CREAT SERPL-MCNC: 0.84 MG/DL (ref 0.76–1.27)
EGFRCR SERPLBLD CKD-EPI 2021: 87.6 ML/MIN/1.73
GLOBULIN UR ELPH-MCNC: 2.4 GM/DL
GLUCOSE SERPL-MCNC: 72 MG/DL (ref 65–99)
HBA1C MFR BLD: 6.9 % (ref 4.8–5.6)
HDLC SERPL-MCNC: 46 MG/DL (ref 40–60)
LDLC SERPL CALC-MCNC: 42 MG/DL (ref 0–100)
LDLC/HDLC SERPL: 0.94 {RATIO}
POTASSIUM SERPL-SCNC: 4.5 MMOL/L (ref 3.5–5.2)
PROT SERPL-MCNC: 6.1 G/DL (ref 6–8.5)
SODIUM SERPL-SCNC: 141 MMOL/L (ref 136–145)
TRIGL SERPL-MCNC: 59 MG/DL (ref 0–150)
VLDLC SERPL-MCNC: 13 MG/DL (ref 5–40)

## 2024-06-19 PROCEDURE — 80053 COMPREHEN METABOLIC PANEL: CPT

## 2024-06-19 PROCEDURE — 83036 HEMOGLOBIN GLYCOSYLATED A1C: CPT

## 2024-06-19 PROCEDURE — 80061 LIPID PANEL: CPT

## 2024-06-19 PROCEDURE — 36415 COLL VENOUS BLD VENIPUNCTURE: CPT

## 2024-06-26 ENCOUNTER — OFFICE VISIT (OUTPATIENT)
Dept: INTERNAL MEDICINE | Age: 84
End: 2024-06-26
Payer: MEDICARE

## 2024-06-26 VITALS
OXYGEN SATURATION: 98 % | HEIGHT: 70 IN | BODY MASS INDEX: 24.48 KG/M2 | WEIGHT: 171 LBS | SYSTOLIC BLOOD PRESSURE: 136 MMHG | TEMPERATURE: 98.2 F | HEART RATE: 80 BPM | DIASTOLIC BLOOD PRESSURE: 78 MMHG

## 2024-06-26 DIAGNOSIS — I50.32 CHRONIC HEART FAILURE WITH PRESERVED EJECTION FRACTION (HFPEF): ICD-10-CM

## 2024-06-26 DIAGNOSIS — I10 ESSENTIAL HYPERTENSION: ICD-10-CM

## 2024-06-26 DIAGNOSIS — I48.19 ATRIAL FIBRILLATION, PERSISTENT: ICD-10-CM

## 2024-06-26 DIAGNOSIS — N18.31 STAGE 3A CHRONIC KIDNEY DISEASE: ICD-10-CM

## 2024-06-26 DIAGNOSIS — E78.5 HYPERLIPIDEMIA LDL GOAL <70: ICD-10-CM

## 2024-06-26 DIAGNOSIS — Z09 HOSPITAL DISCHARGE FOLLOW-UP: Primary | ICD-10-CM

## 2024-06-26 DIAGNOSIS — D50.9 IRON DEFICIENCY ANEMIA, UNSPECIFIED IRON DEFICIENCY ANEMIA TYPE: ICD-10-CM

## 2024-06-26 DIAGNOSIS — E11.9 TYPE 2 DIABETES MELLITUS WITHOUT COMPLICATION, WITHOUT LONG-TERM CURRENT USE OF INSULIN: Chronic | ICD-10-CM

## 2024-06-26 DIAGNOSIS — S32.591D CLOSED FRACTURE OF RAMUS OF RIGHT PUBIS WITH ROUTINE HEALING, SUBSEQUENT ENCOUNTER: ICD-10-CM

## 2024-06-26 DIAGNOSIS — I87.2 VENOUS INSUFFICIENCY OF BOTH LOWER EXTREMITIES: ICD-10-CM

## 2024-06-26 PROCEDURE — 3075F SYST BP GE 130 - 139MM HG: CPT | Performed by: INTERNAL MEDICINE

## 2024-06-26 PROCEDURE — 99214 OFFICE O/P EST MOD 30 MIN: CPT | Performed by: INTERNAL MEDICINE

## 2024-06-26 PROCEDURE — G2211 COMPLEX E/M VISIT ADD ON: HCPCS | Performed by: INTERNAL MEDICINE

## 2024-06-26 PROCEDURE — 1160F RVW MEDS BY RX/DR IN RCRD: CPT | Performed by: INTERNAL MEDICINE

## 2024-06-26 PROCEDURE — 3078F DIAST BP <80 MM HG: CPT | Performed by: INTERNAL MEDICINE

## 2024-06-26 PROCEDURE — 1125F AMNT PAIN NOTED PAIN PRSNT: CPT | Performed by: INTERNAL MEDICINE

## 2024-06-26 PROCEDURE — 1159F MED LIST DOCD IN RCRD: CPT | Performed by: INTERNAL MEDICINE

## 2024-06-26 RX ORDER — EZETIMIBE 10 MG/1
10 TABLET ORAL DAILY
COMMUNITY

## 2024-06-26 RX ORDER — CLARITHROMYCIN 500 MG/1
500 TABLET, COATED ORAL 2 TIMES DAILY
COMMUNITY
Start: 2024-06-07

## 2024-06-26 RX ORDER — FUROSEMIDE 20 MG/1
20 TABLET ORAL DAILY
COMMUNITY

## 2024-06-26 RX ORDER — GLIMEPIRIDE 2 MG/1
2 TABLET ORAL
COMMUNITY

## 2024-07-01 ENCOUNTER — HOSPITAL ENCOUNTER (OUTPATIENT)
Dept: CARDIOLOGY | Facility: HOSPITAL | Age: 84
Discharge: HOME | End: 2024-07-01
Payer: MEDICARE

## 2024-07-05 ENCOUNTER — LAB (OUTPATIENT)
Dept: LAB | Facility: HOSPITAL | Age: 84
End: 2024-07-05
Payer: MEDICARE

## 2024-07-05 DIAGNOSIS — I10 ESSENTIAL HYPERTENSION: ICD-10-CM

## 2024-07-05 DIAGNOSIS — E78.5 HYPERLIPIDEMIA LDL GOAL <70: ICD-10-CM

## 2024-07-05 DIAGNOSIS — E78.5 HYPERLIPIDEMIA LDL GOAL <70: Primary | ICD-10-CM

## 2024-07-05 LAB
ALBUMIN SERPL-MCNC: 3.8 G/DL (ref 3.5–5.2)
ALP SERPL-CCNC: 179 U/L (ref 39–117)
ALT SERPL W P-5'-P-CCNC: 24 U/L (ref 1–41)
AST SERPL-CCNC: 17 U/L (ref 1–40)
BILIRUB CONJ SERPL-MCNC: <0.2 MG/DL (ref 0–0.3)
BILIRUB INDIRECT SERPL-MCNC: ABNORMAL MG/DL
BILIRUB SERPL-MCNC: 0.4 MG/DL (ref 0–1.2)
CHOLEST SERPL-MCNC: 127 MG/DL (ref 0–200)
HDLC SERPL-MCNC: 52 MG/DL (ref 40–60)
LDLC SERPL CALC-MCNC: 60 MG/DL (ref 0–100)
LDLC/HDLC SERPL: 1.15 {RATIO}
PROT SERPL-MCNC: 6.2 G/DL (ref 6–8.5)
TRIGL SERPL-MCNC: 75 MG/DL (ref 0–150)
VLDLC SERPL-MCNC: 15 MG/DL (ref 5–40)

## 2024-07-05 PROCEDURE — 80061 LIPID PANEL: CPT

## 2024-07-05 PROCEDURE — 80076 HEPATIC FUNCTION PANEL: CPT

## 2024-07-05 PROCEDURE — 36415 COLL VENOUS BLD VENIPUNCTURE: CPT

## 2024-07-11 ENCOUNTER — OFFICE VISIT (OUTPATIENT)
Dept: CARDIOLOGY | Facility: CLINIC | Age: 84
End: 2024-07-11
Payer: MEDICARE

## 2024-07-11 VITALS
HEIGHT: 70 IN | SYSTOLIC BLOOD PRESSURE: 119 MMHG | DIASTOLIC BLOOD PRESSURE: 76 MMHG | OXYGEN SATURATION: 96 % | BODY MASS INDEX: 26.28 KG/M2 | HEART RATE: 91 BPM | WEIGHT: 183.6 LBS

## 2024-07-11 DIAGNOSIS — I50.32 CHRONIC HEART FAILURE WITH PRESERVED EJECTION FRACTION (HFPEF): ICD-10-CM

## 2024-07-11 DIAGNOSIS — E78.5 HYPERLIPIDEMIA LDL GOAL <70: ICD-10-CM

## 2024-07-11 DIAGNOSIS — I48.19 ATRIAL FIBRILLATION, PERSISTENT: Primary | ICD-10-CM

## 2024-07-11 DIAGNOSIS — Z98.61 CAD S/P PERCUTANEOUS CORONARY ANGIOPLASTY: ICD-10-CM

## 2024-07-11 DIAGNOSIS — I10 ESSENTIAL HYPERTENSION: ICD-10-CM

## 2024-07-11 DIAGNOSIS — I25.10 CAD S/P PERCUTANEOUS CORONARY ANGIOPLASTY: ICD-10-CM

## 2024-07-11 RX ORDER — PANTOPRAZOLE SODIUM 40 MG/1
40 TABLET, DELAYED RELEASE ORAL DAILY
COMMUNITY

## 2024-07-11 RX ORDER — CLOPIDOGREL BISULFATE 75 MG/1
75 TABLET ORAL DAILY
COMMUNITY

## 2024-07-12 ENCOUNTER — OFFICE VISIT (OUTPATIENT)
Dept: ORTHOPEDIC SURGERY | Facility: CLINIC | Age: 84
End: 2024-07-12
Payer: MEDICARE

## 2024-07-12 VITALS
OXYGEN SATURATION: 98 % | HEART RATE: 87 BPM | WEIGHT: 183 LBS | DIASTOLIC BLOOD PRESSURE: 71 MMHG | SYSTOLIC BLOOD PRESSURE: 124 MMHG | HEIGHT: 70 IN | BODY MASS INDEX: 26.2 KG/M2

## 2024-07-12 DIAGNOSIS — S32.591A CLOSED FRACTURE OF RAMUS OF RIGHT PUBIS, INITIAL ENCOUNTER: ICD-10-CM

## 2024-07-12 DIAGNOSIS — R10.2 PAIN IN PELVIS: Primary | ICD-10-CM

## 2024-07-17 ENCOUNTER — TELEPHONE (OUTPATIENT)
Dept: GASTROENTEROLOGY | Facility: CLINIC | Age: 84
End: 2024-07-17
Payer: MEDICARE

## 2024-07-18 ENCOUNTER — TELEPHONE (OUTPATIENT)
Dept: GASTROENTEROLOGY | Facility: CLINIC | Age: 84
End: 2024-07-18
Payer: MEDICARE

## 2024-07-31 ENCOUNTER — HOSPITAL ENCOUNTER (OUTPATIENT)
Dept: ULTRASOUND IMAGING | Facility: HOSPITAL | Age: 84
Discharge: HOME | End: 2024-07-31
Admitting: NURSE PRACTITIONER
Payer: MEDICARE

## 2024-07-31 DIAGNOSIS — R93.2 ABNORMAL GALLBLADDER ULTRASOUND: ICD-10-CM

## 2024-07-31 PROCEDURE — 76705 ECHO EXAM OF ABDOMEN: CPT

## 2024-08-19 ENCOUNTER — TELEPHONE (OUTPATIENT)
Dept: INTERNAL MEDICINE | Age: 84
End: 2024-08-19
Payer: MEDICARE

## 2024-08-19 RX ORDER — METHYLPREDNISOLONE 4 MG/1
TABLET ORAL
Qty: 21 EACH | Refills: 0 | Status: SHIPPED | OUTPATIENT
Start: 2024-08-19 | End: 2024-08-24

## 2024-08-27 ENCOUNTER — APPOINTMENT (OUTPATIENT)
Dept: GENERAL RADIOLOGY | Facility: HOSPITAL | Age: 84
DRG: 193 | End: 2024-08-27
Payer: MEDICARE

## 2024-08-27 ENCOUNTER — HOSPITAL ENCOUNTER (INPATIENT)
Facility: HOSPITAL | Age: 84
LOS: 2 days | Discharge: HOME | DRG: 193 | End: 2024-08-29
Attending: EMERGENCY MEDICINE | Admitting: STUDENT IN AN ORGANIZED HEALTH CARE EDUCATION/TRAINING PROGRAM
Payer: MEDICARE

## 2024-08-27 DIAGNOSIS — R09.02 HYPOXIA: Primary | ICD-10-CM

## 2024-08-27 DIAGNOSIS — U07.1 PNEUMONIA DUE TO COVID-19 VIRUS: ICD-10-CM

## 2024-08-27 DIAGNOSIS — J12.82 PNEUMONIA DUE TO COVID-19 VIRUS: ICD-10-CM

## 2024-08-27 PROBLEM — J18.9 MULTIFOCAL PNEUMONIA: Status: ACTIVE | Noted: 2024-08-27

## 2024-08-27 LAB
ACANTHOCYTES BLD QL SMEAR: ABNORMAL
ALBUMIN SERPL-MCNC: 2.7 G/DL (ref 3.5–5.2)
ALBUMIN/GLOB SERPL: 0.9 G/DL
ALP SERPL-CCNC: 81 U/L (ref 39–117)
ALT SERPL W P-5'-P-CCNC: 67 U/L (ref 1–41)
ANION GAP SERPL CALCULATED.3IONS-SCNC: 13.4 MMOL/L (ref 5–15)
ANISOCYTOSIS BLD QL: ABNORMAL
AST SERPL-CCNC: 45 U/L (ref 1–40)
BILIRUB SERPL-MCNC: 0.5 MG/DL (ref 0–1.2)
BUN SERPL-MCNC: 26 MG/DL (ref 8–23)
BUN/CREAT SERPL: 23.2 (ref 7–25)
BURR CELLS BLD QL SMEAR: ABNORMAL
CALCIUM SPEC-SCNC: 8.9 MG/DL (ref 8.6–10.5)
CHLORIDE SERPL-SCNC: 99 MMOL/L (ref 98–107)
CO2 SERPL-SCNC: 23.6 MMOL/L (ref 22–29)
CREAT SERPL-MCNC: 1.12 MG/DL (ref 0.76–1.27)
D-LACTATE SERPL-SCNC: 1.9 MMOL/L (ref 0.5–2)
D-LACTATE SERPL-SCNC: 2.5 MMOL/L (ref 0.5–2)
DEPRECATED RDW RBC AUTO: 50.2 FL (ref 37–54)
EGFRCR SERPLBLD CKD-EPI 2021: 66 ML/MIN/1.73
EOSINOPHIL # BLD MANUAL: 0.15 10*3/MM3 (ref 0–0.4)
EOSINOPHIL NFR BLD MANUAL: 1 % (ref 0.3–6.2)
ERYTHROCYTE [DISTWIDTH] IN BLOOD BY AUTOMATED COUNT: 18.7 % (ref 12.3–15.4)
FLUAV SUBTYP SPEC NAA+PROBE: NOT DETECTED
FLUBV RNA ISLT QL NAA+PROBE: NOT DETECTED
GEN 5 2HR TROPONIN T REFLEX: 52 NG/L
GLOBULIN UR ELPH-MCNC: 3 GM/DL
GLUCOSE SERPL-MCNC: 194 MG/DL (ref 65–99)
HCT VFR BLD AUTO: 38.9 % (ref 37.5–51)
HGB BLD-MCNC: 12 G/DL (ref 13–17.7)
HOLD SPECIMEN: NORMAL
HOLD SPECIMEN: NORMAL
LYMPHOCYTES # BLD MANUAL: 1.47 10*3/MM3 (ref 0.7–3.1)
LYMPHOCYTES NFR BLD MANUAL: 3 % (ref 5–12)
MACROCYTES BLD QL SMEAR: ABNORMAL
MCH RBC QN AUTO: 24 PG (ref 26.6–33)
MCHC RBC AUTO-ENTMCNC: 30.8 G/DL (ref 31.5–35.7)
MCV RBC AUTO: 78 FL (ref 79–97)
METAMYELOCYTES NFR BLD MANUAL: 4 % (ref 0–0)
MICROCYTES BLD QL: ABNORMAL
MONOCYTES # BLD: 0.44 10*3/MM3 (ref 0.1–0.9)
MYELOCYTES NFR BLD MANUAL: 1 % (ref 0–0)
NEUTROPHILS # BLD AUTO: 11.92 10*3/MM3 (ref 1.7–7)
NEUTROPHILS NFR BLD MANUAL: 81 % (ref 42.7–76)
NT-PROBNP SERPL-MCNC: 641 PG/ML (ref 0–1800)
OVALOCYTES BLD QL SMEAR: ABNORMAL
PLAT MORPH BLD: NORMAL
PLATELET # BLD AUTO: 188 10*3/MM3 (ref 140–450)
PMV BLD AUTO: 10.1 FL (ref 6–12)
POIKILOCYTOSIS BLD QL SMEAR: ABNORMAL
POTASSIUM SERPL-SCNC: 4 MMOL/L (ref 3.5–5.2)
PROCALCITONIN SERPL-MCNC: 0.13 NG/ML (ref 0–0.25)
PROT SERPL-MCNC: 5.7 G/DL (ref 6–8.5)
QT INTERVAL: 374 MS
QTC INTERVAL: 428 MS
RBC # BLD AUTO: 4.99 10*6/MM3 (ref 4.14–5.8)
RSV RNA NPH QL NAA+NON-PROBE: NOT DETECTED
SARS-COV-2 RNA RESP QL NAA+PROBE: DETECTED
SCAN SLIDE: NORMAL
SODIUM SERPL-SCNC: 136 MMOL/L (ref 136–145)
TROPONIN T DELTA: -13 NG/L
TROPONIN T SERPL HS-MCNC: 65 NG/L
VARIANT LYMPHS NFR BLD MANUAL: 10 % (ref 19.6–45.3)
WBC MORPH BLD: NORMAL
WBC NRBC COR # BLD AUTO: 14.71 10*3/MM3 (ref 3.4–10.8)
WHOLE BLOOD HOLD COAG: NORMAL
WHOLE BLOOD HOLD SPECIMEN: NORMAL

## 2024-08-27 PROCEDURE — 25010000002 CEFEPIME PER 500 MG

## 2024-08-27 PROCEDURE — 94640 AIRWAY INHALATION TREATMENT: CPT

## 2024-08-27 PROCEDURE — 36415 COLL VENOUS BLD VENIPUNCTURE: CPT

## 2024-08-27 PROCEDURE — 25810000003 SODIUM CHLORIDE 0.9 % SOLUTION

## 2024-08-27 PROCEDURE — 87040 BLOOD CULTURE FOR BACTERIA: CPT | Performed by: EMERGENCY MEDICINE

## 2024-08-27 PROCEDURE — 85025 COMPLETE CBC W/AUTO DIFF WBC: CPT | Performed by: EMERGENCY MEDICINE

## 2024-08-27 PROCEDURE — 25010000002 FUROSEMIDE PER 20 MG: Performed by: STUDENT IN AN ORGANIZED HEALTH CARE EDUCATION/TRAINING PROGRAM

## 2024-08-27 PROCEDURE — 84484 ASSAY OF TROPONIN QUANT: CPT | Performed by: EMERGENCY MEDICINE

## 2024-08-27 PROCEDURE — 83605 ASSAY OF LACTIC ACID: CPT | Performed by: EMERGENCY MEDICINE

## 2024-08-27 PROCEDURE — 93005 ELECTROCARDIOGRAM TRACING: CPT | Performed by: EMERGENCY MEDICINE

## 2024-08-27 PROCEDURE — 80053 COMPREHEN METABOLIC PANEL: CPT | Performed by: EMERGENCY MEDICINE

## 2024-08-27 PROCEDURE — 84145 PROCALCITONIN (PCT): CPT

## 2024-08-27 PROCEDURE — 94664 DEMO&/EVAL PT USE INHALER: CPT

## 2024-08-27 PROCEDURE — 83880 ASSAY OF NATRIURETIC PEPTIDE: CPT | Performed by: EMERGENCY MEDICINE

## 2024-08-27 PROCEDURE — 71045 X-RAY EXAM CHEST 1 VIEW: CPT

## 2024-08-27 PROCEDURE — 87637 SARSCOV2&INF A&B&RSV AMP PRB: CPT | Performed by: EMERGENCY MEDICINE

## 2024-08-27 PROCEDURE — 85007 BL SMEAR W/DIFF WBC COUNT: CPT | Performed by: EMERGENCY MEDICINE

## 2024-08-27 PROCEDURE — 94799 UNLISTED PULMONARY SVC/PX: CPT

## 2024-08-27 PROCEDURE — 99291 CRITICAL CARE FIRST HOUR: CPT

## 2024-08-27 PROCEDURE — 99223 1ST HOSP IP/OBS HIGH 75: CPT | Performed by: STUDENT IN AN ORGANIZED HEALTH CARE EDUCATION/TRAINING PROGRAM

## 2024-08-27 PROCEDURE — 25010000002 VANCOMYCIN 5 G RECONSTITUTED SOLUTION

## 2024-08-27 RX ORDER — IPRATROPIUM BROMIDE AND ALBUTEROL SULFATE 2.5; .5 MG/3ML; MG/3ML
3 SOLUTION RESPIRATORY (INHALATION) ONCE
Status: COMPLETED | OUTPATIENT
Start: 2024-08-27 | End: 2024-08-27

## 2024-08-27 RX ORDER — FUROSEMIDE 10 MG/ML
40 INJECTION INTRAMUSCULAR; INTRAVENOUS ONCE
Status: COMPLETED | OUTPATIENT
Start: 2024-08-27 | End: 2024-08-27

## 2024-08-27 RX ORDER — SODIUM CHLORIDE 0.9 % (FLUSH) 0.9 %
10 SYRINGE (ML) INJECTION AS NEEDED
Status: DISCONTINUED | OUTPATIENT
Start: 2024-08-27 | End: 2024-08-29 | Stop reason: HOSPADM

## 2024-08-27 RX ORDER — CLOPIDOGREL BISULFATE 75 MG/1
75 TABLET ORAL DAILY
Status: DISCONTINUED | OUTPATIENT
Start: 2024-08-28 | End: 2024-08-29 | Stop reason: HOSPADM

## 2024-08-27 RX ADMIN — CEFEPIME 2000 MG: 2 INJECTION, POWDER, FOR SOLUTION INTRAVENOUS at 21:49

## 2024-08-27 RX ADMIN — IPRATROPIUM BROMIDE AND ALBUTEROL SULFATE 3 ML: .5; 3 SOLUTION RESPIRATORY (INHALATION) at 20:28

## 2024-08-27 RX ADMIN — VANCOMYCIN HYDROCHLORIDE 1750 MG: 5 INJECTION, POWDER, LYOPHILIZED, FOR SOLUTION INTRAVENOUS at 22:29

## 2024-08-27 RX ADMIN — FUROSEMIDE 40 MG: 10 INJECTION, SOLUTION INTRAMUSCULAR; INTRAVENOUS at 23:00

## 2024-08-28 LAB
ALBUMIN SERPL-MCNC: 2.6 G/DL (ref 3.5–5.2)
ALBUMIN/GLOB SERPL: 0.8 G/DL
ALP SERPL-CCNC: 75 U/L (ref 39–117)
ALT SERPL W P-5'-P-CCNC: 60 U/L (ref 1–41)
ANION GAP SERPL CALCULATED.3IONS-SCNC: 8.1 MMOL/L (ref 5–15)
AST SERPL-CCNC: 39 U/L (ref 1–40)
BASOPHILS # BLD AUTO: 0.01 10*3/MM3 (ref 0–0.2)
BASOPHILS NFR BLD AUTO: 0.1 % (ref 0–1.5)
BILIRUB SERPL-MCNC: 0.5 MG/DL (ref 0–1.2)
BUN SERPL-MCNC: 24 MG/DL (ref 8–23)
BUN/CREAT SERPL: 24.5 (ref 7–25)
CALCIUM SPEC-SCNC: 8.7 MG/DL (ref 8.6–10.5)
CHLORIDE SERPL-SCNC: 100 MMOL/L (ref 98–107)
CO2 SERPL-SCNC: 28.9 MMOL/L (ref 22–29)
CREAT SERPL-MCNC: 0.98 MG/DL (ref 0.76–1.27)
DEPRECATED RDW RBC AUTO: 49.5 FL (ref 37–54)
EGFRCR SERPLBLD CKD-EPI 2021: 77.5 ML/MIN/1.73
EOSINOPHIL # BLD AUTO: 0.1 10*3/MM3 (ref 0–0.4)
EOSINOPHIL NFR BLD AUTO: 0.8 % (ref 0.3–6.2)
ERYTHROCYTE [DISTWIDTH] IN BLOOD BY AUTOMATED COUNT: 18.3 % (ref 12.3–15.4)
GLOBULIN UR ELPH-MCNC: 3.1 GM/DL
GLUCOSE BLDC GLUCOMTR-MCNC: 106 MG/DL (ref 70–99)
GLUCOSE BLDC GLUCOMTR-MCNC: 228 MG/DL (ref 70–99)
GLUCOSE BLDC GLUCOMTR-MCNC: 274 MG/DL (ref 70–99)
GLUCOSE BLDC GLUCOMTR-MCNC: 90 MG/DL (ref 70–99)
GLUCOSE SERPL-MCNC: 105 MG/DL (ref 65–99)
HCT VFR BLD AUTO: 38 % (ref 37.5–51)
HGB BLD-MCNC: 11.9 G/DL (ref 13–17.7)
IMM GRANULOCYTES # BLD AUTO: 1.73 10*3/MM3 (ref 0–0.05)
IMM GRANULOCYTES NFR BLD AUTO: 13.6 % (ref 0–0.5)
L PNEUMO1 AG UR QL IA: NEGATIVE
LYMPHOCYTES # BLD AUTO: 0.81 10*3/MM3 (ref 0.7–3.1)
LYMPHOCYTES NFR BLD AUTO: 6.4 % (ref 19.6–45.3)
MAGNESIUM SERPL-MCNC: 1.7 MG/DL (ref 1.6–2.4)
MCH RBC QN AUTO: 24.2 PG (ref 26.6–33)
MCHC RBC AUTO-ENTMCNC: 31.3 G/DL (ref 31.5–35.7)
MCV RBC AUTO: 77.4 FL (ref 79–97)
MONOCYTES # BLD AUTO: 0.16 10*3/MM3 (ref 0.1–0.9)
MONOCYTES NFR BLD AUTO: 1.3 % (ref 5–12)
MRSA DNA SPEC QL NAA+PROBE: NORMAL
NEUTROPHILS NFR BLD AUTO: 77.8 % (ref 42.7–76)
NEUTROPHILS NFR BLD AUTO: 9.91 10*3/MM3 (ref 1.7–7)
NRBC BLD AUTO-RTO: 0 /100 WBC (ref 0–0.2)
PHOSPHATE SERPL-MCNC: 2.9 MG/DL (ref 2.5–4.5)
PLATELET # BLD AUTO: 187 10*3/MM3 (ref 140–450)
PMV BLD AUTO: 9.7 FL (ref 6–12)
POTASSIUM SERPL-SCNC: 3.3 MMOL/L (ref 3.5–5.2)
PROCALCITONIN SERPL-MCNC: 0.09 NG/ML (ref 0–0.25)
PROT SERPL-MCNC: 5.7 G/DL (ref 6–8.5)
RBC # BLD AUTO: 4.91 10*6/MM3 (ref 4.14–5.8)
S PNEUM AG SPEC QL LA: NEGATIVE
SODIUM SERPL-SCNC: 137 MMOL/L (ref 136–145)
WBC NRBC COR # BLD AUTO: 12.72 10*3/MM3 (ref 3.4–10.8)

## 2024-08-28 PROCEDURE — 87070 CULTURE OTHR SPECIMN AEROBIC: CPT | Performed by: STUDENT IN AN ORGANIZED HEALTH CARE EDUCATION/TRAINING PROGRAM

## 2024-08-28 PROCEDURE — 80053 COMPREHEN METABOLIC PANEL: CPT | Performed by: STUDENT IN AN ORGANIZED HEALTH CARE EDUCATION/TRAINING PROGRAM

## 2024-08-28 PROCEDURE — 94799 UNLISTED PULMONARY SVC/PX: CPT

## 2024-08-28 PROCEDURE — 87641 MR-STAPH DNA AMP PROBE: CPT | Performed by: STUDENT IN AN ORGANIZED HEALTH CARE EDUCATION/TRAINING PROGRAM

## 2024-08-28 PROCEDURE — 82948 REAGENT STRIP/BLOOD GLUCOSE: CPT | Performed by: STUDENT IN AN ORGANIZED HEALTH CARE EDUCATION/TRAINING PROGRAM

## 2024-08-28 PROCEDURE — 25010000002 MAGNESIUM SULFATE IN D5W 1G/100ML (PREMIX) 1-5 GM/100ML-% SOLUTION: Performed by: INTERNAL MEDICINE

## 2024-08-28 PROCEDURE — 63710000001 PREDNISONE PER 1 MG: Performed by: INTERNAL MEDICINE

## 2024-08-28 PROCEDURE — 25010000002 AZITHROMYCIN PER 500 MG: Performed by: STUDENT IN AN ORGANIZED HEALTH CARE EDUCATION/TRAINING PROGRAM

## 2024-08-28 PROCEDURE — 83735 ASSAY OF MAGNESIUM: CPT | Performed by: STUDENT IN AN ORGANIZED HEALTH CARE EDUCATION/TRAINING PROGRAM

## 2024-08-28 PROCEDURE — 82948 REAGENT STRIP/BLOOD GLUCOSE: CPT

## 2024-08-28 PROCEDURE — 94664 DEMO&/EVAL PT USE INHALER: CPT

## 2024-08-28 PROCEDURE — 85025 COMPLETE CBC W/AUTO DIFF WBC: CPT | Performed by: STUDENT IN AN ORGANIZED HEALTH CARE EDUCATION/TRAINING PROGRAM

## 2024-08-28 PROCEDURE — 87899 AGENT NOS ASSAY W/OPTIC: CPT | Performed by: STUDENT IN AN ORGANIZED HEALTH CARE EDUCATION/TRAINING PROGRAM

## 2024-08-28 PROCEDURE — 63710000001 INSULIN LISPRO (HUMAN) PER 5 UNITS: Performed by: STUDENT IN AN ORGANIZED HEALTH CARE EDUCATION/TRAINING PROGRAM

## 2024-08-28 PROCEDURE — 87449 NOS EACH ORGANISM AG IA: CPT | Performed by: STUDENT IN AN ORGANIZED HEALTH CARE EDUCATION/TRAINING PROGRAM

## 2024-08-28 PROCEDURE — 84145 PROCALCITONIN (PCT): CPT | Performed by: INTERNAL MEDICINE

## 2024-08-28 PROCEDURE — 87205 SMEAR GRAM STAIN: CPT | Performed by: STUDENT IN AN ORGANIZED HEALTH CARE EDUCATION/TRAINING PROGRAM

## 2024-08-28 PROCEDURE — 25010000002 CEFTRIAXONE PER 250 MG: Performed by: STUDENT IN AN ORGANIZED HEALTH CARE EDUCATION/TRAINING PROGRAM

## 2024-08-28 PROCEDURE — 25810000003 SODIUM CHLORIDE 0.9 % SOLUTION: Performed by: STUDENT IN AN ORGANIZED HEALTH CARE EDUCATION/TRAINING PROGRAM

## 2024-08-28 PROCEDURE — 94761 N-INVAS EAR/PLS OXIMETRY MLT: CPT

## 2024-08-28 PROCEDURE — 84100 ASSAY OF PHOSPHORUS: CPT | Performed by: STUDENT IN AN ORGANIZED HEALTH CARE EDUCATION/TRAINING PROGRAM

## 2024-08-28 PROCEDURE — 99233 SBSQ HOSP IP/OBS HIGH 50: CPT | Performed by: INTERNAL MEDICINE

## 2024-08-28 PROCEDURE — 94760 N-INVAS EAR/PLS OXIMETRY 1: CPT

## 2024-08-28 RX ORDER — FUROSEMIDE 20 MG
20 TABLET ORAL DAILY
Status: DISCONTINUED | OUTPATIENT
Start: 2024-08-28 | End: 2024-08-29 | Stop reason: HOSPADM

## 2024-08-28 RX ORDER — POTASSIUM CHLORIDE 750 MG/1
40 CAPSULE, EXTENDED RELEASE ORAL ONCE
Status: COMPLETED | OUTPATIENT
Start: 2024-08-28 | End: 2024-08-28

## 2024-08-28 RX ORDER — ACETAMINOPHEN 325 MG/1
650 TABLET ORAL EVERY 6 HOURS PRN
Status: DISCONTINUED | OUTPATIENT
Start: 2024-08-28 | End: 2024-08-29 | Stop reason: HOSPADM

## 2024-08-28 RX ORDER — PREDNISONE 20 MG/1
40 TABLET ORAL
Status: DISCONTINUED | OUTPATIENT
Start: 2024-08-28 | End: 2024-08-29 | Stop reason: HOSPADM

## 2024-08-28 RX ORDER — AMOXICILLIN 250 MG
2 CAPSULE ORAL 2 TIMES DAILY PRN
Status: DISCONTINUED | OUTPATIENT
Start: 2024-08-28 | End: 2024-08-29 | Stop reason: HOSPADM

## 2024-08-28 RX ORDER — PANTOPRAZOLE SODIUM 40 MG/1
40 TABLET, DELAYED RELEASE ORAL DAILY
Status: DISCONTINUED | OUTPATIENT
Start: 2024-08-28 | End: 2024-08-29 | Stop reason: HOSPADM

## 2024-08-28 RX ORDER — DEXTROSE MONOHYDRATE 25 G/50ML
25 INJECTION, SOLUTION INTRAVENOUS
Status: DISCONTINUED | OUTPATIENT
Start: 2024-08-28 | End: 2024-08-29 | Stop reason: HOSPADM

## 2024-08-28 RX ORDER — ARFORMOTEROL TARTRATE 15 UG/2ML
15 SOLUTION RESPIRATORY (INHALATION)
Status: DISCONTINUED | OUTPATIENT
Start: 2024-08-28 | End: 2024-08-29 | Stop reason: HOSPADM

## 2024-08-28 RX ORDER — DILTIAZEM HYDROCHLORIDE 120 MG/1
120 CAPSULE, COATED, EXTENDED RELEASE ORAL
Status: DISCONTINUED | OUTPATIENT
Start: 2024-08-28 | End: 2024-08-29 | Stop reason: HOSPADM

## 2024-08-28 RX ORDER — HYDROXYZINE HYDROCHLORIDE 25 MG/1
25 TABLET, FILM COATED ORAL 3 TIMES DAILY PRN
Status: DISCONTINUED | OUTPATIENT
Start: 2024-08-28 | End: 2024-08-29 | Stop reason: HOSPADM

## 2024-08-28 RX ORDER — ALUMINA, MAGNESIA, AND SIMETHICONE 2400; 2400; 240 MG/30ML; MG/30ML; MG/30ML
15 SUSPENSION ORAL EVERY 6 HOURS PRN
Status: DISCONTINUED | OUTPATIENT
Start: 2024-08-28 | End: 2024-08-29 | Stop reason: HOSPADM

## 2024-08-28 RX ORDER — ONDANSETRON 2 MG/ML
4 INJECTION INTRAMUSCULAR; INTRAVENOUS EVERY 4 HOURS PRN
Status: DISCONTINUED | OUTPATIENT
Start: 2024-08-28 | End: 2024-08-29 | Stop reason: HOSPADM

## 2024-08-28 RX ORDER — GUAIFENESIN/DEXTROMETHORPHAN 100-10MG/5
5 SYRUP ORAL EVERY 4 HOURS PRN
Status: DISCONTINUED | OUTPATIENT
Start: 2024-08-28 | End: 2024-08-29 | Stop reason: HOSPADM

## 2024-08-28 RX ORDER — BENZONATATE 100 MG/1
200 CAPSULE ORAL 3 TIMES DAILY
Status: DISCONTINUED | OUTPATIENT
Start: 2024-08-28 | End: 2024-08-29 | Stop reason: HOSPADM

## 2024-08-28 RX ORDER — NITROGLYCERIN 0.4 MG/1
0.4 TABLET SUBLINGUAL
Status: DISCONTINUED | OUTPATIENT
Start: 2024-08-28 | End: 2024-08-29 | Stop reason: HOSPADM

## 2024-08-28 RX ORDER — SACCHAROMYCES BOULARDII 250 MG
250 CAPSULE ORAL 2 TIMES DAILY
Status: DISCONTINUED | OUTPATIENT
Start: 2024-08-28 | End: 2024-08-29 | Stop reason: HOSPADM

## 2024-08-28 RX ORDER — IPRATROPIUM BROMIDE AND ALBUTEROL SULFATE 2.5; .5 MG/3ML; MG/3ML
3 SOLUTION RESPIRATORY (INHALATION) EVERY 6 HOURS PRN
Status: DISCONTINUED | OUTPATIENT
Start: 2024-08-28 | End: 2024-08-29 | Stop reason: HOSPADM

## 2024-08-28 RX ORDER — LIDOCAINE 4 G/G
1 PATCH TOPICAL DAILY PRN
Status: DISCONTINUED | OUTPATIENT
Start: 2024-08-28 | End: 2024-08-29 | Stop reason: HOSPADM

## 2024-08-28 RX ORDER — SODIUM CHLORIDE 9 MG/ML
40 INJECTION, SOLUTION INTRAVENOUS AS NEEDED
Status: DISCONTINUED | OUTPATIENT
Start: 2024-08-28 | End: 2024-08-29 | Stop reason: HOSPADM

## 2024-08-28 RX ORDER — ROSUVASTATIN CALCIUM 20 MG/1
20 TABLET, COATED ORAL NIGHTLY
Status: DISCONTINUED | OUTPATIENT
Start: 2024-08-28 | End: 2024-08-29 | Stop reason: HOSPADM

## 2024-08-28 RX ORDER — BISACODYL 5 MG/1
5 TABLET, DELAYED RELEASE ORAL DAILY PRN
Status: DISCONTINUED | OUTPATIENT
Start: 2024-08-28 | End: 2024-08-29 | Stop reason: HOSPADM

## 2024-08-28 RX ORDER — BUDESONIDE 0.5 MG/2ML
0.5 INHALANT ORAL
Status: DISCONTINUED | OUTPATIENT
Start: 2024-08-28 | End: 2024-08-29 | Stop reason: HOSPADM

## 2024-08-28 RX ORDER — IBUPROFEN 600 MG/1
1 TABLET ORAL
Status: DISCONTINUED | OUTPATIENT
Start: 2024-08-28 | End: 2024-08-29 | Stop reason: HOSPADM

## 2024-08-28 RX ORDER — MAGNESIUM SULFATE 1 G/100ML
1 INJECTION INTRAVENOUS ONCE
Status: COMPLETED | OUTPATIENT
Start: 2024-08-28 | End: 2024-08-29

## 2024-08-28 RX ORDER — POLYETHYLENE GLYCOL 3350 17 G/17G
17 POWDER, FOR SOLUTION ORAL DAILY PRN
Status: DISCONTINUED | OUTPATIENT
Start: 2024-08-28 | End: 2024-08-29 | Stop reason: HOSPADM

## 2024-08-28 RX ORDER — PROCHLORPERAZINE EDISYLATE 5 MG/ML
5 INJECTION INTRAMUSCULAR; INTRAVENOUS EVERY 6 HOURS PRN
Status: DISCONTINUED | OUTPATIENT
Start: 2024-08-28 | End: 2024-08-29 | Stop reason: HOSPADM

## 2024-08-28 RX ORDER — BISACODYL 10 MG
10 SUPPOSITORY, RECTAL RECTAL DAILY PRN
Status: DISCONTINUED | OUTPATIENT
Start: 2024-08-28 | End: 2024-08-29 | Stop reason: HOSPADM

## 2024-08-28 RX ORDER — SODIUM CHLORIDE 0.9 % (FLUSH) 0.9 %
10 SYRINGE (ML) INJECTION AS NEEDED
Status: DISCONTINUED | OUTPATIENT
Start: 2024-08-28 | End: 2024-08-29 | Stop reason: HOSPADM

## 2024-08-28 RX ORDER — NICOTINE 21 MG/24HR
1 PATCH, TRANSDERMAL 24 HOURS TRANSDERMAL DAILY PRN
Status: DISCONTINUED | OUTPATIENT
Start: 2024-08-28 | End: 2024-08-29 | Stop reason: HOSPADM

## 2024-08-28 RX ORDER — NICOTINE POLACRILEX 4 MG
15 LOZENGE BUCCAL
Status: DISCONTINUED | OUTPATIENT
Start: 2024-08-28 | End: 2024-08-29 | Stop reason: HOSPADM

## 2024-08-28 RX ORDER — INSULIN LISPRO 100 [IU]/ML
2-7 INJECTION, SOLUTION INTRAVENOUS; SUBCUTANEOUS
Status: DISCONTINUED | OUTPATIENT
Start: 2024-08-28 | End: 2024-08-29 | Stop reason: HOSPADM

## 2024-08-28 RX ORDER — BENZONATATE 100 MG/1
100 CAPSULE ORAL 3 TIMES DAILY PRN
Status: DISCONTINUED | OUTPATIENT
Start: 2024-08-28 | End: 2024-08-28

## 2024-08-28 RX ORDER — CARVEDILOL 25 MG/1
25 TABLET ORAL 2 TIMES DAILY WITH MEALS
Status: DISCONTINUED | OUTPATIENT
Start: 2024-08-28 | End: 2024-08-29 | Stop reason: HOSPADM

## 2024-08-28 RX ORDER — SODIUM CHLORIDE 0.9 % (FLUSH) 0.9 %
10 SYRINGE (ML) INJECTION EVERY 12 HOURS SCHEDULED
Status: DISCONTINUED | OUTPATIENT
Start: 2024-08-28 | End: 2024-08-29 | Stop reason: HOSPADM

## 2024-08-28 RX ADMIN — Medication 250 MG: at 09:26

## 2024-08-28 RX ADMIN — CEFTRIAXONE SODIUM 1000 MG: 1 INJECTION, POWDER, FOR SOLUTION INTRAMUSCULAR; INTRAVENOUS at 09:32

## 2024-08-28 RX ADMIN — Medication 10 ML: at 21:05

## 2024-08-28 RX ADMIN — APIXABAN 5 MG: 5 TABLET, FILM COATED ORAL at 21:05

## 2024-08-28 RX ADMIN — BENZONATATE 200 MG: 100 CAPSULE ORAL at 21:05

## 2024-08-28 RX ADMIN — Medication 250 MG: at 21:05

## 2024-08-28 RX ADMIN — CARVEDILOL 25 MG: 25 TABLET, FILM COATED ORAL at 17:16

## 2024-08-28 RX ADMIN — CARVEDILOL 25 MG: 25 TABLET, FILM COATED ORAL at 09:26

## 2024-08-28 RX ADMIN — Medication 10 ML: at 09:33

## 2024-08-28 RX ADMIN — GUAIFENESIN AND DEXTROMETHORPHAN 5 ML: 100; 10 SYRUP ORAL at 14:23

## 2024-08-28 RX ADMIN — APIXABAN 5 MG: 5 TABLET, FILM COATED ORAL at 09:26

## 2024-08-28 RX ADMIN — BUDESONIDE 0.5 MG: 0.5 SUSPENSION RESPIRATORY (INHALATION) at 19:21

## 2024-08-28 RX ADMIN — Medication 400 MG: at 09:27

## 2024-08-28 RX ADMIN — MAGNESIUM SULFATE HEPTAHYDRATE 1 G: 1 INJECTION, SOLUTION INTRAVENOUS at 09:32

## 2024-08-28 RX ADMIN — INSULIN LISPRO 3 UNITS: 100 INJECTION, SOLUTION INTRAVENOUS; SUBCUTANEOUS at 17:19

## 2024-08-28 RX ADMIN — Medication 10 ML: at 00:59

## 2024-08-28 RX ADMIN — BENZONATATE 200 MG: 100 CAPSULE ORAL at 17:16

## 2024-08-28 RX ADMIN — INSULIN LISPRO 4 UNITS: 100 INJECTION, SOLUTION INTRAVENOUS; SUBCUTANEOUS at 21:06

## 2024-08-28 RX ADMIN — POTASSIUM CHLORIDE 40 MEQ: 750 CAPSULE, EXTENDED RELEASE ORAL at 09:26

## 2024-08-28 RX ADMIN — ARFORMOTEROL TARTRATE 15 MCG: 15 SOLUTION RESPIRATORY (INHALATION) at 19:21

## 2024-08-28 RX ADMIN — AZITHROMYCIN 500 MG: 500 INJECTION, POWDER, LYOPHILIZED, FOR SOLUTION INTRAVENOUS at 10:45

## 2024-08-28 RX ADMIN — ARFORMOTEROL TARTRATE 15 MCG: 15 SOLUTION RESPIRATORY (INHALATION) at 09:08

## 2024-08-28 RX ADMIN — FUROSEMIDE 20 MG: 20 TABLET ORAL at 09:26

## 2024-08-28 RX ADMIN — BUDESONIDE 0.5 MG: 0.5 SUSPENSION RESPIRATORY (INHALATION) at 09:08

## 2024-08-28 RX ADMIN — IPRATROPIUM BROMIDE AND ALBUTEROL SULFATE 3 ML: .5; 3 SOLUTION RESPIRATORY (INHALATION) at 03:02

## 2024-08-28 RX ADMIN — PREDNISONE 40 MG: 20 TABLET ORAL at 13:22

## 2024-08-28 RX ADMIN — PANTOPRAZOLE SODIUM 40 MG: 40 TABLET, DELAYED RELEASE ORAL at 09:26

## 2024-08-28 RX ADMIN — CLOPIDOGREL BISULFATE 75 MG: 75 TABLET ORAL at 09:27

## 2024-08-29 ENCOUNTER — READMISSION MANAGEMENT (OUTPATIENT)
Dept: CALL CENTER | Facility: HOSPITAL | Age: 84
End: 2024-08-29
Payer: MEDICARE

## 2024-08-29 VITALS
HEIGHT: 70 IN | TEMPERATURE: 99.4 F | WEIGHT: 183.42 LBS | HEART RATE: 96 BPM | DIASTOLIC BLOOD PRESSURE: 95 MMHG | BODY MASS INDEX: 26.26 KG/M2 | SYSTOLIC BLOOD PRESSURE: 148 MMHG | RESPIRATION RATE: 18 BRPM | OXYGEN SATURATION: 94 %

## 2024-08-29 LAB
ALBUMIN SERPL-MCNC: 2.5 G/DL (ref 3.5–5.2)
ALP SERPL-CCNC: 86 U/L (ref 39–117)
ALT SERPL W P-5'-P-CCNC: 68 U/L (ref 1–41)
ANION GAP SERPL CALCULATED.3IONS-SCNC: 8.3 MMOL/L (ref 5–15)
ANISOCYTOSIS BLD QL: NORMAL
AST SERPL-CCNC: 41 U/L (ref 1–40)
BASOPHILS # BLD AUTO: 0.01 10*3/MM3 (ref 0–0.2)
BASOPHILS NFR BLD AUTO: 0.1 % (ref 0–1.5)
BILIRUB CONJ SERPL-MCNC: <0.2 MG/DL (ref 0–0.3)
BILIRUB INDIRECT SERPL-MCNC: ABNORMAL MG/DL
BILIRUB SERPL-MCNC: 0.4 MG/DL (ref 0–1.2)
BUN SERPL-MCNC: 27 MG/DL (ref 8–23)
BUN/CREAT SERPL: 27.8 (ref 7–25)
CALCIUM SPEC-SCNC: 9 MG/DL (ref 8.6–10.5)
CHLORIDE SERPL-SCNC: 101 MMOL/L (ref 98–107)
CO2 SERPL-SCNC: 27.7 MMOL/L (ref 22–29)
CREAT SERPL-MCNC: 0.97 MG/DL (ref 0.76–1.27)
DEPRECATED RDW RBC AUTO: 49.7 FL (ref 37–54)
EGFRCR SERPLBLD CKD-EPI 2021: 78.4 ML/MIN/1.73
EOSINOPHIL # BLD AUTO: 0 10*3/MM3 (ref 0–0.4)
EOSINOPHIL NFR BLD AUTO: 0 % (ref 0.3–6.2)
ERYTHROCYTE [DISTWIDTH] IN BLOOD BY AUTOMATED COUNT: 18.2 % (ref 12.3–15.4)
GLUCOSE BLDC GLUCOMTR-MCNC: 161 MG/DL (ref 70–99)
GLUCOSE BLDC GLUCOMTR-MCNC: 170 MG/DL (ref 70–99)
GLUCOSE SERPL-MCNC: 216 MG/DL (ref 65–99)
HCT VFR BLD AUTO: 35.8 % (ref 37.5–51)
HGB BLD-MCNC: 11.1 G/DL (ref 13–17.7)
HYPOCHROMIA BLD QL: NORMAL
IMM GRANULOCYTES # BLD AUTO: 1.05 10*3/MM3 (ref 0–0.05)
IMM GRANULOCYTES NFR BLD AUTO: 9.4 % (ref 0–0.5)
LYMPHOCYTES # BLD AUTO: 0.3 10*3/MM3 (ref 0.7–3.1)
LYMPHOCYTES NFR BLD AUTO: 2.7 % (ref 19.6–45.3)
MAGNESIUM SERPL-MCNC: 1.9 MG/DL (ref 1.6–2.4)
MCH RBC QN AUTO: 24 PG (ref 26.6–33)
MCHC RBC AUTO-ENTMCNC: 31 G/DL (ref 31.5–35.7)
MCV RBC AUTO: 77.5 FL (ref 79–97)
MICROCYTES BLD QL: NORMAL
MONOCYTES # BLD AUTO: 0.36 10*3/MM3 (ref 0.1–0.9)
MONOCYTES NFR BLD AUTO: 3.2 % (ref 5–12)
NEUTROPHILS NFR BLD AUTO: 84.6 % (ref 42.7–76)
NEUTROPHILS NFR BLD AUTO: 9.5 10*3/MM3 (ref 1.7–7)
NRBC BLD AUTO-RTO: 0 /100 WBC (ref 0–0.2)
OVALOCYTES BLD QL SMEAR: NORMAL
PHOSPHATE SERPL-MCNC: 3.2 MG/DL (ref 2.5–4.5)
PLATELET # BLD AUTO: 188 10*3/MM3 (ref 140–450)
PMV BLD AUTO: 9.5 FL (ref 6–12)
POTASSIUM SERPL-SCNC: 4 MMOL/L (ref 3.5–5.2)
PROT SERPL-MCNC: 5.5 G/DL (ref 6–8.5)
RBC # BLD AUTO: 4.62 10*6/MM3 (ref 4.14–5.8)
SMALL PLATELETS BLD QL SMEAR: ADEQUATE
SODIUM SERPL-SCNC: 137 MMOL/L (ref 136–145)
WBC MORPH BLD: NORMAL
WBC NRBC COR # BLD AUTO: 11.22 10*3/MM3 (ref 3.4–10.8)

## 2024-08-29 PROCEDURE — 82948 REAGENT STRIP/BLOOD GLUCOSE: CPT | Performed by: STUDENT IN AN ORGANIZED HEALTH CARE EDUCATION/TRAINING PROGRAM

## 2024-08-29 PROCEDURE — 63710000001 INSULIN LISPRO (HUMAN) PER 5 UNITS: Performed by: STUDENT IN AN ORGANIZED HEALTH CARE EDUCATION/TRAINING PROGRAM

## 2024-08-29 PROCEDURE — 94664 DEMO&/EVAL PT USE INHALER: CPT

## 2024-08-29 PROCEDURE — 80048 BASIC METABOLIC PNL TOTAL CA: CPT | Performed by: INTERNAL MEDICINE

## 2024-08-29 PROCEDURE — 85025 COMPLETE CBC W/AUTO DIFF WBC: CPT | Performed by: INTERNAL MEDICINE

## 2024-08-29 PROCEDURE — 85007 BL SMEAR W/DIFF WBC COUNT: CPT | Performed by: INTERNAL MEDICINE

## 2024-08-29 PROCEDURE — 63710000001 PREDNISONE PER 1 MG: Performed by: INTERNAL MEDICINE

## 2024-08-29 PROCEDURE — 25010000002 CEFTRIAXONE PER 250 MG: Performed by: STUDENT IN AN ORGANIZED HEALTH CARE EDUCATION/TRAINING PROGRAM

## 2024-08-29 PROCEDURE — 80076 HEPATIC FUNCTION PANEL: CPT | Performed by: INTERNAL MEDICINE

## 2024-08-29 PROCEDURE — 84100 ASSAY OF PHOSPHORUS: CPT | Performed by: INTERNAL MEDICINE

## 2024-08-29 PROCEDURE — 94799 UNLISTED PULMONARY SVC/PX: CPT

## 2024-08-29 PROCEDURE — 94761 N-INVAS EAR/PLS OXIMETRY MLT: CPT

## 2024-08-29 PROCEDURE — 99239 HOSP IP/OBS DSCHRG MGMT >30: CPT | Performed by: INTERNAL MEDICINE

## 2024-08-29 PROCEDURE — 83735 ASSAY OF MAGNESIUM: CPT | Performed by: INTERNAL MEDICINE

## 2024-08-29 RX ORDER — CEFDINIR 300 MG/1
300 CAPSULE ORAL 2 TIMES DAILY
Qty: 6 CAPSULE | Refills: 0 | Status: SHIPPED | OUTPATIENT
Start: 2024-08-30 | End: 2024-09-02

## 2024-08-29 RX ADMIN — PANTOPRAZOLE SODIUM 40 MG: 40 TABLET, DELAYED RELEASE ORAL at 09:05

## 2024-08-29 RX ADMIN — GUAIFENESIN AND DEXTROMETHORPHAN 5 ML: 100; 10 SYRUP ORAL at 09:04

## 2024-08-29 RX ADMIN — CEFTRIAXONE SODIUM 1000 MG: 1 INJECTION, POWDER, FOR SOLUTION INTRAMUSCULAR; INTRAVENOUS at 09:06

## 2024-08-29 RX ADMIN — BUDESONIDE 0.5 MG: 0.5 SUSPENSION RESPIRATORY (INHALATION) at 07:10

## 2024-08-29 RX ADMIN — Medication 400 MG: at 09:05

## 2024-08-29 RX ADMIN — FUROSEMIDE 20 MG: 20 TABLET ORAL at 09:05

## 2024-08-29 RX ADMIN — APIXABAN 5 MG: 5 TABLET, FILM COATED ORAL at 09:05

## 2024-08-29 RX ADMIN — Medication 250 MG: at 09:05

## 2024-08-29 RX ADMIN — Medication 10 ML: at 09:06

## 2024-08-29 RX ADMIN — INSULIN LISPRO 2 UNITS: 100 INJECTION, SOLUTION INTRAVENOUS; SUBCUTANEOUS at 09:05

## 2024-08-29 RX ADMIN — DILTIAZEM HYDROCHLORIDE 120 MG: 120 CAPSULE, EXTENDED RELEASE ORAL at 09:05

## 2024-08-29 RX ADMIN — CARVEDILOL 25 MG: 25 TABLET, FILM COATED ORAL at 09:04

## 2024-08-29 RX ADMIN — BENZONATATE 200 MG: 100 CAPSULE ORAL at 09:04

## 2024-08-29 RX ADMIN — ARFORMOTEROL TARTRATE 15 MCG: 15 SOLUTION RESPIRATORY (INHALATION) at 07:10

## 2024-08-29 RX ADMIN — CLOPIDOGREL BISULFATE 75 MG: 75 TABLET ORAL at 09:04

## 2024-08-29 RX ADMIN — PREDNISONE 40 MG: 20 TABLET ORAL at 09:05

## 2024-08-30 ENCOUNTER — TRANSITIONAL CARE MANAGEMENT TELEPHONE ENCOUNTER (OUTPATIENT)
Dept: CALL CENTER | Facility: HOSPITAL | Age: 84
End: 2024-08-30
Payer: MEDICARE

## 2024-08-30 LAB
BACTERIA SPEC RESP CULT: NORMAL
GRAM STN SPEC: NORMAL

## 2024-08-31 ENCOUNTER — TRANSITIONAL CARE MANAGEMENT TELEPHONE ENCOUNTER (OUTPATIENT)
Dept: CALL CENTER | Facility: HOSPITAL | Age: 84
End: 2024-08-31
Payer: MEDICARE

## 2024-09-01 LAB
BACTERIA SPEC AEROBE CULT: NORMAL
BACTERIA SPEC AEROBE CULT: NORMAL

## 2024-09-02 ENCOUNTER — APPOINTMENT (OUTPATIENT)
Dept: GENERAL RADIOLOGY | Facility: HOSPITAL | Age: 84
End: 2024-09-02
Payer: MEDICARE

## 2024-09-02 ENCOUNTER — HOSPITAL ENCOUNTER (EMERGENCY)
Facility: HOSPITAL | Age: 84
Discharge: HOME | End: 2024-09-02
Attending: EMERGENCY MEDICINE
Payer: MEDICARE

## 2024-09-02 ENCOUNTER — APPOINTMENT (OUTPATIENT)
Dept: CT IMAGING | Facility: HOSPITAL | Age: 84
End: 2024-09-02
Payer: MEDICARE

## 2024-09-02 VITALS
BODY MASS INDEX: 25.63 KG/M2 | TEMPERATURE: 98.6 F | RESPIRATION RATE: 20 BRPM | OXYGEN SATURATION: 96 % | WEIGHT: 179.01 LBS | SYSTOLIC BLOOD PRESSURE: 167 MMHG | HEIGHT: 70 IN | DIASTOLIC BLOOD PRESSURE: 81 MMHG | HEART RATE: 89 BPM

## 2024-09-02 DIAGNOSIS — R07.81 PLEURITIC CHEST PAIN: Primary | ICD-10-CM

## 2024-09-02 LAB
ACANTHOCYTES BLD QL SMEAR: ABNORMAL
ALBUMIN SERPL-MCNC: 2.5 G/DL (ref 3.5–5.2)
ALBUMIN/GLOB SERPL: 0.9 G/DL
ALP SERPL-CCNC: 81 U/L (ref 39–117)
ALT SERPL W P-5'-P-CCNC: 36 U/L (ref 1–41)
ANION GAP SERPL CALCULATED.3IONS-SCNC: 10.9 MMOL/L (ref 5–15)
ANISOCYTOSIS BLD QL: ABNORMAL
AST SERPL-CCNC: 16 U/L (ref 1–40)
BASOPHILS # BLD MANUAL: 0.15 10*3/MM3 (ref 0–0.2)
BASOPHILS NFR BLD MANUAL: 1 % (ref 0–1.5)
BILIRUB SERPL-MCNC: 0.5 MG/DL (ref 0–1.2)
BUN SERPL-MCNC: 18 MG/DL (ref 8–23)
BUN/CREAT SERPL: 22.2 (ref 7–25)
BURR CELLS BLD QL SMEAR: ABNORMAL
CALCIUM SPEC-SCNC: 8.5 MG/DL (ref 8.6–10.5)
CHLORIDE SERPL-SCNC: 101 MMOL/L (ref 98–107)
CLUMPED PLATELETS: PRESENT
CO2 SERPL-SCNC: 25.1 MMOL/L (ref 22–29)
CREAT SERPL-MCNC: 0.81 MG/DL (ref 0.76–1.27)
D-LACTATE SERPL-SCNC: 1.6 MMOL/L (ref 0.5–2)
DACRYOCYTES BLD QL SMEAR: ABNORMAL
DEPRECATED RDW RBC AUTO: 51.7 FL (ref 37–54)
EGFRCR SERPLBLD CKD-EPI 2021: 88.6 ML/MIN/1.73
EOSINOPHIL # BLD MANUAL: 0.3 10*3/MM3 (ref 0–0.4)
EOSINOPHIL NFR BLD MANUAL: 2 % (ref 0.3–6.2)
ERYTHROCYTE [DISTWIDTH] IN BLOOD BY AUTOMATED COUNT: 19.8 % (ref 12.3–15.4)
GEN 5 2HR TROPONIN T REFLEX: 65 NG/L
GLOBULIN UR ELPH-MCNC: 2.9 GM/DL
GLUCOSE SERPL-MCNC: 243 MG/DL (ref 65–99)
HCT VFR BLD AUTO: 35.9 % (ref 37.5–51)
HGB BLD-MCNC: 11.3 G/DL (ref 13–17.7)
HOLD SPECIMEN: NORMAL
HOLD SPECIMEN: NORMAL
LARGE PLATELETS: ABNORMAL
LYMPHOCYTES # BLD MANUAL: 0.75 10*3/MM3 (ref 0.7–3.1)
LYMPHOCYTES NFR BLD MANUAL: 1 % (ref 5–12)
MACROCYTES BLD QL SMEAR: ABNORMAL
MCH RBC QN AUTO: 24.4 PG (ref 26.6–33)
MCHC RBC AUTO-ENTMCNC: 31.5 G/DL (ref 31.5–35.7)
MCV RBC AUTO: 77.4 FL (ref 79–97)
METAMYELOCYTES NFR BLD MANUAL: 2 % (ref 0–0)
MONOCYTES # BLD: 0.15 10*3/MM3 (ref 0.1–0.9)
NEUTROPHILS # BLD AUTO: 13.36 10*3/MM3 (ref 1.7–7)
NEUTROPHILS NFR BLD MANUAL: 85 % (ref 42.7–76)
NEUTS BAND NFR BLD MANUAL: 4 % (ref 0–5)
NT-PROBNP SERPL-MCNC: 894 PG/ML (ref 0–1800)
OVALOCYTES BLD QL SMEAR: ABNORMAL
PLATELET # BLD AUTO: 244 10*3/MM3 (ref 140–450)
PMV BLD AUTO: 9.7 FL (ref 6–12)
POIKILOCYTOSIS BLD QL SMEAR: ABNORMAL
POTASSIUM SERPL-SCNC: 3.6 MMOL/L (ref 3.5–5.2)
PROT SERPL-MCNC: 5.4 G/DL (ref 6–8.5)
RBC # BLD AUTO: 4.64 10*6/MM3 (ref 4.14–5.8)
SCAN SLIDE: NORMAL
SMALL PLATELETS BLD QL SMEAR: ADEQUATE
SODIUM SERPL-SCNC: 137 MMOL/L (ref 136–145)
TROPONIN T DELTA: -11 NG/L
TROPONIN T SERPL HS-MCNC: 76 NG/L
VARIANT LYMPHS NFR BLD MANUAL: 5 % (ref 19.6–45.3)
WBC MORPH BLD: NORMAL
WBC NRBC COR # BLD AUTO: 15.01 10*3/MM3 (ref 3.4–10.8)
WHOLE BLOOD HOLD COAG: NORMAL
WHOLE BLOOD HOLD SPECIMEN: NORMAL

## 2024-09-02 PROCEDURE — 85007 BL SMEAR W/DIFF WBC COUNT: CPT

## 2024-09-02 PROCEDURE — 87040 BLOOD CULTURE FOR BACTERIA: CPT | Performed by: EMERGENCY MEDICINE

## 2024-09-02 PROCEDURE — 80053 COMPREHEN METABOLIC PANEL: CPT

## 2024-09-02 PROCEDURE — 93010 ELECTROCARDIOGRAM REPORT: CPT | Performed by: INTERNAL MEDICINE

## 2024-09-02 PROCEDURE — 71260 CT THORAX DX C+: CPT

## 2024-09-02 PROCEDURE — 25510000001 IOPAMIDOL PER 1 ML: Performed by: EMERGENCY MEDICINE

## 2024-09-02 PROCEDURE — 99285 EMERGENCY DEPT VISIT HI MDM: CPT

## 2024-09-02 PROCEDURE — 36415 COLL VENOUS BLD VENIPUNCTURE: CPT

## 2024-09-02 PROCEDURE — 83605 ASSAY OF LACTIC ACID: CPT | Performed by: EMERGENCY MEDICINE

## 2024-09-02 PROCEDURE — 85025 COMPLETE CBC W/AUTO DIFF WBC: CPT

## 2024-09-02 PROCEDURE — 84484 ASSAY OF TROPONIN QUANT: CPT | Performed by: EMERGENCY MEDICINE

## 2024-09-02 PROCEDURE — 83880 ASSAY OF NATRIURETIC PEPTIDE: CPT

## 2024-09-02 PROCEDURE — 71045 X-RAY EXAM CHEST 1 VIEW: CPT

## 2024-09-02 PROCEDURE — 93005 ELECTROCARDIOGRAM TRACING: CPT | Performed by: EMERGENCY MEDICINE

## 2024-09-02 PROCEDURE — 84484 ASSAY OF TROPONIN QUANT: CPT

## 2024-09-02 PROCEDURE — 36415 COLL VENOUS BLD VENIPUNCTURE: CPT | Performed by: EMERGENCY MEDICINE

## 2024-09-02 PROCEDURE — 93005 ELECTROCARDIOGRAM TRACING: CPT

## 2024-09-02 RX ORDER — HYDROCODONE BITARTRATE AND ACETAMINOPHEN 5; 325 MG/1; MG/1
1 TABLET ORAL EVERY 6 HOURS PRN
Qty: 15 TABLET | Refills: 0 | Status: SHIPPED | OUTPATIENT
Start: 2024-09-02 | End: 2024-09-09 | Stop reason: SDUPTHER

## 2024-09-02 RX ORDER — IOPAMIDOL 755 MG/ML
100 INJECTION, SOLUTION INTRAVASCULAR
Status: COMPLETED | OUTPATIENT
Start: 2024-09-02 | End: 2024-09-02

## 2024-09-02 RX ORDER — SODIUM CHLORIDE 0.9 % (FLUSH) 0.9 %
10 SYRINGE (ML) INJECTION AS NEEDED
Status: DISCONTINUED | OUTPATIENT
Start: 2024-09-02 | End: 2024-09-02 | Stop reason: HOSPADM

## 2024-09-02 RX ADMIN — IOPAMIDOL 100 ML: 755 INJECTION, SOLUTION INTRAVENOUS at 15:06

## 2024-09-03 ENCOUNTER — TELEPHONE (OUTPATIENT)
Dept: GASTROENTEROLOGY | Facility: CLINIC | Age: 84
End: 2024-09-03
Payer: MEDICARE

## 2024-09-03 LAB
QT INTERVAL: 353 MS
QTC INTERVAL: 441 MS

## 2024-09-04 ENCOUNTER — TELEPHONE (OUTPATIENT)
Dept: GASTROENTEROLOGY | Facility: CLINIC | Age: 84
End: 2024-09-04
Payer: MEDICARE

## 2024-09-04 ENCOUNTER — OFFICE VISIT (OUTPATIENT)
Dept: INTERNAL MEDICINE | Age: 84
End: 2024-09-04
Payer: MEDICARE

## 2024-09-04 VITALS
SYSTOLIC BLOOD PRESSURE: 124 MMHG | WEIGHT: 179 LBS | TEMPERATURE: 98.4 F | HEIGHT: 70 IN | BODY MASS INDEX: 25.62 KG/M2 | HEART RATE: 122 BPM | DIASTOLIC BLOOD PRESSURE: 76 MMHG | OXYGEN SATURATION: 97 %

## 2024-09-04 DIAGNOSIS — N18.31 STAGE 3A CHRONIC KIDNEY DISEASE: ICD-10-CM

## 2024-09-04 DIAGNOSIS — Z09 HOSPITAL DISCHARGE FOLLOW-UP: Primary | ICD-10-CM

## 2024-09-04 DIAGNOSIS — J18.9 ATYPICAL PNEUMONIA: ICD-10-CM

## 2024-09-04 DIAGNOSIS — E78.5 HYPERLIPIDEMIA LDL GOAL <70: ICD-10-CM

## 2024-09-04 DIAGNOSIS — I50.32 CHRONIC HEART FAILURE WITH PRESERVED EJECTION FRACTION (HFPEF): ICD-10-CM

## 2024-09-04 DIAGNOSIS — J18.9 MULTIFOCAL PNEUMONIA: ICD-10-CM

## 2024-09-04 DIAGNOSIS — R05.1 ACUTE COUGH: ICD-10-CM

## 2024-09-04 DIAGNOSIS — I48.19 ATRIAL FIBRILLATION, PERSISTENT: ICD-10-CM

## 2024-09-04 DIAGNOSIS — I10 ESSENTIAL HYPERTENSION: ICD-10-CM

## 2024-09-04 PROBLEM — T14.8XXA FRACTURE: Status: RESOLVED | Noted: 2024-05-24 | Resolved: 2024-09-04

## 2024-09-04 PROCEDURE — 3078F DIAST BP <80 MM HG: CPT | Performed by: INTERNAL MEDICINE

## 2024-09-04 PROCEDURE — 1125F AMNT PAIN NOTED PAIN PRSNT: CPT | Performed by: INTERNAL MEDICINE

## 2024-09-04 PROCEDURE — 1160F RVW MEDS BY RX/DR IN RCRD: CPT | Performed by: INTERNAL MEDICINE

## 2024-09-04 PROCEDURE — 3074F SYST BP LT 130 MM HG: CPT | Performed by: INTERNAL MEDICINE

## 2024-09-04 PROCEDURE — 1159F MED LIST DOCD IN RCRD: CPT | Performed by: INTERNAL MEDICINE

## 2024-09-04 PROCEDURE — 99495 TRANSJ CARE MGMT MOD F2F 14D: CPT | Performed by: INTERNAL MEDICINE

## 2024-09-04 PROCEDURE — 1111F DSCHRG MED/CURRENT MED MERGE: CPT | Performed by: INTERNAL MEDICINE

## 2024-09-04 RX ORDER — ALBUTEROL SULFATE 0.63 MG/3ML
1 SOLUTION RESPIRATORY (INHALATION) EVERY 6 HOURS PRN
Qty: 90 ML | Refills: 1 | Status: SHIPPED | OUTPATIENT
Start: 2024-09-04

## 2024-09-05 ENCOUNTER — TELEPHONE (OUTPATIENT)
Dept: CASE MANAGEMENT | Facility: OTHER | Age: 84
End: 2024-09-05
Payer: MEDICARE

## 2024-09-06 ENCOUNTER — PATIENT OUTREACH (OUTPATIENT)
Dept: CASE MANAGEMENT | Facility: OTHER | Age: 84
End: 2024-09-06
Payer: MEDICARE

## 2024-09-06 DIAGNOSIS — U07.1 COVID-19: Primary | ICD-10-CM

## 2024-09-06 DIAGNOSIS — J18.9 MULTIFOCAL PNEUMONIA: ICD-10-CM

## 2024-09-06 LAB
QT INTERVAL: 374 MS
QTC INTERVAL: 428 MS

## 2024-09-07 ENCOUNTER — HOSPITAL ENCOUNTER (EMERGENCY)
Facility: HOSPITAL | Age: 84
Discharge: HOME | End: 2024-09-07
Attending: EMERGENCY MEDICINE
Payer: MEDICARE

## 2024-09-07 ENCOUNTER — APPOINTMENT (OUTPATIENT)
Dept: GENERAL RADIOLOGY | Facility: HOSPITAL | Age: 84
End: 2024-09-07
Payer: MEDICARE

## 2024-09-07 VITALS
OXYGEN SATURATION: 96 % | RESPIRATION RATE: 18 BRPM | DIASTOLIC BLOOD PRESSURE: 86 MMHG | HEART RATE: 80 BPM | BODY MASS INDEX: 25.68 KG/M2 | TEMPERATURE: 97.6 F | HEIGHT: 70 IN | SYSTOLIC BLOOD PRESSURE: 138 MMHG

## 2024-09-07 DIAGNOSIS — R07.81 PLEURITIC CHEST PAIN: Primary | ICD-10-CM

## 2024-09-07 LAB
ALBUMIN SERPL-MCNC: 2.6 G/DL (ref 3.5–5.2)
ALBUMIN/GLOB SERPL: 0.7 G/DL
ALP SERPL-CCNC: 106 U/L (ref 39–117)
ALT SERPL W P-5'-P-CCNC: 19 U/L (ref 1–41)
ANION GAP SERPL CALCULATED.3IONS-SCNC: 9.4 MMOL/L (ref 5–15)
ANISOCYTOSIS BLD QL: NORMAL
AST SERPL-CCNC: 15 U/L (ref 1–40)
BACTERIA SPEC AEROBE CULT: NORMAL
BACTERIA SPEC AEROBE CULT: NORMAL
BASOPHILS # BLD AUTO: 0.17 10*3/MM3 (ref 0–0.2)
BASOPHILS NFR BLD AUTO: 1.4 % (ref 0–1.5)
BILIRUB SERPL-MCNC: 0.5 MG/DL (ref 0–1.2)
BUN SERPL-MCNC: 22 MG/DL (ref 8–23)
BUN/CREAT SERPL: 21.6 (ref 7–25)
BURR CELLS BLD QL SMEAR: NORMAL
CALCIUM SPEC-SCNC: 8.6 MG/DL (ref 8.6–10.5)
CHLORIDE SERPL-SCNC: 97 MMOL/L (ref 98–107)
CO2 SERPL-SCNC: 24.6 MMOL/L (ref 22–29)
CREAT SERPL-MCNC: 1.02 MG/DL (ref 0.76–1.27)
DACRYOCYTES BLD QL SMEAR: NORMAL
DEPRECATED RDW RBC AUTO: 55.9 FL (ref 37–54)
EGFRCR SERPLBLD CKD-EPI 2021: 73.8 ML/MIN/1.73
ELLIPTOCYTES BLD QL SMEAR: NORMAL
EOSINOPHIL # BLD AUTO: 0.05 10*3/MM3 (ref 0–0.4)
EOSINOPHIL NFR BLD AUTO: 0.4 % (ref 0.3–6.2)
ERYTHROCYTE [DISTWIDTH] IN BLOOD BY AUTOMATED COUNT: 20.8 % (ref 12.3–15.4)
GEN 5 2HR TROPONIN T REFLEX: 55 NG/L
GLOBULIN UR ELPH-MCNC: 3.5 GM/DL
GLUCOSE SERPL-MCNC: 154 MG/DL (ref 65–99)
HCT VFR BLD AUTO: 34.8 % (ref 37.5–51)
HGB BLD-MCNC: 10.7 G/DL (ref 13–17.7)
HOLD SPECIMEN: NORMAL
HOLD SPECIMEN: NORMAL
HYPOCHROMIA BLD QL: NORMAL
IMM GRANULOCYTES # BLD AUTO: 1.36 10*3/MM3 (ref 0–0.05)
IMM GRANULOCYTES NFR BLD AUTO: 11.5 % (ref 0–0.5)
LARGE PLATELETS: NORMAL
LIPASE SERPL-CCNC: 13 U/L (ref 13–60)
LYMPHOCYTES # BLD AUTO: 0.6 10*3/MM3 (ref 0.7–3.1)
LYMPHOCYTES NFR BLD AUTO: 5.1 % (ref 19.6–45.3)
MAGNESIUM SERPL-MCNC: 1.8 MG/DL (ref 1.6–2.4)
MCH RBC QN AUTO: 24.1 PG (ref 26.6–33)
MCHC RBC AUTO-ENTMCNC: 30.7 G/DL (ref 31.5–35.7)
MCV RBC AUTO: 78.4 FL (ref 79–97)
MONOCYTES # BLD AUTO: 1.02 10*3/MM3 (ref 0.1–0.9)
MONOCYTES NFR BLD AUTO: 8.6 % (ref 5–12)
NEUTROPHILS NFR BLD AUTO: 73 % (ref 42.7–76)
NEUTROPHILS NFR BLD AUTO: 8.62 10*3/MM3 (ref 1.7–7)
NRBC BLD AUTO-RTO: 0 /100 WBC (ref 0–0.2)
NT-PROBNP SERPL-MCNC: 1155 PG/ML (ref 0–1800)
OVALOCYTES BLD QL SMEAR: NORMAL
PLATELET # BLD AUTO: 309 10*3/MM3 (ref 140–450)
PMV BLD AUTO: 9.4 FL (ref 6–12)
POIKILOCYTOSIS BLD QL SMEAR: NORMAL
POLYCHROMASIA BLD QL SMEAR: NORMAL
POTASSIUM SERPL-SCNC: 4 MMOL/L (ref 3.5–5.2)
PROT SERPL-MCNC: 6.1 G/DL (ref 6–8.5)
QT INTERVAL: 399 MS
QT INTERVAL: 404 MS
QTC INTERVAL: 428 MS
QTC INTERVAL: 436 MS
RBC # BLD AUTO: 4.44 10*6/MM3 (ref 4.14–5.8)
SODIUM SERPL-SCNC: 131 MMOL/L (ref 136–145)
TROPONIN T DELTA: -14 NG/L
TROPONIN T SERPL HS-MCNC: 69 NG/L
WBC MORPH BLD: NORMAL
WBC NRBC COR # BLD AUTO: 11.82 10*3/MM3 (ref 3.4–10.8)
WHOLE BLOOD HOLD COAG: NORMAL
WHOLE BLOOD HOLD SPECIMEN: NORMAL

## 2024-09-07 PROCEDURE — 83690 ASSAY OF LIPASE: CPT

## 2024-09-07 PROCEDURE — 80053 COMPREHEN METABOLIC PANEL: CPT

## 2024-09-07 PROCEDURE — 36415 COLL VENOUS BLD VENIPUNCTURE: CPT

## 2024-09-07 PROCEDURE — 94761 N-INVAS EAR/PLS OXIMETRY MLT: CPT

## 2024-09-07 PROCEDURE — 71045 X-RAY EXAM CHEST 1 VIEW: CPT

## 2024-09-07 PROCEDURE — 83880 ASSAY OF NATRIURETIC PEPTIDE: CPT

## 2024-09-07 PROCEDURE — 85007 BL SMEAR W/DIFF WBC COUNT: CPT

## 2024-09-07 PROCEDURE — 85025 COMPLETE CBC W/AUTO DIFF WBC: CPT

## 2024-09-07 PROCEDURE — 99284 EMERGENCY DEPT VISIT MOD MDM: CPT

## 2024-09-07 PROCEDURE — 93005 ELECTROCARDIOGRAM TRACING: CPT

## 2024-09-07 PROCEDURE — 94799 UNLISTED PULMONARY SVC/PX: CPT

## 2024-09-07 PROCEDURE — 84484 ASSAY OF TROPONIN QUANT: CPT

## 2024-09-07 PROCEDURE — 94640 AIRWAY INHALATION TREATMENT: CPT

## 2024-09-07 PROCEDURE — 83735 ASSAY OF MAGNESIUM: CPT

## 2024-09-07 PROCEDURE — 93005 ELECTROCARDIOGRAM TRACING: CPT | Performed by: EMERGENCY MEDICINE

## 2024-09-07 RX ORDER — ASPIRIN 81 MG/1
324 TABLET, CHEWABLE ORAL ONCE
Status: DISCONTINUED | OUTPATIENT
Start: 2024-09-07 | End: 2024-09-07 | Stop reason: HOSPADM

## 2024-09-07 RX ORDER — IPRATROPIUM BROMIDE AND ALBUTEROL SULFATE 2.5; .5 MG/3ML; MG/3ML
6 SOLUTION RESPIRATORY (INHALATION) ONCE
Status: COMPLETED | OUTPATIENT
Start: 2024-09-07 | End: 2024-09-07

## 2024-09-07 RX ORDER — HYDROCODONE BITARTRATE AND ACETAMINOPHEN 5; 325 MG/1; MG/1
1 TABLET ORAL ONCE AS NEEDED
Status: COMPLETED | OUTPATIENT
Start: 2024-09-07 | End: 2024-09-07

## 2024-09-07 RX ORDER — SODIUM CHLORIDE 0.9 % (FLUSH) 0.9 %
10 SYRINGE (ML) INJECTION AS NEEDED
Status: DISCONTINUED | OUTPATIENT
Start: 2024-09-07 | End: 2024-09-07 | Stop reason: HOSPADM

## 2024-09-07 RX ADMIN — IPRATROPIUM BROMIDE AND ALBUTEROL SULFATE 6 ML: .5; 3 SOLUTION RESPIRATORY (INHALATION) at 08:20

## 2024-09-07 RX ADMIN — HYDROCODONE BITARTRATE AND ACETAMINOPHEN 1 TABLET: 5; 325 TABLET ORAL at 08:10

## 2024-09-09 ENCOUNTER — PATIENT OUTREACH (OUTPATIENT)
Dept: CASE MANAGEMENT | Facility: OTHER | Age: 84
End: 2024-09-09
Payer: MEDICARE

## 2024-09-09 ENCOUNTER — TELEPHONE (OUTPATIENT)
Dept: CASE MANAGEMENT | Facility: OTHER | Age: 84
End: 2024-09-09
Payer: MEDICARE

## 2024-09-09 DIAGNOSIS — R07.81 PLEURITIC CHEST PAIN: Primary | ICD-10-CM

## 2024-09-09 RX ORDER — PREDNISONE 20 MG/1
20 TABLET ORAL 2 TIMES DAILY
Qty: 10 TABLET | Refills: 0 | Status: SHIPPED | OUTPATIENT
Start: 2024-09-09 | End: 2024-09-14

## 2024-09-09 RX ORDER — HYDROCODONE BITARTRATE AND ACETAMINOPHEN 7.5; 325 MG/1; MG/1
1 TABLET ORAL EVERY 8 HOURS PRN
Qty: 20 TABLET | Refills: 0 | Status: SHIPPED | OUTPATIENT
Start: 2024-09-09

## 2024-09-12 LAB
QT INTERVAL: 399 MS
QT INTERVAL: 404 MS
QTC INTERVAL: 428 MS
QTC INTERVAL: 436 MS

## 2024-09-16 ENCOUNTER — HOSPITAL ENCOUNTER (OUTPATIENT)
Dept: CT IMAGING | Facility: HOSPITAL | Age: 84
Discharge: HOME | End: 2024-09-16
Admitting: INTERNAL MEDICINE
Payer: MEDICARE

## 2024-09-16 ENCOUNTER — OFFICE VISIT (OUTPATIENT)
Dept: INTERNAL MEDICINE | Age: 84
End: 2024-09-16
Payer: MEDICARE

## 2024-09-16 VITALS
DIASTOLIC BLOOD PRESSURE: 90 MMHG | TEMPERATURE: 97.8 F | HEIGHT: 70 IN | WEIGHT: 177.8 LBS | SYSTOLIC BLOOD PRESSURE: 146 MMHG | BODY MASS INDEX: 25.45 KG/M2

## 2024-09-16 DIAGNOSIS — R07.89 LEFT-SIDED CHEST WALL PAIN: ICD-10-CM

## 2024-09-16 DIAGNOSIS — D50.9 IRON DEFICIENCY ANEMIA, UNSPECIFIED IRON DEFICIENCY ANEMIA TYPE: ICD-10-CM

## 2024-09-16 DIAGNOSIS — J18.9 MULTIFOCAL PNEUMONIA: ICD-10-CM

## 2024-09-16 DIAGNOSIS — R10.12 LEFT UPPER QUADRANT PAIN: ICD-10-CM

## 2024-09-16 DIAGNOSIS — R10.12 LEFT UPPER QUADRANT PAIN: Primary | ICD-10-CM

## 2024-09-16 DIAGNOSIS — I10 ESSENTIAL HYPERTENSION: ICD-10-CM

## 2024-09-16 DIAGNOSIS — R06.09 DYSPNEA ON EXERTION: ICD-10-CM

## 2024-09-16 PROBLEM — Z09 HOSPITAL DISCHARGE FOLLOW-UP: Status: RESOLVED | Noted: 2022-08-25 | Resolved: 2024-09-16

## 2024-09-16 PROCEDURE — 3080F DIAST BP >= 90 MM HG: CPT | Performed by: INTERNAL MEDICINE

## 2024-09-16 PROCEDURE — 74177 CT ABD & PELVIS W/CONTRAST: CPT

## 2024-09-16 PROCEDURE — 99214 OFFICE O/P EST MOD 30 MIN: CPT | Performed by: INTERNAL MEDICINE

## 2024-09-16 PROCEDURE — 71260 CT THORAX DX C+: CPT

## 2024-09-16 PROCEDURE — 1125F AMNT PAIN NOTED PAIN PRSNT: CPT | Performed by: INTERNAL MEDICINE

## 2024-09-16 PROCEDURE — 3077F SYST BP >= 140 MM HG: CPT | Performed by: INTERNAL MEDICINE

## 2024-09-16 PROCEDURE — 25510000001 IOPAMIDOL PER 1 ML: Performed by: INTERNAL MEDICINE

## 2024-09-16 PROCEDURE — G2211 COMPLEX E/M VISIT ADD ON: HCPCS | Performed by: INTERNAL MEDICINE

## 2024-09-16 RX ORDER — PREGABALIN 25 MG/1
CAPSULE ORAL
Qty: 60 CAPSULE | Refills: 1 | Status: SHIPPED | OUTPATIENT
Start: 2024-09-16

## 2024-09-16 RX ORDER — IOPAMIDOL 755 MG/ML
100 INJECTION, SOLUTION INTRAVASCULAR
Status: COMPLETED | OUTPATIENT
Start: 2024-09-16 | End: 2024-09-16

## 2024-09-16 RX ADMIN — IOPAMIDOL 100 ML: 755 INJECTION, SOLUTION INTRAVENOUS at 17:53

## 2024-09-17 ENCOUNTER — TELEPHONE (OUTPATIENT)
Dept: INTERNAL MEDICINE | Age: 84
End: 2024-09-17
Payer: MEDICARE

## 2024-09-17 DIAGNOSIS — S22.060G CLOSED WEDGE COMPRESSION FRACTURE OF T8 VERTEBRA WITH DELAYED HEALING, SUBSEQUENT ENCOUNTER: Primary | ICD-10-CM

## 2024-09-18 ENCOUNTER — TELEPHONE (OUTPATIENT)
Dept: INTERNAL MEDICINE | Age: 84
End: 2024-09-18
Payer: MEDICARE

## 2024-09-18 ENCOUNTER — LAB (OUTPATIENT)
Dept: INTERNAL MEDICINE | Age: 84
End: 2024-09-18
Payer: MEDICARE

## 2024-09-18 DIAGNOSIS — R06.09 DYSPNEA ON EXERTION: ICD-10-CM

## 2024-09-18 DIAGNOSIS — D50.9 IRON DEFICIENCY ANEMIA, UNSPECIFIED IRON DEFICIENCY ANEMIA TYPE: ICD-10-CM

## 2024-09-18 DIAGNOSIS — E11.9 TYPE 2 DIABETES MELLITUS WITHOUT COMPLICATION, WITHOUT LONG-TERM CURRENT USE OF INSULIN: Chronic | ICD-10-CM

## 2024-09-18 DIAGNOSIS — R07.89 LEFT-SIDED CHEST WALL PAIN: ICD-10-CM

## 2024-09-18 LAB
ALBUMIN SERPL-MCNC: 3 G/DL (ref 3.5–5.2)
ANION GAP SERPL CALCULATED.3IONS-SCNC: 14.1 MMOL/L (ref 5–15)
ANISOCYTOSIS BLD QL: NORMAL
BASOPHILS # BLD AUTO: 0.18 10*3/MM3 (ref 0–0.2)
BASOPHILS NFR BLD AUTO: 0.7 % (ref 0–1.5)
BUN SERPL-MCNC: 22 MG/DL (ref 8–23)
BUN/CREAT SERPL: 20.2 (ref 7–25)
BURR CELLS BLD QL SMEAR: NORMAL
C3 FRG RBC-MCNC: NORMAL
CALCIUM SPEC-SCNC: 8.9 MG/DL (ref 8.6–10.5)
CHLORIDE SERPL-SCNC: 95 MMOL/L (ref 98–107)
CO2 SERPL-SCNC: 24.9 MMOL/L (ref 22–29)
CREAT SERPL-MCNC: 1.09 MG/DL (ref 0.76–1.27)
CRP SERPL-MCNC: 4.55 MG/DL (ref 0–0.5)
DEPRECATED RDW RBC AUTO: 65.4 FL (ref 37–54)
EGFRCR SERPLBLD CKD-EPI 2021: 68.2 ML/MIN/1.73
EOSINOPHIL # BLD AUTO: 0.01 10*3/MM3 (ref 0–0.4)
EOSINOPHIL NFR BLD AUTO: 0 % (ref 0.3–6.2)
ERYTHROCYTE [DISTWIDTH] IN BLOOD BY AUTOMATED COUNT: 23 % (ref 12.3–15.4)
ERYTHROCYTE [SEDIMENTATION RATE] IN BLOOD: 25 MM/HR (ref 0–20)
FERRITIN SERPL-MCNC: 144.3 NG/ML (ref 30–400)
GIANT PLATELETS: NORMAL
GLUCOSE SERPL-MCNC: 238 MG/DL (ref 65–99)
HBA1C MFR BLD: 8.1 % (ref 4.8–5.6)
HCT VFR BLD AUTO: 40.1 % (ref 37.5–51)
HGB BLD-MCNC: 12.1 G/DL (ref 13–17.7)
HYPOCHROMIA BLD QL: NORMAL
IMM GRANULOCYTES # BLD AUTO: 1.84 10*3/MM3 (ref 0–0.05)
IMM GRANULOCYTES NFR BLD AUTO: 6.9 % (ref 0–0.5)
IRON 24H UR-MRATE: 59 MCG/DL (ref 59–158)
IRON SATN MFR SERPL: 17 % (ref 20–50)
LARGE PLATELETS: NORMAL
LYMPHOCYTES # BLD AUTO: 0.41 10*3/MM3 (ref 0.7–3.1)
LYMPHOCYTES NFR BLD AUTO: 1.5 % (ref 19.6–45.3)
MCH RBC QN AUTO: 24.7 PG (ref 26.6–33)
MCHC RBC AUTO-ENTMCNC: 30.2 G/DL (ref 31.5–35.7)
MCV RBC AUTO: 81.8 FL (ref 79–97)
MICROCYTES BLD QL: NORMAL
MONOCYTES # BLD AUTO: 1.43 10*3/MM3 (ref 0.1–0.9)
MONOCYTES NFR BLD AUTO: 5.4 % (ref 5–12)
NEUTROPHILS NFR BLD AUTO: 22.69 10*3/MM3 (ref 1.7–7)
NEUTROPHILS NFR BLD AUTO: 85.5 % (ref 42.7–76)
NRBC BLD AUTO-RTO: 0 /100 WBC (ref 0–0.2)
NT-PROBNP SERPL-MCNC: 1376 PG/ML (ref 0–1800)
OVALOCYTES BLD QL SMEAR: NORMAL
PHOSPHATE SERPL-MCNC: 3.6 MG/DL (ref 2.5–4.5)
PLATELET # BLD AUTO: 586 10*3/MM3 (ref 140–450)
PMV BLD AUTO: 9.4 FL (ref 6–12)
POTASSIUM SERPL-SCNC: 4.2 MMOL/L (ref 3.5–5.2)
RBC # BLD AUTO: 4.9 10*6/MM3 (ref 4.14–5.8)
SMALL PLATELETS BLD QL SMEAR: NORMAL
SODIUM SERPL-SCNC: 134 MMOL/L (ref 136–145)
TIBC SERPL-MCNC: 340 MCG/DL (ref 298–536)
TRANSFERRIN SERPL-MCNC: 228 MG/DL (ref 200–360)
WBC MORPH BLD: NORMAL
WBC NRBC COR # BLD AUTO: 26.56 10*3/MM3 (ref 3.4–10.8)

## 2024-09-18 PROCEDURE — 83540 ASSAY OF IRON: CPT | Performed by: INTERNAL MEDICINE

## 2024-09-18 PROCEDURE — 36415 COLL VENOUS BLD VENIPUNCTURE: CPT | Performed by: INTERNAL MEDICINE

## 2024-09-18 PROCEDURE — 86140 C-REACTIVE PROTEIN: CPT | Performed by: INTERNAL MEDICINE

## 2024-09-18 PROCEDURE — 83880 ASSAY OF NATRIURETIC PEPTIDE: CPT | Performed by: INTERNAL MEDICINE

## 2024-09-18 PROCEDURE — 85025 COMPLETE CBC W/AUTO DIFF WBC: CPT | Performed by: INTERNAL MEDICINE

## 2024-09-18 PROCEDURE — 84466 ASSAY OF TRANSFERRIN: CPT | Performed by: INTERNAL MEDICINE

## 2024-09-18 PROCEDURE — 85007 BL SMEAR W/DIFF WBC COUNT: CPT | Performed by: INTERNAL MEDICINE

## 2024-09-18 PROCEDURE — 82728 ASSAY OF FERRITIN: CPT | Performed by: INTERNAL MEDICINE

## 2024-09-18 PROCEDURE — 83036 HEMOGLOBIN GLYCOSYLATED A1C: CPT | Performed by: INTERNAL MEDICINE

## 2024-09-18 PROCEDURE — 80069 RENAL FUNCTION PANEL: CPT | Performed by: INTERNAL MEDICINE

## 2024-09-18 PROCEDURE — 85652 RBC SED RATE AUTOMATED: CPT | Performed by: INTERNAL MEDICINE

## 2024-09-19 DIAGNOSIS — R07.81 PLEURITIC CHEST PAIN: ICD-10-CM

## 2024-09-19 RX ORDER — SULFAMETHOXAZOLE/TRIMETHOPRIM 800-160 MG
1 TABLET ORAL 2 TIMES DAILY
Qty: 14 TABLET | Refills: 0 | Status: SHIPPED | OUTPATIENT
Start: 2024-09-19

## 2024-09-19 RX ORDER — HYDROCODONE BITARTRATE AND ACETAMINOPHEN 7.5; 325 MG/1; MG/1
1 TABLET ORAL EVERY 8 HOURS PRN
Qty: 20 TABLET | Refills: 0 | OUTPATIENT
Start: 2024-09-19

## 2024-09-19 RX ORDER — AZITHROMYCIN 250 MG/1
TABLET, FILM COATED ORAL
Qty: 6 TABLET | Refills: 0 | Status: SHIPPED | OUTPATIENT
Start: 2024-09-19

## 2024-09-20 RX ORDER — HYDROCODONE BITARTRATE AND ACETAMINOPHEN 7.5; 325 MG/1; MG/1
1 TABLET ORAL EVERY 8 HOURS PRN
Qty: 20 TABLET | Refills: 0 | Status: CANCELLED | OUTPATIENT
Start: 2024-09-20

## 2024-09-21 ENCOUNTER — HOSPITAL ENCOUNTER (OUTPATIENT)
Dept: MRI IMAGING | Facility: HOSPITAL | Age: 84
Discharge: HOME | End: 2024-09-21
Payer: MEDICARE

## 2024-09-21 DIAGNOSIS — S22.060G CLOSED WEDGE COMPRESSION FRACTURE OF T8 VERTEBRA WITH DELAYED HEALING, SUBSEQUENT ENCOUNTER: ICD-10-CM

## 2024-09-21 PROCEDURE — 72146 MRI CHEST SPINE W/O DYE: CPT

## 2024-09-23 ENCOUNTER — OFFICE VISIT (OUTPATIENT)
Dept: INTERNAL MEDICINE | Age: 84
End: 2024-09-23
Payer: MEDICARE

## 2024-09-23 VITALS
DIASTOLIC BLOOD PRESSURE: 76 MMHG | WEIGHT: 177 LBS | SYSTOLIC BLOOD PRESSURE: 110 MMHG | OXYGEN SATURATION: 97 % | HEART RATE: 96 BPM | BODY MASS INDEX: 25.34 KG/M2 | HEIGHT: 70 IN | TEMPERATURE: 97.7 F

## 2024-09-23 DIAGNOSIS — S22.060G CLOSED WEDGE COMPRESSION FRACTURE OF T8 VERTEBRA WITH DELAYED HEALING: ICD-10-CM

## 2024-09-23 DIAGNOSIS — M80.00XA AGE-RELATED OSTEOPOROSIS WITH CURRENT PATHOLOGICAL FRACTURE, INITIAL ENCOUNTER: ICD-10-CM

## 2024-09-23 DIAGNOSIS — M81.8 OTHER OSTEOPOROSIS, UNSPECIFIED PATHOLOGICAL FRACTURE PRESENCE: ICD-10-CM

## 2024-09-23 DIAGNOSIS — R07.81 PLEURITIC CHEST PAIN: ICD-10-CM

## 2024-09-23 DIAGNOSIS — E11.9 TYPE 2 DIABETES MELLITUS WITHOUT COMPLICATION, WITHOUT LONG-TERM CURRENT USE OF INSULIN: Primary | Chronic | ICD-10-CM

## 2024-09-23 DIAGNOSIS — I10 ESSENTIAL HYPERTENSION: ICD-10-CM

## 2024-09-23 PROCEDURE — 3078F DIAST BP <80 MM HG: CPT | Performed by: INTERNAL MEDICINE

## 2024-09-23 PROCEDURE — G2211 COMPLEX E/M VISIT ADD ON: HCPCS | Performed by: INTERNAL MEDICINE

## 2024-09-23 PROCEDURE — 99214 OFFICE O/P EST MOD 30 MIN: CPT | Performed by: INTERNAL MEDICINE

## 2024-09-23 PROCEDURE — 1159F MED LIST DOCD IN RCRD: CPT | Performed by: INTERNAL MEDICINE

## 2024-09-23 PROCEDURE — 1125F AMNT PAIN NOTED PAIN PRSNT: CPT | Performed by: INTERNAL MEDICINE

## 2024-09-23 PROCEDURE — 1160F RVW MEDS BY RX/DR IN RCRD: CPT | Performed by: INTERNAL MEDICINE

## 2024-09-23 PROCEDURE — 3074F SYST BP LT 130 MM HG: CPT | Performed by: INTERNAL MEDICINE

## 2024-09-23 RX ORDER — HYDROCODONE BITARTRATE AND ACETAMINOPHEN 7.5; 325 MG/1; MG/1
1 TABLET ORAL EVERY 8 HOURS PRN
Qty: 20 TABLET | Refills: 0 | Status: SHIPPED | OUTPATIENT
Start: 2024-09-23 | End: 2024-09-25

## 2024-09-23 RX ORDER — CALCITONIN SALMON 200 [IU]/.09ML
1 SPRAY, METERED NASAL DAILY
Qty: 7.4 ML | Refills: 1 | Status: ON HOLD | OUTPATIENT
Start: 2024-09-23 | End: 2025-03-06

## 2024-09-24 ENCOUNTER — TELEPHONE (OUTPATIENT)
Dept: NEUROSURGERY | Facility: CLINIC | Age: 84
End: 2024-09-24
Payer: MEDICARE

## 2024-09-24 RX ORDER — GLIMEPIRIDE 2 MG/1
2 TABLET ORAL
Qty: 90 TABLET | Refills: 3 | Status: ON HOLD | OUTPATIENT
Start: 2024-09-24

## 2024-09-25 ENCOUNTER — HOSPITAL ENCOUNTER (INPATIENT)
Facility: HOSPITAL | Age: 84
LOS: 7 days | DRG: 193 | End: 2024-10-02
Attending: EMERGENCY MEDICINE | Admitting: HOSPITALIST
Payer: MEDICARE

## 2024-09-25 ENCOUNTER — APPOINTMENT (OUTPATIENT)
Dept: CT IMAGING | Facility: HOSPITAL | Age: 84
DRG: 193 | End: 2024-09-25
Payer: MEDICARE

## 2024-09-25 ENCOUNTER — APPOINTMENT (OUTPATIENT)
Dept: GENERAL RADIOLOGY | Facility: HOSPITAL | Age: 84
DRG: 193 | End: 2024-09-25
Payer: MEDICARE

## 2024-09-25 DIAGNOSIS — M53.2X6 SPINAL INSTABILITY OF LUMBAR REGION: ICD-10-CM

## 2024-09-25 DIAGNOSIS — R13.10 DYSPHAGIA, UNSPECIFIED TYPE: ICD-10-CM

## 2024-09-25 DIAGNOSIS — R26.2 DIFFICULTY IN WALKING: ICD-10-CM

## 2024-09-25 DIAGNOSIS — J90 PLEURAL EFFUSION: ICD-10-CM

## 2024-09-25 DIAGNOSIS — J18.9 PNEUMONIA DUE TO INFECTIOUS ORGANISM, UNSPECIFIED LATERALITY, UNSPECIFIED PART OF LUNG: Primary | ICD-10-CM

## 2024-09-25 LAB
ALBUMIN SERPL-MCNC: 2.9 G/DL (ref 3.5–5.2)
ALBUMIN/GLOB SERPL: 1 G/DL
ALP SERPL-CCNC: 115 U/L (ref 39–117)
ALT SERPL W P-5'-P-CCNC: 17 U/L (ref 1–41)
ANION GAP SERPL CALCULATED.3IONS-SCNC: 10.3 MMOL/L (ref 5–15)
ANISOCYTOSIS BLD QL: NORMAL
AST SERPL-CCNC: 18 U/L (ref 1–40)
BASOPHILS # BLD AUTO: 0.14 10*3/MM3 (ref 0–0.2)
BASOPHILS NFR BLD AUTO: 0.5 % (ref 0–1.5)
BILIRUB SERPL-MCNC: 0.6 MG/DL (ref 0–1.2)
BILIRUB UR QL STRIP: NEGATIVE
BUN SERPL-MCNC: 30 MG/DL (ref 8–23)
BUN/CREAT SERPL: 24.6 (ref 7–25)
CALCIUM SPEC-SCNC: 8.8 MG/DL (ref 8.6–10.5)
CHLORIDE SERPL-SCNC: 97 MMOL/L (ref 98–107)
CLARITY UR: CLEAR
CO2 SERPL-SCNC: 28.7 MMOL/L (ref 22–29)
COLOR UR: YELLOW
CREAT SERPL-MCNC: 1.22 MG/DL (ref 0.76–1.27)
CRP SERPL-MCNC: 10.28 MG/DL (ref 0–0.5)
D-LACTATE SERPL-SCNC: 1.7 MMOL/L (ref 0.5–2)
DEPRECATED RDW RBC AUTO: 66.6 FL (ref 37–54)
EGFRCR SERPLBLD CKD-EPI 2021: 59.6 ML/MIN/1.73
EOSINOPHIL # BLD AUTO: 0.13 10*3/MM3 (ref 0–0.4)
EOSINOPHIL NFR BLD AUTO: 0.5 % (ref 0.3–6.2)
ERYTHROCYTE [DISTWIDTH] IN BLOOD BY AUTOMATED COUNT: 23.7 % (ref 12.3–15.4)
ERYTHROCYTE [SEDIMENTATION RATE] IN BLOOD: 50 MM/HR (ref 0–20)
GEN 5 2HR TROPONIN T REFLEX: 95 NG/L
GIANT PLATELETS: NORMAL
GLOBULIN UR ELPH-MCNC: 2.8 GM/DL
GLUCOSE SERPL-MCNC: 109 MG/DL (ref 65–99)
GLUCOSE UR STRIP-MCNC: NEGATIVE MG/DL
HCT VFR BLD AUTO: 36.2 % (ref 37.5–51)
HGB BLD-MCNC: 11.4 G/DL (ref 13–17.7)
HGB UR QL STRIP.AUTO: NEGATIVE
HOLD SPECIMEN: NORMAL
HOLD SPECIMEN: NORMAL
HYPOCHROMIA BLD QL: NORMAL
IMM GRANULOCYTES # BLD AUTO: 1.31 10*3/MM3 (ref 0–0.05)
IMM GRANULOCYTES NFR BLD AUTO: 4.9 % (ref 0–0.5)
KETONES UR QL STRIP: NEGATIVE
L PNEUMO1 AG UR QL IA: NEGATIVE
LEUKOCYTE ESTERASE UR QL STRIP.AUTO: NEGATIVE
LYMPHOCYTES # BLD AUTO: 0.56 10*3/MM3 (ref 0.7–3.1)
LYMPHOCYTES NFR BLD AUTO: 2.1 % (ref 19.6–45.3)
MAGNESIUM SERPL-MCNC: 1.8 MG/DL (ref 1.6–2.4)
MAGNESIUM SERPL-MCNC: 1.8 MG/DL (ref 1.6–2.4)
MCH RBC QN AUTO: 25.2 PG (ref 26.6–33)
MCHC RBC AUTO-ENTMCNC: 31.5 G/DL (ref 31.5–35.7)
MCV RBC AUTO: 79.9 FL (ref 79–97)
MONOCYTES # BLD AUTO: 1.2 10*3/MM3 (ref 0.1–0.9)
MONOCYTES NFR BLD AUTO: 4.5 % (ref 5–12)
MRSA DNA SPEC QL NAA+PROBE: NORMAL
NEUTROPHILS NFR BLD AUTO: 23.13 10*3/MM3 (ref 1.7–7)
NEUTROPHILS NFR BLD AUTO: 87.5 % (ref 42.7–76)
NITRITE UR QL STRIP: NEGATIVE
NRBC BLD AUTO-RTO: 0 /100 WBC (ref 0–0.2)
NT-PROBNP SERPL-MCNC: 788.7 PG/ML (ref 0–1800)
OVALOCYTES BLD QL SMEAR: NORMAL
PH UR STRIP.AUTO: 7 [PH] (ref 5–8)
PHOSPHATE SERPL-MCNC: 3.3 MG/DL (ref 2.5–4.5)
PLATELET # BLD AUTO: 322 10*3/MM3 (ref 140–450)
PMV BLD AUTO: 9.2 FL (ref 6–12)
POIKILOCYTOSIS BLD QL SMEAR: NORMAL
POTASSIUM SERPL-SCNC: 3.6 MMOL/L (ref 3.5–5.2)
PROCALCITONIN SERPL-MCNC: 0.25 NG/ML (ref 0–0.25)
PROT SERPL-MCNC: 5.7 G/DL (ref 6–8.5)
PROT UR QL STRIP: NEGATIVE
RBC # BLD AUTO: 4.53 10*6/MM3 (ref 4.14–5.8)
S PNEUM AG SPEC QL LA: NEGATIVE
SMALL PLATELETS BLD QL SMEAR: ADEQUATE
SODIUM SERPL-SCNC: 136 MMOL/L (ref 136–145)
SP GR UR STRIP: 1.02 (ref 1–1.03)
TROPONIN T DELTA: -9 NG/L
TROPONIN T SERPL HS-MCNC: 104 NG/L
UROBILINOGEN UR QL STRIP: NORMAL
WBC MORPH BLD: NORMAL
WBC NRBC COR # BLD AUTO: 26.47 10*3/MM3 (ref 3.4–10.8)
WHOLE BLOOD HOLD COAG: NORMAL
WHOLE BLOOD HOLD SPECIMEN: NORMAL

## 2024-09-25 PROCEDURE — 87449 NOS EACH ORGANISM AG IA: CPT

## 2024-09-25 PROCEDURE — 25810000003 SODIUM CHLORIDE 0.9 % SOLUTION: Performed by: EMERGENCY MEDICINE

## 2024-09-25 PROCEDURE — 25810000003 SODIUM CHLORIDE 0.9 % SOLUTION 500 ML FLEX CONT: Performed by: HOSPITALIST

## 2024-09-25 PROCEDURE — 83880 ASSAY OF NATRIURETIC PEPTIDE: CPT

## 2024-09-25 PROCEDURE — 93010 ELECTROCARDIOGRAM REPORT: CPT | Performed by: INTERNAL MEDICINE

## 2024-09-25 PROCEDURE — 71260 CT THORAX DX C+: CPT

## 2024-09-25 PROCEDURE — 25010000002 CEFEPIME PER 500 MG: Performed by: EMERGENCY MEDICINE

## 2024-09-25 PROCEDURE — 83735 ASSAY OF MAGNESIUM: CPT | Performed by: EMERGENCY MEDICINE

## 2024-09-25 PROCEDURE — 87899 AGENT NOS ASSAY W/OPTIC: CPT

## 2024-09-25 PROCEDURE — 99223 1ST HOSP IP/OBS HIGH 75: CPT | Performed by: HOSPITALIST

## 2024-09-25 PROCEDURE — 36415 COLL VENOUS BLD VENIPUNCTURE: CPT | Performed by: EMERGENCY MEDICINE

## 2024-09-25 PROCEDURE — 87040 BLOOD CULTURE FOR BACTERIA: CPT | Performed by: EMERGENCY MEDICINE

## 2024-09-25 PROCEDURE — 36415 COLL VENOUS BLD VENIPUNCTURE: CPT

## 2024-09-25 PROCEDURE — 25010000002 CEFEPIME PER 500 MG: Performed by: HOSPITALIST

## 2024-09-25 PROCEDURE — 80053 COMPREHEN METABOLIC PANEL: CPT | Performed by: EMERGENCY MEDICINE

## 2024-09-25 PROCEDURE — 83735 ASSAY OF MAGNESIUM: CPT | Performed by: HOSPITALIST

## 2024-09-25 PROCEDURE — 81003 URINALYSIS AUTO W/O SCOPE: CPT | Performed by: EMERGENCY MEDICINE

## 2024-09-25 PROCEDURE — 85007 BL SMEAR W/DIFF WBC COUNT: CPT | Performed by: EMERGENCY MEDICINE

## 2024-09-25 PROCEDURE — 84100 ASSAY OF PHOSPHORUS: CPT | Performed by: HOSPITALIST

## 2024-09-25 PROCEDURE — 94799 UNLISTED PULMONARY SVC/PX: CPT

## 2024-09-25 PROCEDURE — 99291 CRITICAL CARE FIRST HOUR: CPT

## 2024-09-25 PROCEDURE — 84145 PROCALCITONIN (PCT): CPT

## 2024-09-25 PROCEDURE — 83605 ASSAY OF LACTIC ACID: CPT | Performed by: EMERGENCY MEDICINE

## 2024-09-25 PROCEDURE — 86140 C-REACTIVE PROTEIN: CPT

## 2024-09-25 PROCEDURE — 93005 ELECTROCARDIOGRAM TRACING: CPT | Performed by: EMERGENCY MEDICINE

## 2024-09-25 PROCEDURE — 87641 MR-STAPH DNA AMP PROBE: CPT | Performed by: HOSPITALIST

## 2024-09-25 PROCEDURE — 85652 RBC SED RATE AUTOMATED: CPT

## 2024-09-25 PROCEDURE — 93005 ELECTROCARDIOGRAM TRACING: CPT

## 2024-09-25 PROCEDURE — 85025 COMPLETE CBC W/AUTO DIFF WBC: CPT | Performed by: EMERGENCY MEDICINE

## 2024-09-25 PROCEDURE — 94640 AIRWAY INHALATION TREATMENT: CPT

## 2024-09-25 PROCEDURE — 25010000002 VANCOMYCIN 5 G RECONSTITUTED SOLUTION: Performed by: EMERGENCY MEDICINE

## 2024-09-25 PROCEDURE — 71045 X-RAY EXAM CHEST 1 VIEW: CPT

## 2024-09-25 PROCEDURE — 25510000001 IOPAMIDOL PER 1 ML: Performed by: EMERGENCY MEDICINE

## 2024-09-25 PROCEDURE — 84484 ASSAY OF TROPONIN QUANT: CPT | Performed by: EMERGENCY MEDICINE

## 2024-09-25 PROCEDURE — 74177 CT ABD & PELVIS W/CONTRAST: CPT

## 2024-09-25 RX ORDER — ACETAMINOPHEN 160 MG/5ML
650 SOLUTION ORAL EVERY 4 HOURS PRN
Status: DISCONTINUED | OUTPATIENT
Start: 2024-09-25 | End: 2024-10-02 | Stop reason: HOSPADM

## 2024-09-25 RX ORDER — AMOXICILLIN 250 MG
2 CAPSULE ORAL 2 TIMES DAILY
Status: DISCONTINUED | OUTPATIENT
Start: 2024-09-25 | End: 2024-10-02 | Stop reason: HOSPADM

## 2024-09-25 RX ORDER — SODIUM CHLORIDE 9 MG/ML
40 INJECTION, SOLUTION INTRAVENOUS AS NEEDED
Status: DISCONTINUED | OUTPATIENT
Start: 2024-09-25 | End: 2024-10-02 | Stop reason: HOSPADM

## 2024-09-25 RX ORDER — ONDANSETRON 4 MG/1
4 TABLET, ORALLY DISINTEGRATING ORAL EVERY 6 HOURS PRN
Status: DISCONTINUED | OUTPATIENT
Start: 2024-09-25 | End: 2024-10-02 | Stop reason: HOSPADM

## 2024-09-25 RX ORDER — FUROSEMIDE 20 MG
40 TABLET ORAL TAKE AS DIRECTED
Status: ON HOLD | COMMUNITY

## 2024-09-25 RX ORDER — SODIUM CHLORIDE 0.9 % (FLUSH) 0.9 %
10 SYRINGE (ML) INJECTION AS NEEDED
Status: DISCONTINUED | OUTPATIENT
Start: 2024-09-25 | End: 2024-10-02 | Stop reason: HOSPADM

## 2024-09-25 RX ORDER — IOPAMIDOL 755 MG/ML
100 INJECTION, SOLUTION INTRAVASCULAR
Status: COMPLETED | OUTPATIENT
Start: 2024-09-25 | End: 2024-09-25

## 2024-09-25 RX ORDER — POLYETHYLENE GLYCOL 3350 17 G/17G
17 POWDER, FOR SOLUTION ORAL DAILY PRN
Status: DISCONTINUED | OUTPATIENT
Start: 2024-09-25 | End: 2024-10-02 | Stop reason: HOSPADM

## 2024-09-25 RX ORDER — ACETAMINOPHEN 650 MG/1
650 SUPPOSITORY RECTAL EVERY 4 HOURS PRN
Status: DISCONTINUED | OUTPATIENT
Start: 2024-09-25 | End: 2024-10-02 | Stop reason: HOSPADM

## 2024-09-25 RX ORDER — HYDROCODONE BITARTRATE AND ACETAMINOPHEN 7.5; 325 MG/1; MG/1
1 TABLET ORAL EVERY 4 HOURS PRN
Status: DISPENSED | OUTPATIENT
Start: 2024-09-25 | End: 2024-09-30

## 2024-09-25 RX ORDER — HYDROCODONE BITARTRATE AND ACETAMINOPHEN 7.5; 325 MG/1; MG/1
2 TABLET ORAL DAILY
Status: ON HOLD | COMMUNITY

## 2024-09-25 RX ORDER — ARFORMOTEROL TARTRATE 15 UG/2ML
15 SOLUTION RESPIRATORY (INHALATION)
Status: DISCONTINUED | OUTPATIENT
Start: 2024-09-25 | End: 2024-10-02 | Stop reason: HOSPADM

## 2024-09-25 RX ORDER — IPRATROPIUM BROMIDE AND ALBUTEROL SULFATE 2.5; .5 MG/3ML; MG/3ML
3 SOLUTION RESPIRATORY (INHALATION)
Status: DISCONTINUED | OUTPATIENT
Start: 2024-09-25 | End: 2024-09-29

## 2024-09-25 RX ORDER — ACETAMINOPHEN 325 MG/1
650 TABLET ORAL EVERY 4 HOURS PRN
Status: DISCONTINUED | OUTPATIENT
Start: 2024-09-25 | End: 2024-10-02 | Stop reason: HOSPADM

## 2024-09-25 RX ORDER — BISACODYL 10 MG
10 SUPPOSITORY, RECTAL RECTAL DAILY PRN
Status: DISCONTINUED | OUTPATIENT
Start: 2024-09-25 | End: 2024-10-02 | Stop reason: HOSPADM

## 2024-09-25 RX ORDER — BISACODYL 5 MG/1
5 TABLET, DELAYED RELEASE ORAL DAILY PRN
Status: DISCONTINUED | OUTPATIENT
Start: 2024-09-25 | End: 2024-10-02 | Stop reason: HOSPADM

## 2024-09-25 RX ORDER — ROSUVASTATIN CALCIUM 20 MG/1
20 TABLET, COATED ORAL DAILY
Status: ON HOLD | COMMUNITY

## 2024-09-25 RX ORDER — VANCOMYCIN/0.9 % SOD CHLORIDE 1.5G/250ML
20 PLASTIC BAG, INJECTION (ML) INTRAVENOUS ONCE
Status: COMPLETED | OUTPATIENT
Start: 2024-09-25 | End: 2024-09-25

## 2024-09-25 RX ORDER — GUAIFENESIN 600 MG/1
1200 TABLET, EXTENDED RELEASE ORAL EVERY 12 HOURS SCHEDULED
Status: DISCONTINUED | OUTPATIENT
Start: 2024-09-25 | End: 2024-10-02 | Stop reason: HOSPADM

## 2024-09-25 RX ORDER — SODIUM CHLORIDE 0.9 % (FLUSH) 0.9 %
10 SYRINGE (ML) INJECTION EVERY 12 HOURS SCHEDULED
Status: DISCONTINUED | OUTPATIENT
Start: 2024-09-25 | End: 2024-10-02 | Stop reason: HOSPADM

## 2024-09-25 RX ORDER — BUDESONIDE 0.5 MG/2ML
0.5 INHALANT ORAL
Status: DISCONTINUED | OUTPATIENT
Start: 2024-09-25 | End: 2024-10-02 | Stop reason: HOSPADM

## 2024-09-25 RX ORDER — ONDANSETRON 2 MG/ML
4 INJECTION INTRAMUSCULAR; INTRAVENOUS EVERY 6 HOURS PRN
Status: DISCONTINUED | OUTPATIENT
Start: 2024-09-25 | End: 2024-10-02 | Stop reason: HOSPADM

## 2024-09-25 RX ORDER — HYDROCODONE BITARTRATE AND ACETAMINOPHEN 7.5; 325 MG/1; MG/1
1 TABLET ORAL ONCE
Status: COMPLETED | OUTPATIENT
Start: 2024-09-25 | End: 2024-09-25

## 2024-09-25 RX ADMIN — IPRATROPIUM BROMIDE AND ALBUTEROL SULFATE 3 ML: .5; 3 SOLUTION RESPIRATORY (INHALATION) at 19:51

## 2024-09-25 RX ADMIN — SENNOSIDES AND DOCUSATE SODIUM 2 TABLET: 50; 8.6 TABLET ORAL at 22:15

## 2024-09-25 RX ADMIN — CEFEPIME 2000 MG: 2 INJECTION, POWDER, FOR SOLUTION INTRAVENOUS at 23:51

## 2024-09-25 RX ADMIN — SODIUM CHLORIDE 40 ML: 9 INJECTION, SOLUTION INTRAVENOUS at 22:57

## 2024-09-25 RX ADMIN — GUAIFENESIN 1200 MG: 600 TABLET ORAL at 22:15

## 2024-09-25 RX ADMIN — HYDROCODONE BITARTRATE AND ACETAMINOPHEN 1 TABLET: 7.5; 325 TABLET ORAL at 16:28

## 2024-09-25 RX ADMIN — ARFORMOTEROL TARTRATE 15 MCG: 15 SOLUTION RESPIRATORY (INHALATION) at 19:51

## 2024-09-25 RX ADMIN — Medication 10 ML: at 22:15

## 2024-09-25 RX ADMIN — VANCOMYCIN HYDROCHLORIDE 1500 MG: 5 INJECTION, POWDER, LYOPHILIZED, FOR SOLUTION INTRAVENOUS at 13:51

## 2024-09-25 RX ADMIN — CEFEPIME 2000 MG: 2 INJECTION, POWDER, FOR SOLUTION INTRAVENOUS at 12:49

## 2024-09-25 RX ADMIN — IOPAMIDOL 100 ML: 755 INJECTION, SOLUTION INTRAVENOUS at 11:33

## 2024-09-25 RX ADMIN — SODIUM CHLORIDE 500 ML: 9 INJECTION, SOLUTION INTRAVENOUS at 11:36

## 2024-09-25 RX ADMIN — BUDESONIDE 0.5 MG: 0.5 INHALANT RESPIRATORY (INHALATION) at 19:51

## 2024-09-25 RX ADMIN — SODIUM CHLORIDE 1000 ML: 9 INJECTION, SOLUTION INTRAVENOUS at 13:00

## 2024-09-26 ENCOUNTER — APPOINTMENT (OUTPATIENT)
Dept: GENERAL RADIOLOGY | Facility: HOSPITAL | Age: 84
DRG: 193 | End: 2024-09-26
Payer: MEDICARE

## 2024-09-26 ENCOUNTER — TELEPHONE (OUTPATIENT)
Dept: NEUROSURGERY | Facility: CLINIC | Age: 84
End: 2024-09-26
Payer: MEDICARE

## 2024-09-26 LAB
ALBUMIN FLD-MCNC: 0.7 G/DL
ANION GAP SERPL CALCULATED.3IONS-SCNC: 5.4 MMOL/L (ref 5–15)
APPEARANCE FLD: CLEAR
B PARAPERT DNA SPEC QL NAA+PROBE: NOT DETECTED
B PERT DNA SPEC QL NAA+PROBE: NOT DETECTED
BASOPHILS # BLD AUTO: 0.2 10*3/MM3 (ref 0–0.2)
BASOPHILS NFR BLD AUTO: 0.8 % (ref 0–1.5)
BUN SERPL-MCNC: 23 MG/DL (ref 8–23)
BUN/CREAT SERPL: 25.8 (ref 7–25)
C PNEUM DNA NPH QL NAA+NON-PROBE: NOT DETECTED
CALCIUM SPEC-SCNC: 8.7 MG/DL (ref 8.6–10.5)
CHLORIDE SERPL-SCNC: 105 MMOL/L (ref 98–107)
CHOLESTEROL FLUID: 7 MG/DL
CO2 SERPL-SCNC: 27.6 MMOL/L (ref 22–29)
COLOR FLD: YELLOW
CREAT SERPL-MCNC: 0.89 MG/DL (ref 0.76–1.27)
DEPRECATED RDW RBC AUTO: 69.2 FL (ref 37–54)
EGFRCR SERPLBLD CKD-EPI 2021: 86.1 ML/MIN/1.73
EOSINOPHIL # BLD AUTO: 0.25 10*3/MM3 (ref 0–0.4)
EOSINOPHIL NFR BLD AUTO: 1 % (ref 0.3–6.2)
ERYTHROCYTE [DISTWIDTH] IN BLOOD BY AUTOMATED COUNT: 23.8 % (ref 12.3–15.4)
FLUAV SUBTYP SPEC NAA+PROBE: NOT DETECTED
FLUBV RNA ISLT QL NAA+PROBE: NOT DETECTED
GLUCOSE FLD-MCNC: 68 MG/DL
GLUCOSE SERPL-MCNC: 51 MG/DL (ref 65–99)
HADV DNA SPEC NAA+PROBE: NOT DETECTED
HCOV 229E RNA SPEC QL NAA+PROBE: NOT DETECTED
HCOV HKU1 RNA SPEC QL NAA+PROBE: NOT DETECTED
HCOV NL63 RNA SPEC QL NAA+PROBE: NOT DETECTED
HCOV OC43 RNA SPEC QL NAA+PROBE: NOT DETECTED
HCT VFR BLD AUTO: 34 % (ref 37.5–51)
HGB BLD-MCNC: 10.3 G/DL (ref 13–17.7)
HMPV RNA NPH QL NAA+NON-PROBE: NOT DETECTED
HPIV1 RNA ISLT QL NAA+PROBE: NOT DETECTED
HPIV2 RNA SPEC QL NAA+PROBE: NOT DETECTED
HPIV3 RNA NPH QL NAA+PROBE: NOT DETECTED
HPIV4 P GENE NPH QL NAA+PROBE: NOT DETECTED
IMM GRANULOCYTES # BLD AUTO: 1.45 10*3/MM3 (ref 0–0.05)
IMM GRANULOCYTES NFR BLD AUTO: 5.7 % (ref 0–0.5)
LDH FLD-CCNC: 86 U/L
LYMPHOCYTES # BLD AUTO: 0.52 10*3/MM3 (ref 0.7–3.1)
LYMPHOCYTES NFR BLD AUTO: 2 % (ref 19.6–45.3)
M PNEUMO IGG SER IA-ACNC: NOT DETECTED
MAGNESIUM SERPL-MCNC: 1.8 MG/DL (ref 1.6–2.4)
MCH RBC QN AUTO: 24.7 PG (ref 26.6–33)
MCHC RBC AUTO-ENTMCNC: 30.3 G/DL (ref 31.5–35.7)
MCV RBC AUTO: 81.5 FL (ref 79–97)
MONOCYTES # BLD AUTO: 1.55 10*3/MM3 (ref 0.1–0.9)
MONOCYTES NFR BLD AUTO: 6 % (ref 5–12)
NEUTROPHILS NFR BLD AUTO: 21.67 10*3/MM3 (ref 1.7–7)
NEUTROPHILS NFR BLD AUTO: 84.5 % (ref 42.7–76)
NRBC BLD AUTO-RTO: 0 /100 WBC (ref 0–0.2)
NUC CELL # FLD: 4 /MM3
PH FLD: 8 [PH]
PHOSPHATE SERPL-MCNC: 3.3 MG/DL (ref 2.5–4.5)
PLATELET # BLD AUTO: 293 10*3/MM3 (ref 140–450)
PMV BLD AUTO: 9.6 FL (ref 6–12)
POTASSIUM SERPL-SCNC: 3.3 MMOL/L (ref 3.5–5.2)
PROT FLD-MCNC: <1 G/DL
RBC # BLD AUTO: 4.17 10*6/MM3 (ref 4.14–5.8)
RBC # FLD AUTO: <2000 /MM3
RHINOVIRUS RNA SPEC NAA+PROBE: NOT DETECTED
RSV RNA NPH QL NAA+NON-PROBE: NOT DETECTED
SARS-COV-2 RNA RESP QL NAA+PROBE: NOT DETECTED
SODIUM SERPL-SCNC: 138 MMOL/L (ref 136–145)
TRIGL FLD-MCNC: 14 MG/DL
WBC NRBC COR # BLD AUTO: 25.64 10*3/MM3 (ref 3.4–10.8)

## 2024-09-26 PROCEDURE — 85025 COMPLETE CBC W/AUTO DIFF WBC: CPT | Performed by: HOSPITALIST

## 2024-09-26 PROCEDURE — 82042 OTHER SOURCE ALBUMIN QUAN EA: CPT | Performed by: INTERNAL MEDICINE

## 2024-09-26 PROCEDURE — 71045 X-RAY EXAM CHEST 1 VIEW: CPT

## 2024-09-26 PROCEDURE — 99232 SBSQ HOSP IP/OBS MODERATE 35: CPT | Performed by: STUDENT IN AN ORGANIZED HEALTH CARE EDUCATION/TRAINING PROGRAM

## 2024-09-26 PROCEDURE — 25010000002 AZITHROMYCIN PER 500 MG: Performed by: INTERNAL MEDICINE

## 2024-09-26 PROCEDURE — 83735 ASSAY OF MAGNESIUM: CPT | Performed by: HOSPITALIST

## 2024-09-26 PROCEDURE — 87205 SMEAR GRAM STAIN: CPT | Performed by: INTERNAL MEDICINE

## 2024-09-26 PROCEDURE — 84100 ASSAY OF PHOSPHORUS: CPT | Performed by: HOSPITALIST

## 2024-09-26 PROCEDURE — 87102 FUNGUS ISOLATION CULTURE: CPT | Performed by: INTERNAL MEDICINE

## 2024-09-26 PROCEDURE — 25010000002 MAGNESIUM SULFATE 2 GM/50ML SOLUTION: Performed by: INTERNAL MEDICINE

## 2024-09-26 PROCEDURE — 82945 GLUCOSE OTHER FLUID: CPT | Performed by: INTERNAL MEDICINE

## 2024-09-26 PROCEDURE — 80048 BASIC METABOLIC PNL TOTAL CA: CPT | Performed by: HOSPITALIST

## 2024-09-26 PROCEDURE — 99223 1ST HOSP IP/OBS HIGH 75: CPT | Performed by: INTERNAL MEDICINE

## 2024-09-26 PROCEDURE — 25810000003 SODIUM CHLORIDE 0.9 % SOLUTION: Performed by: INTERNAL MEDICINE

## 2024-09-26 PROCEDURE — 25010000002 CEFTRIAXONE PER 250 MG: Performed by: INTERNAL MEDICINE

## 2024-09-26 PROCEDURE — 32555 ASPIRATE PLEURA W/ IMAGING: CPT | Performed by: INTERNAL MEDICINE

## 2024-09-26 PROCEDURE — 92610 EVALUATE SWALLOWING FUNCTION: CPT

## 2024-09-26 PROCEDURE — 99024 POSTOP FOLLOW-UP VISIT: CPT | Performed by: STUDENT IN AN ORGANIZED HEALTH CARE EDUCATION/TRAINING PROGRAM

## 2024-09-26 PROCEDURE — 83986 ASSAY PH BODY FLUID NOS: CPT | Performed by: INTERNAL MEDICINE

## 2024-09-26 PROCEDURE — 88108 CYTOPATH CONCENTRATE TECH: CPT | Performed by: INTERNAL MEDICINE

## 2024-09-26 PROCEDURE — 94799 UNLISTED PULMONARY SVC/PX: CPT

## 2024-09-26 PROCEDURE — 83615 LACTATE (LD) (LDH) ENZYME: CPT | Performed by: INTERNAL MEDICINE

## 2024-09-26 PROCEDURE — 84311 SPECTROPHOTOMETRY: CPT | Performed by: INTERNAL MEDICINE

## 2024-09-26 PROCEDURE — 84478 ASSAY OF TRIGLYCERIDES: CPT | Performed by: INTERNAL MEDICINE

## 2024-09-26 PROCEDURE — 87070 CULTURE OTHR SPECIMN AEROBIC: CPT | Performed by: INTERNAL MEDICINE

## 2024-09-26 PROCEDURE — 84157 ASSAY OF PROTEIN OTHER: CPT | Performed by: INTERNAL MEDICINE

## 2024-09-26 PROCEDURE — 87015 SPECIMEN INFECT AGNT CONCNTJ: CPT | Performed by: INTERNAL MEDICINE

## 2024-09-26 PROCEDURE — 87116 MYCOBACTERIA CULTURE: CPT | Performed by: INTERNAL MEDICINE

## 2024-09-26 PROCEDURE — 87075 CULTR BACTERIA EXCEPT BLOOD: CPT | Performed by: INTERNAL MEDICINE

## 2024-09-26 PROCEDURE — 25010000002 FUROSEMIDE PER 20 MG: Performed by: INTERNAL MEDICINE

## 2024-09-26 PROCEDURE — 87206 SMEAR FLUORESCENT/ACID STAI: CPT | Performed by: INTERNAL MEDICINE

## 2024-09-26 PROCEDURE — 0W9B3ZX DRAINAGE OF LEFT PLEURAL CAVITY, PERCUTANEOUS APPROACH, DIAGNOSTIC: ICD-10-PCS | Performed by: INTERNAL MEDICINE

## 2024-09-26 PROCEDURE — 94664 DEMO&/EVAL PT USE INHALER: CPT

## 2024-09-26 PROCEDURE — 89050 BODY FLUID CELL COUNT: CPT | Performed by: INTERNAL MEDICINE

## 2024-09-26 PROCEDURE — 0202U NFCT DS 22 TRGT SARS-COV-2: CPT | Performed by: INTERNAL MEDICINE

## 2024-09-26 RX ORDER — FUROSEMIDE 10 MG/ML
40 INJECTION INTRAMUSCULAR; INTRAVENOUS ONCE
Status: COMPLETED | OUTPATIENT
Start: 2024-09-26 | End: 2024-09-26

## 2024-09-26 RX ORDER — MAGNESIUM SULFATE HEPTAHYDRATE 40 MG/ML
2 INJECTION, SOLUTION INTRAVENOUS ONCE
Status: COMPLETED | OUTPATIENT
Start: 2024-09-26 | End: 2024-09-26

## 2024-09-26 RX ORDER — POTASSIUM CHLORIDE 750 MG/1
40 CAPSULE, EXTENDED RELEASE ORAL ONCE
Status: COMPLETED | OUTPATIENT
Start: 2024-09-26 | End: 2024-09-26

## 2024-09-26 RX ADMIN — Medication 10 ML: at 21:10

## 2024-09-26 RX ADMIN — HYDROCODONE BITARTRATE AND ACETAMINOPHEN 1 TABLET: 7.5; 325 TABLET ORAL at 11:46

## 2024-09-26 RX ADMIN — IPRATROPIUM BROMIDE AND ALBUTEROL SULFATE 3 ML: .5; 3 SOLUTION RESPIRATORY (INHALATION) at 11:42

## 2024-09-26 RX ADMIN — FUROSEMIDE 40 MG: 10 INJECTION, SOLUTION INTRAMUSCULAR; INTRAVENOUS at 09:16

## 2024-09-26 RX ADMIN — ARFORMOTEROL TARTRATE 15 MCG: 15 SOLUTION RESPIRATORY (INHALATION) at 19:20

## 2024-09-26 RX ADMIN — POTASSIUM CHLORIDE 40 MEQ: 750 CAPSULE, EXTENDED RELEASE ORAL at 09:16

## 2024-09-26 RX ADMIN — CEFTRIAXONE SODIUM 1000 MG: 1 INJECTION, POWDER, FOR SOLUTION INTRAMUSCULAR; INTRAVENOUS at 09:15

## 2024-09-26 RX ADMIN — BUDESONIDE 0.5 MG: 0.5 INHALANT RESPIRATORY (INHALATION) at 06:46

## 2024-09-26 RX ADMIN — MAGNESIUM SULFATE HEPTAHYDRATE 2 G: 40 INJECTION, SOLUTION INTRAVENOUS at 10:15

## 2024-09-26 RX ADMIN — Medication 10 ML: at 09:17

## 2024-09-26 RX ADMIN — BUDESONIDE 0.5 MG: 0.5 INHALANT RESPIRATORY (INHALATION) at 19:20

## 2024-09-26 RX ADMIN — AZITHROMYCIN 500 MG: 500 INJECTION, POWDER, LYOPHILIZED, FOR SOLUTION INTRAVENOUS at 10:15

## 2024-09-26 RX ADMIN — HYDROCODONE BITARTRATE AND ACETAMINOPHEN 1 TABLET: 7.5; 325 TABLET ORAL at 23:46

## 2024-09-26 RX ADMIN — GUAIFENESIN 1200 MG: 600 TABLET ORAL at 09:16

## 2024-09-26 RX ADMIN — IPRATROPIUM BROMIDE AND ALBUTEROL SULFATE 3 ML: .5; 3 SOLUTION RESPIRATORY (INHALATION) at 06:45

## 2024-09-26 RX ADMIN — ARFORMOTEROL TARTRATE 15 MCG: 15 SOLUTION RESPIRATORY (INHALATION) at 06:45

## 2024-09-26 RX ADMIN — ACETAMINOPHEN 650 MG: 325 TABLET ORAL at 01:39

## 2024-09-26 RX ADMIN — SENNOSIDES AND DOCUSATE SODIUM 2 TABLET: 50; 8.6 TABLET ORAL at 21:10

## 2024-09-26 RX ADMIN — IPRATROPIUM BROMIDE AND ALBUTEROL SULFATE 3 ML: .5; 3 SOLUTION RESPIRATORY (INHALATION) at 19:20

## 2024-09-26 RX ADMIN — SENNOSIDES AND DOCUSATE SODIUM 2 TABLET: 50; 8.6 TABLET ORAL at 09:16

## 2024-09-26 RX ADMIN — GUAIFENESIN 1200 MG: 600 TABLET ORAL at 21:10

## 2024-09-27 LAB
ACANTHOCYTES BLD QL SMEAR: ABNORMAL
ANION GAP SERPL CALCULATED.3IONS-SCNC: 8.6 MMOL/L (ref 5–15)
BASOPHILS # BLD MANUAL: 0.39 10*3/MM3 (ref 0–0.2)
BASOPHILS NFR BLD MANUAL: 2 % (ref 0–1.5)
BUN SERPL-MCNC: 20 MG/DL (ref 8–23)
BUN/CREAT SERPL: 26 (ref 7–25)
BURR CELLS BLD QL SMEAR: ABNORMAL
CALCIUM SPEC-SCNC: 8.9 MG/DL (ref 8.6–10.5)
CHLORIDE SERPL-SCNC: 103 MMOL/L (ref 98–107)
CO2 SERPL-SCNC: 25.4 MMOL/L (ref 22–29)
CREAT SERPL-MCNC: 0.77 MG/DL (ref 0.76–1.27)
DEPRECATED RDW RBC AUTO: 70.2 FL (ref 37–54)
EGFRCR SERPLBLD CKD-EPI 2021: 89.9 ML/MIN/1.73
ELLIPTOCYTES BLD QL SMEAR: ABNORMAL
EOSINOPHIL # BLD MANUAL: 0.78 10*3/MM3 (ref 0–0.4)
EOSINOPHIL NFR BLD MANUAL: 4 % (ref 0.3–6.2)
ERYTHROCYTE [DISTWIDTH] IN BLOOD BY AUTOMATED COUNT: 24.1 % (ref 12.3–15.4)
GLUCOSE SERPL-MCNC: 75 MG/DL (ref 65–99)
HCT VFR BLD AUTO: 34.9 % (ref 37.5–51)
HGB BLD-MCNC: 10.8 G/DL (ref 13–17.7)
LARGE PLATELETS: ABNORMAL
LYMPHOCYTES # BLD MANUAL: 1.36 10*3/MM3 (ref 0.7–3.1)
LYMPHOCYTES NFR BLD MANUAL: 2 % (ref 5–12)
Lab: NORMAL
MAGNESIUM SERPL-MCNC: 2.5 MG/DL (ref 1.6–2.4)
MCH RBC QN AUTO: 25.5 PG (ref 26.6–33)
MCHC RBC AUTO-ENTMCNC: 30.9 G/DL (ref 31.5–35.7)
MCV RBC AUTO: 82.5 FL (ref 79–97)
MICROCYTES BLD QL: ABNORMAL
MONOCYTES # BLD: 0.39 10*3/MM3 (ref 0.1–0.9)
NEUTROPHILS # BLD AUTO: 16.3 10*3/MM3 (ref 1.7–7)
NEUTROPHILS NFR BLD MANUAL: 84 % (ref 42.7–76)
NEUTS HYPERSEG # BLD: ABNORMAL 10*3/UL
OVALOCYTES BLD QL SMEAR: ABNORMAL
PHOSPHATE SERPL-MCNC: 2.8 MG/DL (ref 2.5–4.5)
PLASMA CELL PREC NFR BLD MANUAL: 1 % (ref 0–0)
PLATELET # BLD AUTO: 278 10*3/MM3 (ref 140–450)
PMV BLD AUTO: 9.4 FL (ref 6–12)
POTASSIUM SERPL-SCNC: 3.9 MMOL/L (ref 3.5–5.2)
QT INTERVAL: 279 MS
QTC INTERVAL: 440 MS
RBC # BLD AUTO: 4.23 10*6/MM3 (ref 4.14–5.8)
SCAN SLIDE: NORMAL
SODIUM SERPL-SCNC: 137 MMOL/L (ref 136–145)
VARIANT LYMPHS NFR BLD MANUAL: 7 % (ref 19.6–45.3)
WBC NRBC COR # BLD AUTO: 19.41 10*3/MM3 (ref 3.4–10.8)

## 2024-09-27 PROCEDURE — 25010000002 FUROSEMIDE PER 20 MG: Performed by: INTERNAL MEDICINE

## 2024-09-27 PROCEDURE — 85025 COMPLETE CBC W/AUTO DIFF WBC: CPT | Performed by: STUDENT IN AN ORGANIZED HEALTH CARE EDUCATION/TRAINING PROGRAM

## 2024-09-27 PROCEDURE — 97530 THERAPEUTIC ACTIVITIES: CPT

## 2024-09-27 PROCEDURE — 99233 SBSQ HOSP IP/OBS HIGH 50: CPT | Performed by: INTERNAL MEDICINE

## 2024-09-27 PROCEDURE — 25810000003 SODIUM CHLORIDE 0.9 % SOLUTION: Performed by: INTERNAL MEDICINE

## 2024-09-27 PROCEDURE — 99221 1ST HOSP IP/OBS SF/LOW 40: CPT | Performed by: NEUROLOGICAL SURGERY

## 2024-09-27 PROCEDURE — 93010 ELECTROCARDIOGRAM REPORT: CPT | Performed by: INTERNAL MEDICINE

## 2024-09-27 PROCEDURE — 80048 BASIC METABOLIC PNL TOTAL CA: CPT | Performed by: STUDENT IN AN ORGANIZED HEALTH CARE EDUCATION/TRAINING PROGRAM

## 2024-09-27 PROCEDURE — 94799 UNLISTED PULMONARY SVC/PX: CPT

## 2024-09-27 PROCEDURE — 94761 N-INVAS EAR/PLS OXIMETRY MLT: CPT

## 2024-09-27 PROCEDURE — 94664 DEMO&/EVAL PT USE INHALER: CPT

## 2024-09-27 PROCEDURE — 97760 ORTHOTIC MGMT&TRAING 1ST ENC: CPT

## 2024-09-27 PROCEDURE — 85007 BL SMEAR W/DIFF WBC COUNT: CPT | Performed by: STUDENT IN AN ORGANIZED HEALTH CARE EDUCATION/TRAINING PROGRAM

## 2024-09-27 PROCEDURE — 83735 ASSAY OF MAGNESIUM: CPT | Performed by: STUDENT IN AN ORGANIZED HEALTH CARE EDUCATION/TRAINING PROGRAM

## 2024-09-27 PROCEDURE — 25010000002 AZITHROMYCIN PER 500 MG: Performed by: INTERNAL MEDICINE

## 2024-09-27 PROCEDURE — L0464 TLSO 4MOD SACRO-SCAP PRE: HCPCS

## 2024-09-27 PROCEDURE — 84100 ASSAY OF PHOSPHORUS: CPT | Performed by: STUDENT IN AN ORGANIZED HEALTH CARE EDUCATION/TRAINING PROGRAM

## 2024-09-27 PROCEDURE — 97165 OT EVAL LOW COMPLEX 30 MIN: CPT

## 2024-09-27 PROCEDURE — 99232 SBSQ HOSP IP/OBS MODERATE 35: CPT | Performed by: STUDENT IN AN ORGANIZED HEALTH CARE EDUCATION/TRAINING PROGRAM

## 2024-09-27 PROCEDURE — 93005 ELECTROCARDIOGRAM TRACING: CPT | Performed by: STUDENT IN AN ORGANIZED HEALTH CARE EDUCATION/TRAINING PROGRAM

## 2024-09-27 PROCEDURE — 25010000002 CEFTRIAXONE PER 250 MG: Performed by: INTERNAL MEDICINE

## 2024-09-27 RX ORDER — PREGABALIN 25 MG/1
25 CAPSULE ORAL EVERY 12 HOURS SCHEDULED
Status: DISCONTINUED | OUTPATIENT
Start: 2024-09-27 | End: 2024-10-02 | Stop reason: HOSPADM

## 2024-09-27 RX ORDER — PANTOPRAZOLE SODIUM 40 MG/1
40 TABLET, DELAYED RELEASE ORAL EVERY MORNING
Status: DISCONTINUED | OUTPATIENT
Start: 2024-09-27 | End: 2024-10-02 | Stop reason: HOSPADM

## 2024-09-27 RX ORDER — POTASSIUM CHLORIDE 750 MG/1
40 CAPSULE, EXTENDED RELEASE ORAL ONCE
Status: COMPLETED | OUTPATIENT
Start: 2024-09-27 | End: 2024-09-27

## 2024-09-27 RX ORDER — FUROSEMIDE 10 MG/ML
40 INJECTION INTRAMUSCULAR; INTRAVENOUS ONCE
Status: COMPLETED | OUTPATIENT
Start: 2024-09-27 | End: 2024-09-27

## 2024-09-27 RX ORDER — ROSUVASTATIN CALCIUM 20 MG/1
20 TABLET, COATED ORAL NIGHTLY
Status: DISCONTINUED | OUTPATIENT
Start: 2024-09-27 | End: 2024-10-02 | Stop reason: HOSPADM

## 2024-09-27 RX ORDER — CLOPIDOGREL BISULFATE 75 MG/1
75 TABLET ORAL DAILY
Status: DISCONTINUED | OUTPATIENT
Start: 2024-09-27 | End: 2024-10-02 | Stop reason: HOSPADM

## 2024-09-27 RX ORDER — CARVEDILOL 25 MG/1
25 TABLET ORAL 2 TIMES DAILY WITH MEALS
Status: DISCONTINUED | OUTPATIENT
Start: 2024-09-27 | End: 2024-10-02 | Stop reason: HOSPADM

## 2024-09-27 RX ORDER — DILTIAZEM HYDROCHLORIDE 120 MG/1
120 CAPSULE, COATED, EXTENDED RELEASE ORAL
Status: DISCONTINUED | OUTPATIENT
Start: 2024-09-27 | End: 2024-09-28

## 2024-09-27 RX ADMIN — ARFORMOTEROL TARTRATE 15 MCG: 15 SOLUTION RESPIRATORY (INHALATION) at 19:04

## 2024-09-27 RX ADMIN — DILTIAZEM HYDROCHLORIDE 120 MG: 120 CAPSULE, EXTENDED RELEASE ORAL at 09:22

## 2024-09-27 RX ADMIN — CARVEDILOL 25 MG: 25 TABLET, FILM COATED ORAL at 17:28

## 2024-09-27 RX ADMIN — HYDROCODONE BITARTRATE AND ACETAMINOPHEN 1 TABLET: 7.5; 325 TABLET ORAL at 20:48

## 2024-09-27 RX ADMIN — Medication 10 ML: at 20:48

## 2024-09-27 RX ADMIN — PREGABALIN 25 MG: 25 CAPSULE ORAL at 20:48

## 2024-09-27 RX ADMIN — HYDROCODONE BITARTRATE AND ACETAMINOPHEN 1 TABLET: 7.5; 325 TABLET ORAL at 07:57

## 2024-09-27 RX ADMIN — PREGABALIN 25 MG: 25 CAPSULE ORAL at 09:05

## 2024-09-27 RX ADMIN — ARFORMOTEROL TARTRATE 15 MCG: 15 SOLUTION RESPIRATORY (INHALATION) at 07:11

## 2024-09-27 RX ADMIN — BUDESONIDE 0.5 MG: 0.5 INHALANT RESPIRATORY (INHALATION) at 07:11

## 2024-09-27 RX ADMIN — IPRATROPIUM BROMIDE AND ALBUTEROL SULFATE 3 ML: .5; 3 SOLUTION RESPIRATORY (INHALATION) at 19:02

## 2024-09-27 RX ADMIN — GUAIFENESIN 1200 MG: 600 TABLET ORAL at 20:48

## 2024-09-27 RX ADMIN — IPRATROPIUM BROMIDE AND ALBUTEROL SULFATE 3 ML: .5; 3 SOLUTION RESPIRATORY (INHALATION) at 00:58

## 2024-09-27 RX ADMIN — POTASSIUM CHLORIDE 40 MEQ: 750 CAPSULE, EXTENDED RELEASE ORAL at 08:08

## 2024-09-27 RX ADMIN — GUAIFENESIN 1200 MG: 600 TABLET ORAL at 08:09

## 2024-09-27 RX ADMIN — SENNOSIDES AND DOCUSATE SODIUM 2 TABLET: 50; 8.6 TABLET ORAL at 08:09

## 2024-09-27 RX ADMIN — IPRATROPIUM BROMIDE AND ALBUTEROL SULFATE 3 ML: .5; 3 SOLUTION RESPIRATORY (INHALATION) at 07:11

## 2024-09-27 RX ADMIN — METOPROLOL TARTRATE 2.5 MG: 1 INJECTION, SOLUTION INTRAVENOUS at 17:27

## 2024-09-27 RX ADMIN — PANTOPRAZOLE SODIUM 40 MG: 40 TABLET, DELAYED RELEASE ORAL at 09:04

## 2024-09-27 RX ADMIN — FUROSEMIDE 40 MG: 10 INJECTION, SOLUTION INTRAMUSCULAR; INTRAVENOUS at 08:08

## 2024-09-27 RX ADMIN — Medication 10 ML: at 08:10

## 2024-09-27 RX ADMIN — CEFTRIAXONE SODIUM 1000 MG: 1 INJECTION, POWDER, FOR SOLUTION INTRAMUSCULAR; INTRAVENOUS at 07:57

## 2024-09-27 RX ADMIN — IPRATROPIUM BROMIDE AND ALBUTEROL SULFATE 3 ML: .5; 3 SOLUTION RESPIRATORY (INHALATION) at 13:04

## 2024-09-27 RX ADMIN — APIXABAN 5 MG: 5 TABLET, FILM COATED ORAL at 20:48

## 2024-09-27 RX ADMIN — CLOPIDOGREL BISULFATE 75 MG: 75 TABLET ORAL at 09:05

## 2024-09-27 RX ADMIN — BUDESONIDE 0.5 MG: 0.5 INHALANT RESPIRATORY (INHALATION) at 19:04

## 2024-09-27 RX ADMIN — APIXABAN 5 MG: 5 TABLET, FILM COATED ORAL at 09:04

## 2024-09-27 RX ADMIN — CARVEDILOL 25 MG: 25 TABLET, FILM COATED ORAL at 09:04

## 2024-09-27 RX ADMIN — ROSUVASTATIN 20 MG: 20 TABLET, FILM COATED ORAL at 20:48

## 2024-09-27 RX ADMIN — AZITHROMYCIN 500 MG: 500 INJECTION, POWDER, LYOPHILIZED, FOR SOLUTION INTRAVENOUS at 09:04

## 2024-09-27 RX ADMIN — SENNOSIDES AND DOCUSATE SODIUM 2 TABLET: 50; 8.6 TABLET ORAL at 20:48

## 2024-09-28 ENCOUNTER — APPOINTMENT (OUTPATIENT)
Dept: CARDIOLOGY | Facility: HOSPITAL | Age: 84
DRG: 193 | End: 2024-09-28
Payer: MEDICARE

## 2024-09-28 LAB
ANION GAP SERPL CALCULATED.3IONS-SCNC: 5.9 MMOL/L (ref 5–15)
ANISOCYTOSIS BLD QL: ABNORMAL
BASOPHILS # BLD MANUAL: 0.17 10*3/MM3 (ref 0–0.2)
BASOPHILS NFR BLD MANUAL: 1 % (ref 0–1.5)
BH CV ECHO MEAS - AO MAX PG: 7.5 MMHG
BH CV ECHO MEAS - AO MEAN PG: 4 MMHG
BH CV ECHO MEAS - AO ROOT DIAM: 3.8 CM
BH CV ECHO MEAS - AO V2 MAX: 137 CM/SEC
BH CV ECHO MEAS - AO V2 VTI: 22.1 CM
BH CV ECHO MEAS - AVA(I,D): 1.89 CM2
BH CV ECHO MEAS - EDV(CUBED): 35.9 ML
BH CV ECHO MEAS - EDV(MOD-SP2): 34.5 ML
BH CV ECHO MEAS - EDV(MOD-SP4): 60 ML
BH CV ECHO MEAS - EF(MOD-BP): 56.2 %
BH CV ECHO MEAS - EF(MOD-SP2): 51.6 %
BH CV ECHO MEAS - EF(MOD-SP4): 60.7 %
BH CV ECHO MEAS - ESV(CUBED): 13.8 ML
BH CV ECHO MEAS - ESV(MOD-SP2): 16.7 ML
BH CV ECHO MEAS - ESV(MOD-SP4): 23.6 ML
BH CV ECHO MEAS - FS: 27.3 %
BH CV ECHO MEAS - IVS/LVPW: 0.88 CM
BH CV ECHO MEAS - IVSD: 1.4 CM
BH CV ECHO MEAS - LA DIMENSION: 3.6 CM
BH CV ECHO MEAS - LAT PEAK E' VEL: 8.1 CM/SEC
BH CV ECHO MEAS - LV DIASTOLIC VOL/BSA (35-75): 30.9 CM2
BH CV ECHO MEAS - LV MASS(C)D: 178.7 GRAMS
BH CV ECHO MEAS - LV MAX PG: 2.25 MMHG
BH CV ECHO MEAS - LV MEAN PG: 1 MMHG
BH CV ECHO MEAS - LV SYSTOLIC VOL/BSA (12-30): 12.1 CM2
BH CV ECHO MEAS - LV V1 MAX: 75 CM/SEC
BH CV ECHO MEAS - LV V1 VTI: 13.3 CM
BH CV ECHO MEAS - LVIDD: 3.3 CM
BH CV ECHO MEAS - LVIDS: 2.4 CM
BH CV ECHO MEAS - LVOT AREA: 3.1 CM2
BH CV ECHO MEAS - LVOT DIAM: 2 CM
BH CV ECHO MEAS - LVPWD: 1.6 CM
BH CV ECHO MEAS - MED PEAK E' VEL: 8.2 CM/SEC
BH CV ECHO MEAS - MV DEC TIME: 0.16 SEC
BH CV ECHO MEAS - MV MEAN PG: 2 MMHG
BH CV ECHO MEAS - MV V2 VTI: 22.1 CM
BH CV ECHO MEAS - MVA(VTI): 1.89 CM2
BH CV ECHO MEAS - RVDD: 3.1 CM
BH CV ECHO MEAS - SV(LVOT): 41.8 ML
BH CV ECHO MEAS - SV(MOD-SP2): 17.8 ML
BH CV ECHO MEAS - SV(MOD-SP4): 36.4 ML
BH CV ECHO MEAS - SVI(LVOT): 21.5 ML/M2
BH CV ECHO MEAS - SVI(MOD-SP2): 9.2 ML/M2
BH CV ECHO MEAS - SVI(MOD-SP4): 18.7 ML/M2
BH CV ECHO MEAS - TAPSE (>1.6): 1.07 CM
BUN SERPL-MCNC: 22 MG/DL (ref 8–23)
BUN/CREAT SERPL: 29.3 (ref 7–25)
CALCIUM SPEC-SCNC: 9 MG/DL (ref 8.6–10.5)
CHLORIDE SERPL-SCNC: 104 MMOL/L (ref 98–107)
CO2 SERPL-SCNC: 25.1 MMOL/L (ref 22–29)
CREAT SERPL-MCNC: 0.75 MG/DL (ref 0.76–1.27)
DEPRECATED RDW RBC AUTO: 69 FL (ref 37–54)
EGFRCR SERPLBLD CKD-EPI 2021: 90.7 ML/MIN/1.73
ELLIPTOCYTES BLD QL SMEAR: ABNORMAL
EOSINOPHIL # BLD MANUAL: 0.17 10*3/MM3 (ref 0–0.4)
EOSINOPHIL NFR BLD MANUAL: 1 % (ref 0.3–6.2)
ERYTHROCYTE [DISTWIDTH] IN BLOOD BY AUTOMATED COUNT: 24.1 % (ref 12.3–15.4)
GLUCOSE SERPL-MCNC: 144 MG/DL (ref 65–99)
HCT VFR BLD AUTO: 33.9 % (ref 37.5–51)
HGB BLD-MCNC: 10.5 G/DL (ref 13–17.7)
HYPOCHROMIA BLD QL: ABNORMAL
LEFT ATRIUM VOLUME INDEX: 22.3 ML/M2
LYMPHOCYTES # BLD MANUAL: 0.51 10*3/MM3 (ref 0.7–3.1)
LYMPHOCYTES NFR BLD MANUAL: 1 % (ref 5–12)
MAGNESIUM SERPL-MCNC: 1.7 MG/DL (ref 1.6–2.4)
MCH RBC QN AUTO: 25.1 PG (ref 26.6–33)
MCHC RBC AUTO-ENTMCNC: 31 G/DL (ref 31.5–35.7)
MCV RBC AUTO: 81.1 FL (ref 79–97)
METAMYELOCYTES NFR BLD MANUAL: 1 % (ref 0–0)
MONOCYTES # BLD: 0.17 10*3/MM3 (ref 0.1–0.9)
NEUTROPHILS # BLD AUTO: 15.71 10*3/MM3 (ref 1.7–7)
NEUTROPHILS NFR BLD MANUAL: 92 % (ref 42.7–76)
NEUTS BAND NFR BLD MANUAL: 1 % (ref 0–5)
OVALOCYTES BLD QL SMEAR: ABNORMAL
PHOSPHATE SERPL-MCNC: 2.3 MG/DL (ref 2.5–4.5)
PLAT MORPH BLD: NORMAL
PLATELET # BLD AUTO: 272 10*3/MM3 (ref 140–450)
PMV BLD AUTO: 9.7 FL (ref 6–12)
POIKILOCYTOSIS BLD QL SMEAR: ABNORMAL
POTASSIUM SERPL-SCNC: 4 MMOL/L (ref 3.5–5.2)
RBC # BLD AUTO: 4.18 10*6/MM3 (ref 4.14–5.8)
SCAN SLIDE: NORMAL
SODIUM SERPL-SCNC: 135 MMOL/L (ref 136–145)
VARIANT LYMPHS NFR BLD MANUAL: 3 % (ref 19.6–45.3)
WBC MORPH BLD: NORMAL
WBC NRBC COR # BLD AUTO: 16.89 10*3/MM3 (ref 3.4–10.8)

## 2024-09-28 PROCEDURE — 25810000003 SODIUM CHLORIDE 0.9 % SOLUTION: Performed by: INTERNAL MEDICINE

## 2024-09-28 PROCEDURE — 85007 BL SMEAR W/DIFF WBC COUNT: CPT | Performed by: STUDENT IN AN ORGANIZED HEALTH CARE EDUCATION/TRAINING PROGRAM

## 2024-09-28 PROCEDURE — 25010000002 AZITHROMYCIN PER 500 MG: Performed by: INTERNAL MEDICINE

## 2024-09-28 PROCEDURE — 99232 SBSQ HOSP IP/OBS MODERATE 35: CPT | Performed by: STUDENT IN AN ORGANIZED HEALTH CARE EDUCATION/TRAINING PROGRAM

## 2024-09-28 PROCEDURE — 94664 DEMO&/EVAL PT USE INHALER: CPT

## 2024-09-28 PROCEDURE — 94799 UNLISTED PULMONARY SVC/PX: CPT

## 2024-09-28 PROCEDURE — 83735 ASSAY OF MAGNESIUM: CPT | Performed by: STUDENT IN AN ORGANIZED HEALTH CARE EDUCATION/TRAINING PROGRAM

## 2024-09-28 PROCEDURE — 25010000002 MAGNESIUM SULFATE 2 GM/50ML SOLUTION: Performed by: STUDENT IN AN ORGANIZED HEALTH CARE EDUCATION/TRAINING PROGRAM

## 2024-09-28 PROCEDURE — 85025 COMPLETE CBC W/AUTO DIFF WBC: CPT | Performed by: STUDENT IN AN ORGANIZED HEALTH CARE EDUCATION/TRAINING PROGRAM

## 2024-09-28 PROCEDURE — 93306 TTE W/DOPPLER COMPLETE: CPT | Performed by: INTERNAL MEDICINE

## 2024-09-28 PROCEDURE — 80048 BASIC METABOLIC PNL TOTAL CA: CPT | Performed by: STUDENT IN AN ORGANIZED HEALTH CARE EDUCATION/TRAINING PROGRAM

## 2024-09-28 PROCEDURE — 84100 ASSAY OF PHOSPHORUS: CPT | Performed by: STUDENT IN AN ORGANIZED HEALTH CARE EDUCATION/TRAINING PROGRAM

## 2024-09-28 PROCEDURE — 93306 TTE W/DOPPLER COMPLETE: CPT

## 2024-09-28 PROCEDURE — 25010000002 CEFTRIAXONE PER 250 MG: Performed by: INTERNAL MEDICINE

## 2024-09-28 RX ORDER — DILTIAZEM HYDROCHLORIDE 180 MG/1
180 CAPSULE, COATED, EXTENDED RELEASE ORAL
Status: DISCONTINUED | OUTPATIENT
Start: 2024-09-29 | End: 2024-10-02 | Stop reason: HOSPADM

## 2024-09-28 RX ORDER — MAGNESIUM SULFATE HEPTAHYDRATE 40 MG/ML
2 INJECTION, SOLUTION INTRAVENOUS ONCE
Status: COMPLETED | OUTPATIENT
Start: 2024-09-28 | End: 2024-09-28

## 2024-09-28 RX ORDER — FENTANYL 12.5 UG/1
1 PATCH TRANSDERMAL
Status: DISCONTINUED | OUTPATIENT
Start: 2024-09-28 | End: 2024-10-02 | Stop reason: HOSPADM

## 2024-09-28 RX ORDER — DILTIAZEM HCL 60 MG
60 TABLET ORAL ONCE
Status: COMPLETED | OUTPATIENT
Start: 2024-09-28 | End: 2024-09-28

## 2024-09-28 RX ADMIN — IPRATROPIUM BROMIDE AND ALBUTEROL SULFATE 3 ML: .5; 3 SOLUTION RESPIRATORY (INHALATION) at 19:18

## 2024-09-28 RX ADMIN — CEFTRIAXONE SODIUM 1000 MG: 1 INJECTION, POWDER, FOR SOLUTION INTRAMUSCULAR; INTRAVENOUS at 08:13

## 2024-09-28 RX ADMIN — GUAIFENESIN 1200 MG: 600 TABLET ORAL at 08:14

## 2024-09-28 RX ADMIN — CARVEDILOL 25 MG: 25 TABLET, FILM COATED ORAL at 17:35

## 2024-09-28 RX ADMIN — APIXABAN 5 MG: 5 TABLET, FILM COATED ORAL at 08:14

## 2024-09-28 RX ADMIN — GUAIFENESIN 1200 MG: 600 TABLET ORAL at 20:04

## 2024-09-28 RX ADMIN — SENNOSIDES AND DOCUSATE SODIUM 2 TABLET: 50; 8.6 TABLET ORAL at 20:04

## 2024-09-28 RX ADMIN — ARFORMOTEROL TARTRATE 15 MCG: 15 SOLUTION RESPIRATORY (INHALATION) at 06:45

## 2024-09-28 RX ADMIN — FENTANYL 1 PATCH: 12 PATCH TRANSDERMAL at 17:33

## 2024-09-28 RX ADMIN — APIXABAN 5 MG: 5 TABLET, FILM COATED ORAL at 20:04

## 2024-09-28 RX ADMIN — CLOPIDOGREL BISULFATE 75 MG: 75 TABLET ORAL at 08:14

## 2024-09-28 RX ADMIN — ROSUVASTATIN 20 MG: 20 TABLET, FILM COATED ORAL at 20:04

## 2024-09-28 RX ADMIN — ARFORMOTEROL TARTRATE 15 MCG: 15 SOLUTION RESPIRATORY (INHALATION) at 19:18

## 2024-09-28 RX ADMIN — CARVEDILOL 25 MG: 25 TABLET, FILM COATED ORAL at 08:14

## 2024-09-28 RX ADMIN — IPRATROPIUM BROMIDE AND ALBUTEROL SULFATE 3 ML: .5; 3 SOLUTION RESPIRATORY (INHALATION) at 13:00

## 2024-09-28 RX ADMIN — PREGABALIN 25 MG: 25 CAPSULE ORAL at 20:04

## 2024-09-28 RX ADMIN — IPRATROPIUM BROMIDE AND ALBUTEROL SULFATE 3 ML: .5; 3 SOLUTION RESPIRATORY (INHALATION) at 06:45

## 2024-09-28 RX ADMIN — DILTIAZEM HYDROCHLORIDE 120 MG: 120 CAPSULE, EXTENDED RELEASE ORAL at 08:14

## 2024-09-28 RX ADMIN — AZITHROMYCIN 500 MG: 500 INJECTION, POWDER, LYOPHILIZED, FOR SOLUTION INTRAVENOUS at 09:57

## 2024-09-28 RX ADMIN — HYDROCODONE BITARTRATE AND ACETAMINOPHEN 1 TABLET: 7.5; 325 TABLET ORAL at 06:10

## 2024-09-28 RX ADMIN — Medication 10 ML: at 20:04

## 2024-09-28 RX ADMIN — BUDESONIDE 0.5 MG: 0.5 INHALANT RESPIRATORY (INHALATION) at 06:45

## 2024-09-28 RX ADMIN — PANTOPRAZOLE SODIUM 40 MG: 40 TABLET, DELAYED RELEASE ORAL at 06:10

## 2024-09-28 RX ADMIN — PREGABALIN 25 MG: 25 CAPSULE ORAL at 08:14

## 2024-09-28 RX ADMIN — IPRATROPIUM BROMIDE AND ALBUTEROL SULFATE 3 ML: .5; 3 SOLUTION RESPIRATORY (INHALATION) at 00:39

## 2024-09-28 RX ADMIN — DILTIAZEM HYDROCHLORIDE 60 MG: 60 TABLET, FILM COATED ORAL at 11:00

## 2024-09-28 RX ADMIN — Medication 10 ML: at 08:13

## 2024-09-28 RX ADMIN — BUDESONIDE 0.5 MG: 0.5 INHALANT RESPIRATORY (INHALATION) at 19:18

## 2024-09-28 RX ADMIN — MAGNESIUM SULFATE HEPTAHYDRATE 2 G: 40 INJECTION, SOLUTION INTRAVENOUS at 09:56

## 2024-09-29 LAB
ANION GAP SERPL CALCULATED.3IONS-SCNC: 9 MMOL/L (ref 5–15)
BACTERIA FLD CULT: NORMAL
BASOPHILS # BLD AUTO: 0.18 10*3/MM3 (ref 0–0.2)
BASOPHILS NFR BLD AUTO: 1.1 % (ref 0–1.5)
BUN SERPL-MCNC: 19 MG/DL (ref 8–23)
BUN/CREAT SERPL: 30.2 (ref 7–25)
CALCIUM SPEC-SCNC: 8.8 MG/DL (ref 8.6–10.5)
CHLORIDE SERPL-SCNC: 105 MMOL/L (ref 98–107)
CO2 SERPL-SCNC: 24 MMOL/L (ref 22–29)
CREAT SERPL-MCNC: 0.63 MG/DL (ref 0.76–1.27)
DEPRECATED RDW RBC AUTO: 69.5 FL (ref 37–54)
EGFRCR SERPLBLD CKD-EPI 2021: 95.6 ML/MIN/1.73
EOSINOPHIL # BLD AUTO: 0.51 10*3/MM3 (ref 0–0.4)
EOSINOPHIL NFR BLD AUTO: 3.2 % (ref 0.3–6.2)
ERYTHROCYTE [DISTWIDTH] IN BLOOD BY AUTOMATED COUNT: 23.9 % (ref 12.3–15.4)
GLUCOSE SERPL-MCNC: 106 MG/DL (ref 65–99)
GRAM STN SPEC: NORMAL
HCT VFR BLD AUTO: 32.8 % (ref 37.5–51)
HGB BLD-MCNC: 10 G/DL (ref 13–17.7)
IMM GRANULOCYTES # BLD AUTO: 1.06 10*3/MM3 (ref 0–0.05)
IMM GRANULOCYTES NFR BLD AUTO: 6.5 % (ref 0–0.5)
LYMPHOCYTES # BLD AUTO: 0.67 10*3/MM3 (ref 0.7–3.1)
LYMPHOCYTES NFR BLD AUTO: 4.1 % (ref 19.6–45.3)
MAGNESIUM SERPL-MCNC: 2 MG/DL (ref 1.6–2.4)
MCH RBC QN AUTO: 24.8 PG (ref 26.6–33)
MCHC RBC AUTO-ENTMCNC: 30.5 G/DL (ref 31.5–35.7)
MCV RBC AUTO: 81.4 FL (ref 79–97)
MONOCYTES # BLD AUTO: 1.11 10*3/MM3 (ref 0.1–0.9)
MONOCYTES NFR BLD AUTO: 6.9 % (ref 5–12)
NEUTROPHILS NFR BLD AUTO: 12.66 10*3/MM3 (ref 1.7–7)
NEUTROPHILS NFR BLD AUTO: 78.2 % (ref 42.7–76)
NRBC BLD AUTO-RTO: 0 /100 WBC (ref 0–0.2)
PHOSPHATE SERPL-MCNC: 2.8 MG/DL (ref 2.5–4.5)
PLATELET # BLD AUTO: 271 10*3/MM3 (ref 140–450)
PMV BLD AUTO: 9.3 FL (ref 6–12)
POTASSIUM SERPL-SCNC: 3.9 MMOL/L (ref 3.5–5.2)
RBC # BLD AUTO: 4.03 10*6/MM3 (ref 4.14–5.8)
SODIUM SERPL-SCNC: 138 MMOL/L (ref 136–145)
WBC NRBC COR # BLD AUTO: 16.19 10*3/MM3 (ref 3.4–10.8)

## 2024-09-29 PROCEDURE — 25010000002 FUROSEMIDE PER 20 MG: Performed by: STUDENT IN AN ORGANIZED HEALTH CARE EDUCATION/TRAINING PROGRAM

## 2024-09-29 PROCEDURE — 99232 SBSQ HOSP IP/OBS MODERATE 35: CPT | Performed by: STUDENT IN AN ORGANIZED HEALTH CARE EDUCATION/TRAINING PROGRAM

## 2024-09-29 PROCEDURE — 94799 UNLISTED PULMONARY SVC/PX: CPT

## 2024-09-29 PROCEDURE — 25010000002 PIPERACILLIN SOD-TAZOBACTAM PER 1 G: Performed by: STUDENT IN AN ORGANIZED HEALTH CARE EDUCATION/TRAINING PROGRAM

## 2024-09-29 PROCEDURE — 83735 ASSAY OF MAGNESIUM: CPT | Performed by: STUDENT IN AN ORGANIZED HEALTH CARE EDUCATION/TRAINING PROGRAM

## 2024-09-29 PROCEDURE — 84100 ASSAY OF PHOSPHORUS: CPT | Performed by: STUDENT IN AN ORGANIZED HEALTH CARE EDUCATION/TRAINING PROGRAM

## 2024-09-29 PROCEDURE — 94664 DEMO&/EVAL PT USE INHALER: CPT

## 2024-09-29 PROCEDURE — 80048 BASIC METABOLIC PNL TOTAL CA: CPT | Performed by: STUDENT IN AN ORGANIZED HEALTH CARE EDUCATION/TRAINING PROGRAM

## 2024-09-29 PROCEDURE — 85025 COMPLETE CBC W/AUTO DIFF WBC: CPT | Performed by: STUDENT IN AN ORGANIZED HEALTH CARE EDUCATION/TRAINING PROGRAM

## 2024-09-29 PROCEDURE — 25010000002 CEFTRIAXONE PER 250 MG: Performed by: INTERNAL MEDICINE

## 2024-09-29 RX ORDER — DIPHENHYDRAMINE HYDROCHLORIDE 50 MG/ML
25 INJECTION INTRAMUSCULAR; INTRAVENOUS EVERY 6 HOURS PRN
Status: DISCONTINUED | OUTPATIENT
Start: 2024-09-29 | End: 2024-10-02 | Stop reason: HOSPADM

## 2024-09-29 RX ORDER — FUROSEMIDE 10 MG/ML
40 INJECTION INTRAMUSCULAR; INTRAVENOUS DAILY
Status: DISCONTINUED | OUTPATIENT
Start: 2024-09-29 | End: 2024-10-02 | Stop reason: HOSPADM

## 2024-09-29 RX ORDER — IPRATROPIUM BROMIDE AND ALBUTEROL SULFATE 2.5; .5 MG/3ML; MG/3ML
3 SOLUTION RESPIRATORY (INHALATION)
Status: DISCONTINUED | OUTPATIENT
Start: 2024-09-29 | End: 2024-09-30

## 2024-09-29 RX ADMIN — IPRATROPIUM BROMIDE AND ALBUTEROL SULFATE 3 ML: .5; 3 SOLUTION RESPIRATORY (INHALATION) at 07:44

## 2024-09-29 RX ADMIN — CARVEDILOL 25 MG: 25 TABLET, FILM COATED ORAL at 09:20

## 2024-09-29 RX ADMIN — SENNOSIDES AND DOCUSATE SODIUM 2 TABLET: 50; 8.6 TABLET ORAL at 09:21

## 2024-09-29 RX ADMIN — SENNOSIDES AND DOCUSATE SODIUM 2 TABLET: 50; 8.6 TABLET ORAL at 22:11

## 2024-09-29 RX ADMIN — PREGABALIN 25 MG: 25 CAPSULE ORAL at 09:21

## 2024-09-29 RX ADMIN — CEFTRIAXONE SODIUM 1000 MG: 1 INJECTION, POWDER, FOR SOLUTION INTRAMUSCULAR; INTRAVENOUS at 09:20

## 2024-09-29 RX ADMIN — HYDROCODONE BITARTRATE AND ACETAMINOPHEN 1 TABLET: 7.5; 325 TABLET ORAL at 22:31

## 2024-09-29 RX ADMIN — DILTIAZEM HYDROCHLORIDE 180 MG: 180 CAPSULE, COATED, EXTENDED RELEASE ORAL at 09:21

## 2024-09-29 RX ADMIN — PANTOPRAZOLE SODIUM 40 MG: 40 TABLET, DELAYED RELEASE ORAL at 06:09

## 2024-09-29 RX ADMIN — BUDESONIDE 0.5 MG: 0.5 INHALANT RESPIRATORY (INHALATION) at 07:44

## 2024-09-29 RX ADMIN — GUAIFENESIN 1200 MG: 600 TABLET ORAL at 09:21

## 2024-09-29 RX ADMIN — APIXABAN 5 MG: 5 TABLET, FILM COATED ORAL at 22:11

## 2024-09-29 RX ADMIN — PIPERACILLIN AND TAZOBACTAM 3.38 G: 3; .375 INJECTION, POWDER, FOR SOLUTION INTRAVENOUS at 16:43

## 2024-09-29 RX ADMIN — FUROSEMIDE 40 MG: 10 INJECTION, SOLUTION INTRAMUSCULAR; INTRAVENOUS at 16:44

## 2024-09-29 RX ADMIN — IPRATROPIUM BROMIDE AND ALBUTEROL SULFATE 3 ML: .5; 3 SOLUTION RESPIRATORY (INHALATION) at 00:51

## 2024-09-29 RX ADMIN — ROSUVASTATIN 20 MG: 20 TABLET, FILM COATED ORAL at 22:11

## 2024-09-29 RX ADMIN — Medication 10 ML: at 22:11

## 2024-09-29 RX ADMIN — CLOPIDOGREL BISULFATE 75 MG: 75 TABLET ORAL at 09:21

## 2024-09-29 RX ADMIN — CARVEDILOL 25 MG: 25 TABLET, FILM COATED ORAL at 16:44

## 2024-09-29 RX ADMIN — Medication 10 ML: at 09:23

## 2024-09-29 RX ADMIN — ARFORMOTEROL TARTRATE 15 MCG: 15 SOLUTION RESPIRATORY (INHALATION) at 07:44

## 2024-09-29 RX ADMIN — PREGABALIN 25 MG: 25 CAPSULE ORAL at 22:11

## 2024-09-29 RX ADMIN — GUAIFENESIN 1200 MG: 600 TABLET ORAL at 22:11

## 2024-09-29 RX ADMIN — APIXABAN 5 MG: 5 TABLET, FILM COATED ORAL at 09:21

## 2024-09-29 RX ADMIN — PIPERACILLIN AND TAZOBACTAM 3.38 G: 3; .375 INJECTION, POWDER, FOR SOLUTION INTRAVENOUS at 22:11

## 2024-09-29 RX ADMIN — ARFORMOTEROL TARTRATE 15 MCG: 15 SOLUTION RESPIRATORY (INHALATION) at 19:16

## 2024-09-29 RX ADMIN — BUDESONIDE 0.5 MG: 0.5 INHALANT RESPIRATORY (INHALATION) at 19:16

## 2024-09-29 RX ADMIN — IPRATROPIUM BROMIDE AND ALBUTEROL SULFATE 3 ML: .5; 3 SOLUTION RESPIRATORY (INHALATION) at 19:16

## 2024-09-30 ENCOUNTER — APPOINTMENT (OUTPATIENT)
Facility: HOSPITAL | Age: 84
DRG: 193 | End: 2024-09-30
Payer: MEDICARE

## 2024-09-30 LAB
ANION GAP SERPL CALCULATED.3IONS-SCNC: 9.3 MMOL/L (ref 5–15)
BACTERIA SPEC AEROBE CULT: NORMAL
BACTERIA SPEC AEROBE CULT: NORMAL
BASOPHILS # BLD AUTO: 0.21 10*3/MM3 (ref 0–0.2)
BASOPHILS NFR BLD AUTO: 1.2 % (ref 0–1.5)
BH CV LOWER VASCULAR LEFT COMMON FEMORAL AUGMENT: NORMAL
BH CV LOWER VASCULAR LEFT COMMON FEMORAL COMPETENT: NORMAL
BH CV LOWER VASCULAR LEFT COMMON FEMORAL COMPRESS: NORMAL
BH CV LOWER VASCULAR LEFT COMMON FEMORAL PHASIC: NORMAL
BH CV LOWER VASCULAR LEFT COMMON FEMORAL SPONT: NORMAL
BH CV LOWER VASCULAR LEFT DISTAL FEMORAL COMPRESS: NORMAL
BH CV LOWER VASCULAR LEFT GASTRONEMIUS COMPRESS: NORMAL
BH CV LOWER VASCULAR LEFT GREATER SAPH AK COMPRESS: NORMAL
BH CV LOWER VASCULAR LEFT GREATER SAPH BK COMPRESS: NORMAL
BH CV LOWER VASCULAR LEFT LESSER SAPH COMPRESS: NORMAL
BH CV LOWER VASCULAR LEFT MID FEMORAL AUGMENT: NORMAL
BH CV LOWER VASCULAR LEFT MID FEMORAL COMPETENT: NORMAL
BH CV LOWER VASCULAR LEFT MID FEMORAL COMPRESS: NORMAL
BH CV LOWER VASCULAR LEFT MID FEMORAL PHASIC: NORMAL
BH CV LOWER VASCULAR LEFT MID FEMORAL SPONT: NORMAL
BH CV LOWER VASCULAR LEFT PERONEAL COMPRESS: NORMAL
BH CV LOWER VASCULAR LEFT POPLITEAL AUGMENT: NORMAL
BH CV LOWER VASCULAR LEFT POPLITEAL COMPETENT: NORMAL
BH CV LOWER VASCULAR LEFT POPLITEAL COMPRESS: NORMAL
BH CV LOWER VASCULAR LEFT POPLITEAL PHASIC: NORMAL
BH CV LOWER VASCULAR LEFT POPLITEAL SPONT: NORMAL
BH CV LOWER VASCULAR LEFT POSTERIOR TIBIAL COMPRESS: NORMAL
BH CV LOWER VASCULAR LEFT PROXIMAL FEMORAL COMPRESS: NORMAL
BH CV LOWER VASCULAR LEFT SAPHENOFEMORAL JUNCTION COMPRESS: NORMAL
BH CV LOWER VASCULAR RIGHT COMMON FEMORAL AUGMENT: NORMAL
BH CV LOWER VASCULAR RIGHT COMMON FEMORAL COMPETENT: NORMAL
BH CV LOWER VASCULAR RIGHT COMMON FEMORAL COMPRESS: NORMAL
BH CV LOWER VASCULAR RIGHT COMMON FEMORAL PHASIC: NORMAL
BH CV LOWER VASCULAR RIGHT COMMON FEMORAL SPONT: NORMAL
BH CV VAS PRELIMINARY FINDINGS SCRIPTING: 1
BUN SERPL-MCNC: 16 MG/DL (ref 8–23)
BUN/CREAT SERPL: 23.2 (ref 7–25)
CALCIUM SPEC-SCNC: 8.8 MG/DL (ref 8.6–10.5)
CHLORIDE SERPL-SCNC: 103 MMOL/L (ref 98–107)
CO2 SERPL-SCNC: 25.7 MMOL/L (ref 22–29)
CREAT SERPL-MCNC: 0.69 MG/DL (ref 0.76–1.27)
CYTO UR: NORMAL
DEPRECATED RDW RBC AUTO: 69 FL (ref 37–54)
EGFRCR SERPLBLD CKD-EPI 2021: 93 ML/MIN/1.73
EOSINOPHIL # BLD AUTO: 0.59 10*3/MM3 (ref 0–0.4)
EOSINOPHIL NFR BLD AUTO: 3.4 % (ref 0.3–6.2)
ERYTHROCYTE [DISTWIDTH] IN BLOOD BY AUTOMATED COUNT: 24 % (ref 12.3–15.4)
GLUCOSE SERPL-MCNC: 99 MG/DL (ref 65–99)
HCT VFR BLD AUTO: 31.2 % (ref 37.5–51)
HGB BLD-MCNC: 9.7 G/DL (ref 13–17.7)
IMM GRANULOCYTES # BLD AUTO: 1.24 10*3/MM3 (ref 0–0.05)
IMM GRANULOCYTES NFR BLD AUTO: 7.2 % (ref 0–0.5)
LAB AP CASE REPORT: NORMAL
LAB AP CLINICAL INFORMATION: NORMAL
LAB AP FLOW CYTOMETRY SUMMARY: NORMAL
LYMPHOCYTES # BLD AUTO: 0.84 10*3/MM3 (ref 0.7–3.1)
LYMPHOCYTES NFR BLD AUTO: 4.9 % (ref 19.6–45.3)
MAGNESIUM SERPL-MCNC: 1.7 MG/DL (ref 1.6–2.4)
MCH RBC QN AUTO: 25.3 PG (ref 26.6–33)
MCHC RBC AUTO-ENTMCNC: 31.1 G/DL (ref 31.5–35.7)
MCV RBC AUTO: 81.3 FL (ref 79–97)
MONOCYTES # BLD AUTO: 0.98 10*3/MM3 (ref 0.1–0.9)
MONOCYTES NFR BLD AUTO: 5.7 % (ref 5–12)
NEUTROPHILS NFR BLD AUTO: 13.36 10*3/MM3 (ref 1.7–7)
NEUTROPHILS NFR BLD AUTO: 77.6 % (ref 42.7–76)
NRBC BLD AUTO-RTO: 0 /100 WBC (ref 0–0.2)
PATH REPORT.FINAL DX SPEC: NORMAL
PATH REPORT.GROSS SPEC: NORMAL
PHOSPHATE SERPL-MCNC: 4 MG/DL (ref 2.5–4.5)
PLATELET # BLD AUTO: 291 10*3/MM3 (ref 140–450)
PMV BLD AUTO: 10.1 FL (ref 6–12)
POTASSIUM SERPL-SCNC: 3.4 MMOL/L (ref 3.5–5.2)
QT INTERVAL: 314 MS
QTC INTERVAL: 385 MS
RBC # BLD AUTO: 3.84 10*6/MM3 (ref 4.14–5.8)
SODIUM SERPL-SCNC: 138 MMOL/L (ref 136–145)
WBC NRBC COR # BLD AUTO: 17.22 10*3/MM3 (ref 3.4–10.8)

## 2024-09-30 PROCEDURE — 93971 EXTREMITY STUDY: CPT | Performed by: SURGERY

## 2024-09-30 PROCEDURE — 85025 COMPLETE CBC W/AUTO DIFF WBC: CPT | Performed by: STUDENT IN AN ORGANIZED HEALTH CARE EDUCATION/TRAINING PROGRAM

## 2024-09-30 PROCEDURE — 99232 SBSQ HOSP IP/OBS MODERATE 35: CPT | Performed by: STUDENT IN AN ORGANIZED HEALTH CARE EDUCATION/TRAINING PROGRAM

## 2024-09-30 PROCEDURE — 99233 SBSQ HOSP IP/OBS HIGH 50: CPT | Performed by: STUDENT IN AN ORGANIZED HEALTH CARE EDUCATION/TRAINING PROGRAM

## 2024-09-30 PROCEDURE — 97162 PT EVAL MOD COMPLEX 30 MIN: CPT | Performed by: PHYSICAL THERAPIST

## 2024-09-30 PROCEDURE — 94799 UNLISTED PULMONARY SVC/PX: CPT

## 2024-09-30 PROCEDURE — 80048 BASIC METABOLIC PNL TOTAL CA: CPT | Performed by: STUDENT IN AN ORGANIZED HEALTH CARE EDUCATION/TRAINING PROGRAM

## 2024-09-30 PROCEDURE — 94664 DEMO&/EVAL PT USE INHALER: CPT

## 2024-09-30 PROCEDURE — 25010000002 PIPERACILLIN SOD-TAZOBACTAM PER 1 G: Performed by: STUDENT IN AN ORGANIZED HEALTH CARE EDUCATION/TRAINING PROGRAM

## 2024-09-30 PROCEDURE — 83735 ASSAY OF MAGNESIUM: CPT | Performed by: STUDENT IN AN ORGANIZED HEALTH CARE EDUCATION/TRAINING PROGRAM

## 2024-09-30 PROCEDURE — 93971 EXTREMITY STUDY: CPT

## 2024-09-30 PROCEDURE — 99231 SBSQ HOSP IP/OBS SF/LOW 25: CPT | Performed by: NEUROLOGICAL SURGERY

## 2024-09-30 PROCEDURE — 84100 ASSAY OF PHOSPHORUS: CPT | Performed by: STUDENT IN AN ORGANIZED HEALTH CARE EDUCATION/TRAINING PROGRAM

## 2024-09-30 PROCEDURE — 25010000002 FUROSEMIDE PER 20 MG: Performed by: STUDENT IN AN ORGANIZED HEALTH CARE EDUCATION/TRAINING PROGRAM

## 2024-09-30 RX ORDER — IPRATROPIUM BROMIDE AND ALBUTEROL SULFATE 2.5; .5 MG/3ML; MG/3ML
3 SOLUTION RESPIRATORY (INHALATION) EVERY 6 HOURS PRN
Status: DISCONTINUED | OUTPATIENT
Start: 2024-09-30 | End: 2024-10-02 | Stop reason: HOSPADM

## 2024-09-30 RX ADMIN — ARFORMOTEROL TARTRATE 15 MCG: 15 SOLUTION RESPIRATORY (INHALATION) at 06:52

## 2024-09-30 RX ADMIN — Medication 10 ML: at 21:30

## 2024-09-30 RX ADMIN — DILTIAZEM HYDROCHLORIDE 180 MG: 180 CAPSULE, COATED, EXTENDED RELEASE ORAL at 10:16

## 2024-09-30 RX ADMIN — BUDESONIDE 0.5 MG: 0.5 INHALANT RESPIRATORY (INHALATION) at 06:52

## 2024-09-30 RX ADMIN — CARVEDILOL 25 MG: 25 TABLET, FILM COATED ORAL at 10:17

## 2024-09-30 RX ADMIN — PANTOPRAZOLE SODIUM 40 MG: 40 TABLET, DELAYED RELEASE ORAL at 06:16

## 2024-09-30 RX ADMIN — SENNOSIDES AND DOCUSATE SODIUM 2 TABLET: 50; 8.6 TABLET ORAL at 21:30

## 2024-09-30 RX ADMIN — APIXABAN 5 MG: 5 TABLET, FILM COATED ORAL at 10:16

## 2024-09-30 RX ADMIN — APIXABAN 5 MG: 5 TABLET, FILM COATED ORAL at 21:30

## 2024-09-30 RX ADMIN — GUAIFENESIN 1200 MG: 600 TABLET ORAL at 21:30

## 2024-09-30 RX ADMIN — Medication 10 ML: at 10:17

## 2024-09-30 RX ADMIN — ARFORMOTEROL TARTRATE 15 MCG: 15 SOLUTION RESPIRATORY (INHALATION) at 19:45

## 2024-09-30 RX ADMIN — SENNOSIDES AND DOCUSATE SODIUM 2 TABLET: 50; 8.6 TABLET ORAL at 10:17

## 2024-09-30 RX ADMIN — BUDESONIDE 0.5 MG: 0.5 INHALANT RESPIRATORY (INHALATION) at 19:45

## 2024-09-30 RX ADMIN — PREGABALIN 25 MG: 25 CAPSULE ORAL at 21:30

## 2024-09-30 RX ADMIN — GUAIFENESIN 1200 MG: 600 TABLET ORAL at 10:16

## 2024-09-30 RX ADMIN — CLOPIDOGREL BISULFATE 75 MG: 75 TABLET ORAL at 10:16

## 2024-09-30 RX ADMIN — PIPERACILLIN AND TAZOBACTAM 3.38 G: 3; .375 INJECTION, POWDER, FOR SOLUTION INTRAVENOUS at 21:30

## 2024-09-30 RX ADMIN — FUROSEMIDE 40 MG: 10 INJECTION, SOLUTION INTRAMUSCULAR; INTRAVENOUS at 10:17

## 2024-09-30 RX ADMIN — PREGABALIN 25 MG: 25 CAPSULE ORAL at 10:16

## 2024-09-30 RX ADMIN — PIPERACILLIN AND TAZOBACTAM 3.38 G: 3; .375 INJECTION, POWDER, FOR SOLUTION INTRAVENOUS at 06:16

## 2024-09-30 RX ADMIN — ROSUVASTATIN 20 MG: 20 TABLET, FILM COATED ORAL at 21:30

## 2024-09-30 RX ADMIN — PIPERACILLIN AND TAZOBACTAM 3.38 G: 3; .375 INJECTION, POWDER, FOR SOLUTION INTRAVENOUS at 14:14

## 2024-09-30 RX ADMIN — CARVEDILOL 25 MG: 25 TABLET, FILM COATED ORAL at 17:21

## 2024-10-01 LAB
ANION GAP SERPL CALCULATED.3IONS-SCNC: 9.4 MMOL/L (ref 5–15)
ANISOCYTOSIS BLD QL: ABNORMAL
BACTERIA SPEC ANAEROBE CULT: NORMAL
BUN SERPL-MCNC: 17 MG/DL (ref 8–23)
BUN/CREAT SERPL: 23.9 (ref 7–25)
BURR CELLS BLD QL SMEAR: ABNORMAL
CALCIUM SPEC-SCNC: 8.5 MG/DL (ref 8.6–10.5)
CHLORIDE SERPL-SCNC: 103 MMOL/L (ref 98–107)
CO2 SERPL-SCNC: 24.6 MMOL/L (ref 22–29)
CREAT SERPL-MCNC: 0.71 MG/DL (ref 0.76–1.27)
DEPRECATED RDW RBC AUTO: 70.7 FL (ref 37–54)
EGFRCR SERPLBLD CKD-EPI 2021: 92.2 ML/MIN/1.73
EOSINOPHIL # BLD MANUAL: 0.69 10*3/MM3 (ref 0–0.4)
EOSINOPHIL NFR BLD MANUAL: 4 % (ref 0.3–6.2)
ERYTHROCYTE [DISTWIDTH] IN BLOOD BY AUTOMATED COUNT: 24.1 % (ref 12.3–15.4)
FLUAV SUBTYP SPEC NAA+PROBE: NOT DETECTED
FLUBV RNA ISLT QL NAA+PROBE: NOT DETECTED
GLUCOSE SERPL-MCNC: 100 MG/DL (ref 65–99)
HCT VFR BLD AUTO: 32.6 % (ref 37.5–51)
HGB BLD-MCNC: 9.8 G/DL (ref 13–17.7)
LYMPHOCYTES # BLD MANUAL: 0.35 10*3/MM3 (ref 0.7–3.1)
MAGNESIUM SERPL-MCNC: 1.6 MG/DL (ref 1.6–2.4)
MCH RBC QN AUTO: 24.8 PG (ref 26.6–33)
MCHC RBC AUTO-ENTMCNC: 30.1 G/DL (ref 31.5–35.7)
MCV RBC AUTO: 82.5 FL (ref 79–97)
METAMYELOCYTES NFR BLD MANUAL: 1 % (ref 0–0)
NEUTROPHILS # BLD AUTO: 16.14 10*3/MM3 (ref 1.7–7)
NEUTROPHILS NFR BLD MANUAL: 92 % (ref 42.7–76)
NEUTS BAND NFR BLD MANUAL: 1 % (ref 0–5)
OVALOCYTES BLD QL SMEAR: ABNORMAL
PHOSPHATE SERPL-MCNC: 3.1 MG/DL (ref 2.5–4.5)
PLATELET # BLD AUTO: 276 10*3/MM3 (ref 140–450)
PMV BLD AUTO: 9.6 FL (ref 6–12)
POLYCHROMASIA BLD QL SMEAR: ABNORMAL
POTASSIUM SERPL-SCNC: 3.1 MMOL/L (ref 3.5–5.2)
RBC # BLD AUTO: 3.95 10*6/MM3 (ref 4.14–5.8)
RSV RNA NPH QL NAA+NON-PROBE: NOT DETECTED
SARS-COV-2 RNA RESP QL NAA+PROBE: NOT DETECTED
SCAN SLIDE: NORMAL
SMALL PLATELETS BLD QL SMEAR: ADEQUATE
SODIUM SERPL-SCNC: 137 MMOL/L (ref 136–145)
VARIANT LYMPHS NFR BLD MANUAL: 2 % (ref 19.6–45.3)
WBC MORPH BLD: NORMAL
WBC NRBC COR # BLD AUTO: 17.36 10*3/MM3 (ref 3.4–10.8)

## 2024-10-01 PROCEDURE — 97530 THERAPEUTIC ACTIVITIES: CPT

## 2024-10-01 PROCEDURE — 94760 N-INVAS EAR/PLS OXIMETRY 1: CPT

## 2024-10-01 PROCEDURE — 94799 UNLISTED PULMONARY SVC/PX: CPT

## 2024-10-01 PROCEDURE — 80048 BASIC METABOLIC PNL TOTAL CA: CPT | Performed by: STUDENT IN AN ORGANIZED HEALTH CARE EDUCATION/TRAINING PROGRAM

## 2024-10-01 PROCEDURE — 25010000002 PIPERACILLIN SOD-TAZOBACTAM PER 1 G: Performed by: STUDENT IN AN ORGANIZED HEALTH CARE EDUCATION/TRAINING PROGRAM

## 2024-10-01 PROCEDURE — 99233 SBSQ HOSP IP/OBS HIGH 50: CPT | Performed by: STUDENT IN AN ORGANIZED HEALTH CARE EDUCATION/TRAINING PROGRAM

## 2024-10-01 PROCEDURE — 87637 SARSCOV2&INF A&B&RSV AMP PRB: CPT | Performed by: STUDENT IN AN ORGANIZED HEALTH CARE EDUCATION/TRAINING PROGRAM

## 2024-10-01 PROCEDURE — 94664 DEMO&/EVAL PT USE INHALER: CPT

## 2024-10-01 PROCEDURE — 84100 ASSAY OF PHOSPHORUS: CPT | Performed by: STUDENT IN AN ORGANIZED HEALTH CARE EDUCATION/TRAINING PROGRAM

## 2024-10-01 PROCEDURE — 97116 GAIT TRAINING THERAPY: CPT

## 2024-10-01 PROCEDURE — 85007 BL SMEAR W/DIFF WBC COUNT: CPT | Performed by: STUDENT IN AN ORGANIZED HEALTH CARE EDUCATION/TRAINING PROGRAM

## 2024-10-01 PROCEDURE — 83735 ASSAY OF MAGNESIUM: CPT | Performed by: STUDENT IN AN ORGANIZED HEALTH CARE EDUCATION/TRAINING PROGRAM

## 2024-10-01 PROCEDURE — 25010000002 FUROSEMIDE PER 20 MG: Performed by: STUDENT IN AN ORGANIZED HEALTH CARE EDUCATION/TRAINING PROGRAM

## 2024-10-01 PROCEDURE — 85025 COMPLETE CBC W/AUTO DIFF WBC: CPT | Performed by: STUDENT IN AN ORGANIZED HEALTH CARE EDUCATION/TRAINING PROGRAM

## 2024-10-01 PROCEDURE — 25010000002 MAGNESIUM SULFATE IN D5W 1G/100ML (PREMIX) 1-5 GM/100ML-% SOLUTION

## 2024-10-01 PROCEDURE — 99233 SBSQ HOSP IP/OBS HIGH 50: CPT | Performed by: INTERNAL MEDICINE

## 2024-10-01 RX ORDER — MAGNESIUM SULFATE 1 G/100ML
1 INJECTION INTRAVENOUS
Status: COMPLETED | OUTPATIENT
Start: 2024-10-01 | End: 2024-10-01

## 2024-10-01 RX ORDER — MAGNESIUM SULFATE 1 G/100ML
1 INJECTION INTRAVENOUS
Status: DISPENSED | OUTPATIENT
Start: 2024-10-01 | End: 2024-10-01

## 2024-10-01 RX ORDER — HYDROCODONE BITARTRATE AND ACETAMINOPHEN 7.5; 325 MG/1; MG/1
2 TABLET ORAL DAILY
Status: DISCONTINUED | OUTPATIENT
Start: 2024-10-01 | End: 2024-10-01

## 2024-10-01 RX ORDER — HYDROCODONE BITARTRATE AND ACETAMINOPHEN 7.5; 325 MG/1; MG/1
1 TABLET ORAL EVERY 12 HOURS PRN
Status: DISCONTINUED | OUTPATIENT
Start: 2024-10-01 | End: 2024-10-02 | Stop reason: HOSPADM

## 2024-10-01 RX ORDER — HYDROCODONE BITARTRATE AND ACETAMINOPHEN 7.5; 325 MG/1; MG/1
1 TABLET ORAL DAILY
Status: DISCONTINUED | OUTPATIENT
Start: 2024-10-01 | End: 2024-10-01

## 2024-10-01 RX ORDER — POTASSIUM CHLORIDE 750 MG/1
40 CAPSULE, EXTENDED RELEASE ORAL 2 TIMES DAILY WITH MEALS
Status: COMPLETED | OUTPATIENT
Start: 2024-10-01 | End: 2024-10-01

## 2024-10-01 RX ADMIN — PIPERACILLIN AND TAZOBACTAM 3.38 G: 3; .375 INJECTION, POWDER, FOR SOLUTION INTRAVENOUS at 13:45

## 2024-10-01 RX ADMIN — PANTOPRAZOLE SODIUM 40 MG: 40 TABLET, DELAYED RELEASE ORAL at 06:27

## 2024-10-01 RX ADMIN — HYDROCODONE BITARTRATE AND ACETAMINOPHEN 1 TABLET: 7.5; 325 TABLET ORAL at 22:36

## 2024-10-01 RX ADMIN — APIXABAN 5 MG: 5 TABLET, FILM COATED ORAL at 20:51

## 2024-10-01 RX ADMIN — PIPERACILLIN AND TAZOBACTAM 3.38 G: 3; .375 INJECTION, POWDER, FOR SOLUTION INTRAVENOUS at 20:55

## 2024-10-01 RX ADMIN — APIXABAN 5 MG: 5 TABLET, FILM COATED ORAL at 09:48

## 2024-10-01 RX ADMIN — ROSUVASTATIN 20 MG: 20 TABLET, FILM COATED ORAL at 20:51

## 2024-10-01 RX ADMIN — BUDESONIDE 0.5 MG: 0.5 INHALANT RESPIRATORY (INHALATION) at 18:53

## 2024-10-01 RX ADMIN — ACETAMINOPHEN 650 MG: 325 TABLET ORAL at 22:18

## 2024-10-01 RX ADMIN — PREGABALIN 25 MG: 25 CAPSULE ORAL at 09:48

## 2024-10-01 RX ADMIN — CLOPIDOGREL BISULFATE 75 MG: 75 TABLET ORAL at 09:48

## 2024-10-01 RX ADMIN — POTASSIUM CHLORIDE 40 MEQ: 750 CAPSULE, EXTENDED RELEASE ORAL at 09:47

## 2024-10-01 RX ADMIN — MAGNESIUM SULFATE HEPTAHYDRATE 1 G: 10 INJECTION, SOLUTION INTRAVENOUS at 11:12

## 2024-10-01 RX ADMIN — MAGNESIUM SULFATE HEPTAHYDRATE 1 G: 10 INJECTION, SOLUTION INTRAVENOUS at 12:11

## 2024-10-01 RX ADMIN — ARFORMOTEROL TARTRATE 15 MCG: 15 SOLUTION RESPIRATORY (INHALATION) at 06:45

## 2024-10-01 RX ADMIN — CARVEDILOL 25 MG: 25 TABLET, FILM COATED ORAL at 17:28

## 2024-10-01 RX ADMIN — MAGNESIUM SULFATE HEPTAHYDRATE 1 G: 10 INJECTION, SOLUTION INTRAVENOUS at 13:16

## 2024-10-01 RX ADMIN — ARFORMOTEROL TARTRATE 15 MCG: 15 SOLUTION RESPIRATORY (INHALATION) at 18:53

## 2024-10-01 RX ADMIN — PIPERACILLIN AND TAZOBACTAM 3.38 G: 3; .375 INJECTION, POWDER, FOR SOLUTION INTRAVENOUS at 05:42

## 2024-10-01 RX ADMIN — GUAIFENESIN 1200 MG: 600 TABLET ORAL at 20:51

## 2024-10-01 RX ADMIN — FENTANYL 1 PATCH: 12 PATCH TRANSDERMAL at 12:12

## 2024-10-01 RX ADMIN — BUDESONIDE 0.5 MG: 0.5 INHALANT RESPIRATORY (INHALATION) at 06:45

## 2024-10-01 RX ADMIN — PREGABALIN 25 MG: 25 CAPSULE ORAL at 20:51

## 2024-10-01 RX ADMIN — DILTIAZEM HYDROCHLORIDE 180 MG: 180 CAPSULE, COATED, EXTENDED RELEASE ORAL at 09:48

## 2024-10-01 RX ADMIN — FUROSEMIDE 40 MG: 10 INJECTION, SOLUTION INTRAMUSCULAR; INTRAVENOUS at 11:15

## 2024-10-01 RX ADMIN — Medication 10 ML: at 10:20

## 2024-10-01 RX ADMIN — SENNOSIDES AND DOCUSATE SODIUM 2 TABLET: 50; 8.6 TABLET ORAL at 09:48

## 2024-10-01 RX ADMIN — CARVEDILOL 25 MG: 25 TABLET, FILM COATED ORAL at 09:48

## 2024-10-01 RX ADMIN — GUAIFENESIN 1200 MG: 600 TABLET ORAL at 09:48

## 2024-10-02 ENCOUNTER — HOSPITAL ENCOUNTER (INPATIENT)
Facility: HOSPITAL | Age: 84
DRG: 947 | End: 2024-10-02
Attending: INTERNAL MEDICINE | Admitting: INTERNAL MEDICINE
Payer: MEDICARE

## 2024-10-02 VITALS
WEIGHT: 169.31 LBS | HEIGHT: 70 IN | TEMPERATURE: 98.6 F | BODY MASS INDEX: 24.24 KG/M2 | HEART RATE: 89 BPM | RESPIRATION RATE: 20 BRPM | SYSTOLIC BLOOD PRESSURE: 127 MMHG | OXYGEN SATURATION: 95 % | DIASTOLIC BLOOD PRESSURE: 73 MMHG

## 2024-10-02 DIAGNOSIS — R26.2 DIFFICULTY WALKING: Primary | ICD-10-CM

## 2024-10-02 DIAGNOSIS — Z78.9 DECREASED ACTIVITIES OF DAILY LIVING (ADL): ICD-10-CM

## 2024-10-02 DIAGNOSIS — R10.13 EPIGASTRIC PAIN: ICD-10-CM

## 2024-10-02 PROBLEM — R53.81 DEBILITY: Status: ACTIVE | Noted: 2024-10-02

## 2024-10-02 LAB
ALBUMIN SERPL-MCNC: 2.1 G/DL (ref 3.5–5.2)
ALBUMIN/GLOB SERPL: 0.7 G/DL
ALP SERPL-CCNC: 91 U/L (ref 39–117)
ALT SERPL W P-5'-P-CCNC: 38 U/L (ref 1–41)
ANION GAP SERPL CALCULATED.3IONS-SCNC: 8.2 MMOL/L (ref 5–15)
AST SERPL-CCNC: 33 U/L (ref 1–40)
BASOPHILS # BLD AUTO: 0.12 10*3/MM3 (ref 0–0.2)
BASOPHILS NFR BLD AUTO: 0.8 % (ref 0–1.5)
BILIRUB SERPL-MCNC: 0.3 MG/DL (ref 0–1.2)
BUN SERPL-MCNC: 14 MG/DL (ref 8–23)
BUN/CREAT SERPL: 21.2 (ref 7–25)
CALCIUM SPEC-SCNC: 8.5 MG/DL (ref 8.6–10.5)
CHLORIDE SERPL-SCNC: 101 MMOL/L (ref 98–107)
CO2 SERPL-SCNC: 26.8 MMOL/L (ref 22–29)
CREAT SERPL-MCNC: 0.66 MG/DL (ref 0.76–1.27)
DEPRECATED RDW RBC AUTO: 71.7 FL (ref 37–54)
EGFRCR SERPLBLD CKD-EPI 2021: 94.2 ML/MIN/1.73
EOSINOPHIL # BLD AUTO: 0.79 10*3/MM3 (ref 0–0.4)
EOSINOPHIL NFR BLD AUTO: 5.2 % (ref 0.3–6.2)
ERYTHROCYTE [DISTWIDTH] IN BLOOD BY AUTOMATED COUNT: 23.9 % (ref 12.3–15.4)
GLOBULIN UR ELPH-MCNC: 2.9 GM/DL
GLUCOSE BLDC GLUCOMTR-MCNC: 83 MG/DL (ref 70–99)
GLUCOSE SERPL-MCNC: 119 MG/DL (ref 65–99)
HCT VFR BLD AUTO: 29.4 % (ref 37.5–51)
HGB BLD-MCNC: 9 G/DL (ref 13–17.7)
IMM GRANULOCYTES # BLD AUTO: 1.01 10*3/MM3 (ref 0–0.05)
IMM GRANULOCYTES NFR BLD AUTO: 6.7 % (ref 0–0.5)
LYMPHOCYTES # BLD AUTO: 0.99 10*3/MM3 (ref 0.7–3.1)
LYMPHOCYTES NFR BLD AUTO: 6.6 % (ref 19.6–45.3)
MAGNESIUM SERPL-MCNC: 2 MG/DL (ref 1.6–2.4)
MCH RBC QN AUTO: 25.4 PG (ref 26.6–33)
MCHC RBC AUTO-ENTMCNC: 30.6 G/DL (ref 31.5–35.7)
MCV RBC AUTO: 83.1 FL (ref 79–97)
MONOCYTES # BLD AUTO: 0.83 10*3/MM3 (ref 0.1–0.9)
MONOCYTES NFR BLD AUTO: 5.5 % (ref 5–12)
NEUTROPHILS NFR BLD AUTO: 11.37 10*3/MM3 (ref 1.7–7)
NEUTROPHILS NFR BLD AUTO: 75.2 % (ref 42.7–76)
NRBC BLD AUTO-RTO: 0 /100 WBC (ref 0–0.2)
PHOSPHATE SERPL-MCNC: 3.1 MG/DL (ref 2.5–4.5)
PLATELET # BLD AUTO: 279 10*3/MM3 (ref 140–450)
PMV BLD AUTO: 9.8 FL (ref 6–12)
POTASSIUM SERPL-SCNC: 3 MMOL/L (ref 3.5–5.2)
PROT SERPL-MCNC: 5 G/DL (ref 6–8.5)
RBC # BLD AUTO: 3.54 10*6/MM3 (ref 4.14–5.8)
SODIUM SERPL-SCNC: 136 MMOL/L (ref 136–145)
WBC NRBC COR # BLD AUTO: 15.11 10*3/MM3 (ref 3.4–10.8)

## 2024-10-02 PROCEDURE — 25010000002 FUROSEMIDE PER 20 MG: Performed by: STUDENT IN AN ORGANIZED HEALTH CARE EDUCATION/TRAINING PROGRAM

## 2024-10-02 PROCEDURE — 97530 THERAPEUTIC ACTIVITIES: CPT

## 2024-10-02 PROCEDURE — 85025 COMPLETE CBC W/AUTO DIFF WBC: CPT | Performed by: STUDENT IN AN ORGANIZED HEALTH CARE EDUCATION/TRAINING PROGRAM

## 2024-10-02 PROCEDURE — 97760 ORTHOTIC MGMT&TRAING 1ST ENC: CPT

## 2024-10-02 PROCEDURE — 25010000002 PIPERACILLIN SOD-TAZOBACTAM PER 1 G: Performed by: STUDENT IN AN ORGANIZED HEALTH CARE EDUCATION/TRAINING PROGRAM

## 2024-10-02 PROCEDURE — 80053 COMPREHEN METABOLIC PANEL: CPT | Performed by: INTERNAL MEDICINE

## 2024-10-02 PROCEDURE — 83735 ASSAY OF MAGNESIUM: CPT | Performed by: STUDENT IN AN ORGANIZED HEALTH CARE EDUCATION/TRAINING PROGRAM

## 2024-10-02 PROCEDURE — 97110 THERAPEUTIC EXERCISES: CPT

## 2024-10-02 PROCEDURE — 94799 UNLISTED PULMONARY SVC/PX: CPT

## 2024-10-02 PROCEDURE — 99233 SBSQ HOSP IP/OBS HIGH 50: CPT | Performed by: STUDENT IN AN ORGANIZED HEALTH CARE EDUCATION/TRAINING PROGRAM

## 2024-10-02 PROCEDURE — 84100 ASSAY OF PHOSPHORUS: CPT | Performed by: STUDENT IN AN ORGANIZED HEALTH CARE EDUCATION/TRAINING PROGRAM

## 2024-10-02 PROCEDURE — 99239 HOSP IP/OBS DSCHRG MGMT >30: CPT | Performed by: INTERNAL MEDICINE

## 2024-10-02 PROCEDURE — 82948 REAGENT STRIP/BLOOD GLUCOSE: CPT

## 2024-10-02 RX ORDER — BISACODYL 10 MG
10 SUPPOSITORY, RECTAL RECTAL DAILY PRN
Status: CANCELLED | OUTPATIENT
Start: 2024-10-02

## 2024-10-02 RX ORDER — FENTANYL 12.5 UG/1
1 PATCH TRANSDERMAL
Status: DISCONTINUED | OUTPATIENT
Start: 2024-10-04 | End: 2024-10-08

## 2024-10-02 RX ORDER — DILTIAZEM HYDROCHLORIDE 180 MG/1
180 CAPSULE, COATED, EXTENDED RELEASE ORAL
Status: CANCELLED | OUTPATIENT
Start: 2024-10-03

## 2024-10-02 RX ORDER — POLYETHYLENE GLYCOL 3350 17 G/17G
17 POWDER, FOR SOLUTION ORAL DAILY PRN
Status: DISCONTINUED | OUTPATIENT
Start: 2024-10-02 | End: 2024-10-11 | Stop reason: HOSPADM

## 2024-10-02 RX ORDER — POLYETHYLENE GLYCOL 3350 17 G/17G
17 POWDER, FOR SOLUTION ORAL DAILY PRN
Status: CANCELLED | OUTPATIENT
Start: 2024-10-02

## 2024-10-02 RX ORDER — HYDROCODONE BITARTRATE AND ACETAMINOPHEN 7.5; 325 MG/1; MG/1
1 TABLET ORAL EVERY 12 HOURS PRN
Status: CANCELLED | OUTPATIENT
Start: 2024-10-02

## 2024-10-02 RX ORDER — FUROSEMIDE 10 MG/ML
40 INJECTION INTRAMUSCULAR; INTRAVENOUS DAILY
Status: DISCONTINUED | OUTPATIENT
Start: 2024-10-03 | End: 2024-10-03

## 2024-10-02 RX ORDER — PANTOPRAZOLE SODIUM 40 MG/1
40 TABLET, DELAYED RELEASE ORAL EVERY MORNING
Status: CANCELLED | OUTPATIENT
Start: 2024-10-03

## 2024-10-02 RX ORDER — CARVEDILOL 25 MG/1
25 TABLET ORAL 2 TIMES DAILY WITH MEALS
Status: CANCELLED | OUTPATIENT
Start: 2024-10-02

## 2024-10-02 RX ORDER — CARVEDILOL 25 MG/1
25 TABLET ORAL 2 TIMES DAILY WITH MEALS
Status: DISCONTINUED | OUTPATIENT
Start: 2024-10-02 | End: 2024-10-11 | Stop reason: HOSPADM

## 2024-10-02 RX ORDER — BISACODYL 10 MG
10 SUPPOSITORY, RECTAL RECTAL DAILY PRN
Status: DISCONTINUED | OUTPATIENT
Start: 2024-10-02 | End: 2024-10-11 | Stop reason: HOSPADM

## 2024-10-02 RX ORDER — PREGABALIN 25 MG/1
25 CAPSULE ORAL EVERY 12 HOURS SCHEDULED
Status: DISCONTINUED | OUTPATIENT
Start: 2024-10-02 | End: 2024-10-06

## 2024-10-02 RX ORDER — BISACODYL 5 MG/1
5 TABLET, DELAYED RELEASE ORAL DAILY PRN
Status: CANCELLED | OUTPATIENT
Start: 2024-10-02

## 2024-10-02 RX ORDER — PREGABALIN 25 MG/1
25 CAPSULE ORAL EVERY 12 HOURS SCHEDULED
Status: CANCELLED | OUTPATIENT
Start: 2024-10-02

## 2024-10-02 RX ORDER — AMOXICILLIN 250 MG
2 CAPSULE ORAL 2 TIMES DAILY
Status: CANCELLED | OUTPATIENT
Start: 2024-10-02

## 2024-10-02 RX ORDER — CEFDINIR 300 MG/1
300 CAPSULE ORAL EVERY 12 HOURS SCHEDULED
Status: CANCELLED | OUTPATIENT
Start: 2024-10-02 | End: 2024-10-05

## 2024-10-02 RX ORDER — BISACODYL 5 MG/1
5 TABLET, DELAYED RELEASE ORAL DAILY PRN
Status: DISCONTINUED | OUTPATIENT
Start: 2024-10-02 | End: 2024-10-11 | Stop reason: HOSPADM

## 2024-10-02 RX ORDER — ROSUVASTATIN CALCIUM 20 MG/1
20 TABLET, COATED ORAL NIGHTLY
Status: CANCELLED | OUTPATIENT
Start: 2024-10-02

## 2024-10-02 RX ORDER — BUDESONIDE AND FORMOTEROL FUMARATE DIHYDRATE 160; 4.5 UG/1; UG/1
2 AEROSOL RESPIRATORY (INHALATION)
Status: ON HOLD
Start: 2024-10-02 | End: 2024-11-01

## 2024-10-02 RX ORDER — IPRATROPIUM BROMIDE AND ALBUTEROL SULFATE 2.5; .5 MG/3ML; MG/3ML
3 SOLUTION RESPIRATORY (INHALATION) EVERY 6 HOURS PRN
Status: CANCELLED | OUTPATIENT
Start: 2024-10-02

## 2024-10-02 RX ORDER — GUAIFENESIN 600 MG/1
1200 TABLET, EXTENDED RELEASE ORAL EVERY 12 HOURS SCHEDULED
Status: DISCONTINUED | OUTPATIENT
Start: 2024-10-02 | End: 2024-10-10

## 2024-10-02 RX ORDER — DILTIAZEM HYDROCHLORIDE 180 MG/1
180 CAPSULE, COATED, EXTENDED RELEASE ORAL
Status: DISCONTINUED | OUTPATIENT
Start: 2024-10-03 | End: 2024-10-11 | Stop reason: HOSPADM

## 2024-10-02 RX ORDER — PANTOPRAZOLE SODIUM 40 MG/1
40 TABLET, DELAYED RELEASE ORAL EVERY MORNING
Status: DISCONTINUED | OUTPATIENT
Start: 2024-10-03 | End: 2024-10-07

## 2024-10-02 RX ORDER — CEFDINIR 300 MG/1
300 CAPSULE ORAL EVERY 12 HOURS SCHEDULED
Status: COMPLETED | OUTPATIENT
Start: 2024-10-02 | End: 2024-10-05

## 2024-10-02 RX ORDER — CLOPIDOGREL BISULFATE 75 MG/1
75 TABLET ORAL DAILY
Status: CANCELLED | OUTPATIENT
Start: 2024-10-03

## 2024-10-02 RX ORDER — ROSUVASTATIN CALCIUM 20 MG/1
20 TABLET, COATED ORAL NIGHTLY
Status: DISCONTINUED | OUTPATIENT
Start: 2024-10-02 | End: 2024-10-11 | Stop reason: HOSPADM

## 2024-10-02 RX ORDER — IPRATROPIUM BROMIDE AND ALBUTEROL SULFATE 2.5; .5 MG/3ML; MG/3ML
3 SOLUTION RESPIRATORY (INHALATION) EVERY 6 HOURS PRN
Status: DISCONTINUED | OUTPATIENT
Start: 2024-10-02 | End: 2024-10-11 | Stop reason: HOSPADM

## 2024-10-02 RX ORDER — GUAIFENESIN 600 MG/1
1200 TABLET, EXTENDED RELEASE ORAL EVERY 12 HOURS SCHEDULED
Status: CANCELLED | OUTPATIENT
Start: 2024-10-02

## 2024-10-02 RX ORDER — AMOXICILLIN 250 MG
2 CAPSULE ORAL 2 TIMES DAILY
Status: DISCONTINUED | OUTPATIENT
Start: 2024-10-02 | End: 2024-10-10

## 2024-10-02 RX ORDER — HYDROCODONE BITARTRATE AND ACETAMINOPHEN 7.5; 325 MG/1; MG/1
1 TABLET ORAL EVERY 12 HOURS PRN
Status: DISCONTINUED | OUTPATIENT
Start: 2024-10-02 | End: 2024-10-03

## 2024-10-02 RX ORDER — CLOPIDOGREL BISULFATE 75 MG/1
75 TABLET ORAL DAILY
Status: DISCONTINUED | OUTPATIENT
Start: 2024-10-03 | End: 2024-10-11 | Stop reason: HOSPADM

## 2024-10-02 RX ORDER — FUROSEMIDE 10 MG/ML
40 INJECTION INTRAMUSCULAR; INTRAVENOUS DAILY
Status: CANCELLED | OUTPATIENT
Start: 2024-10-03

## 2024-10-02 RX ADMIN — PREGABALIN 25 MG: 25 CAPSULE ORAL at 09:19

## 2024-10-02 RX ADMIN — CARVEDILOL 25 MG: 25 TABLET, FILM COATED ORAL at 09:19

## 2024-10-02 RX ADMIN — APIXABAN 5 MG: 5 TABLET, FILM COATED ORAL at 20:51

## 2024-10-02 RX ADMIN — GUAIFENESIN 1200 MG: 600 TABLET ORAL at 20:51

## 2024-10-02 RX ADMIN — CARVEDILOL 25 MG: 25 TABLET, FILM COATED ORAL at 18:34

## 2024-10-02 RX ADMIN — PIPERACILLIN AND TAZOBACTAM 3.38 G: 3; .375 INJECTION, POWDER, FOR SOLUTION INTRAVENOUS at 14:02

## 2024-10-02 RX ADMIN — BUDESONIDE 0.5 MG: 0.5 INHALANT RESPIRATORY (INHALATION) at 06:57

## 2024-10-02 RX ADMIN — CLOPIDOGREL BISULFATE 75 MG: 75 TABLET ORAL at 09:19

## 2024-10-02 RX ADMIN — ARFORMOTEROL TARTRATE 15 MCG: 15 SOLUTION RESPIRATORY (INHALATION) at 06:57

## 2024-10-02 RX ADMIN — FUROSEMIDE 40 MG: 10 INJECTION, SOLUTION INTRAMUSCULAR; INTRAVENOUS at 09:19

## 2024-10-02 RX ADMIN — CEFDINIR 300 MG: 300 CAPSULE ORAL at 20:50

## 2024-10-02 RX ADMIN — APIXABAN 5 MG: 5 TABLET, FILM COATED ORAL at 09:19

## 2024-10-02 RX ADMIN — ROSUVASTATIN 20 MG: 20 TABLET, FILM COATED ORAL at 20:50

## 2024-10-02 RX ADMIN — SENNOSIDES AND DOCUSATE SODIUM 2 TABLET: 50; 8.6 TABLET ORAL at 20:50

## 2024-10-02 RX ADMIN — PANTOPRAZOLE SODIUM 40 MG: 40 TABLET, DELAYED RELEASE ORAL at 04:55

## 2024-10-02 RX ADMIN — DILTIAZEM HYDROCHLORIDE 180 MG: 180 CAPSULE, COATED, EXTENDED RELEASE ORAL at 09:19

## 2024-10-02 RX ADMIN — GUAIFENESIN 1200 MG: 600 TABLET ORAL at 09:19

## 2024-10-02 RX ADMIN — HYDROCODONE BITARTRATE AND ACETAMINOPHEN 1 TABLET: 7.5; 325 TABLET ORAL at 22:38

## 2024-10-02 RX ADMIN — PIPERACILLIN AND TAZOBACTAM 3.38 G: 3; .375 INJECTION, POWDER, FOR SOLUTION INTRAVENOUS at 04:55

## 2024-10-02 RX ADMIN — PREGABALIN 25 MG: 25 CAPSULE ORAL at 20:51

## 2024-10-02 RX ADMIN — TUBERCULIN PURIFIED PROTEIN DERIVATIVE 5 UNITS: 5 INJECTION, SOLUTION INTRADERMAL at 20:51

## 2024-10-02 RX ADMIN — Medication 10 ML: at 09:28

## 2024-10-03 LAB
ANION GAP SERPL CALCULATED.3IONS-SCNC: 10.3 MMOL/L (ref 5–15)
BUN SERPL-MCNC: 13 MG/DL (ref 8–23)
BUN/CREAT SERPL: 17.6 (ref 7–25)
CALCIUM SPEC-SCNC: 9.4 MG/DL (ref 8.6–10.5)
CHLORIDE SERPL-SCNC: 99 MMOL/L (ref 98–107)
CO2 SERPL-SCNC: 26.7 MMOL/L (ref 22–29)
CREAT SERPL-MCNC: 0.74 MG/DL (ref 0.76–1.27)
EGFRCR SERPLBLD CKD-EPI 2021: 91 ML/MIN/1.73
FUNGUS WND CULT: NORMAL
GLUCOSE BLDC GLUCOMTR-MCNC: 121 MG/DL (ref 70–99)
GLUCOSE BLDC GLUCOMTR-MCNC: 128 MG/DL (ref 70–99)
GLUCOSE SERPL-MCNC: 115 MG/DL (ref 65–99)
MYCOBACTERIUM SPEC CULT: NORMAL
NIGHT BLUE STAIN TISS: NORMAL
POTASSIUM SERPL-SCNC: 3.1 MMOL/L (ref 3.5–5.2)
SARS-COV-2 RNA RESP QL NAA+PROBE: NOT DETECTED
SODIUM SERPL-SCNC: 136 MMOL/L (ref 136–145)
WHOLE BLOOD HOLD SPECIMEN: NORMAL

## 2024-10-03 PROCEDURE — 97530 THERAPEUTIC ACTIVITIES: CPT

## 2024-10-03 PROCEDURE — 80048 BASIC METABOLIC PNL TOTAL CA: CPT | Performed by: PHYSICIAN ASSISTANT

## 2024-10-03 PROCEDURE — 63710000001 ONDANSETRON ODT 4 MG TABLET DISPERSIBLE: Performed by: PHYSICIAN ASSISTANT

## 2024-10-03 PROCEDURE — 82948 REAGENT STRIP/BLOOD GLUCOSE: CPT

## 2024-10-03 PROCEDURE — 87635 SARS-COV-2 COVID-19 AMP PRB: CPT | Performed by: PHYSICIAN ASSISTANT

## 2024-10-03 PROCEDURE — 97535 SELF CARE MNGMENT TRAINING: CPT

## 2024-10-03 PROCEDURE — 97166 OT EVAL MOD COMPLEX 45 MIN: CPT

## 2024-10-03 PROCEDURE — 99306 1ST NF CARE HIGH MDM 50: CPT | Performed by: PHYSICIAN ASSISTANT

## 2024-10-03 PROCEDURE — 94799 UNLISTED PULMONARY SVC/PX: CPT

## 2024-10-03 PROCEDURE — 97161 PT EVAL LOW COMPLEX 20 MIN: CPT

## 2024-10-03 PROCEDURE — 97110 THERAPEUTIC EXERCISES: CPT

## 2024-10-03 RX ORDER — BUDESONIDE AND FORMOTEROL FUMARATE DIHYDRATE 160; 4.5 UG/1; UG/1
2 AEROSOL RESPIRATORY (INHALATION)
Status: DISCONTINUED | OUTPATIENT
Start: 2024-10-03 | End: 2024-10-11 | Stop reason: HOSPADM

## 2024-10-03 RX ORDER — FUROSEMIDE 40 MG
40 TABLET ORAL DAILY
Status: DISCONTINUED | OUTPATIENT
Start: 2024-10-03 | End: 2024-10-11 | Stop reason: HOSPADM

## 2024-10-03 RX ORDER — ALUMINA, MAGNESIA, AND SIMETHICONE 2400; 2400; 240 MG/30ML; MG/30ML; MG/30ML
15 SUSPENSION ORAL EVERY 6 HOURS PRN
Status: DISCONTINUED | OUTPATIENT
Start: 2024-10-03 | End: 2024-10-11 | Stop reason: HOSPADM

## 2024-10-03 RX ORDER — HYDROCODONE BITARTRATE AND ACETAMINOPHEN 7.5; 325 MG/1; MG/1
1 TABLET ORAL EVERY 6 HOURS PRN
Status: DISCONTINUED | OUTPATIENT
Start: 2024-10-03 | End: 2024-10-11 | Stop reason: HOSPADM

## 2024-10-03 RX ORDER — BISMUTH SUBSALICYLATE 262 MG/1
524 TABLET, CHEWABLE ORAL
Status: DISCONTINUED | OUTPATIENT
Start: 2024-10-03 | End: 2024-10-11 | Stop reason: HOSPADM

## 2024-10-03 RX ORDER — CALCIUM CARBONATE 500 MG/1
2 TABLET, CHEWABLE ORAL 3 TIMES DAILY PRN
Status: DISCONTINUED | OUTPATIENT
Start: 2024-10-03 | End: 2024-10-11 | Stop reason: HOSPADM

## 2024-10-03 RX ORDER — ONDANSETRON 4 MG/1
4 TABLET, ORALLY DISINTEGRATING ORAL EVERY 6 HOURS PRN
Status: DISCONTINUED | OUTPATIENT
Start: 2024-10-03 | End: 2024-10-11 | Stop reason: HOSPADM

## 2024-10-03 RX ADMIN — DILTIAZEM HYDROCHLORIDE 180 MG: 180 CAPSULE, EXTENDED RELEASE ORAL at 09:13

## 2024-10-03 RX ADMIN — HYDROCODONE BITARTRATE AND ACETAMINOPHEN 1 TABLET: 7.5; 325 TABLET ORAL at 09:12

## 2024-10-03 RX ADMIN — BUDESONIDE AND FORMOTEROL FUMARATE DIHYDRATE 2 PUFF: 160; 4.5 AEROSOL RESPIRATORY (INHALATION) at 22:18

## 2024-10-03 RX ADMIN — SENNOSIDES AND DOCUSATE SODIUM 2 TABLET: 50; 8.6 TABLET ORAL at 20:19

## 2024-10-03 RX ADMIN — ROSUVASTATIN 20 MG: 20 TABLET, FILM COATED ORAL at 20:19

## 2024-10-03 RX ADMIN — GUAIFENESIN 1200 MG: 600 TABLET ORAL at 09:12

## 2024-10-03 RX ADMIN — CARVEDILOL 25 MG: 25 TABLET, FILM COATED ORAL at 09:12

## 2024-10-03 RX ADMIN — PANTOPRAZOLE SODIUM 40 MG: 40 TABLET, DELAYED RELEASE ORAL at 06:09

## 2024-10-03 RX ADMIN — PREGABALIN 25 MG: 25 CAPSULE ORAL at 09:12

## 2024-10-03 RX ADMIN — CEFDINIR 300 MG: 300 CAPSULE ORAL at 20:19

## 2024-10-03 RX ADMIN — CLOPIDOGREL BISULFATE 75 MG: 75 TABLET ORAL at 09:12

## 2024-10-03 RX ADMIN — SENNOSIDES AND DOCUSATE SODIUM 2 TABLET: 50; 8.6 TABLET ORAL at 09:12

## 2024-10-03 RX ADMIN — BUDESONIDE AND FORMOTEROL FUMARATE DIHYDRATE 2 PUFF: 160; 4.5 AEROSOL RESPIRATORY (INHALATION) at 09:32

## 2024-10-03 RX ADMIN — CARVEDILOL 25 MG: 25 TABLET, FILM COATED ORAL at 18:03

## 2024-10-03 RX ADMIN — ONDANSETRON 4 MG: 4 TABLET, ORALLY DISINTEGRATING ORAL at 12:20

## 2024-10-03 RX ADMIN — APIXABAN 5 MG: 5 TABLET, FILM COATED ORAL at 20:19

## 2024-10-03 RX ADMIN — FUROSEMIDE 40 MG: 40 TABLET ORAL at 09:13

## 2024-10-03 RX ADMIN — GUAIFENESIN 1200 MG: 600 TABLET ORAL at 20:18

## 2024-10-03 RX ADMIN — CEFDINIR 300 MG: 300 CAPSULE ORAL at 09:12

## 2024-10-03 RX ADMIN — PREGABALIN 25 MG: 25 CAPSULE ORAL at 20:19

## 2024-10-03 RX ADMIN — APIXABAN 5 MG: 5 TABLET, FILM COATED ORAL at 09:13

## 2024-10-03 NOTE — PLAN OF CARE
Goal Outcome Evaluation:  Plan of Care Reviewed With: patient           Outcome Evaluation: Pt is a new admit late yesterday. He is AO x 4, Koi and VSS. He is an assist of 1 to the BR with a walker and gait belt. No complaints of pain or discomfort. Several skin tears and scabs noted and charted. Will continue with his POC. Call light and personal items within reach.

## 2024-10-03 NOTE — H&P
[facility]   HOSPITALIST HISTORY AND PHYSICAL  Date: 10/3/2024   Patient Name: Ion Cox  : 1940  MRN: [id number]  Primary Care Physician:  Maurice Mixon MD  Date of admission: 10/2/2024    Subjective   Subjective   Chief Complaint: Hospital-acquired weakness    HPI:  Ion PROCTOR Case is a 83 y.o. male with a past medical history of atrial fibrillation on Eliquis, CAD and prior PCI, CKD stage III, recent hospitalization for COVID-19 infection in late August.  The patient presented to the ED 24 for generalized weakness.  He was hypoxic.  Patient does not use home oxygen. CT chest with contrast showed left pleural effusion and trace right pleural effusion.  Bibasilar densities greater on the left, atelectasis.  Left basilar pneumonia cannot be excluded.  Compression fracture deformity of T8 similar to prior. Subacute fractures involving the ischium and the superior pubic ramus area on the right as well as the symphysis of the pubis area. Admitted for further management of acute hypoxia due to pneumonia and pleural effusion. Pulmonology consulted.  He underwent bedside left sided thoracentesis on 2024 with removal of 200 cc of pleural fluid. Respiratory status improved.  Neurosurgery following for recent T8 compression fracture.  OT was consulted for brace placement as inpatient, was not able to tolerate brace on , encouraged out of bed and to use brace while ambulating.  Also developed left lower leg cellulitis for which patient was switched to IV Zosyn which he tolerated well without side effects/allergic reaction.  Patient's erythema improved.  Patient's respiratory status improved.  Will transition patient to cefdinir to complete a full course of antibiotics.  Patient transition to Symbicort and as needed DuoNebs.  Patient weak and debilitated from hospitalization and is being discharged to the rehab facility today in stable condition.    Currently resting comfortably in bed.   Respiratory status stable.  On room air currently.  Alert and conversational.  Still some lower extremity edema noted.  Wearing TLSO brace.  Does not tolerate very well.  Denies any pruritus.  Daughter at bedside    Pertinent History   Past Medical History:    Active Ambulatory Problems     Diagnosis Date Noted    Hyperlipidemia LDL goal <70 08/17/2021    Vitamin D deficiency 08/17/2021    Atrial fibrillation, persistent 10/04/2021    Essential hypertension 10/04/2021    Chronic superficial gastritis without bleeding 12/06/2021    Chronic right flank pain 12/06/2021    Adenomatous polyp of colon 06/06/2022    Left-sided chest wall pain 08/21/2022    Type 2 diabetes mellitus without complication, without long-term current use of insulin 08/25/2022    CAD S/P percutaneous coronary angioplasty 09/06/2022    Weight loss 01/12/2023    Medicare annual wellness visit, subsequent 03/13/2023    Chronic heart failure with preserved ejection fraction (HFpEF) 06/02/2023    Prerenal azotemia 06/13/2023    Elevated liver enzymes 06/13/2023    Cellulitis of left lower extremity 06/29/2023    Stage 3a chronic kidney disease 07/25/2023    Absent peripheral pulse 01/29/2024    History of colon polyps 02/27/2024    Venous insufficiency of both lower extremities 04/04/2024    Pelvic fracture 05/20/2024    Multifocal pneumonia 08/27/2024    Left upper quadrant pain 09/16/2024    Closed wedge compression fracture of T8 vertebra with delayed healing 09/23/2024    Pleural effusion 09/25/2024     Resolved Ambulatory Problems     Diagnosis Date Noted    Nephrolithiasis 06/15/2021    Renal cyst 06/15/2021    Benign prostatic hyperplasia with urinary frequency 07/21/2021    Right upper quadrant abdominal pain 08/17/2021    Diarrhea 08/17/2021    Odynophagia 08/17/2021    Right upper quadrant abdominal pain 08/17/2021    Diarrhea 08/17/2021    Displacement of lumbar intervertebral disc without myelopathy 07/14/2020    Idiopathic osteoarthritis  12/05/2021    Irritable bowel syndrome with diarrhea 12/05/2021    Snapping thumb syndrome 11/01/2017    Lumbar spondylosis 07/14/2020    Degeneration of lumbar intervertebral disc 12/21/2021    Inflammation of sacroiliac joint 12/21/2021    Lower extremity edema 04/27/2022    Adductor tendinitis 06/15/2022    Sensory neuropathy 06/15/2022    Clinical diagnosis of severe acute respiratory syndrome coronavirus 2 (SARS-CoV-2) disease 08/01/2022    Paroxysmal atrial fibrillation 08/21/2022    Hyperglycemia 08/21/2022    Swelling of lower extremity 08/21/2022    ACS (acute coronary syndrome) 08/23/2022    Hospital discharge follow-up 08/25/2022    Visit for suture removal 03/21/2023    Fall against sharp object, sequela 03/21/2023    Atypical chest pain 06/15/2023    Gastroesophageal reflux disease 06/15/2023    History of colon polyps 06/15/2023    Dysphagia 06/15/2023    Dyspnea on exertion 11/29/2023    Fracture 05/24/2024     Past Medical History:   Diagnosis Date    Cataract     Colon polyp     Coronary artery disease     Degeneration of lumbosacral intervertebral disc 07/14/2020    Diverticulosis     Elevated cholesterol     Facet arthropathy, lumbar 07/14/2020    GERD (gastroesophageal reflux disease)     HL (hearing loss)     Hyperlipidemia LDL goal <100 08/17/2021    Kidney stone     Lumbar herniated disc 07/14/2020    Lumbar spinal stenosis 08/04/2020    Pneumonia     Spinal headache     Trigger finger of right thumb 11/01/2017       Past Surgical History:    Past Surgical History:   Procedure Laterality Date    BACK SURGERY      CARDIAC CATHETERIZATION N/A 08/23/2022    Procedure: LEFT HEART CATH with coronary artery angiography and right heart catheterization possible percutaneous intervention possible closure device;  Surgeon: Emili Cheng MD;  Location: [facility] INVASIVE LOCATION;  Service: Cardiovascular;  Laterality: N/A;    CAROTID STENT      COLONOSCOPY  2017    Dr. Onael    COLONOSCOPY N/A  10/06/2021    Procedure: COLONOSCOPY WITH BIOPSIES, POLYPECTOMY;  Surgeon: Vazquez Estrada MD;  Location: [facility] ENDOSCOPY;  Service: Gastroenterology;  Laterality: N/A;  COLON POLYPS, DIVERTICULOSIS, HEMORRHOIDS    COLONOSCOPY N/A 7/13/2023    Procedure: COLONOSCOPY with polypectomy with cold snare;  Surgeon: Vazquez Estrada MD;  Location: [facility] ENDOSCOPY;  Service: Gastroenterology;  Laterality: N/A;  colon polyps, diverticulosis, hemorrhoids    CORONARY ANGIOPLASTY      ENDOSCOPY N/A 10/06/2021    Procedure: ESOPHAGOGASTRODUODENOSCOPY WITH BIOPSIES;  Surgeon: Vazquez Estrada MD;  Location: [facility] ENDOSCOPY;  Service: Gastroenterology;  Laterality: N/A;  GASTRITIS    ENDOSCOPY N/A 7/13/2023    Procedure: ESOPHAGOGASTRODUODENOSCOPY with biopsies, with dilation with 15- 18 mm balloon;  Surgeon: Vazquez Estrada MD;  Location: [facility] ENDOSCOPY;  Service: Gastroenterology;  Laterality: N/A;  hiatal hernia, gastric polyp, schatzki's ring    KIDNEY STONE SURGERY      Unspecified    LUMBAR DISC SURGERY Right 06/26/2020    L3-4  Minimally Invasive    TRIGGER FINGER RELEASE Bilateral     UPPER GASTROINTESTINAL ENDOSCOPY  10/06/2021    Dr. Estrada       Social History:   .  Lives with wife locally.  Rides bike, waleing, goes to the gym  Social History     Socioeconomic History    Marital status:    Tobacco Use    Smoking status: Never     Passive exposure: Never    Smokeless tobacco: Never   Vaping Use    Vaping status: Never Used   Substance and Sexual Activity    Alcohol use: Never     Comment: Does not drink    Drug use: Never    Sexual activity: Not Currently     Partners: Female       Family History:     Family History   Problem Relation Age of Onset    Heart disease Mother     Arthritis Mother     Heart disease Father     Heart attack Other         (Myocardial Infarction)    Colon cancer Neg Hx     Malig Hyperthermia Neg Hx        Home Medications (reported)  Current Outpatient  Medications   Medication Instructions    albuterol (ACCUNEB) 0.63 mg, Nebulization, Every 6 Hours PRN    apixaban (ELIQUIS) 5 mg, Oral, 2 Times Daily    budesonide-formoterol (Symbicort) 160-4.5 MCG/ACT inhaler 2 puffs, Inhalation, 2 Times Daily - RT    calcitonin, salmon, (Miacalcin) 200 UNIT/ACT nasal spray 1 spray, Alternating Nares, Daily    carvedilol (COREG) 25 mg, Oral, 2 Times Daily With Meals    clopidogrel (PLAVIX) 75 mg, Oral, Daily    dilTIAZem (TIADYLT ER) 120 mg, Oral, Daily    ezetimibe (ZETIA) 10 mg, Oral, Daily    furosemide (LASIX) 20 mg, Oral, 3 Times Weekly, Monday , Wednesday , and Friday     furosemide (LASIX) 40 mg, Oral, Take As Directed, Tuesday , Thursday , Saturday, and Sunday     glimepiride (AMARYL) 2 mg, Oral, Every Morning Before Breakfast    HYDROcodone-acetaminophen (NORCO) 7.5-325 MG per tablet 2 tablets, Oral, Daily    pantoprazole (PROTONIX) 40 mg, Oral, Daily    pregabalin (Lyrica) 25 MG capsule Take 1 capsule twice daily, may increase to 2 capsules twice daily if no benefit in 3 days.    rosuvastatin (CRESTOR) 20 mg, Oral, Daily    SITagliptin (JANUVIA) 25 mg, Oral, Daily    sulfamethoxazole-trimethoprim (Bactrim DS) 800-160 MG per tablet 1 tablet, Oral, 2 Times Daily       Allergies:  Allergies   Allergen Reactions    Penicillins Swelling    Lisinopril Cough       REVIEW OF SYSTEMS:   Generalized weakness    Objective   Objective   Vitals:   Temp:  [97.5 °F (36.4 °C)-98.6 °F (37 °C)] 97.5 °F (36.4 °C)  Heart Rate:  [102-105] 105  Resp:  [18-20] 18  BP: (112-141)/(63-82) 112/63  Flow (L/min):  [1] 1  PHYSICAL EXAM   CON: WN. WD. NAD.   NECK:  No thyromegaly. No stridor. Trachea midline.  RESP:  CTA. No wheezes. No crackles.   CV:  Rhythm irregular. Rate WNL. No murmur noted.  1 B edema.  GI:  Soft and nontender. Nondistended.  TLSO brace in place.  EXT: Peripheral pulses intact.    PSYCH:  Alert. Oriented. Normal affect and mood.  NEURO:  No dysarthria or aphasia.   SKIN: No  chronic venous stasis changes or varicosities.  No cellulitis    Result Review    Result Review:  I have personally reviewed the results from the time of this admission to 10/3/2024 06:59 EDT and agree with these findings:  []  Laboratory  []  Microbiology  []  Radiology  []  EKG/Telemetry   []  Cardiology/Vascular   []  Pathology  []  Old records  []  Other:    Assessment & Plan   Assessment / Plan   Assessment:    Hospital-acquired weakness  Recent acute hypoxic respiratory failure  Recent left lower lobe pneumonia, CAP  Recent bilateral pleural effusions  Recent thoracentesis  Hx diastolic CHF  History of T8 compression fracture  History atrial fibrillation (on Eliquis)  History CAD/PCI (Dr. 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  Adjust diuretics accordingly.  Continue home Eliquis and clopidogrel  Accu-Cheks twice daily.  Home Januvia not on formulary.  Consider SSI.  Continue carvedilol and diltiazem  Continue scheduled bronchodilators/nebulizers  Will need neurosurgery follow-up for T8 fracture  Prior dermatology notes reviewed.  Received Kenalog periodically for Grovers.    Continue low-dose fentanyl patch    VTE Prophylaxis:  Pharmacologic VTE prophylaxis orders are present.      CODE STATUS:      Level Of Support Discussed With: Patient  Code Status (Patient has no pulse and is not breathing): CPR (Attempt to Resuscitate)  Medical Interventions (Patient has pulse or is breathing): Full Support  More than 45 minutes total was spent today before, during, after patient encounter, including exam, discussion/counseling patient, coordinating care with healthcare  staff, obtaining collateral history from family, reviewing prior studies/records/external documentation as well as documenting this encounter.   Electronically signed by ARIANA Hardy, 10/03/24, 6:59 AM EDT.

## 2024-10-03 NOTE — THERAPY EVALUATION
SNF - Occupational Therapy Initial Evaluation  AMELIE Estes    Patient Name: Ion PROCTOR Case  : [id number]    MRN: [id number]                              Today's Date: 10/3/2024       Admit Date: 10/2/2024    Visit Dx: No diagnosis found.  Patient Active Problem List   Diagnosis    Hyperlipidemia LDL goal <70    Vitamin D deficiency    Atrial fibrillation, persistent    Essential hypertension    Chronic superficial gastritis without bleeding    Chronic right flank pain    Adenomatous polyp of colon    Left-sided chest wall pain    Type 2 diabetes mellitus without complication, without long-term current use of insulin    CAD S/P percutaneous coronary angioplasty    Weight loss    Medicare annual wellness visit, subsequent    Chronic heart failure with preserved ejection fraction (HFpEF)    Prerenal azotemia    Elevated liver enzymes    Cellulitis of left lower extremity    Stage 3a chronic kidney disease    Absent peripheral pulse    History of colon polyps    Venous insufficiency of both lower extremities    Pelvic fracture    Multifocal pneumonia    Left upper quadrant pain    Closed wedge compression fracture of T8 vertebra with delayed healing    Pleural effusion    Debility     Past Medical History:   Diagnosis Date    Cataract     Removed    Chronic heart failure with preserved ejection fraction (HFpEF) 2023    Colon polyp     Removed     Coronary artery disease     Degeneration of lumbosacral intervertebral disc 2020    Diverticulosis     Elevated cholesterol     Essential hypertension 10/04/2021    Facet arthropathy, lumbar 2020    GERD (gastroesophageal reflux disease)     HL (hearing loss)     Hyperlipidemia LDL goal <100 2021    Kidney stone     Lumbar herniated disc 2020    (R) L3-4    Lumbar spinal stenosis 2020    Paroxysmal atrial fibrillation 10/04/2021    Pneumonia     Spinal headache     post 3 injections in back     Trigger finger of right thumb 2017     all fingers both hands     Past Surgical History:   Procedure Laterality Date    BACK SURGERY      CARDIAC CATHETERIZATION N/A 08/23/2022    Procedure: LEFT HEART CATH with coronary artery angiography and right heart catheterization possible percutaneous intervention possible closure device;  Surgeon: Emili Cheng MD;  Location: [facility] CATH INVASIVE LOCATION;  Service: Cardiovascular;  Laterality: N/A;    CAROTID STENT      COLONOSCOPY  2017    Dr. Oneal    COLONOSCOPY N/A 10/06/2021    Procedure: COLONOSCOPY WITH BIOPSIES, POLYPECTOMY;  Surgeon: Vazquez Estrada MD;  Location: [facility] ENDOSCOPY;  Service: Gastroenterology;  Laterality: N/A;  COLON POLYPS, DIVERTICULOSIS, HEMORRHOIDS    COLONOSCOPY N/A 7/13/2023    Procedure: COLONOSCOPY with polypectomy with cold snare;  Surgeon: Vazquez Estrada MD;  Location: [facility] ENDOSCOPY;  Service: Gastroenterology;  Laterality: N/A;  colon polyps, diverticulosis, hemorrhoids    CORONARY ANGIOPLASTY      ENDOSCOPY N/A 10/06/2021    Procedure: ESOPHAGOGASTRODUODENOSCOPY WITH BIOPSIES;  Surgeon: Vazquez Estrada MD;  Location: [facility] ENDOSCOPY;  Service: Gastroenterology;  Laterality: N/A;  GASTRITIS    ENDOSCOPY N/A 7/13/2023    Procedure: ESOPHAGOGASTRODUODENOSCOPY with biopsies, with dilation with 15- 18 mm balloon;  Surgeon: Vazquez Estrada MD;  Location: [facility] ENDOSCOPY;  Service: Gastroenterology;  Laterality: N/A;  hiatal hernia, gastric polyp, schatzki's ring    KIDNEY STONE SURGERY      Unspecified    LUMBAR DISC SURGERY Right 06/26/2020    L3-4  Minimally Invasive    TRIGGER FINGER RELEASE Bilateral     UPPER GASTROINTESTINAL ENDOSCOPY  10/06/2021    Dr. Estrada      General Information       Row Name 10/03/24 1349 10/03/24 1337       OT Time and Intention    Document Type therapy note (daily note)  -EG evaluation  -EG    Mode of Treatment individual therapy;occupational therapy  -EG individual therapy;occupational therapy  -EG      Row Name  10/03/24 1349 10/03/24 1337       General Information    Patient Profile Reviewed -- yes  Ind. at home for ADL's without use of an AD; Lives with wife  -EG    Prior Level of Function -- independent:;ADL's;all household mobility  -EG    Existing Precautions/Restrictions fall;TLSO;spinal  -EG fall;TLSO;spinal  -EG    Barriers to Rehab -- none identified  -EG      Row Name 10/03/24 1337          Occupational Profile    Reason for Services/Referral (Occupational Profile) Patient is a 83-year-old male admitted to [facility] on 10/2/2024.  OT was consulted due to pleural effusion with pneumonia and T8 compression fracture.  OT to assess for new onset of deficits and limitations in ADL/transfer performance.  No previous OT services for current condition.  -EG       Row Name 10/03/24 1337          Living Environment    People in Home spouse  -EG     Name(s) of People in Home Charlene  -EG       Row Name 10/03/24 1349 10/03/24 1337       Cognition    Orientation Status (Cognition) oriented x 4  -EG oriented x 4  -EG      Row Name 10/03/24 1349 10/03/24 1337       Safety Issues, Functional Mobility    Safety Issues Affecting Function (Mobility) -- impulsivity;safety precaution awareness;insight into deficits/self-awareness;safety precautions follow-through/compliance  -EG    Impairments Affecting Function (Mobility) balance;endurance/activity tolerance;strength;pain;range of motion (ROM);shortness of breath  -EG balance;endurance/activity tolerance;strength;pain;range of motion (ROM);shortness of breath  -EG              User Key  (r) = Recorded By, (t) = Taken By, (c) = Cosigned By      Initials Name Provider Type    EG Karely Hensley, OT Occupational Therapist                     Mobility/ADL's       Row Name 10/03/24 1349 10/03/24 1339       Bed Mobility    Bed Mobility supine-sit  -EG bed mobility (all) activities  -EG    All Activities, [place] (Bed Mobility) -- minimum assist (75% patient effort);verbal  cues;nonverbal cues (demo/gesture)  -EG    Supine-Sit [place] (Bed Mobility) minimum assist (75% patient effort);verbal cues;nonverbal cues (demo/gesture)  -EG --    Bed Mobility, Safety Issues decreased use of arms for pushing/pulling;decreased use of legs for bridging/pushing  -EG decreased use of arms for pushing/pulling;decreased use of legs for bridging/pushing  -EG    Assistive Device (Bed Mobility) bed rails;head of bed elevated  -EG bed rails;head of bed elevated  -EG      Row Name 10/03/24 1349 10/03/24 1339       Transfers    Transfers bed-chair transfer;sit-stand transfer;stand-sit transfer;toilet transfer  -EG --    Comment, (Transfers) -- CGA/Sunitha for transfers with use of RW; Occ. instances of pain cause need for assist  -EG      Row Name 10/03/24 1349 10/03/24 1339       Bed-Chair Transfer    Bed-Chair [place] (Transfers) contact guard;verbal cues  -EG contact guard;verbal cues  -EG    Assistive Device (Bed-Chair Transfers) walker, front-wheeled  -EG walker, front-wheeled  -EG      Row Name 10/03/24 1349 10/03/24 1339       Sit-Stand Transfer    Sit-Stand [place] (Transfers) minimum assist (75% patient effort);contact guard  -EG minimum assist (75% patient effort);contact guard  -EG    Assistive Device (Sit-Stand Transfers) walker, front-wheeled  -EG walker, front-wheeled  -EG      Row Name 10/03/24 1349 10/03/24 1339       Stand-Sit Transfer    Stand-Sit [place] (Transfers) contact guard;minimum assist (75% patient effort)  -EG contact guard;minimum assist (75% patient effort)  -EG    Assistive Device (Stand-Sit Transfers) walker, front-wheeled  -EG walker, front-wheeled  -EG      Row Name 10/03/24 1349          Toilet Transfer    [place] Level (Toilet Transfer) minimum assist (75% patient effort);contact guard;verbal cues  -EG     Assistive Device (Toilet Transfer) walker, front-wheeled;commode chair;grab bars/safety frame  -EG       Row Name 10/03/24 1349 10/03/24 1339        Functional Mobility    Functional Mobility- Ind. Level contact guard assist;minimum assist (75% patient effort)  -EG contact guard assist;minimum assist (75% patient effort)  -EG    Functional Mobility- Comment Functional ability performed in room during ADL routine using rolling walker, TLSO donned with cues and education on safe use of assistive device provided  -EG Functional mobility performed in room during ADL routine using RW, TLSO donned with cues and education on safe use of AD provided  -EG      Row Name 10/03/24 1349 10/03/24 1339       Activities of Daily Living    BADL Assessment/Intervention bathing;upper body dressing;lower body dressing;grooming;toileting  -EG bathing;upper body dressing;lower body dressing;grooming;feeding;toileting  -EG      Row Name 10/03/24 1349 10/03/24 1339       Lower Body Dressing Assessment/Training    [place] Level (Lower Body Dressing) lower body dressing skills;moderate assist (50% patient effort);minimum assist (75% patient effort)  -EG lower body dressing skills;moderate assist (50% patient effort);minimum assist (75% patient effort)  -EG    Assistive Devices (Lower Body Dressing) reacher  -EG reacher  -EG    Comment, (Lower Body Dressing) -- Reacher training and education; need for reptitions to promote carryover  -EG      Row Name 10/03/24 1349 10/03/24 1339       Upper Body Dressing Assessment/Training    [place] Level (Upper Body Dressing) upper body dressing skills;set up  -EG upper body dressing skills;set up  -EG      Row Name 10/03/24 1349 10/03/24 1339       Bathing Assessment/Intervention    [place] Level (Bathing) bathing skills;moderate assist (50% patient effort)  -EG bathing skills;moderate assist (50% patient effort)  -EG    Comment, (Bathing) -- sponge bath seated at EOB; decreased performance for LB  -EG      Row Name 10/03/24 1349 10/03/24 1339       Grooming Assessment/Training    [place] Level (Grooming) grooming skills;set up   -EG grooming skills;set up  -EG      Row Name 10/03/24 1339          Self-Feeding Assessment/Training    [place] Level (Feeding) feeding skills;set up  -EG       Row Name 10/03/24 1349 10/03/24 1339       Toileting Assessment/Training    [place] Level (Toileting) toileting skills;minimum assist (75% patient effort);adjust/manage clothing;perform perineal hygiene  -EG toileting skills;minimum assist (75% patient effort);adjust/manage clothing;perform perineal hygiene  -EG              User Key  (r) = Recorded By, (t) = Taken By, (c) = Cosigned By      Initials Name Provider Type    EG Karely Hensley OT Occupational Therapist                   Obj/Interventions       Row Name 10/03/24 1351 10/03/24 1341       Sensory Assessment (Somatosensory)    Sensory Assessment (Somatosensory) sensation intact  -EG sensation intact  -[facility] Name 10/03/24 1351 10/03/24 1341       Vision Assessment/Intervention    Visual Impairment/Limitations WFL  -EG WFL  -EG      [facility] Name 10/03/24 1341          Range of Motion Comprehensive    General Range of Motion bilateral upper extremity ROM WNL  -EG       [facility] Name 10/03/24 1341          Strength Comprehensive (MMT)    Comment, General Manual Muscle Testing (MMT) Assessment 3+/5 grossly in BUE's at all major joints  -       Row Name 10/03/24 1351 10/03/24 1341       Motor Skills    Motor Skills functional endurance  -EG coordination;functional endurance  -EG    Coordination -- WFL  -EG    Functional Endurance fair- on room air; reports increased pain with most OOB activity and can cause limited task tolerance  -EG fair- on room air; pain can be a limiting factor causing nausea per patient report and observation this date  -      Row Name 10/03/24 1351 10/03/24 1341       Balance    Balance Assessment -- sit to stand dynamic balance;standing static balance  -EG    Sit to Stand Dynamic Balance -- minimal assist;contact guard  -EG    Static Standing Balance -- contact  guard;minimal assist;verbal cues  -EG    Position/Device Used, Standing Balance -- walker, front-wheeled  -EG    Balance Interventions standing;sit to stand;supported;static;dynamic;weight shifting activity;occupation based/functional task  -EG --              User Key  (r) = Recorded By, (t) = Taken By, (c) = Cosigned By      Initials Name Provider Type    EG Karely Hensley, OT Occupational Therapist                   Goals/Plan       Row Name 10/03/24 1344          Transfer Goal 1 (OT)    Activity/Assistive Device (Transfer Goal 1, OT) transfers, all  -EG     [place] Level/Cues Needed (Transfer Goal 1, OT) modified independence  -EG     Time Frame (Transfer Goal 1, OT) long term goal (LTG);30 days  -EG       Row Name 10/03/24 1344          Bathing Goal 1 (OT)    Activity/Device (Bathing Goal 1, OT) bathing skills, all  -EG     [place] Level/Cues Needed (Bathing Goal 1, OT) supervision required;set-up required  -EG     Time Frame (Bathing Goal 1, OT) long term goal (LTG);30 days  -EG       Row Name 10/03/24 1344          Dressing Goal 1 (OT)    Activity/Device (Dressing Goal 1, OT) dressing skills, all  -EG     [place]/Cues Needed (Dressing Goal 1, OT) modified independence  -EG     Time Frame (Dressing Goal 1, OT) long term goal (LTG);30 days  -EG       Row Name 10/03/24 1344          Toileting Goal 1 (OT)    Activity/Device (Toileting Goal 1, OT) toileting skills, all  -EG     [place] Level/Cues Needed (Toileting Goal 1, OT) modified independence  -EG     Time Frame (Toileting Goal 1, OT) long term goal (LTG);30 days  -EG       Row Name 10/03/24 1344          Grooming Goal 1 (OT)    Activity/Device (Grooming Goal 1, OT) grooming skills, all  -EG     [place] (Grooming Goal 1, OT) modified independence  -EG     Time Frame (Grooming Goal 1, OT) long term goal (LTG);30 days  -EG       Row Name 10/03/24 1344          Strength Goal 1 (OT)    Strength Goal 1 (OT) Patient will improve bilateral  upper extremity strength to 4+/5 to support independence with self-care activity and functional transfers  -EG     Time Frame (Strength Goal 1, OT) long term goal (LTG);30 days  -EG       Row Name 10/03/24 1344          Problem Specific Goal 1 (OT)    Problem Specific Goal 1 (OT) Patient will improve activity tolerance to fair plus to support independence with self-care tasks  -EG     Time Frame (Problem Specific Goal 1, OT) long term goal (LTG);30 days  -EG       Row Name 10/03/24 1344          Therapy Assessment/Plan (OT)    Planned Therapy Interventions (OT) activity tolerance training;functional balance retraining;occupation/activity based interventions;adaptive equipment training;BADL retraining;neuromuscular control/coordination retraining;patient/caregiver education/training;transfer/mobility retraining;strengthening exercise  -EG               User Key  (r) = Recorded By, (t) = Taken By, (c) = Cosigned By      Initials Name Provider Type    EG Karely Hensley, OT Occupational Therapist                   Clinical Impression       Row Name 10/03/24 1352 10/03/24 1342       Pain Assessment    Pretreatment Pain Rating 8/10  -EG 8/10  -EG    Posttreatment Pain Rating 9/10  -EG 9/10  -EG    Pain Location generalized  -EG generalized  -EG    Pain Location - back  -EG back  -EG    Pain Intervention(s) Nursing Notified  -EG --      Row Name 10/03/24 1352 10/03/24 1342       Plan of Care Review    Plan of Care Reviewed With patient  -EG patient  -EG    Progress no change  -EG no change  -EG    Outcome Evaluation Patient reports increased pain when participating in ADL's and OOB activity; OT services will address ind. and promote improved strength, safety, endurance and balance skills; Continue with POC; Ax1 w/RW; increased assistance required for ADL's at this time.  -EG Patient presents with limitations of decreased functional strength, endurance, balance and safety skills requiring need for skilled OT services to  facilitate return to prior level of function with ADLs. Pain is currently a major limiting factor for patient as well as compliance with TLSO at times.  -EG      Row Name 10/03/24 1352 10/03/24 1342       Therapy Assessment/Plan (OT)    Rehab Potential (OT) good, to achieve stated therapy goals  -EG good, to achieve stated therapy goals  -EG    Criteria for Skilled Therapeutic Interventions Met (OT) yes;meets criteria;skilled treatment is necessary  -EG yes;meets criteria;skilled treatment is necessary  -EG    Therapy Frequency (OT) 5 times/wk  -EG 5 times/wk  -EG      Row Name 10/03/24 1352 10/03/24 1342       Therapy Plan Review/Discharge Plan (OT)    Equipment Needs Upon Discharge (OT) -- --  OT to continue to assess needs as patient progress is noted in short term rehab stay  -EG    Anticipated Discharge Disposition (OT) home with home health  -EG --      Row Name 10/03/24 1352 10/03/24 1342       Positioning and Restraints    Pre-Treatment Position in bed  -EG in bed  -EG    Post Treatment Position chair  -EG chair  -EG    In Chair reclined;sitting;call light within reach;encouraged to call for assist;exit alarm on  -EG reclined;sitting;call light within reach;encouraged to call for assist;exit alarm on  -EG              User Key  (r) = Recorded By, (t) = Taken By, (c) = Cosigned By      Initials Name Provider Type    EG Karely Hensley, ROLANDO Occupational Therapist                   Outcome Measures       Row Name 10/03/24 1354 10/03/24 1345       How much help from another is currently needed...    Putting on and taking off regular lower body clothing? 2  -EG 2  -EG    Bathing (including washing, rinsing, and drying) 2  -EG 2  -EG    Toileting (which includes using toilet bed pan or urinal) 2  -EG 2  -EG    Putting on and taking off regular upper body clothing 3  -EG 3  -EG    Taking care of personal grooming (such as brushing teeth) 3  -EG 3  -EG    Eating meals 4  -EG 4  -EG    AM-PAC 6 Clicks Score (OT) 16   -EG 16  -EG      Row Name 10/03/24 0912          How much help from another person do you currently need...    Turning from your back to your side while in flat bed without using bedrails? 3  -ST     Moving from lying on back to sitting on the side of a flat bed without bedrails? 2  -ST     Moving to and from a bed to a chair (including a wheelchair)? 3  -ST     Standing up from a chair using your arms (e.g., wheelchair, bedside chair)? 3  -ST     Climbing 3-5 steps with a railing? 2  -ST     To walk in hospital room? 3  -ST     AM-PAC 6 Clicks Score (PT) 16  -ST     Highest Level of Mobility Goal 5 --> Static standing  -ST       Row Name 10/03/24 1354 10/03/24 1345       Functional Assessment    Outcome Measure Options AM-PAC 6 Clicks Daily Activity (OT);Optimal Instrument  -EG AM-PAC 6 Clicks Daily Activity (OT);Optimal Instrument  -EG      Row Name 10/03/24 1354 10/03/24 1345       Optimal Instrument    Optimal Instrument Optimal - 3  -EG Optimal - 3  -EG    Bending/Stooping 5  -EG 5  -EG    Standing 2  -EG 2  -EG    Reaching 1  -EG 1  -EG              User Key  (r) = Recorded By, (t) = Taken By, (c) = Cosigned By      Initials Name Provider Type    Mary Soria, RN Registered Nurse    Karely sOeguera OT Occupational Therapist                  Section G  Mobility  Bed mobility - self performance: limited assistance (staff provide guided maneuvering of limbs or other non-weight bearing assistance)  Bed mobility support/assistance: One person assist  Transfer - self performance: limited assistance (staff provide guided maneuvering of limbs or other non-weight bearing assistance)  Transfer support/assistance: One person assist  Walking in room - self performance: limited assistance (staff provide guided maneuvering of limbs or other non-weight bearing assistance)  Walking in room support/assistance: One person assist  Walking in corridors/hallway - self performance: limited assistance (staff provide guided  maneuvering of limbs or other non-weight bearing assistance)  Walking in corridors/hallway support/assistance: One person assist  Locomotion on unit - self performance: limited assistance (staff provide guided maneuvering of limbs or other non-weight bearing assistance)  Locomotion on unit support/assistance: One person assist  Locomotion off unit - self performance: activity did not occur  Locomotion off unit support/assistance: Activity did not occur  Dressing - self performance: limited assistance (staff provide guided maneuvering of limbs or other non-weight bearing assistance)  Dressing support/assistance: One person assist  Eating - self performance: independent  Eating support/assistance: Setup help only  Toileting - self performance: total dependence (full staff performance)  Personal hygiene - self performance: limited assistance (staff provide guided maneuvering of limbs or other non-weight bearing assistance)  Personal hygiene support/assistance: One person assist  Bathing  Bathing - self performance: Physical help with bathing (exclude washing back and hair for patient)  Bathing support/assistance: One person assist  Balance  Balance during transitions & walking: Not steady, requires assist to steady  Moving from seated to standing position: Not steady, requires assist to steady  Walking: Not steady, requires assist to steady  Turning around while walking: Not steady, requires assist to steady  Moving on and off toilet: Not steady, requires assist to steady  Surface-to-surface transfer: Not steady, requires assist to steady  Mobility devices: Walker  Range of Motion  Upper Extremity: No impairment  Lower Extremity: No impairment  Section GG  Functional Ability/Goals, Adm (Section GG)  Self Care, Prior Functioning (RA3228Q): 3. Independent  Functional Cognition, Prior Functioning (IP5551G): 3. Independent  Prior Device Use ([id number]): none of the above (Z)  Upper Extremity Range of Motion (CA2913F): No  impairment  Lower Extremity Range of Motion (EV3649A): No impairment  Self Care, Admission (Section GG)  Eating: Self-Care Admission Performance (OM9947O5): setup or clean-up assistance (05)  Oral Hygiene: Self-Care Admission Performance (DB2682C5): setup or clean-up assistance (05)  Toileting Hygiene: Self-Care Admission Performance (DV1142X1): dependent (01)  Shower/Bathe Self: Self-Care Admission Performance (GW3077H5): partial/moderate assistance (03)  Upper Body Dressing: Self-Care Admission Performance ([id number]): supervision or touching assistance (04)  Lower Body Dressing: Self-Care Admission Performance ([id number]): partial/moderate assistance (03)  Putting On/Taking Off Footwear: Self-Care Admission Performance (XL1413K3): partial/moderate assistance (03)  Personal Hygiene: Self-Care Admission Performance (TN1713D1): supervision or touching assistance (04)  Mobility, Admission Performance ([id number])  Toilet Transfer: Mobility Admission Performance (TZ3479G1): supervision or touching assistance (04)  Tub/shower Transfer: Mobility Admission Performance ([id number]): partial/moderate assistance (03)                Occupational Therapy Education       Title: PT OT SLP Therapies (In Progress)       Topic: Occupational Therapy (In Progress)       Point: ADL training (In Progress)       Description:   Instruct learner(s) on proper safety adaptation and remediation techniques during self care or transfers.   Instruct in proper use of assistive devices.                  Learning Progress Summary             Patient JANIS Arnold, NR by EG at 10/3/2024 1486    Comment: TLSO education and spinal precautions  Education on use of AD and fall risk prevention  Reacher and compensatory techniques for LB ADL's  OT benefits education                         Point: Home exercise program (In Progress)       Description:   Instruct learner(s) on appropriate technique for monitoring, assisting and/or progressing therapeutic  exercises/activities.                  Learning Progress Summary             Patient Eager, E, NR by EG at 10/3/2024 1345    Comment: TLSO education and spinal precautions  Education on use of AD and fall risk prevention  Reacher and compensatory techniques for LB ADL's  OT benefits education                         Point: Precautions (In Progress)       Description:   Instruct learner(s) on prescribed precautions during self-care and functional transfers.                  Learning Progress Summary             Patient Eager, E, NR by EG at 10/3/2024 1345    Comment: TLSO education and spinal precautions  Education on use of AD and fall risk prevention  Reacher and compensatory techniques for LB ADL's  OT benefits education                         Point: Body mechanics (In Progress)       Description:   Instruct learner(s) on proper positioning and spine alignment during self-care, functional mobility activities and/or exercises.                  Learning Progress Summary             Patient Eager, E, NR by EG at 10/3/2024 1345    Comment: TLSO education and spinal precautions  Education on use of AD and fall risk prevention  Reacher and compensatory techniques for LB ADL's  OT benefits education                                         User Key       Initials Effective Dates Name Provider Type Discipline    EG 09/14/22 -  Karely Hensley OT Occupational Therapist OT                  OT Recommendation and Plan  Planned Therapy Interventions (OT): activity tolerance training, functional balance retraining, occupation/activity based interventions, adaptive equipment training, BADL retraining, neuromuscular control/coordination retraining, patient/caregiver education/training, transfer/mobility retraining, strengthening exercise  Therapy Frequency (OT): 5 times/wk  Plan of Care Review  Plan of Care Reviewed With: patient  Progress: no change  Outcome Evaluation: Patient reports increased pain when participating in  ADL's and OOB activity; OT services will address ind. and promote improved strength, safety, endurance and balance skills; Continue with POC; Ax1 w/RW; increased assistance required for ADL's at this time.     Time Calculation:   Evaluation Complexity (OT)  Review Occupational Profile/Medical/Therapy History Complexity: expanded/moderate complexity  Assessment, Occupational Performance/Identification of Deficit Complexity: 3-5 performance deficits  Clinical Decision Making Complexity (OT): detailed assessment/moderate complexity  Overall Complexity of Evaluation (OT): moderate complexity     Time Calculation- OT       Row Name 10/03/24 1354 10/03/24 1347          Time Calculation- OT    OT Received On 10/03/24  -EG 10/03/24  -EG     OT Goal Re-Cert Due Date 11/02/24  -EG 11/02/24  -EG        Timed Charges    30353 - OT Therapeutic Activity Minutes 15  -EG --     76472 - OT Self Care/Mgmt Minutes 29  -EG --        Untimed Charges    OT Eval/Re-eval Minutes -- 35  -EG        SNF Occupational Therapy Minutes    Skilled Minutes- OT 44 min  -EG --        Total Minutes    Timed Charges Total Minutes 44  -EG --     Untimed Charges Total Minutes -- 35  -EG      Total Minutes 44  -EG 35  -EG               User Key  (r) = Recorded By, (t) = Taken By, (c) = Cosigned By      Initials Name Provider Type    EG Karely Hensley OT Occupational Therapist                  Therapy Charges for Today       Code Description Service Date Service Provider Modifiers Qty    26034745598 HC OT EVAL MOD COMPLEXITY 3 10/3/2024 Karely Hensley OT GO 1    08999009037 HC OT THERAPEUTIC ACT EA 15 MIN 10/3/2024 Karely Hensley OT GO 1    29126734660 HC OT SELF CARE/MGMT/TRAIN EA 15 MIN 10/3/2024 Karely Hensley OT GO 2                 Karely Hensley OT  10/3/2024

## 2024-10-03 NOTE — PLAN OF CARE
Goal Outcome Evaluation:  Plan of Care Reviewed With: patient        Progress: no change  Outcome Evaluation: Patient presents with limitations of decreased functional strength, endurance, balance and safety skills requiring need for skilled OT services to facilitate return to prior level of function with ADLs. Pain is currently a major limiting factor for patient as well as compliance with TLSO at times.

## 2024-10-03 NOTE — CONSULTS
Nutrition Services    Patient Name: Ion PROCTOR Case  YOB: 1940  MRN: [id number]  Admission date: 10/2/2024      CLINICAL NUTRITION ASSESSMENT      Reason for Assessment  MST Score 2+ and Nursing consult     H&P:  Past Medical History:   Diagnosis Date    Cataract     Removed    Chronic heart failure with preserved ejection fraction (HFpEF) 06/02/2023    Colon polyp     Removed July 13    Coronary artery disease     Degeneration of lumbosacral intervertebral disc 07/14/2020    Diverticulosis     Elevated cholesterol     Essential hypertension 10/04/2021    Facet arthropathy, lumbar 07/14/2020    GERD (gastroesophageal reflux disease)     HL (hearing loss)     Hyperlipidemia LDL goal <100 08/17/2021    Kidney stone     Lumbar herniated disc 07/14/2020    (R) L3-4    Lumbar spinal stenosis 08/04/2020    Paroxysmal atrial fibrillation 10/04/2021    Pneumonia     Spinal headache     post 3 injections in back     Trigger finger of right thumb 11/01/2017    all fingers both hands        Current Problems:   Active Hospital Problems    Diagnosis     **Debility         Nutrition/Diet History         Narrative   Pt resting at time of visit, but easy to wake. Currently, pt reports eating 50-75% of meals w/ no issues c/s. NKFA. No n/v/c/d complaints. Pt continues to drink ONS TID and daily snack in AM. Will d/c ice cream TID per pt request. No overt signs of malnutrition. RD to continue to monitor.      Anthropometrics        Current Height, Weight    Weight: 79.5 kg (175 lb 4.3 oz)   Current BMI Body mass index is 25.15 kg/m².   BMI Classification Normal range   % %   Adjusted Body Weight (ABW)    Weight Hx  Wt Readings from Last 30 Encounters:   10/02/24 1645 79.5 kg (175 lb 4.3 oz)   09/25/24 0948 76.8 kg (169 lb 5 oz)   09/23/24 0858 80.3 kg (177 lb)   09/16/24 1106 80.6 kg (177 lb 12.8 oz)   09/04/24 1333 81.2 kg (179 lb)   09/02/24 1134 81.2 kg (179 lb 0.2 oz)   08/28/24 0508 83.2 kg (183 lb 6.8 oz)    08/28/24 0104 83.2 kg (183 lb 6.8 oz)   08/27/24 1950 83.1 kg (183 lb 3.2 oz)   07/12/24 0841 83 kg (183 lb)   07/11/24 1344 83.3 kg (183 lb 9.6 oz)   06/26/24 1219 77.6 kg (171 lb)   06/04/24 0500 78.1 kg (172 lb 2.9 oz)   06/03/24 0500 78 kg (171 lb 15.3 oz)   06/02/24 0500 78.3 kg (172 lb 9.9 oz)   06/01/24 0500 79.1 kg (174 lb 6.1 oz)   05/31/24 0500 78.7 kg (173 lb 8 oz)   05/28/24 0943 80.1 kg (176 lb 9.4 oz)   05/27/24 1549 80.1 kg (176 lb 9.4 oz)   05/24/24 1356 84.6 kg (186 lb 8.2 oz)   05/21/24 0055 82.5 kg (181 lb 14.1 oz)   05/20/24 1819 83 kg (182 lb 15.7 oz)   05/13/24 1112 82.2 kg (181 lb 3.2 oz)   04/04/24 1419 81.6 kg (179 lb 12.8 oz)   03/12/24 1014 78.6 kg (173 lb 3.2 oz)   02/27/24 0902 81.1 kg (178 lb 12.8 oz)   02/20/24 1249 79.1 kg (174 lb 6.4 oz)   01/29/24 1608 79.4 kg (175 lb)   01/10/24 1039 81.2 kg (179 lb)   01/03/24 1002 80.2 kg (176 lb 12.8 oz)   12/12/23 1100 80.6 kg (177 lb 9.6 oz)   11/29/23 1359 81.2 kg (179 lb)   11/27/23 1313 82.9 kg (182 lb 12.8 oz)   10/25/23 1335 82.2 kg (181 lb 3.2 oz)   07/25/23 1330 79.8 kg (176 lb)   07/13/23 1031 78.6 kg (173 lb 4.5 oz)   06/29/23 1406 82 kg (180 lb 12.8 oz)   06/15/23 1111 80.8 kg (178 lb 3.2 oz)   06/13/23 1040 78.1 kg (172 lb 3.2 oz)   06/02/23 1003 80 kg (176 lb 6.4 oz)          Wt Change Observation Note per EMR, pt wt stable      Estimated/Assessed Needs  Estimated Needs based on: Current Body Weight       Energy Requirements 22-25 kcal/kg    EST Needs (kcal/day) 4967-8901 kcal/d       Protein Requirements 1.2-1.5 g.kg   EST Daily Needs (g/day)  g/d        Fluid Requirements 1 ml/kcal    Estimated Needs (mL/day) 8826-5096 mL/d      Labs/Medications         Pertinent Labs Reviewed.   Results from last 7 days   Lab Units 10/03/24  0756 10/02/24  0451 10/01/24  0457   SODIUM mmol/L 136 136 137   POTASSIUM mmol/L 3.1* 3.0* 3.1*   CHLORIDE mmol/L 99 101 103   CO2 mmol/L 26.7 26.8 24.6   BUN mg/dL 13 14 17   CREATININE mg/dL 0.74*  0.66* 0.71*   CALCIUM mg/dL 9.4 8.5* 8.5*   BILIRUBIN mg/dL  --  0.3  --    ALK PHOS U/L  --  91  --    ALT (SGPT) U/L  --  38  --    AST (SGOT) U/L  --  33  --    GLUCOSE mg/dL 115* 119* 100*     Results from last 7 days   Lab Units 10/02/24  0451 10/01/24  0457 09/30/24  0457   MAGNESIUM mg/dL 2.0 1.6 1.7   PHOSPHORUS mg/dL 3.1 3.1 4.0   HEMOGLOBIN g/dL 9.0* 9.8* 9.7*   HEMATOCRIT % 29.4* 32.6* 31.2*     COVID19   Date Value Ref Range Status   10/01/2024 Not Detected Not Detected - Ref. Range Final     Lab Results   Component Value Date    HGBA1C 8.10 (H) 09/18/2024         Pertinent Medications Reviewed.     Malnutrition Severity Assessment              Nutrition Diagnosis         Nutrition Dx Problem 1 No nutrition diagnosis at this time      Nutrition Intervention           Current Nutrition Orders & Evaluation of Intake       Current PO Diet Diet: Regular/House; Fluid Consistency: Thin (IDDSI 0)   Supplement Orders Placed This Encounter      DIET MESSAGE Please send a banana      DIET MESSAGE Please send a vanilla ice-cream with ONS. Thanks      Dietary Nutrition Supplements Ensure Enlive           Nutrition Intervention/Prescription        Recommend to continue current diet order   Recommend to continue Ensure Enlive TID   Recommend to continue banana in AM         Medical Nutrition Therapy/Nutrition Education          Learner     Readiness Patient  N/A     Method     Response N/A  N/A     Monitor/Evaluation        Monitor Monitor PO/ONS intake, wt trends, and labs      Nutrition Discharge Plan         Recommend to continue oral nutrition supplements on discharge.      Electronically signed by:  Marya Massey RD  10/03/24 13:54 EDT

## 2024-10-03 NOTE — THERAPY EVALUATION
SNF - Physical Therapy Initial Evaluation and Treatment Note  AMELIE Estes     Patient Name: Ion PROCTOR Case  : [id number]  MRN: [id number]  Today's Date: 10/3/2024      Visit Dx:    ICD-10-CM ICD-9-CM   1. Difficulty walking  R26.2 719.7     Patient Active Problem List   Diagnosis    Hyperlipidemia LDL goal <70    Vitamin D deficiency    Atrial fibrillation, persistent    Essential hypertension    Chronic superficial gastritis without bleeding    Chronic right flank pain    Adenomatous polyp of colon    Left-sided chest wall pain    Type 2 diabetes mellitus without complication, without long-term current use of insulin    CAD S/P percutaneous coronary angioplasty    Weight loss    Medicare annual wellness visit, subsequent    Chronic heart failure with preserved ejection fraction (HFpEF)    Prerenal azotemia    Elevated liver enzymes    Cellulitis of left lower extremity    Stage 3a chronic kidney disease    Absent peripheral pulse    History of colon polyps    Venous insufficiency of both lower extremities    Pelvic fracture    Multifocal pneumonia    Left upper quadrant pain    Closed wedge compression fracture of T8 vertebra with delayed healing    Pleural effusion    Debility     Past Medical History:   Diagnosis Date    Cataract     Removed    Chronic heart failure with preserved ejection fraction (HFpEF) 2023    Colon polyp     Removed     Coronary artery disease     Degeneration of lumbosacral intervertebral disc 2020    Diverticulosis     Elevated cholesterol     Essential hypertension 10/04/2021    Facet arthropathy, lumbar 2020    GERD (gastroesophageal reflux disease)     HL (hearing loss)     Hyperlipidemia LDL goal <100 2021    Kidney stone     Lumbar herniated disc 2020    (R) L3-4    Lumbar spinal stenosis 2020    Paroxysmal atrial fibrillation 10/04/2021    Pneumonia     Spinal headache     post 3 injections in back     Trigger finger of right thumb 2017     all fingers both hands     Past Surgical History:   Procedure Laterality Date    BACK SURGERY      CARDIAC CATHETERIZATION N/A 08/23/2022    Procedure: LEFT HEART CATH with coronary artery angiography and right heart catheterization possible percutaneous intervention possible closure device;  Surgeon: Emili Cheng MD;  Location: [facility] CATH INVASIVE LOCATION;  Service: Cardiovascular;  Laterality: N/A;    CAROTID STENT      COLONOSCOPY  2017    Dr. Onela    COLONOSCOPY N/A 10/06/2021    Procedure: COLONOSCOPY WITH BIOPSIES, POLYPECTOMY;  Surgeon: Vazquez Estrada MD;  Location: [facility] ENDOSCOPY;  Service: Gastroenterology;  Laterality: N/A;  COLON POLYPS, DIVERTICULOSIS, HEMORRHOIDS    COLONOSCOPY N/A 7/13/2023    Procedure: COLONOSCOPY with polypectomy with cold snare;  Surgeon: Vazquez Estrada MD;  Location: [facility] ENDOSCOPY;  Service: Gastroenterology;  Laterality: N/A;  colon polyps, diverticulosis, hemorrhoids    CORONARY ANGIOPLASTY      ENDOSCOPY N/A 10/06/2021    Procedure: ESOPHAGOGASTRODUODENOSCOPY WITH BIOPSIES;  Surgeon: Vazquez Estrada MD;  Location: [facility] ENDOSCOPY;  Service: Gastroenterology;  Laterality: N/A;  GASTRITIS    ENDOSCOPY N/A 7/13/2023    Procedure: ESOPHAGOGASTRODUODENOSCOPY with biopsies, with dilation with 15- 18 mm balloon;  Surgeon: Vazquez Estrada MD;  Location: [facility] ENDOSCOPY;  Service: Gastroenterology;  Laterality: N/A;  hiatal hernia, gastric polyp, schatzki's ring    KIDNEY STONE SURGERY      Unspecified    LUMBAR DISC SURGERY Right 06/26/2020    L3-4  Minimally Invasive    TRIGGER FINGER RELEASE Bilateral     UPPER GASTROINTESTINAL ENDOSCOPY  10/06/2021    Dr. Estrada       PT Assessment (Last 12 Hours)       PT Evaluation and Treatment       Row Name 10/03/24 1500          Physical Therapy Time and Intention    Document Type evaluation;therapy note (daily note)  -AV     Mode of Treatment individual therapy;physical therapy  -AV     Symptoms Noted  During/After Treatment fatigue;nausea;dizziness  -AV     Comment Patient ambulated to the therapy gym and completed two exercises but subsequently became lightheaded and pale with complaints of SOA and nausea. Patient was transferred to w/c and taken back to room and returned to bed with improvement in symptoms. Nurse in room as well  -AV       Row Name 10/03/24 1500          General Information    Patient Profile Reviewed yes  -AV     Patient Observations alert;cooperative;agree to therapy  -AV     Prior Level of Function independent:;all household mobility;gait;transfer;ADL's  Ambulated without an assistive device. No home O2.  -AV     Existing Precautions/Restrictions fall;spinal;TLSO;brace worn when out of bed  -AV       Row Name 10/03/24 1500          Living Environment    Current Living Arrangements home  -AV     Home Accessibility stairs to enter home  -AV     People in Home spouse  -AV       Row Name 10/03/24 1500          Home Main Entrance    Number of Stairs, Main Entrance one  -AV       Row Name 10/03/24 1500          Cognition    Orientation Status (Cognition) oriented x 3  -AV       Row Name 10/03/24 1500          Range of Motion (ROM)    Range of Motion bilateral lower extremities;ROM is WFL  -AV       Row Name 10/03/24 1500          Strength (Manual Muscle Testing)    Strength (Manual Muscle Testing) right lower extremity strength;left lower extremity strength  -AV     Left Lower Extremity Strength hip;knee;ankle  -AV     Hip, Left (Strength) 3+/5  -AV     Knee, Left (Strength) 4-/5  -AV     Ankle, Left (Strength) 4-/5  -AV     Right Lower Extremity Strength hip;knee;ankle  -AV     Hip, Right (Strength) 3+/5  -AV     Knee, Right (Strength) 4-/5  -AV     Ankle, Right (Strength) 4-/5  -AV       Row Name 10/03/24 1500          Bed Mobility    Bed Mobility supine-sit  -AV     Supine-Sit [place] (Bed Mobility) minimum assist (75% patient effort);verbal cues;nonverbal cues (demo/gesture)  -AV      Sit-Supine [place] (Bed Mobility) minimum assist (75% patient effort)  -AV     Bed Mobility, Safety Issues decreased use of legs for bridging/pushing;decreased use of arms for pushing/pulling  -AV     Assistive Device (Bed Mobility) bed rails;head of bed elevated  -AV       Row Name 10/03/24 1500          Transfers    Transfers sit-stand transfer;stand-sit transfer;toilet transfer  -AV       Row Name 10/03/24 1500          Sit-Stand Transfer    Sit-Stand [place] (Transfers) contact guard;minimum assist (75% patient effort)  -AV     Assistive Device (Sit-Stand Transfers) walker, front-wheeled  -AV       Row Name 10/03/24 1500          Stand-Sit Transfer    Stand-Sit [place] (Transfers) contact guard;minimum assist (75% patient effort)  -AV     Assistive Device (Stand-Sit Transfers) walker, front-wheeled  -AV       Row Name 10/03/24 1500          Toilet Transfer    [place] Level (Toilet Transfer) contact guard;minimum assist (75% patient effort)  -AV     Assistive Device (Toilet Transfer) walker, front-wheeled;commode chair;grab bars/safety frame  -AV       Row Name 10/03/24 1500          Gait/Stairs (Locomotion)    Gait/Stairs Locomotion gait/ambulation independence;gait/ambulation assistive device;distance ambulated  -AV     [place] Level (Gait) contact guard;minimum assist (75% patient effort)  -AV     Assistive Device (Gait) walker, front-wheeled  -AV     Distance in Feet (Gait) --  20 x2, 40, 10  -AV     Deviations/Abnormal Patterns (Gait) gait speed decreased;stride length decreased  -AV     Bilateral Gait Deviations forward flexed posture  -AV       Row Name 10/03/24 1500          Safety Issues, Functional Mobility    Safety Issues Affecting Function (Mobility) impulsivity;insight into deficits/self-awareness;safety precaution awareness;safety precautions follow-through/compliance  -AV     Impairments Affecting Function (Mobility) balance;endurance/activity  tolerance;pain;strength;shortness of breath  -AV       Row Name 10/03/24 1500          Balance    Balance Assessment standing dynamic balance  -AV     Dynamic Standing Balance contact guard;minimal assist  -AV     Position/Device Used, Standing Balance supported;walker, front-wheeled  -AV       Row Name 10/03/24 1500          Hip (Therapeutic Exercise)    Hip (Therapeutic Exercise) AAROM (active assistive range of motion)  -AV     Hip AAROM (Therapeutic Exercise) bilateral;flexion;extension;aBduction;aDduction;supine;sitting;2 sets;10 repetitions  -AV       Row Name 10/03/24 1500          Knee (Therapeutic Exercise)    Knee (Therapeutic Exercise) AAROM (active assistive range of motion)  -AV     Knee AAROM (Therapeutic Exercise) bilateral;flexion;extension;supine;sitting;2 sets;10 repetitions  -AV       Row Name 10/03/24 1500          Ankle (Therapeutic Exercise)    Ankle (Therapeutic Exercise) AROM (active range of motion)  -AV     Ankle AROM (Therapeutic Exercise) bilateral;dorsiflexion;plantarflexion;supine;20 repititions  -AV       Row Name             Wound 09/25/24 1903 Left anterior knee    Wound - Properties Group Placement Date: 09/25/24  -AP Placement Time: 1903  -AP Side: Left  -AP Orientation: anterior  -AP Location: knee  -AP    Retired Wound - Properties Group Placement Date: 09/25/24  -AP Placement Time: 1903  -AP Side: Left  -AP Orientation: anterior  -AP Location: knee  -AP    Retired Wound - Properties Group Date first assessed: 09/25/24  -AP Time first assessed: 1903  -AP Side: Left  -AP Location: knee  -AP      Row Name             Wound 09/25/24 1903 Right anterior knee    Wound - Properties Group Placement Date: 09/25/24  -AP Placement Time: 1903  -AP Present on Original Admission: Y  -AP Side: Right  -AP Orientation: anterior  -AP Location: knee  -AP    Retired Wound - Properties Group Placement Date: 09/25/24  -AP Placement Time: 1903  -AP Present on Original Admission: Y  -AP Side: Right   -AP Orientation: anterior  -AP Location: knee  -AP    Retired Wound - Properties Group Date first assessed: 09/25/24  -AP Time first assessed: 1903  -AP Present on Original Admission: Y  -AP Side: Right  -AP Location: knee  -AP      Row Name             Wound 09/25/24 1906 Right posterior hand Traumatic    Wound - Properties Group Placement Date: 09/25/24  -AP Placement Time: 1906  -AP Side: Right  -AP Orientation: posterior  -AP Location: hand  -AP Primary Wound Type: Traumatic  -AP    Retired Wound - Properties Group Placement Date: 09/25/24  -AP Placement Time: 1906  -AP Side: Right  -AP Orientation: posterior  -AP Location: hand  -AP Primary Wound Type: Traumatic  -AP    Retired Wound - Properties Group Date first assessed: 09/25/24  -AP Time first assessed: 1906  -AP Side: Right  -AP Location: hand  -AP Primary Wound Type: Traumatic  -AP      Row Name             Wound 09/25/24 1920 Left proximal leg Skin Tear    Wound - Properties Group Placement Date: 09/25/24  -SM Placement Time: 1920  -SM Present on Original Admission: Y  -SM Side: Left  -SM Orientation: proximal  -SM Location: leg  -SM Primary Wound Type: Skin tear  -SM    Retired Wound - Properties Group Placement Date: 09/25/24  -SM Placement Time: 1920  -SM Present on Original Admission: Y  -SM Side: Left  -SM Orientation: proximal  -SM Location: leg  -SM Primary Wound Type: Skin tear  -SM    Retired Wound - Properties Group Date first assessed: 09/25/24  -SM Time first assessed: 1920  -SM Present on Original Admission: Y  -SM Side: Left  -SM Location: leg  -SM Primary Wound Type: Skin tear  -SM      Row Name             Wound 10/02/24 1700 Left lower leg Skin Tear    Wound - Properties Group Placement Date: 10/02/24  -FH Placement Time: 1700  -FH Present on Original Admission: N  -FH Side: Left  -FH Orientation: lower  -FH Location: leg  -FH Primary Wound Type: Skin tear  -FH    Retired Wound - Properties Group Placement Date: 10/02/24  -FH Placement  Time: 1700 -FH Present on Original Admission: N  -FH Side: Left  -FH Orientation: lower  -FH Location: leg  -FH Primary Wound Type: Skin tear  -FH    Retired Wound - Properties Group Date first assessed: 10/02/24  - Time first assessed: 1700 -FH Present on Original Admission: N  -FH Side: Left  -FH Location: leg  -FH Primary Wound Type: Skin tear  -FH      Row Name             Wound 10/02/24 2008 Left medial knee Skin Tear    Wound - Properties Group Placement Date: 10/02/24  - Placement Time: 2008 -FH Present on Original Admission: Y  -FH Side: Left  -FH Orientation: medial  -FH Location: knee  -FH Primary Wound Type: Skin tear  -FH    Retired Wound - Properties Group Placement Date: 10/02/24  - Placement Time: 2008 -FH Present on Original Admission: Y  -FH Side: Left  -FH Orientation: medial  -FH Location: knee  -FH Primary Wound Type: Skin tear  -FH    Retired Wound - Properties Group Date first assessed: 10/02/24  - Time first assessed: 2008 -FH Present on Original Admission: Y  -FH Side: Left  -FH Location: knee  -FH Primary Wound Type: Skin tear  -FH      Row Name 10/03/24 1500          Plan of Care Review    Plan of Care Reviewed With patient;daughter  -AV     Progress no change  -AV     Outcome Evaluation Patient presents with deficits in balance, endurance, transfers, and ambulation. Patient will benefit from skilled PT services to address these mobility deficits and decrease risk of falls.  -AV       Row Name 10/03/24 1500          Vital Signs    Intra Systolic BP Rehab 132  -AV     Post Systolic BP Rehab 46  -AV     Intra SpO2 (%) 95  -AV       Row Name 10/03/24 1500          Positioning and Restraints    Post Treatment Position bed  -AV     In Bed supine;call light within reach;encouraged to call for assist;exit alarm on;with family/caregiver  -AV       Row Name 10/03/24 1500          Therapy Assessment/Plan (PT)    Rehab Potential (PT) good, to achieve stated therapy goals  -AV     Criteria  for Skilled Interventions Met (PT) yes;skilled treatment is necessary  -AV     Therapy Frequency (PT) 6 times/wk  -AV     Predicted Duration of Therapy Intervention (PT) 30 days  -AV     Problem List (PT) problems related to;balance;mobility;strength;pain;postural control  -AV     Activity Limitations Related to Problem List (PT) unable to transfer safely;unable to ambulate safely  -AV       Row Name 10/03/24 1500          PT Evaluation Complexity    History, PT Evaluation Complexity 1-2 personal factors and/or comorbidities  -AV     Examination of Body Systems (PT Eval Complexity) total of 4 or more elements  -AV     Clinical Presentation (PT Evaluation Complexity) stable  -AV     Clinical Decision Making (PT Evaluation Complexity) low complexity  -AV     Overall Complexity (PT Evaluation Complexity) low complexity  -AV       Row Name 10/03/24 1500          Therapy Plan Review/Discharge Plan (PT)    Therapy Plan Review (PT) evaluation/treatment results reviewed;patient  -AV       Row Name 10/03/24 1500          Physical Therapy Goals    Bed Mobility Goal Selection (PT) bed mobility, PT goal 1  -AV     Transfer Goal Selection (PT) transfer, PT goal 1  -AV     Gait Training Goal Selection (PT) gait training, PT goal 1  -AV     Stairs Goal Selection (PT) stairs, PT goal 1  -AV       Row Name 10/03/24 1500          Bed Mobility Goal 1 (PT)    Activity/Assistive Device (Bed Mobility Goal 1, PT) sit to supine/supine to sit  -AV     [place] Level/Cues Needed (Bed Mobility Goal 1, PT) modified independence  -AV     Time Frame (Bed Mobility Goal 1, PT) 30 days  -AV       Row Name 10/03/24 1500          Transfer Goal 1 (PT)    Activity/Assistive Device (Transfer Goal 1, PT) sit-to-stand/stand-to-sit;bed-to-chair/chair-to-bed;walker, rolling  -AV     [place] Level/Cues Needed (Transfer Goal 1, PT) modified independence  -AV     Time Frame (Transfer Goal 1, PT) 30 days  -AV       Row Name 10/03/24 1500          Gait  Training Goal 1 (PT)    Activity/Assistive Device (Gait Training Goal 1, PT) gait (walking locomotion);assistive device use;walker, rolling  -AV     [place] Level (Gait Training Goal 1, PT) modified independence  -AV     Distance (Gait Training Goal 1, PT) 300  -AV     Time Frame (Gait Training Goal 1, PT) 30 days  -AV       Row Name 10/03/24 1500          Stairs Goal 1 (PT)    Activity/Assistive Device (Stairs Goal 1, PT) stairs, all skills  -AV     [place] Level/Cues Needed (Stairs Goal 1, PT) modified independence  -AV     Number of Stairs (Stairs Goal 1, PT) 1  -AV     Time Frame (Stairs Goal 1, PT) 30 days  -AV               User Key  (r) = Recorded By, (t) = Taken By, (c) = Cosigned By      Initials Name Provider Type    Wilma Eric, RN Registered Nurse    Laura Alexis RN Registered Nurse    Luke Martin, PT Physical Therapist    Monae Artis RN Registered Nurse                  Section G  Mobility  Bed mobility - self performance: limited assistance (staff provide guided maneuvering of limbs or other non-weight bearing assistance)  Bed mobility support/assistance: One person assist  Transfer - self performance: limited assistance (staff provide guided maneuvering of limbs or other non-weight bearing assistance)  Transfer support/assistance: One person assist  Walking in room - self performance: limited assistance (staff provide guided maneuvering of limbs or other non-weight bearing assistance)  Walking in room support/assistance: One person assist  Walking in corridors/hallway - self performance: limited assistance (staff provide guided maneuvering of limbs or other non-weight bearing assistance)  Walking in corridors/hallway support/assistance: One person assist  Locomotion on unit - self performance: limited assistance (staff provide guided maneuvering of limbs or other non-weight bearing assistance)  Locomotion on unit support/assistance: One person  assist  Locomotion off unit - self performance: activity did not occur  Locomotion off unit support/assistance: Activity did not occur     Balance  Balance during transitions & walking: Not steady, requires assist to steady  Moving from seated to standing position: Not steady, requires assist to steady  Walking: Not steady, requires assist to steady  Turning around while walking: Not steady, requires assist to steady  Surface-to-surface transfer: Activity did not occur  Mobility devices: Walker  Range of Motion  Lower Extremity: No impairment  Section GG  Functional Ability/Goals, Adm (Section GG)  Indoor Mobility - Ambulation, Prior Function (QS3089B): 3. Independent  Stairs, Prior Function (LA1995Z): 3. Independent (1 step)  Prior Device Use (JP2703): none of the above (Z)  Lower Extremity Range of Motion (PU3174B): No impairment     Mobility, Admission Performance (HB3655)  Roll Left & Right: Mobility Admission Performance (DQ0442O2): partial/moderate assistance (03)  Sit to Lying: Mobility Admission Performance (JU5876D7): partial/moderate assistance (03)  Lying to Sitting, Side of Bed: Mobility Admission Performance (VY5427S1): partial/moderate assistance (03)  Sit to Stand: Mobility Admission Performance (YA8989K3): partial/moderate assistance (03)  Chair/Bed-Chair Transfer: Mobility Admission Performance (QD8716M0): partial/moderate assistance (03)  Car Transfer: Mobility Admission Performance (FG0485M5): not attempted due to environmental limitations (10)  Walk 10 Feet: Mobility Admission Performance (JW3891N8): partial/moderate assistance (03)  Walk 50 Feet With Two Turns: Mobility Admission Performance (YL1413I4): not attempted, medical condition/safety concern (88)  Walk 150 Feet: Mobility Admission Performance (HD5567X3): not attempted, medical condition/safety concern (88)  Walk 10 Ft, Uneven Surfaces: Mobility Admission Performance (DG0202T5): not attempted, medical condition/safety concern (88)  1  Step/Curb: Mobility Admission Performance (KE6693N2): not attempted, medical condition/safety concern (88)  4 Steps: Mobility Admission Performance (IQ5380Y3): not applicable (09)  12 Steps: Mobility Admission Performance (RL4558P7): not applicable (09)  Picking up object: Mobility Admission Performance (MA8844O7): not attempted, medical condition/safety concern (88)  Wheel 50 Ft Two Turns: Mobility Admission Performance (WR0589S1): not applicable (09)  Wheel 150 Feet: Mobility Admission Performance (OB8967A0): not applicable (09)              Physical Therapy Education       Title: PT OT SLP Therapies (In Progress)       Topic: Physical Therapy (Done)       Point: Mobility training (Done)       Learning Progress Summary             Patient Acceptance, E,TB, VU by AV at 10/3/2024 1522                         Point: Home exercise program (Done)       Learning Progress Summary             Patient Acceptance, E,TB, VU by AV at 10/3/2024 1522                         Point: Body mechanics (Done)       Learning Progress Summary             Patient Acceptance, E,TB, VU by AV at 10/3/2024 1522                         Point: Precautions (Done)       Learning Progress Summary             Patient Acceptance, E,TB, VU by AV at 10/3/2024 1522                                         User Key       Initials Effective Dates Name Provider Type Discipline    AV 21 -  Luke Limon, PT Physical Therapist PT                  PT Recommendation and Plan  Anticipated Discharge Disposition (PT): home with home health  Planned Therapy Interventions (PT): balance training, bed mobility training, gait training, home exercise program, neuromuscular re-education, postural re-education, strengthening, stair training, transfer training  Therapy Frequency (PT): 6 times/wk  Plan of Care Reviewed With: patient, daughter  Progress: no change  Outcome Evaluation: Patient presents with deficits in balance, endurance, transfers, and  ambulation. Patient will benefit from skilled PT services to address these mobility deficits and decrease risk of falls.   Outcome Measures       Row Name 10/03/24 1500             How much help from another person do you currently need...    Turning from your back to your side while in flat bed without using bedrails? 3  -AV      Moving from lying on back to sitting on the side of a flat bed without bedrails? 3  -AV      Moving to and from a bed to a chair (including a wheelchair)? 3  -AV      Standing up from a chair using your arms (e.g., wheelchair, bedside chair)? 3  -AV      Climbing 3-5 steps with a railing? 2  -AV      To walk in hospital room? 3  -AV      AM-PAC 6 Clicks Score (PT) 17  -AV      Highest Level of Mobility Goal 5 --> Static standing  -AV         Functional Assessment    Outcome Measure Options AM-PAC 6 Clicks Basic Mobility (PT)  -AV                User Key  (r) = Recorded By, (t) = Taken By, (c) = Cosigned By      Initials Name Provider Type    Luke Martin, PT Physical Therapist                     Time Calculation:    PT Charges       Row Name 10/03/24 1520             Time Calculation    PT Received On 10/03/24  -AV      PT Goal Re-Cert Due Date 11/01/24  -AV         Timed Charges    12950 - PT Therapeutic Exercise Minutes 20  -AV      82682 - Gait Training Minutes  5  -AV      02074 - PT Therapeutic Activity Minutes 5  -AV         Untimed Charges    PT Eval/Re-eval Minutes 32  -AV         SNF Physical Therapy Minutes    Skilled Minutes- PT 30 min  -AV         Total Minutes    Timed Charges Total Minutes 30  -AV      Untimed Charges Total Minutes 32  -AV       Total Minutes 62  -AV                User Key  (r) = Recorded By, (t) = Taken By, (c) = Cosigned By      Initials Name Provider Type    Luke Martin, PT Physical Therapist                  Therapy Charges for Today       Code Description Service Date Service Provider Modifiers Qty    [id number]  PT THER PROC EA 15  MIN 10/3/2024 Luke Limon, PT GP 2    [id number] HC PT EVAL LOW COMPLEXITY 3 10/3/2024 Luke Limon, PT GP 1            PT G-Codes  Outcome Measure Options: AM-PAC 6 Clicks Basic Mobility (PT)  AM-PAC 6 Clicks Score (PT): 17  AM-PAC 6 Clicks Score (OT): 16    Luke Limon, PEDRO  10/3/2024

## 2024-10-03 NOTE — PLAN OF CARE
Goal Outcome Evaluation:  Plan of Care Reviewed With: patient           Outcome Evaluation: medicated for pain and nausea

## 2024-10-04 LAB
ANION GAP SERPL CALCULATED.3IONS-SCNC: 8.9 MMOL/L (ref 5–15)
ANISOCYTOSIS BLD QL: NORMAL
BASOPHILS # BLD AUTO: 0.11 10*3/MM3 (ref 0–0.2)
BASOPHILS NFR BLD AUTO: 0.8 % (ref 0–1.5)
BUN SERPL-MCNC: 13 MG/DL (ref 8–23)
BUN/CREAT SERPL: 16.9 (ref 7–25)
BURR CELLS BLD QL SMEAR: NORMAL
CALCIUM SPEC-SCNC: 8.9 MG/DL (ref 8.6–10.5)
CHLORIDE SERPL-SCNC: 99 MMOL/L (ref 98–107)
CO2 SERPL-SCNC: 30.1 MMOL/L (ref 22–29)
CREAT SERPL-MCNC: 0.77 MG/DL (ref 0.76–1.27)
DEPRECATED RDW RBC AUTO: 68.2 FL (ref 37–54)
EGFRCR SERPLBLD CKD-EPI 2021: 89.9 ML/MIN/1.73
EOSINOPHIL # BLD AUTO: 0.54 10*3/MM3 (ref 0–0.4)
EOSINOPHIL NFR BLD AUTO: 4 % (ref 0.3–6.2)
ERYTHROCYTE [DISTWIDTH] IN BLOOD BY AUTOMATED COUNT: 23.7 % (ref 12.3–15.4)
GLUCOSE BLDC GLUCOMTR-MCNC: 114 MG/DL (ref 70–99)
GLUCOSE BLDC GLUCOMTR-MCNC: 168 MG/DL (ref 70–99)
GLUCOSE SERPL-MCNC: 115 MG/DL (ref 65–99)
HCT VFR BLD AUTO: 31 % (ref 37.5–51)
HGB BLD-MCNC: 9.6 G/DL (ref 13–17.7)
IMM GRANULOCYTES # BLD AUTO: 0.76 10*3/MM3 (ref 0–0.05)
IMM GRANULOCYTES NFR BLD AUTO: 5.6 % (ref 0–0.5)
INDURATION: 0 MM (ref 0–10)
LYMPHOCYTES # BLD AUTO: 0.95 10*3/MM3 (ref 0.7–3.1)
LYMPHOCYTES NFR BLD AUTO: 7 % (ref 19.6–45.3)
Lab: NORMAL
Lab: NORMAL
MACROCYTES BLD QL SMEAR: NORMAL
MAGNESIUM SERPL-MCNC: 1.6 MG/DL (ref 1.6–2.4)
MCH RBC QN AUTO: 25.3 PG (ref 26.6–33)
MCHC RBC AUTO-ENTMCNC: 31 G/DL (ref 31.5–35.7)
MCV RBC AUTO: 81.8 FL (ref 79–97)
MICROCYTES BLD QL: NORMAL
MONOCYTES # BLD AUTO: 0.97 10*3/MM3 (ref 0.1–0.9)
MONOCYTES NFR BLD AUTO: 7.2 % (ref 5–12)
NEUTROPHILS NFR BLD AUTO: 10.16 10*3/MM3 (ref 1.7–7)
NEUTROPHILS NFR BLD AUTO: 75.4 % (ref 42.7–76)
NRBC BLD AUTO-RTO: 0 /100 WBC (ref 0–0.2)
OVALOCYTES BLD QL SMEAR: NORMAL
PLAT MORPH BLD: NORMAL
PLATELET # BLD AUTO: 334 10*3/MM3 (ref 140–450)
PMV BLD AUTO: 9.6 FL (ref 6–12)
POIKILOCYTOSIS BLD QL SMEAR: NORMAL
POLYCHROMASIA BLD QL SMEAR: NORMAL
POTASSIUM SERPL-SCNC: 3 MMOL/L (ref 3.5–5.2)
RBC # BLD AUTO: 3.79 10*6/MM3 (ref 4.14–5.8)
SODIUM SERPL-SCNC: 138 MMOL/L (ref 136–145)
TB SKIN TEST: NEGATIVE
WBC MORPH BLD: NORMAL
WBC NRBC COR # BLD AUTO: 13.49 10*3/MM3 (ref 3.4–10.8)

## 2024-10-04 PROCEDURE — 97530 THERAPEUTIC ACTIVITIES: CPT

## 2024-10-04 PROCEDURE — 85007 BL SMEAR W/DIFF WBC COUNT: CPT | Performed by: PHYSICIAN ASSISTANT

## 2024-10-04 PROCEDURE — 97116 GAIT TRAINING THERAPY: CPT

## 2024-10-04 PROCEDURE — 83735 ASSAY OF MAGNESIUM: CPT | Performed by: PHYSICIAN ASSISTANT

## 2024-10-04 PROCEDURE — 80048 BASIC METABOLIC PNL TOTAL CA: CPT | Performed by: PHYSICIAN ASSISTANT

## 2024-10-04 PROCEDURE — 97110 THERAPEUTIC EXERCISES: CPT

## 2024-10-04 PROCEDURE — 85025 COMPLETE CBC W/AUTO DIFF WBC: CPT | Performed by: PHYSICIAN ASSISTANT

## 2024-10-04 PROCEDURE — 97535 SELF CARE MNGMENT TRAINING: CPT

## 2024-10-04 PROCEDURE — 82948 REAGENT STRIP/BLOOD GLUCOSE: CPT

## 2024-10-04 PROCEDURE — 94799 UNLISTED PULMONARY SVC/PX: CPT

## 2024-10-04 RX ADMIN — CLOPIDOGREL BISULFATE 75 MG: 75 TABLET ORAL at 09:45

## 2024-10-04 RX ADMIN — GUAIFENESIN 1200 MG: 600 TABLET ORAL at 21:38

## 2024-10-04 RX ADMIN — SENNOSIDES AND DOCUSATE SODIUM 2 TABLET: 50; 8.6 TABLET ORAL at 09:45

## 2024-10-04 RX ADMIN — GUAIFENESIN 1200 MG: 600 TABLET ORAL at 09:45

## 2024-10-04 RX ADMIN — PANTOPRAZOLE SODIUM 40 MG: 40 TABLET, DELAYED RELEASE ORAL at 05:39

## 2024-10-04 RX ADMIN — BUDESONIDE AND FORMOTEROL FUMARATE DIHYDRATE 2 PUFF: 160; 4.5 AEROSOL RESPIRATORY (INHALATION) at 21:39

## 2024-10-04 RX ADMIN — DILTIAZEM HYDROCHLORIDE 180 MG: 180 CAPSULE, EXTENDED RELEASE ORAL at 09:45

## 2024-10-04 RX ADMIN — BUDESONIDE AND FORMOTEROL FUMARATE DIHYDRATE 2 PUFF: 160; 4.5 AEROSOL RESPIRATORY (INHALATION) at 09:53

## 2024-10-04 RX ADMIN — CARVEDILOL 25 MG: 25 TABLET, FILM COATED ORAL at 08:18

## 2024-10-04 RX ADMIN — HYDROCODONE BITARTRATE AND ACETAMINOPHEN 1 TABLET: 7.5; 325 TABLET ORAL at 21:42

## 2024-10-04 RX ADMIN — APIXABAN 5 MG: 5 TABLET, FILM COATED ORAL at 09:45

## 2024-10-04 RX ADMIN — FUROSEMIDE 40 MG: 40 TABLET ORAL at 09:45

## 2024-10-04 RX ADMIN — APIXABAN 5 MG: 5 TABLET, FILM COATED ORAL at 21:39

## 2024-10-04 RX ADMIN — PREGABALIN 25 MG: 25 CAPSULE ORAL at 09:45

## 2024-10-04 RX ADMIN — FENTANYL 1 PATCH: 12 PATCH TRANSDERMAL at 09:44

## 2024-10-04 RX ADMIN — ROSUVASTATIN 20 MG: 20 TABLET, FILM COATED ORAL at 21:38

## 2024-10-04 RX ADMIN — PREGABALIN 25 MG: 25 CAPSULE ORAL at 21:39

## 2024-10-04 RX ADMIN — CARVEDILOL 25 MG: 25 TABLET, FILM COATED ORAL at 18:10

## 2024-10-04 RX ADMIN — CEFDINIR 300 MG: 300 CAPSULE ORAL at 09:45

## 2024-10-04 RX ADMIN — HYDROCODONE BITARTRATE AND ACETAMINOPHEN 1 TABLET: 7.5; 325 TABLET ORAL at 08:29

## 2024-10-04 RX ADMIN — CEFDINIR 300 MG: 300 CAPSULE ORAL at 21:39

## 2024-10-04 NOTE — PLAN OF CARE
Goal Outcome Evaluation:  Plan of Care Reviewed With: patient        Progress: improving  Outcome Evaluation: Alert and oriented and pleasant with staff. x1 assist for transfers and ambulation. x1 admin PRN pain medication with relief of symptoms noted. Peripheral IVs DC'd per Pt request. Compression wraps applied to Bilateral Lower extremieties. Pt tolerating well. Sitting up in bed, call light in reach.

## 2024-10-04 NOTE — PLAN OF CARE
Goal Outcome Evaluation:           Progress: no change  Outcome Evaluation: Rsd. is alert and oriented x4, able to make needs known to staff.  Rsd. has rested well this shift, denies pain.  Transfers x1 assist to bathroom with gaitbelt and rolling walker.  Call light and personal items within reach.  Rsd. reminded to use call light for assistance, verbalizes understanding.  Will continue to monitor and notify on-coming staff.  Current plan of care remains in place.  Bed/chair alerts in place.

## 2024-10-04 NOTE — THERAPY TREATMENT NOTE
SNF - Physical Therapy Treatment Note  AMELIE Estes     Patient Name: Ion PROCTOR Case  : 1940  MRN: [id number]  Today's Date: 10/4/2024      Visit Dx:    ICD-10-CM ICD-9-CM   1. Difficulty walking  R26.2 719.7   2. Decreased activities of daily living (ADL)  Z78.9 V49.89     Patient Active Problem List   Diagnosis    Hyperlipidemia LDL goal <70    Vitamin D deficiency    Atrial fibrillation, persistent    Essential hypertension    Chronic superficial gastritis without bleeding    Chronic right flank pain    Adenomatous polyp of colon    Left-sided chest wall pain    Type 2 diabetes mellitus without complication, without long-term current use of insulin    CAD S/P percutaneous coronary angioplasty    Weight loss    Medicare annual wellness visit, subsequent    Chronic heart failure with preserved ejection fraction (HFpEF)    Prerenal azotemia    Elevated liver enzymes    Cellulitis of left lower extremity    Stage 3a chronic kidney disease    Absent peripheral pulse    History of colon polyps    Venous insufficiency of both lower extremities    Pelvic fracture    Multifocal pneumonia    Left upper quadrant pain    Closed wedge compression fracture of T8 vertebra with delayed healing    Pleural effusion    Debility     Past Medical History:   Diagnosis Date    Cataract     Removed    Chronic heart failure with preserved ejection fraction (HFpEF) 2023    Colon polyp     Removed     Coronary artery disease     Degeneration of lumbosacral intervertebral disc 2020    Diverticulosis     Elevated cholesterol     Essential hypertension 10/04/2021    Facet arthropathy, lumbar 2020    GERD (gastroesophageal reflux disease)     HL (hearing loss)     Hyperlipidemia LDL goal <100 2021    Kidney stone     Lumbar herniated disc 2020    (R) L3-4    Lumbar spinal stenosis 2020    Paroxysmal atrial fibrillation 10/04/2021    Pneumonia     Spinal headache     post 3 injections in back      Trigger finger of right thumb 11/01/2017    all fingers both hands     Past Surgical History:   Procedure Laterality Date    BACK SURGERY      CARDIAC CATHETERIZATION N/A 08/23/2022    Procedure: LEFT HEART CATH with coronary artery angiography and right heart catheterization possible percutaneous intervention possible closure device;  Surgeon: Emili Cheng MD;  Location: [facility] CATH INVASIVE LOCATION;  Service: Cardiovascular;  Laterality: N/A;    CAROTID STENT      COLONOSCOPY  2017    Dr. Oneal    COLONOSCOPY N/A 10/06/2021    Procedure: COLONOSCOPY WITH BIOPSIES, POLYPECTOMY;  Surgeon: Vazquez Estrada MD;  Location: [facility] ENDOSCOPY;  Service: Gastroenterology;  Laterality: N/A;  COLON POLYPS, DIVERTICULOSIS, HEMORRHOIDS    COLONOSCOPY N/A 7/13/2023    Procedure: COLONOSCOPY with polypectomy with cold snare;  Surgeon: Vazquez Estrada MD;  Location: [facility] ENDOSCOPY;  Service: Gastroenterology;  Laterality: N/A;  colon polyps, diverticulosis, hemorrhoids    CORONARY ANGIOPLASTY      ENDOSCOPY N/A 10/06/2021    Procedure: ESOPHAGOGASTRODUODENOSCOPY WITH BIOPSIES;  Surgeon: Vazquez Estrada MD;  Location: [facility] ENDOSCOPY;  Service: Gastroenterology;  Laterality: N/A;  GASTRITIS    ENDOSCOPY N/A 7/13/2023    Procedure: ESOPHAGOGASTRODUODENOSCOPY with biopsies, with dilation with 15- 18 mm balloon;  Surgeon: Vazquez Estrada MD;  Location: [facility] ENDOSCOPY;  Service: Gastroenterology;  Laterality: N/A;  hiatal hernia, gastric polyp, schatzki's ring    KIDNEY STONE SURGERY      Unspecified    LUMBAR DISC SURGERY Right 06/26/2020    L3-4  Minimally Invasive    TRIGGER FINGER RELEASE Bilateral     UPPER GASTROINTESTINAL ENDOSCOPY  10/06/2021    Dr. Estrada       PT Assessment (Last 12 Hours)       PT Evaluation and Treatment       Row Name 10/04/24 1400          Physical Therapy Time and Intention    Subjective Information complains of;fatigue;pain  -CS     Document Type therapy note (daily note)   -CS     Mode of Treatment individual therapy;physical therapy  -CS     Patient Effort good  -CS     Symptoms Noted During/After Treatment fatigue;increased pain  -CS       Row Name 10/04/24 1400          Pain    Additional Documentation Pain Scale: FACES Pre/Post-Treatment (Group)  -       Row Name 10/04/24 1400          Pain Scale: FACES Pre/Post-Treatment    Pain: FACES Scale, Pretreatment 2-->hurts little bit  -CS     Posttreatment Pain Rating 6-->hurts even more  -CS     Pain Location lower  -CS     Pain Location - back  -CS       Row Name 10/04/24 1400          Cognition    Orientation Status (Cognition) oriented x 3  -       Row Name 10/04/24 1400          Bed Mobility    Bed Mobility supine-sit-supine  -CS     Supine-Sit-Supine [place] (Bed Mobility) verbal cues;minimum assist (75% patient effort);moderate assist (50% patient effort)  -     Bed Mobility, Safety Issues decreased use of legs for bridging/pushing;decreased use of arms for pushing/pulling  -     Assistive Device (Bed Mobility) bed rails;head of bed elevated  -       Row Name 10/04/24 1400          Transfers    Transfers sit-stand transfer;stand-sit transfer;toilet transfer  -       Row Name 10/04/24 1400          Sit-Stand Transfer    Sit-Stand [place] (Transfers) verbal cues;contact guard;minimum assist (75% patient effort)  -     Assistive Device (Sit-Stand Transfers) walker, front-wheeled  -       Row Name 10/04/24 1400          Stand-Sit Transfer    Stand-Sit [place] (Transfers) verbal cues;contact guard  -     Assistive Device (Stand-Sit Transfers) walker, front-wheeled  -       Row Name 10/04/24 1400          Toilet Transfer    Type (Toilet Transfer) sit-stand;squat pivot  -     [place] Level (Toilet Transfer) contact guard;minimum assist (75% patient effort)  -     Assistive Device (Toilet Transfer) walker, front-wheeled;commode chair;grab bars/safety frame  -       Row Name 10/04/24 1400           Gait/Stairs (Locomotion)    Gait/Stairs Locomotion gait/ambulation assistive device  -CS     [place] Level (Gait) verbal cues;contact guard  -CS     Assistive Device (Gait) walker, front-wheeled  -CS     Patient was able to Ambulate yes  -CS     Distance in Feet (Gait) 25  x2, plus 45 and 12'  -CS     Deviations/Abnormal Patterns (Gait) gait speed decreased;stride length decreased  -CS     Bilateral Gait Deviations forward flexed posture  -CS       Row Name 10/04/24 1400          Safety Issues, Functional Mobility    Impairments Affecting Function (Mobility) balance;endurance/activity tolerance;pain;strength;shortness of breath  -CS       Row Name 10/04/24 1400          Balance    Balance Interventions sit to stand;standing;supported;static;dynamic;dynamic reaching;weight shifting activity  -CS       Row Name 10/04/24 1400          Motor Skills    Therapeutic Exercise hip;ankle;knee;aerobic  -CS       Row Name 10/04/24 1400          Aerobic Exercise    Type (Aerobic Exercise) other (see comments)  NuStep  -     Time Performed (Aerobic Exercise) x10 minutes at workload 3  -CS       Row Name             Wound 09/25/24 1903 Left anterior knee    Wound - Properties Group Placement Date: 09/25/24  -AP Placement Time: 1903  -AP Side: Left  -AP Orientation: anterior  -AP Location: knee  -AP    Retired Wound - Properties Group Placement Date: 09/25/24  -AP Placement Time: 1903  -AP Side: Left  -AP Orientation: anterior  -AP Location: knee  -AP    Retired Wound - Properties Group Date first assessed: 09/25/24  -AP Time first assessed: 1903  -AP Side: Left  -AP Location: knee  -AP      Row Name             Wound 09/25/24 1903 Right anterior knee    Wound - Properties Group Placement Date: 09/25/24  -AP Placement Time: 1903  -AP Present on Original Admission: Y  -AP Side: Right  -AP Orientation: anterior  -AP Location: knee  -AP    Retired Wound - Properties Group Placement Date: 09/25/24  -AP Placement Time: 1903   -AP Present on Original Admission: Y  -AP Side: Right  -AP Orientation: anterior  -AP Location: knee  -AP    Retired Wound - Properties Group Date first assessed: 09/25/24  -AP Time first assessed: 1903  -AP Present on Original Admission: Y  -AP Side: Right  -AP Location: knee  -AP      Row Name             Wound 09/25/24 1906 Right posterior hand Traumatic    Wound - Properties Group Placement Date: 09/25/24  -AP Placement Time: 1906  -AP Side: Right  -AP Orientation: posterior  -AP Location: hand  -AP Primary Wound Type: Traumatic  -AP    Retired Wound - Properties Group Placement Date: 09/25/24  -AP Placement Time: 1906  -AP Side: Right  -AP Orientation: posterior  -AP Location: hand  -AP Primary Wound Type: Traumatic  -AP    Retired Wound - Properties Group Date first assessed: 09/25/24  -AP Time first assessed: 1906  -AP Side: Right  -AP Location: hand  -AP Primary Wound Type: Traumatic  -AP      Row Name             Wound 09/25/24 1920 Left proximal leg Skin Tear    Wound - Properties Group Placement Date: 09/25/24  -SM Placement Time: 1920  -SM Present on Original Admission: Y  -SM Side: Left  -SM Orientation: proximal  -SM Location: leg  -SM Primary Wound Type: Skin tear  -SM    Retired Wound - Properties Group Placement Date: 09/25/24  -SM Placement Time: 1920  -SM Present on Original Admission: Y  -SM Side: Left  -SM Orientation: proximal  -SM Location: leg  -SM Primary Wound Type: Skin tear  -SM    Retired Wound - Properties Group Date first assessed: 09/25/24  -SM Time first assessed: 1920  -SM Present on Original Admission: Y  -SM Side: Left  -SM Location: leg  -SM Primary Wound Type: Skin tear  -SM      Row Name             Wound 10/02/24 1700 Left lower leg Skin Tear    Wound - Properties Group Placement Date: 10/02/24  -FH Placement Time: 1700  -FH Present on Original Admission: N  -FH Side: Left  -FH Orientation: lower  -FH Location: leg  -FH Primary Wound Type: Skin tear  -FH    Retired Wound -  Properties Group Placement Date: 10/02/24  - Placement Time: 1700 -FH Present on Original Admission: N  -FH Side: Left  -FH Orientation: lower  -FH Location: leg  -FH Primary Wound Type: Skin tear  -FH    Retired Wound - Properties Group Date first assessed: 10/02/24  - Time first assessed: 1700 -FH Present on Original Admission: N  -FH Side: Left  -FH Location: leg  -FH Primary Wound Type: Skin tear  -FH      Row Name             Wound 10/02/24 2008 Left medial knee Skin Tear    Wound - Properties Group Placement Date: 10/02/24  - Placement Time: 2008 -FH Present on Original Admission: Y  -FH Side: Left  -FH Orientation: medial  -FH Location: knee  -FH Primary Wound Type: Skin tear  -FH    Retired Wound - Properties Group Placement Date: 10/02/24  - Placement Time: 2008 -FH Present on Original Admission: Y  -FH Side: Left  -FH Orientation: medial  -FH Location: knee  -FH Primary Wound Type: Skin tear  -FH    Retired Wound - Properties Group Date first assessed: 10/02/24  - Time first assessed: 2008 -FH Present on Original Admission: Y  -FH Side: Left  -FH Location: knee  -FH Primary Wound Type: Skin tear  -FH      Row Name 10/04/24 1400          Plan of Care Review    Plan of Care Reviewed With patient  -CS     Progress no change  -CS     Outcome Evaluation Continue plan of care.  -CS       Row Name 10/04/24 1400          Positioning and Restraints    Pre-Treatment Position in bed  -CS     Post Treatment Position bed  -CS     In Bed supine;call light within reach;encouraged to call for assist;with nsg  -CS       Row Name 10/04/24 1400          Progress Summary (PT)    Progress Toward Functional Goals (PT) progress toward functional goals is fair  -CS               User Key  (r) = Recorded By, (t) = Taken By, (c) = Cosigned By      Initials Name Provider Type    FH Wilma Powers RN Registered Nurse    Laura Alexis RN Registered Nurse    Moon Ashraf, PT Physical Therapist    AP  Mnoae Frederick, RN Registered Nurse                  Bilateral Lower Extremity   Exercise  Reps  Sets    Short and long arc quads  12 2   Heel slides  12 2   Ankle pumps  12 2   Quad sets  12 2   Straight leg raise  12 2   Marching in place 12 2   bridges 8 1   Hip ab/adduction 12 2         Section G              Section GG                       Physical Therapy Education       Title: PT OT SLP Therapies (In Progress)       Topic: Physical Therapy (Done)       Point: Mobility training (Done)       Learning Progress Summary             Patient Acceptance, E,TB, VU by AV at 10/3/2024 1522                         Point: Home exercise program (Done)       Learning Progress Summary             Patient Acceptance, E,TB, VU by AV at 10/3/2024 1522                         Point: Body mechanics (Done)       Learning Progress Summary             Patient Acceptance, E,TB, VU by AV at 10/3/2024 1522                         Point: Precautions (Done)       Learning Progress Summary             Patient Acceptance, E,TB, VU by AV at 10/3/2024 1522                                         User Key       Initials Effective Dates Name Provider Type Discipline    AV 06/11/21 -  Luke Limon, PT Physical Therapist PT                  PT Recommendation and Plan     Progress Summary (PT)  Progress Toward Functional Goals (PT): progress toward functional goals is fair  Plan of Care Reviewed With: patient  Progress: no change  Outcome Evaluation: Continue plan of care.   Outcome Measures       Row Name 10/03/24 1500             How much help from another person do you currently need...    Turning from your back to your side while in flat bed without using bedrails? 3  -AV      Moving from lying on back to sitting on the side of a flat bed without bedrails? 3  -AV      Moving to and from a bed to a chair (including a wheelchair)? 3  -AV      Standing up from a chair using your arms (e.g., wheelchair, bedside chair)? 3  -AV      Climbing  3-5 steps with a railing? 2  -AV      To walk in hospital room? 3  -AV      AM-PAC 6 Clicks Score (PT) 17  -AV      Highest Level of Mobility Goal 5 --> Static standing  -AV         Functional Assessment    Outcome Measure Options AM-PAC 6 Clicks Basic Mobility (PT)  -AV                User Key  (r) = Recorded By, (t) = Taken By, (c) = Cosigned By      Initials Name Provider Type    AV Luke Limon, PT Physical Therapist                     Time Calculation:    PT Charges       Row Name 10/04/24 1610             Time Calculation    PT Received On 10/04/24  -CS         Timed Charges    58376 - PT Therapeutic Exercise Minutes 25  -CS      57340 - Gait Training Minutes  10  -CS      22017 - PT Therapeutic Activity Minutes 18  -CS         SNF Physical Therapy Minutes    Skilled Minutes- PT 53 min  -CS         Total Minutes    Timed Charges Total Minutes 53  -CS       Total Minutes 53  -CS                User Key  (r) = Recorded By, (t) = Taken By, (c) = Cosigned By      Initials Name Provider Type    CS Moon Sarmiento, PT Physical Therapist                  Therapy Charges for Today       Code Description Service Date Service Provider Modifiers Qty    27679744175 HC PT THER PROC EA 15 MIN 10/4/2024 Moon Sarmiento, PT GP 2    49561026227 HC GAIT TRAINING EA 15 MIN 10/4/2024 Moon Sarmiento, PT GP 1    64306213803 HC PT THERAPEUTIC ACT EA 15 MIN 10/4/2024 Moon Sarmiento, PT GP 1            PT G-Codes  Outcome Measure Options: AM-PAC 6 Clicks Daily Activity (OT), Optimal Instrument  AM-PAC 6 Clicks Score (PT): 17  AM-PAC 6 Clicks Score (OT): 16    Moon Sarmiento PT  10/4/2024

## 2024-10-04 NOTE — THERAPY TREATMENT NOTE
SNF - Occupational Therapy Treatment Note  AMELIE Estes    Patient Name: Ion PROCTOR Case  : 1940    MRN: [id number]                              Today's Date: 10/4/2024       Admit Date: 10/2/2024    Visit Dx:     ICD-10-CM ICD-9-CM   1. Difficulty walking  R26.2 719.7   2. Decreased activities of daily living (ADL)  Z78.9 V49.89     Patient Active Problem List   Diagnosis    Hyperlipidemia LDL goal <70    Vitamin D deficiency    Atrial fibrillation, persistent    Essential hypertension    Chronic superficial gastritis without bleeding    Chronic right flank pain    Adenomatous polyp of colon    Left-sided chest wall pain    Type 2 diabetes mellitus without complication, without long-term current use of insulin    CAD S/P percutaneous coronary angioplasty    Weight loss    Medicare annual wellness visit, subsequent    Chronic heart failure with preserved ejection fraction (HFpEF)    Prerenal azotemia    Elevated liver enzymes    Cellulitis of left lower extremity    Stage 3a chronic kidney disease    Absent peripheral pulse    History of colon polyps    Venous insufficiency of both lower extremities    Pelvic fracture    Multifocal pneumonia    Left upper quadrant pain    Closed wedge compression fracture of T8 vertebra with delayed healing    Pleural effusion    Debility     Past Medical History:   Diagnosis Date    Cataract     Removed    Chronic heart failure with preserved ejection fraction (HFpEF) 2023    Colon polyp     Removed     Coronary artery disease     Degeneration of lumbosacral intervertebral disc 2020    Diverticulosis     Elevated cholesterol     Essential hypertension 10/04/2021    Facet arthropathy, lumbar 2020    GERD (gastroesophageal reflux disease)     HL (hearing loss)     Hyperlipidemia LDL goal <100 2021    Kidney stone     Lumbar herniated disc 2020    (R) L3-4    Lumbar spinal stenosis 2020    Paroxysmal atrial fibrillation 10/04/2021     Pneumonia     Spinal headache     post 3 injections in back     Trigger finger of right thumb 11/01/2017    all fingers both hands     Past Surgical History:   Procedure Laterality Date    BACK SURGERY      CARDIAC CATHETERIZATION N/A 08/23/2022    Procedure: LEFT HEART CATH with coronary artery angiography and right heart catheterization possible percutaneous intervention possible closure device;  Surgeon: Emili Cheng MD;  Location: formerly [facility] CATH INVASIVE LOCATION;  Service: Cardiovascular;  Laterality: N/A;    CAROTID STENT      COLONOSCOPY  2017    Dr. Oneal    COLONOSCOPY N/A 10/06/2021    Procedure: COLONOSCOPY WITH BIOPSIES, POLYPECTOMY;  Surgeon: Vazquez Estrada MD;  Location: formerly [facility] ENDOSCOPY;  Service: Gastroenterology;  Laterality: N/A;  COLON POLYPS, DIVERTICULOSIS, HEMORRHOIDS    COLONOSCOPY N/A 7/13/2023    Procedure: COLONOSCOPY with polypectomy with cold snare;  Surgeon: Vazquez Estrada MD;  Location: formerly [facility] ENDOSCOPY;  Service: Gastroenterology;  Laterality: N/A;  colon polyps, diverticulosis, hemorrhoids    CORONARY ANGIOPLASTY      ENDOSCOPY N/A 10/06/2021    Procedure: ESOPHAGOGASTRODUODENOSCOPY WITH BIOPSIES;  Surgeon: Vazquez Estrada MD;  Location: formerly [facility] ENDOSCOPY;  Service: Gastroenterology;  Laterality: N/A;  GASTRITIS    ENDOSCOPY N/A 7/13/2023    Procedure: ESOPHAGOGASTRODUODENOSCOPY with biopsies, with dilation with 15- 18 mm balloon;  Surgeon: Vazquez Estrada MD;  Location: formerly [facility] ENDOSCOPY;  Service: Gastroenterology;  Laterality: N/A;  hiatal hernia, gastric polyp, schatzki's ring    KIDNEY STONE SURGERY      Unspecified    LUMBAR DISC SURGERY Right 06/26/2020    L3-4  Minimally Invasive    TRIGGER FINGER RELEASE Bilateral     UPPER GASTROINTESTINAL ENDOSCOPY  10/06/2021    Dr. Estrada      General Information       Row Name 10/04/24 1028          OT Time and Intention    Document Type therapy note (daily note)  -EG     Mode of Treatment individual  "therapy;occupational therapy  -EG       Row Name 10/04/24 1028          General Information    Patient Profile Reviewed yes  -EG     Existing Precautions/Restrictions fall;spinal;TLSO;brace worn when out of bed  -EG       Row Name 10/04/24 1028          Cognition    Orientation Status (Cognition) oriented x 3  -EG       Row Name 10/04/24 1028          Safety Issues, Functional Mobility    Impairments Affecting Function (Mobility) balance;endurance/activity tolerance;pain;strength;shortness of breath  -EG               User Key  (r) = Recorded By, (t) = Taken By, (c) = Cosigned By      Initials Name Provider Type    EG Karely Hensley OT Occupational Therapist                     Mobility/ADL's       Row Name 10/04/24 1030          Bed Mobility    Bed Mobility supine-sit-supine  -EG     Supine-Sit-Supine [place] (Bed Mobility) minimum assist (75% patient effort);verbal cues;nonverbal cues (demo/gesture)  -EG     Bed Mobility, Safety Issues decreased use of legs for bridging/pushing;decreased use of arms for pushing/pulling  -EG     Assistive Device (Bed Mobility) bed rails;head of bed elevated  -EG     Comment, (Bed Mobility) assist for BLE's in and out of bed at times; reports high level of pain and \"weakness\"  -EG       Row Name 10/04/24 1030          Transfers    Transfers bed-chair transfer;sit-stand transfer;stand-sit transfer  -       Row Name 10/04/24 1030          Bed-Chair Transfer    Bed-Chair [place] (Transfers) contact guard;verbal cues;minimum assist (75% patient effort)  -EG     Assistive Device (Bed-Chair Transfers) walker, front-wheeled  -EG       Row Name 10/04/24 1030          Sit-Stand Transfer    Sit-Stand [place] (Transfers) contact guard;minimum assist (75% patient effort)  -EG     Assistive Device (Sit-Stand Transfers) walker, front-wheeled  -EG       Row Name 10/04/24 1030          Stand-Sit Transfer    Stand-Sit [place] (Transfers) contact guard;minimum assist (75% " "patient effort)  -EG     Assistive Device (Stand-Sit Transfers) walker, front-wheeled  -EG       Row Name 10/04/24 1030          Functional Mobility    Functional Mobility- Ind. Level contact guard assist;minimum assist (75% patient effort)  -EG     Functional Mobility- Device walker, front-wheeled  -EG     Functional Mobility- Comment Functional mobility performed to shower room with one seated rest break while TLSO donned using RW, reports increased weakness and fatigue during mobility; Patient unable to perform back to room from pain and \"exhaustion\"  -EG       Row Name 10/04/24 1030          Activities of Daily Living    BADL Assessment/Intervention bathing;upper body dressing;lower body dressing;grooming  -EG       Row Name 10/04/24 1030          Lower Body Dressing Assessment/Training    [place] Level (Lower Body Dressing) lower body dressing skills;maximum assist (25% patient effort)  -EG     Comment, (Lower Body Dressing) declines and refuses to attempt to perform more ind. this date; states too tired despite education and encouragement from OT  -EG       Row Name 10/04/24 1030          Upper Body Dressing Assessment/Training    [place] Level (Upper Body Dressing) upper body dressing skills;maximum assist (25% patient effort);minimum assist (75% patient effort)  -EG     Comment, (Upper Body Dressing) MaxA to don TLSO this date; [place] for shirt due to decreased task initiation  -EG       Row Name 10/04/24 1030          Bathing Assessment/Intervention    [place] Level (Bathing) bathing skills;moderate assist (50% patient effort)  -EG     Assistive Devices (Bathing) tub bench;hand-held shower spray hose;grab bar, tub/shower  -EG     Comment, (Bathing) increased assist required; patient with limited initiation  -EG       Row Name 10/04/24 1030          Grooming Assessment/Training    [place] Level (Grooming) grooming skills;set up  -EG               User Key  (r) = Recorded By, (t) = Taken By, " (c) = Cosigned By      Initials Name Provider Type    EG Karely Hensley OT Occupational Therapist                   Obj/Interventions       Row Name 10/04/24 1033          Sensory Assessment (Somatosensory)    Sensory Assessment (Somatosensory) sensation intact  -EG       [facility] Name 10/04/24 1033          Vision Assessment/Intervention    Visual Impairment/Limitations WFL  -EG       [facility] Name 10/04/24 1033          Motor Skills    Motor Skills functional endurance  -EG     Functional Endurance Poor+ thsi date on room air; frequent and prolonged rest breaks with slow pace of performance for all funcitonal tasks; reports dizziness and vitals assessed WNL 2x during session  -EG       Row Name 10/04/24 1033          Balance    Balance Interventions standing;sit to stand;supported;static;dynamic;weight shifting activity;occupation based/functional task  -EG               User Key  (r) = Recorded By, (t) = Taken By, (c) = Cosigned By      Initials Name Provider Type    Karely Oseguera OT Occupational Therapist                   Goals/Plan    No documentation.                  Clinical Impression       [facility] Name 10/04/24 1034          Pain Assessment    Pretreatment Pain Rating 0/10 - no pain  -EG     Posttreatment Pain Rating 9/10  -EG     Pain Location generalized  -EG     Pain Location - back  -EG       Row Name 10/04/24 1034          Plan of Care Review    Plan of Care Reviewed With patient  -EG     Progress no change  -EG     Outcome Evaluation Patient requires encouragement for ADL's and mobility training; Patient requiring increased and prolonged rest breaks with all functional task performance and participation this date; OT education and encouragement for OOB activity even just for meals in room with minimal recpetiveness noted; Continue POC; Ax1 w/RW; TLSO donned when OOB at all times.  -EG       Row Name 10/04/24 1034          Therapy Assessment/Plan (OT)    Rehab Potential (OT) good, to achieve stated  therapy goals  -EG     Criteria for Skilled Therapeutic Interventions Met (OT) yes;meets criteria;skilled treatment is necessary  -EG     Therapy Frequency (OT) 5 times/wk  -EG       Row Name 10/04/24 1034          Therapy Plan Review/Discharge Plan (OT)    Anticipated Discharge Disposition (OT) home with home health  -EG       Row Name 10/04/24 1034          Positioning and Restraints    Pre-Treatment Position in bed  -EG     Post Treatment Position bed  -EG     In Bed supine;with family/caregiver;exit alarm on;encouraged to call for assist;call light within reach  -EG               User Key  (r) = Recorded By, (t) = Taken By, (c) = Cosigned By      Initials Name Provider Type    Karely Oseguera OT Occupational Therapist                   Outcome Measures       Row Name 10/04/24 1036          How much help from another is currently needed...    Putting on and taking off regular lower body clothing? 2  -EG     Bathing (including washing, rinsing, and drying) 2  -EG     Toileting (which includes using toilet bed pan or urinal) 2  -EG     Putting on and taking off regular upper body clothing 3  -EG     Taking care of personal grooming (such as brushing teeth) 3  -EG     Eating meals 4  -EG     AM-PAC 6 Clicks Score (OT) 16  -EG       Row Name 10/04/24 1036          Functional Assessment    Outcome Measure Options AM-PAC 6 Clicks Daily Activity (OT);Optimal Instrument  -EG       Row Name 10/04/24 1036          Optimal Instrument    Optimal Instrument Optimal - 3  -EG     Bending/Stooping 5  -EG     Standing 2  -EG     Reaching 1  -EG               User Key  (r) = Recorded By, (t) = Taken By, (c) = Cosigned By      Initials Name Provider Type    Karely Oseguera OT Occupational Therapist                  Section G  Mobility  Bed mobility - self performance: limited assistance (staff provide guided maneuvering of limbs or other non-weight bearing assistance)  Bed mobility support/assistance: One person  assist  Transfer - self performance: limited assistance (staff provide guided maneuvering of limbs or other non-weight bearing assistance)  Transfer support/assistance: One person assist  Walking in room - self performance: limited assistance (staff provide guided maneuvering of limbs or other non-weight bearing assistance)  Walking in room support/assistance: One person assist  Walking in corridors/hallway - self performance: limited assistance (staff provide guided maneuvering of limbs or other non-weight bearing assistance)  Walking in corridors/hallway support/assistance: One person assist  Locomotion on unit - self performance: limited assistance (staff provide guided maneuvering of limbs or other non-weight bearing assistance)  Locomotion on unit support/assistance: One person assist  Locomotion off unit - self performance: activity did not occur  Locomotion off unit support/assistance: Activity did not occur  Dressing - self performance: limited assistance (staff provide guided maneuvering of limbs or other non-weight bearing assistance)  Dressing support/assistance: One person assist  Eating - self performance: independent  Eating support/assistance: Setup help only  Toileting - self performance: total dependence (full staff performance)  Personal hygiene - self performance: limited assistance (staff provide guided maneuvering of limbs or other non-weight bearing assistance)  Personal hygiene support/assistance: One person assist  Bathing  Bathing - self performance: Physical help with bathing (exclude washing back and hair for patient)  Bathing support/assistance: One person assist  Balance  Balance during transitions & walking: Not steady, requires assist to steady  Moving from seated to standing position: Not steady, requires assist to steady  Walking: Not steady, requires assist to steady  Turning around while walking: Not steady, requires assist to steady  Moving on and off toilet: Not steady, requires  assist to steady  Surface-to-surface transfer: Not steady, requires assist to steady  Mobility devices: Walker  Range of Motion  Upper Extremity: No impairment  Lower Extremity: No impairment  Section GG  Functional Ability/Goals, Adm (Section GG)  Self Care, Prior Functioning (KA7381Z): 3. Independent  Functional Cognition, Prior Functioning (HV7051K): 3. Independent  Prior Device Use (PO3795): none of the above (Z)  Upper Extremity Range of Motion (BE5155W): No impairment  Lower Extremity Range of Motion (OW9791H): No impairment  Self Care, Admission (Section GG)  Eating: Self-Care Admission Performance (SZ2307H3): setup or clean-up assistance (05)  Oral Hygiene: Self-Care Admission Performance (EM1262B5): setup or clean-up assistance (05)  Toileting Hygiene: Self-Care Admission Performance (ES9446E1): dependent (01)  Shower/Bathe Self: Self-Care Admission Performance (ZI6118W4): partial/moderate assistance (03)  Upper Body Dressing: Self-Care Admission Performance (KY7738S4): supervision or touching assistance (04)  Lower Body Dressing: Self-Care Admission Performance ([id number]): partial/moderate assistance (03)  Putting On/Taking Off Footwear: Self-Care Admission Performance (YE7027G2): partial/moderate assistance (03)  Personal Hygiene: Self-Care Admission Performance (XI5682M2): supervision or touching assistance (04)  Mobility, Admission Performance (FV5852)  Toilet Transfer: Mobility Admission Performance (SZ7291O2): supervision or touching assistance (04)  Tub/shower Transfer: Mobility Admission Performance (NW7964TP2): partial/moderate assistance (03)                Occupational Therapy Education       Title: PT OT SLP Therapies (In Progress)       Topic: Occupational Therapy (In Progress)       Point: ADL training (In Progress)       Description:   Instruct learner(s) on proper safety adaptation and remediation techniques during self care or transfers.   Instruct in proper use of assistive devices.                   Learning Progress Summary             Patient Eager, E, NR by EG at 10/3/2024 1345    Comment: TLSO education and spinal precautions  Education on use of AD and fall risk prevention  Reacher and compensatory techniques for LB ADL's  OT benefits education                         Point: Home exercise program (In Progress)       Description:   Instruct learner(s) on appropriate technique for monitoring, assisting and/or progressing therapeutic exercises/activities.                  Learning Progress Summary             Patient Eager, E, NR by EG at 10/3/2024 1345    Comment: TLSO education and spinal precautions  Education on use of AD and fall risk prevention  Reacher and compensatory techniques for LB ADL's  OT benefits education                         Point: Precautions (In Progress)       Description:   Instruct learner(s) on prescribed precautions during self-care and functional transfers.                  Learning Progress Summary             Patient Eager, E, NR by EG at 10/3/2024 1345    Comment: TLSO education and spinal precautions  Education on use of AD and fall risk prevention  Reacher and compensatory techniques for LB ADL's  OT benefits education                         Point: Body mechanics (In Progress)       Description:   Instruct learner(s) on proper positioning and spine alignment during self-care, functional mobility activities and/or exercises.                  Learning Progress Summary             Patient Eager, E, NR by EG at 10/3/2024 1345    Comment: TLSO education and spinal precautions  Education on use of AD and fall risk prevention  Reacher and compensatory techniques for LB ADL's  OT benefits education                                         User Key       Initials Effective Dates Name Provider Type Discipline    EG 09/14/22 -  Karely Hensley OT Occupational Therapist OT                  OT Recommendation and Plan  Planned Therapy Interventions (OT): activity tolerance  training, functional balance retraining, occupation/activity based interventions, adaptive equipment training, BADL retraining, neuromuscular control/coordination retraining, patient/caregiver education/training, transfer/mobility retraining, strengthening exercise  Therapy Frequency (OT): 5 times/wk  Plan of Care Review  Plan of Care Reviewed With: patient  Progress: no change  Outcome Evaluation: Patient requires encouragement for ADL's and mobility training; Patient requiring increased and prolonged rest breaks with all functional task performance and participation this date; OT education and encouragement for OOB activity even just for meals in room with minimal recpetiveness noted; Continue POC; Ax1 w/RW; TLSO donned when OOB at all times.     Time Calculation:   Evaluation Complexity (OT)  Review Occupational Profile/Medical/Therapy History Complexity: expanded/moderate complexity  Assessment, Occupational Performance/Identification of Deficit Complexity: 3-5 performance deficits  Clinical Decision Making Complexity (OT): detailed assessment/moderate complexity  Overall Complexity of Evaluation (OT): moderate complexity     Time Calculation- OT       Row Name 10/04/24 1036             Time Calculation- OT    OT Received On 10/04/24  -EG      OT Goal Re-Cert Due Date 11/02/24  -EG         Timed Charges    65291 - OT Therapeutic Activity Minutes 13  -EG      98235 - OT Self Care/Mgmt Minutes 33  -EG         SNF Occupational Therapy Minutes    Skilled Minutes- OT 46 min  -EG         Total Minutes    Timed Charges Total Minutes 46  -EG       Total Minutes 46  -EG                User Key  (r) = Recorded By, (t) = Taken By, (c) = Cosigned By      Initials Name Provider Type    EG Karely Hensley OT Occupational Therapist                  Therapy Charges for Today       Code Description Service Date Service Provider Modifiers Qty    10599061876  OT EVAL MOD COMPLEXITY 3 10/3/2024 Karely Hensley OT GO 1     08806668840 HC OT THERAPEUTIC ACT EA 15 MIN 10/3/2024 Karely Hensley, OT GO 1    17958996930 HC OT SELF CARE/MGMT/TRAIN EA 15 MIN 10/3/2024 Karely Hensley, OT GO 2    97577116432 HC OT THERAPEUTIC ACT EA 15 MIN 10/4/2024 Karely Hensley OT GO 1    74035860095 HC OT SELF CARE/MGMT/TRAIN EA 15 MIN 10/4/2024 Karely Hensley OT GO 2                 Karely Hensley OT  10/4/2024

## 2024-10-04 NOTE — SIGNIFICANT NOTE
Wound Eval / Progress Noted    AMELIE Estes     Patient Name: Ion PROCTOR Case  : 1940  MRN: [id number]  Today's Date: 10/3/2024                 Admit Date: 10/2/2024    Visit Dx:    ICD-10-CM ICD-9-CM   1. Difficulty walking  R26.2 719.7         Debility        Past Medical History:   Diagnosis Date    Cataract     Removed    Chronic heart failure with preserved ejection fraction (HFpEF) 2023    Colon polyp     Removed     Coronary artery disease     Degeneration of lumbosacral intervertebral disc 2020    Diverticulosis     Elevated cholesterol     Essential hypertension 10/04/2021    Facet arthropathy, lumbar 2020    GERD (gastroesophageal reflux disease)     HL (hearing loss)     Hyperlipidemia LDL goal <100 2021    Kidney stone     Lumbar herniated disc 2020    (R) L3-4    Lumbar spinal stenosis 2020    Paroxysmal atrial fibrillation 10/04/2021    Pneumonia     Spinal headache     post 3 injections in back     Trigger finger of right thumb 2017    all fingers both hands        Past Surgical History:   Procedure Laterality Date    BACK SURGERY      CARDIAC CATHETERIZATION N/A 2022    Procedure: LEFT HEART CATH with coronary artery angiography and right heart catheterization possible percutaneous intervention possible closure device;  Surgeon: Emili Cheng MD;  Location: [facility] CATH INVASIVE LOCATION;  Service: Cardiovascular;  Laterality: N/A;    CAROTID STENT      COLONOSCOPY      Dr. Oneal    COLONOSCOPY N/A 10/06/2021    Procedure: COLONOSCOPY WITH BIOPSIES, POLYPECTOMY;  Surgeon: Vazquez Estrada MD;  Location: [facility] ENDOSCOPY;  Service: Gastroenterology;  Laterality: N/A;  COLON POLYPS, DIVERTICULOSIS, HEMORRHOIDS    COLONOSCOPY N/A 2023    Procedure: COLONOSCOPY with polypectomy with cold snare;  Surgeon: Vazquez Estrada MD;  Location: [facility] ENDOSCOPY;  Service: Gastroenterology;  Laterality: N/A;  colon polyps,  diverticulosis, hemorrhoids    CORONARY ANGIOPLASTY      ENDOSCOPY N/A 10/06/2021    Procedure: ESOPHAGOGASTRODUODENOSCOPY WITH BIOPSIES;  Surgeon: Vazquez Estrada MD;  Location: [facility] ENDOSCOPY;  Service: Gastroenterology;  Laterality: N/A;  GASTRITIS    ENDOSCOPY N/A 7/13/2023    Procedure: ESOPHAGOGASTRODUODENOSCOPY with biopsies, with dilation with 15- 18 mm balloon;  Surgeon: Vazquez Estrada MD;  Location: [facility] ENDOSCOPY;  Service: Gastroenterology;  Laterality: N/A;  hiatal hernia, gastric polyp, schatzki's ring    KIDNEY STONE SURGERY      Unspecified    LUMBAR DISC SURGERY Right 06/26/2020    L3-4  Minimally Invasive    TRIGGER FINGER RELEASE Bilateral     UPPER GASTROINTESTINAL ENDOSCOPY  10/06/2021    Dr. Estrada         Physical Assessment:  Wound 09/25/24 1903 Left anterior knee (Active)   Wound Image   10/03/24 1600   Dressing Appearance dry;intact 10/03/24 1600   Closure None 10/03/24 1600   Base dry;scab 10/03/24 1600   Periwound dry;pink 10/03/24 1600   Periwound Temperature warm 10/03/24 1600   Periwound Skin Turgor soft 10/03/24 1600   Edges rolled/closed 10/03/24 1600   Drainage Amount none 10/03/24 1600   Care, Wound cleansed with;sterile normal saline 10/03/24 1600   Dressing Care open to air 10/03/24 1600   Periwound Care dry periwound area maintained 10/03/24 1600       Wound 09/25/24 1903 Right anterior knee (Active)   Wound Image   10/03/24 1600   Dressing Appearance dry;intact 10/03/24 1600   Closure None 10/03/24 1600   Base moist;red;pink;dry 10/03/24 1600   Periwound dry;redness 10/03/24 1600   Periwound Temperature warm 10/03/24 1600   Periwound Skin Turgor soft 10/03/24 1600   Edges open;rolled/closed 10/03/24 1600   Drainage Characteristics/Odor sanguineous 10/03/24 1600   Drainage Amount scant 10/03/24 1600   Care, Wound cleansed with;sterile normal saline 10/03/24 1600   Dressing Care dressing applied;hydrofiber;silver impregnated;silicone;border dressing 10/03/24 1600    Periwound Care absorptive dressing applied 10/03/24 1600       Wound 09/25/24 1906 Right posterior hand Traumatic (Active)   Dressing Appearance open to air 10/03/24 1600   Closure None 10/03/24 1600   Base dry;scab 10/03/24 1600   Periwound dry;redness 10/03/24 1600   Periwound Temperature warm 10/03/24 1600   Periwound Skin Turgor soft 10/03/24 1600   Edges rolled/closed 10/03/24 1600   Drainage Amount none 10/03/24 1600   Care, Wound cleansed with;sterile normal saline 10/03/24 1600   Dressing Care open to air 10/03/24 1600   Periwound Care dry periwound area maintained 10/03/24 1600       Wound 09/25/24 1920 Left proximal leg Skin Tear (Active)   Dressing Appearance dry;intact 10/03/24 1600   Closure None 10/03/24 1600   Base dry;red 10/03/24 1600   Periwound dry;redness;ecchymotic;edematous 10/03/24 1600   Periwound Temperature warm 10/03/24 1600   Periwound Skin Turgor soft 10/03/24 1600   Edges rolled/closed 10/03/24 1600   Drainage Amount none 10/03/24 1600   Care, Wound cleansed with;sterile normal saline 10/03/24 1600   Dressing Care dressing applied;hydrofiber;silver impregnated;silicone;border dressing 10/03/24 1600   Periwound Care dry periwound area maintained 10/03/24 1600       Wound 10/02/24 1700 Left lower leg Skin Tear (Active)   Wound Image   10/03/24 1600   Dressing Appearance dry;intact 10/03/24 1600   Closure None 10/03/24 1600   Base moist;red 10/03/24 1600   Periwound dry;ecchymotic;edematous 10/03/24 1600   Periwound Temperature warm 10/03/24 1600   Periwound Skin Turgor soft 10/03/24 1600   Edges rolled/closed 10/03/24 1600   Drainage Characteristics/Odor serosanguineous 10/03/24 1600   Drainage Amount scant 10/03/24 1600   Care, Wound cleansed with;sterile normal saline 10/03/24 1600   Dressing Care dressing applied;non-adherent;petroleum-based;gauze;silicone;border dressing 10/03/24 1600   Periwound Care absorptive dressing applied 10/03/24 1600       Wound 10/02/24 2008 Left medial  knee Skin Tear (Active)   Dressing Appearance dry 10/03/24 1600   Closure None 10/03/24 1600   Base moist;red;dry;pink;epithelialization 10/03/24 1600   Periwound dry 10/03/24 1600   Periwound Temperature warm 10/03/24 1600   Periwound Skin Turgor soft 10/03/24 1600   Edges open 10/03/24 1600   Drainage Characteristics/Odor serosanguineous 10/03/24 1600   Drainage Amount scant 10/03/24 1600   Care, Wound cleansed with;sterile normal saline 10/03/24 1600   Dressing Care dressing applied;hydrofiber;silver impregnated;silicone;border dressing 10/03/24 1600   Periwound Care absorptive dressing applied 10/03/24 1600        Wound Check / Follow-up:  Patient seen for wound care consult.  Patient is awake, alert, and oriented.  Patient is currently in skilled nursing facility for rehab therapy.  Patient states he is on blood thinners and easily develops skin tears.  Patient states skin tears to bilateral lower extremities were caused by a fall.  Patient states he utilizes lymphedema pumps as well as compression stockings at home.    Right posterior hand skin tear is closed with dry, crusted, brown tissue.  Periwound tissue is dry and red.  Cleansed with normal saline and gauze, blotted dry.  Recommending quality skin care and hygiene and for area to be left open to air.    Left anterior knee skin tear presents with a small area of moist, red tissue, surrounded by pink epithelialization.  Periwound tissue is dry with no discoloration.  Left lateral leg skin tear wound base is primarily covered by dry, red tissue.  Periwound tissue is dry with redness and ecchymosis noted.  Right anterior knee skin tear presents with a moist pink and red wound base.  Periwound tissue is dry with ecchymosis and redness noted.  Right lateral knee skin tear is closed with dry, red tissue noted to wound base.  Periwound tissue is dry and pink.  Cleanse all areas with normal saline and gauze, blotted dry.  Recommending daily dressing changes with  silver impregnated Hydrofiber and silicone border dressing securement.    Left lower leg skin tear is primarily reapproximated with a small amount of moist red tissue noted.  Periwound tissue is dry with ecchymosis present.  Cleansed with normal saline and gauze, blotted dry.  Recommending every other day dressing changes with non-adherent, petroleum-based gauze and silicone border dressing securement.    Left medial knee skin tear is dry with crusted brown/tan tissue.  Periwound tissue is dry and pink.  Recommending quality skin care and for area to be left open to air    Bilateral lower extremities present with edema.  Recommending daily edema management along with quality skin care and hygiene with application of gauze roll and elastic bandage from base of toes to bend of knee.  Recommending bilateral lower extremities remain elevated at all times.  Discussed findings with attending PA and orders obtained.    Patient denies any discoloration bilateral gluteal aspects.  Primary RN confirms lack of discoloration.  Will recommend application of blue top moisture barrier 2 times a day and as needed for incontinence.  Implement every 2 hour turns and offload at all times.  Keep patient clean, dry, and free from moisture.    Impression: Skin tears to right posterior hand, left anterior knee, left lateral leg, right anterior knee, right lateral knee, left lower leg, and left medial knee.  Bilateral lower extremity edema.    Short term goals: Regain skin integrity, skin protection, moisture prevention, pressure reduction, quality skin care and hygiene, daily dressing changes, every other day dressing changes, edema management    Rhoda Mixon RN    10/3/2024    20:13 EDT

## 2024-10-05 LAB
GLUCOSE BLDC GLUCOMTR-MCNC: 124 MG/DL (ref 70–99)
GLUCOSE BLDC GLUCOMTR-MCNC: 223 MG/DL (ref 70–99)

## 2024-10-05 PROCEDURE — 63710000001 INSULIN LISPRO (HUMAN) PER 5 UNITS: Performed by: PHYSICIAN ASSISTANT

## 2024-10-05 PROCEDURE — 63710000001 ONDANSETRON ODT 4 MG TABLET DISPERSIBLE: Performed by: PHYSICIAN ASSISTANT

## 2024-10-05 PROCEDURE — 82948 REAGENT STRIP/BLOOD GLUCOSE: CPT

## 2024-10-05 PROCEDURE — 97530 THERAPEUTIC ACTIVITIES: CPT

## 2024-10-05 PROCEDURE — 94799 UNLISTED PULMONARY SVC/PX: CPT

## 2024-10-05 PROCEDURE — 97110 THERAPEUTIC EXERCISES: CPT

## 2024-10-05 RX ORDER — CALCIUM CARBONATE/VITAMIN D3 600 MG-10
1 TABLET ORAL DAILY
Status: DISCONTINUED | OUTPATIENT
Start: 2024-10-06 | End: 2024-10-11 | Stop reason: HOSPADM

## 2024-10-05 RX ORDER — POTASSIUM CHLORIDE 750 MG/1
40 CAPSULE, EXTENDED RELEASE ORAL 2 TIMES DAILY
Status: COMPLETED | OUTPATIENT
Start: 2024-10-05 | End: 2024-10-05

## 2024-10-05 RX ORDER — DEXTROSE MONOHYDRATE 25 G/50ML
25 INJECTION, SOLUTION INTRAVENOUS
Status: DISCONTINUED | OUTPATIENT
Start: 2024-10-05 | End: 2024-10-11 | Stop reason: HOSPADM

## 2024-10-05 RX ORDER — IBUPROFEN 600 MG/1
1 TABLET ORAL
Status: DISCONTINUED | OUTPATIENT
Start: 2024-10-05 | End: 2024-10-11 | Stop reason: HOSPADM

## 2024-10-05 RX ORDER — INSULIN LISPRO 100 [IU]/ML
2-7 INJECTION, SOLUTION INTRAVENOUS; SUBCUTANEOUS
Status: DISCONTINUED | OUTPATIENT
Start: 2024-10-05 | End: 2024-10-11 | Stop reason: HOSPADM

## 2024-10-05 RX ORDER — NICOTINE POLACRILEX 4 MG
15 LOZENGE BUCCAL
Status: DISCONTINUED | OUTPATIENT
Start: 2024-10-05 | End: 2024-10-11 | Stop reason: HOSPADM

## 2024-10-05 RX ORDER — POTASSIUM CHLORIDE 750 MG/1
20 CAPSULE, EXTENDED RELEASE ORAL DAILY
Status: DISCONTINUED | OUTPATIENT
Start: 2024-10-06 | End: 2024-10-11 | Stop reason: HOSPADM

## 2024-10-05 RX ADMIN — ONDANSETRON 4 MG: 4 TABLET, ORALLY DISINTEGRATING ORAL at 16:47

## 2024-10-05 RX ADMIN — CEFDINIR 300 MG: 300 CAPSULE ORAL at 09:12

## 2024-10-05 RX ADMIN — ROSUVASTATIN 20 MG: 20 TABLET, FILM COATED ORAL at 21:24

## 2024-10-05 RX ADMIN — CARVEDILOL 25 MG: 25 TABLET, FILM COATED ORAL at 09:13

## 2024-10-05 RX ADMIN — CLOPIDOGREL BISULFATE 75 MG: 75 TABLET ORAL at 09:13

## 2024-10-05 RX ADMIN — BUDESONIDE AND FORMOTEROL FUMARATE DIHYDRATE 2 PUFF: 160; 4.5 AEROSOL RESPIRATORY (INHALATION) at 09:30

## 2024-10-05 RX ADMIN — FUROSEMIDE 40 MG: 40 TABLET ORAL at 09:13

## 2024-10-05 RX ADMIN — APIXABAN 5 MG: 5 TABLET, FILM COATED ORAL at 21:24

## 2024-10-05 RX ADMIN — ONDANSETRON 4 MG: 4 TABLET, ORALLY DISINTEGRATING ORAL at 09:24

## 2024-10-05 RX ADMIN — EMPAGLIFLOZIN 10 MG: 10 TABLET, FILM COATED ORAL at 16:47

## 2024-10-05 RX ADMIN — POTASSIUM CHLORIDE 40 MEQ: 750 CAPSULE, EXTENDED RELEASE ORAL at 21:24

## 2024-10-05 RX ADMIN — PREGABALIN 25 MG: 25 CAPSULE ORAL at 21:47

## 2024-10-05 RX ADMIN — PANTOPRAZOLE SODIUM 40 MG: 40 TABLET, DELAYED RELEASE ORAL at 05:40

## 2024-10-05 RX ADMIN — GUAIFENESIN 1200 MG: 600 TABLET ORAL at 09:12

## 2024-10-05 RX ADMIN — APIXABAN 5 MG: 5 TABLET, FILM COATED ORAL at 09:13

## 2024-10-05 RX ADMIN — DILTIAZEM HYDROCHLORIDE 180 MG: 180 CAPSULE, EXTENDED RELEASE ORAL at 09:12

## 2024-10-05 RX ADMIN — HYDROCODONE BITARTRATE AND ACETAMINOPHEN 1 TABLET: 7.5; 325 TABLET ORAL at 22:08

## 2024-10-05 RX ADMIN — POTASSIUM CHLORIDE 40 MEQ: 750 CAPSULE, EXTENDED RELEASE ORAL at 16:47

## 2024-10-05 RX ADMIN — BUDESONIDE AND FORMOTEROL FUMARATE DIHYDRATE 2 PUFF: 160; 4.5 AEROSOL RESPIRATORY (INHALATION) at 21:23

## 2024-10-05 RX ADMIN — CARVEDILOL 25 MG: 25 TABLET, FILM COATED ORAL at 17:56

## 2024-10-05 RX ADMIN — GUAIFENESIN 1200 MG: 600 TABLET ORAL at 21:24

## 2024-10-05 RX ADMIN — INSULIN LISPRO 3 UNITS: 100 INJECTION, SOLUTION INTRAVENOUS; SUBCUTANEOUS at 17:56

## 2024-10-05 NOTE — PLAN OF CARE
Goal Outcome Evaluation:  Plan of Care Reviewed With: patient           Outcome Evaluation: Alert and oriented. Able to communicate needs. Refused TLSO brace when ambulating to bathroom. Ambulated in [place] before bed. [place] administered during night for back pain. Continue plan of care.

## 2024-10-05 NOTE — THERAPY TREATMENT NOTE
SNF - Physical Therapy Treatment Note  AMELIE Estes     Patient Name: Ion PROCTOR Case  : 1940  MRN: [id number]  Today's Date: 10/5/2024      Visit Dx:    ICD-10-CM ICD-9-CM   1. Difficulty walking  R26.2 719.7   2. Decreased activities of daily living (ADL)  Z78.9 V49.89     Patient Active Problem List   Diagnosis    Hyperlipidemia LDL goal <70    Vitamin D deficiency    Atrial fibrillation, persistent    Essential hypertension    Chronic superficial gastritis without bleeding    Chronic right flank pain    Adenomatous polyp of colon    Left-sided chest wall pain    Type 2 diabetes mellitus without complication, without long-term current use of insulin    CAD S/P percutaneous coronary angioplasty    Weight loss    Medicare annual wellness visit, subsequent    Chronic heart failure with preserved ejection fraction (HFpEF)    Prerenal azotemia    Elevated liver enzymes    Cellulitis of left lower extremity    Stage 3a chronic kidney disease    Absent peripheral pulse    History of colon polyps    Venous insufficiency of both lower extremities    Pelvic fracture    Multifocal pneumonia    Left upper quadrant pain    Closed wedge compression fracture of T8 vertebra with delayed healing    Pleural effusion    Debility     Past Medical History:   Diagnosis Date    Cataract     Removed    Chronic heart failure with preserved ejection fraction (HFpEF) 2023    Colon polyp     Removed     Coronary artery disease     Degeneration of lumbosacral intervertebral disc 2020    Diverticulosis     Elevated cholesterol     Essential hypertension 10/04/2021    Facet arthropathy, lumbar 2020    GERD (gastroesophageal reflux disease)     HL (hearing loss)     Hyperlipidemia LDL goal <100 2021    Kidney stone     Lumbar herniated disc 2020    (R) L3-4    Lumbar spinal stenosis 2020    Paroxysmal atrial fibrillation 10/04/2021    Pneumonia     Spinal headache     post 3 injections in back      Trigger finger of right thumb 11/01/2017    all fingers both hands     Past Surgical History:   Procedure Laterality Date    BACK SURGERY      CARDIAC CATHETERIZATION N/A 08/23/2022    Procedure: LEFT HEART CATH with coronary artery angiography and right heart catheterization possible percutaneous intervention possible closure device;  Surgeon: Emili Cheng MD;  Location: [facility] CATH INVASIVE LOCATION;  Service: Cardiovascular;  Laterality: N/A;    CAROTID STENT      COLONOSCOPY  2017    Dr. Oneal    COLONOSCOPY N/A 10/06/2021    Procedure: COLONOSCOPY WITH BIOPSIES, POLYPECTOMY;  Surgeon: Vazquez Estrada MD;  Location: [facility] ENDOSCOPY;  Service: Gastroenterology;  Laterality: N/A;  COLON POLYPS, DIVERTICULOSIS, HEMORRHOIDS    COLONOSCOPY N/A 7/13/2023    Procedure: COLONOSCOPY with polypectomy with cold snare;  Surgeon: Vazquez Estrada MD;  Location: [facility] ENDOSCOPY;  Service: Gastroenterology;  Laterality: N/A;  colon polyps, diverticulosis, hemorrhoids    CORONARY ANGIOPLASTY      ENDOSCOPY N/A 10/06/2021    Procedure: ESOPHAGOGASTRODUODENOSCOPY WITH BIOPSIES;  Surgeon: Vazquez Estrada MD;  Location: [facility] ENDOSCOPY;  Service: Gastroenterology;  Laterality: N/A;  GASTRITIS    ENDOSCOPY N/A 7/13/2023    Procedure: ESOPHAGOGASTRODUODENOSCOPY with biopsies, with dilation with 15- 18 mm balloon;  Surgeon: Vazquez Estrada MD;  Location: [facility] ENDOSCOPY;  Service: Gastroenterology;  Laterality: N/A;  hiatal hernia, gastric polyp, schatzki's ring    KIDNEY STONE SURGERY      Unspecified    LUMBAR DISC SURGERY Right 06/26/2020    L3-4  Minimally Invasive    TRIGGER FINGER RELEASE Bilateral     UPPER GASTROINTESTINAL ENDOSCOPY  10/06/2021    Dr. Estrada       PT Assessment (Last 12 Hours)       PT Evaluation and Treatment       Row Name 10/05/24 1157          Physical Therapy Time and Intention    Document Type therapy note (daily note)  -WM     Mode of Treatment individual therapy;physical  therapy  -WM     Patient Effort good  -WM     Symptoms Noted During/After Treatment fatigue;dizziness  -       Row Name 10/05/24 1157          Bed Mobility    Supine-Sit Festus (Bed Mobility) minimum assist (75% patient effort);verbal cues  -WM     Sit-Supine [place] (Bed Mobility) contact guard;verbal cues  -WM     Bed Mobility, Safety Issues decreased use of legs for bridging/pushing  -     Assistive Device (Bed Mobility) bed rails;draw sheet  -       Row Name 10/05/24 1157          Sit-Stand Transfer    Sit-Stand Festus (Transfers) contact guard;verbal cues  -WM     Assistive Device (Sit-Stand Transfers) walker, front-wheeled  -WM       Row Name 10/05/24 1157          Stand-Sit Transfer    Stand-Sit [place]us (Transfers) contact guard;verbal cues  -WM     Assistive Device (Stand-Sit Transfers) walker, front-wheeled  -       Row Name 10/05/24 1157          Gait/Stairs (Locomotion)    [place]us Level (Gait) contact guard;verbal cues  -WM     Assistive Device (Gait) walker, front-wheeled  -     Distance in Feet (Gait) 45  x2  -WM     Pattern (Gait) 4-point;step-through  -WM     Deviations/Abnormal Patterns (Gait) gait speed decreased;stride length decreased  -       Row Name 10/05/24 1157          Safety Issues, Functional Mobility    Impairments Affecting Function (Mobility) balance;endurance/activity tolerance;range of motion (ROM);strength  -       Row Name 10/05/24 1157          Aerobic Exercise    Time Performed (Aerobic Exercise) NuStep x 12 minutes, load 3  -       Row Name             Wound 09/25/24 1903 Left anterior knee    Wound - Properties Group Placement Date: 09/25/24  -AP Placement Time: 1903  -AP Side: Left  -AP Orientation: anterior  -AP Location: knee  -AP    Retired Wound - Properties Group Placement Date: 09/25/24  -AP Placement Time: 1903  -AP Side: Left  -AP Orientation: anterior  -AP Location: knee  -AP    Retired Wound - Properties Group Date first  assessed: 09/25/24  -AP Time first assessed: 1903  -AP Side: Left  -AP Location: knee  -AP      Row Name             Wound 09/25/24 1903 Right anterior knee    Wound - Properties Group Placement Date: 09/25/24  -AP Placement Time: 1903  -AP Present on Original Admission: Y  -AP Side: Right  -AP Orientation: anterior  -AP Location: knee  -AP    Retired Wound - Properties Group Placement Date: 09/25/24  -AP Placement Time: 1903  -AP Present on Original Admission: Y  -AP Side: Right  -AP Orientation: anterior  -AP Location: knee  -AP    Retired Wound - Properties Group Date first assessed: 09/25/24  -AP Time first assessed: 1903  -AP Present on Original Admission: Y  -AP Side: Right  -AP Location: knee  -AP      Row Name             Wound 09/25/24 1906 Right posterior hand Traumatic    Wound - Properties Group Placement Date: 09/25/24  -AP Placement Time: 1906  -AP Side: Right  -AP Orientation: posterior  -AP Location: hand  -AP Primary Wound Type: Traumatic  -AP    Retired Wound - Properties Group Placement Date: 09/25/24  -AP Placement Time: 1906  -AP Side: Right  -AP Orientation: posterior  -AP Location: hand  -AP Primary Wound Type: Traumatic  -AP    Retired Wound - Properties Group Date first assessed: 09/25/24  -AP Time first assessed: 1906  -AP Side: Right  -AP Location: hand  -AP Primary Wound Type: Traumatic  -AP      Row Name             Wound 09/25/24 1920 Left proximal leg Skin Tear    Wound - Properties Group Placement Date: 09/25/24  -SM Placement Time: 1920  -SM Present on Original Admission: Y  -SM Side: Left  -SM Orientation: proximal  -SM Location: leg  -SM Primary Wound Type: Skin tear  -SM    Retired Wound - Properties Group Placement Date: 09/25/24  -SM Placement Time: 1920  -SM Present on Original Admission: Y  -SM Side: Left  -SM Orientation: proximal  -SM Location: leg  -SM Primary Wound Type: Skin tear  -SM    Retired Wound - Properties Group Date first assessed: 09/25/24  -SM Time first  assessed: 1920  -SM Present on Original Admission: Y  -SM Side: Left  -SM Location: leg  -SM Primary Wound Type: Skin tear  -SM      Row Name             Wound 10/02/24 1700 Left lower leg Skin Tear    Wound - Properties Group Placement Date: 10/02/24  - Placement Time: 1700 -FH Present on Original Admission: N  -FH Side: Left  -FH Orientation: lower  -FH Location: leg  -FH Primary Wound Type: Skin tear  -FH    Retired Wound - Properties Group Placement Date: 10/02/24  - Placement Time: 1700 -FH Present on Original Admission: N  -FH Side: Left  -FH Orientation: lower  -FH Location: leg  -FH Primary Wound Type: Skin tear  -FH    Retired Wound - Properties Group Date first assessed: 10/02/24  - Time first assessed: 1700 -FH Present on Original Admission: N  -FH Side: Left  -FH Location: leg  -FH Primary Wound Type: Skin tear  -FH      Row Name             Wound 10/02/24 2008 Left medial knee Skin Tear    Wound - Properties Group Placement Date: 10/02/24  - Placement Time: 2008 -FH Present on Original Admission: Y  -FH Side: Left  - Orientation: medial  - Location: knee  -FH Primary Wound Type: Skin tear  -FH    Retired Wound - Properties Group Placement Date: 10/02/24  - Placement Time: 2008 -FH Present on Original Admission: Y  -FH Side: Left  - Orientation: medial  - Location: knee  -FH Primary Wound Type: Skin tear  -FH    Retired Wound - Properties Group Date first assessed: 10/02/24  - Time first assessed: 2008 -FH Present on Original Admission: Y  -FH Side: Left  - Location: knee  -FH Primary Wound Type: Skin tear  -FH      Row Name 10/05/24 1157          Positioning and Restraints    Pre-Treatment Position in bed  -     Post Treatment Position bed  -WM     In Bed side lying left;call light within reach;encouraged to call for assist;exit alarm on;with family/caregiver  -       Row Name 10/05/24 1157          Progress Summary (PT)    Progress Toward Functional Goals (PT) progress  toward functional goals is fair  -WM               User Key  (r) = Recorded By, (t) = Taken By, (c) = Cosigned By      Initials Name Provider Type     Wilma Powers, RN Registered Nurse    Brenton Rutledge PTA Physical Therapist Assistant    Laura Alexis RN Registered Nurse    Monae Artis RN Registered Nurse                  Section G              Section GG                       Physical Therapy Education       Title: PT OT SLP Therapies (In Progress)       Topic: Physical Therapy (Done)       Point: Mobility training (Done)       Learning Progress Summary             Patient Acceptance, E,TB, VU by AV at 10/3/2024 1522                         Point: Home exercise program (Done)       Learning Progress Summary             Patient Acceptance, E,TB, VU by AV at 10/3/2024 1522                         Point: Body mechanics (Done)       Learning Progress Summary             Patient Acceptance, E,TB, VU by AV at 10/3/2024 1522                         Point: Precautions (Done)       Learning Progress Summary             Patient Acceptance, E,TB, VU by AV at 10/3/2024 1522                                         User Key       Initials Effective Dates Name Provider Type Discipline     06/11/21 -  Luke Limon, PT Physical Therapist PT                  PT Recommendation and Plan     Progress Summary (PT)  Progress Toward Functional Goals (PT): progress toward functional goals is fair   Outcome Measures       Row Name 10/05/24 1202 10/03/24 1500          How much help from another person do you currently need...    Turning from your back to your side while in flat bed without using bedrails? 3  -WM 3  -AV     Moving from lying on back to sitting on the side of a flat bed without bedrails? 3  -WM 3  -AV     Moving to and from a bed to a chair (including a wheelchair)? 3  -WM 3  -AV     Standing up from a chair using your arms (e.g., wheelchair, bedside chair)? 3  -WM 3  -AV     Climbing 3-5  steps with a railing? 2  -WM 2  -AV     To walk in hospital room? 3  -WM 3  -AV     AM-PAC 6 Clicks Score (PT) 17  -WM 17  -AV     Highest Level of Mobility Goal 5 --> Static standing  -WM 5 --> Static standing  -AV        Functional Assessment    Outcome Measure Options -- AM-PAC 6 Clicks Basic Mobility (PT)  -AV               User Key  (r) = Recorded By, (t) = Taken By, (c) = Cosigned By      Initials Name Provider Type    Brenton Rutledge PTA Physical Therapist Assistant    AV Luke Limon, PT Physical Therapist                     Time Calculation:    PT Charges       Row Name 10/05/24 1155             Time Calculation    PT Received On 10/05/24  -WM         Timed Charges    77871 - PT Therapeutic Exercise Minutes 12  -WM      83081 - Gait Training Minutes  6  -WM      68576 - PT Therapeutic Activity Minutes 8  -WM         SNF Physical Therapy Minutes    Skilled Minutes- PT 26 min  -WM         Total Minutes    Timed Charges Total Minutes 26  -WM       Total Minutes 26  -WM                User Key  (r) = Recorded By, (t) = Taken By, (c) = Cosigned By      Initials Name Provider Type    Brenton Rutledge PTA Physical Therapist Assistant                      PT G-Codes  Outcome Measure Options: AM-PAC 6 Clicks Daily Activity (OT), Optimal Instrument  AM-PAC 6 Clicks Score (PT): 17  AM-PAC 6 Clicks Score (OT): 16    Brenton De La Garza PTA  10/5/2024

## 2024-10-06 LAB
ALBUMIN SERPL-MCNC: 2.5 G/DL (ref 3.5–5.2)
ALBUMIN/GLOB SERPL: 0.9 G/DL
ALP SERPL-CCNC: 104 U/L (ref 39–117)
ALT SERPL W P-5'-P-CCNC: 22 U/L (ref 1–41)
ANION GAP SERPL CALCULATED.3IONS-SCNC: 11.1 MMOL/L (ref 5–15)
ANISOCYTOSIS BLD QL: NORMAL
AST SERPL-CCNC: 23 U/L (ref 1–40)
BASOPHILS # BLD AUTO: 0.12 10*3/MM3 (ref 0–0.2)
BASOPHILS NFR BLD AUTO: 0.9 % (ref 0–1.5)
BILIRUB SERPL-MCNC: 0.3 MG/DL (ref 0–1.2)
BUN SERPL-MCNC: 11 MG/DL (ref 8–23)
BUN/CREAT SERPL: 13.8 (ref 7–25)
CALCIUM SPEC-SCNC: 8.8 MG/DL (ref 8.6–10.5)
CHLORIDE SERPL-SCNC: 99 MMOL/L (ref 98–107)
CLUMPED PLATELETS: PRESENT
CO2 SERPL-SCNC: 27.9 MMOL/L (ref 22–29)
CREAT SERPL-MCNC: 0.8 MG/DL (ref 0.76–1.27)
DEPRECATED RDW RBC AUTO: 71.3 FL (ref 37–54)
EGFRCR SERPLBLD CKD-EPI 2021: 88.9 ML/MIN/1.73
EOSINOPHIL # BLD AUTO: 0.48 10*3/MM3 (ref 0–0.4)
EOSINOPHIL NFR BLD AUTO: 3.5 % (ref 0.3–6.2)
ERYTHROCYTE [DISTWIDTH] IN BLOOD BY AUTOMATED COUNT: 24.2 % (ref 12.3–15.4)
GLOBULIN UR ELPH-MCNC: 2.9 GM/DL
GLUCOSE BLDC GLUCOMTR-MCNC: 101 MG/DL (ref 70–99)
GLUCOSE BLDC GLUCOMTR-MCNC: 141 MG/DL (ref 70–99)
GLUCOSE BLDC GLUCOMTR-MCNC: 171 MG/DL (ref 70–99)
GLUCOSE BLDC GLUCOMTR-MCNC: 178 MG/DL (ref 70–99)
GLUCOSE SERPL-MCNC: 163 MG/DL (ref 65–99)
HCT VFR BLD AUTO: 32.1 % (ref 37.5–51)
HGB BLD-MCNC: 9.9 G/DL (ref 13–17.7)
IMM GRANULOCYTES # BLD AUTO: 0.73 10*3/MM3 (ref 0–0.05)
IMM GRANULOCYTES NFR BLD AUTO: 5.3 % (ref 0–0.5)
LARGE PLATELETS: NORMAL
LYMPHOCYTES # BLD AUTO: 1.12 10*3/MM3 (ref 0.7–3.1)
LYMPHOCYTES NFR BLD AUTO: 8.1 % (ref 19.6–45.3)
MCH RBC QN AUTO: 25.4 PG (ref 26.6–33)
MCHC RBC AUTO-ENTMCNC: 30.8 G/DL (ref 31.5–35.7)
MCV RBC AUTO: 82.5 FL (ref 79–97)
MONOCYTES # BLD AUTO: 1.08 10*3/MM3 (ref 0.1–0.9)
MONOCYTES NFR BLD AUTO: 7.8 % (ref 5–12)
NEUTROPHILS NFR BLD AUTO: 10.24 10*3/MM3 (ref 1.7–7)
NEUTROPHILS NFR BLD AUTO: 74.4 % (ref 42.7–76)
NRBC BLD AUTO-RTO: 0 /100 WBC (ref 0–0.2)
NT-PROBNP SERPL-MCNC: 1226 PG/ML (ref 0–1800)
PLATELET # BLD AUTO: 371 10*3/MM3 (ref 140–450)
PMV BLD AUTO: 9.2 FL (ref 6–12)
POLYCHROMASIA BLD QL SMEAR: NORMAL
POTASSIUM SERPL-SCNC: 3.8 MMOL/L (ref 3.5–5.2)
PROCALCITONIN SERPL-MCNC: 0.1 NG/ML (ref 0–0.25)
PROT SERPL-MCNC: 5.4 G/DL (ref 6–8.5)
RBC # BLD AUTO: 3.89 10*6/MM3 (ref 4.14–5.8)
SMALL PLATELETS BLD QL SMEAR: ADEQUATE
SODIUM SERPL-SCNC: 138 MMOL/L (ref 136–145)
WBC MORPH BLD: NORMAL
WBC NRBC COR # BLD AUTO: 13.77 10*3/MM3 (ref 3.4–10.8)

## 2024-10-06 PROCEDURE — 82948 REAGENT STRIP/BLOOD GLUCOSE: CPT | Performed by: PHYSICIAN ASSISTANT

## 2024-10-06 PROCEDURE — 94799 UNLISTED PULMONARY SVC/PX: CPT

## 2024-10-06 PROCEDURE — 63710000001 ONDANSETRON ODT 4 MG TABLET DISPERSIBLE: Performed by: PHYSICIAN ASSISTANT

## 2024-10-06 PROCEDURE — 99309 SBSQ NF CARE MODERATE MDM 30: CPT | Performed by: PHYSICIAN ASSISTANT

## 2024-10-06 PROCEDURE — 82948 REAGENT STRIP/BLOOD GLUCOSE: CPT

## 2024-10-06 PROCEDURE — 83880 ASSAY OF NATRIURETIC PEPTIDE: CPT | Performed by: PHYSICIAN ASSISTANT

## 2024-10-06 PROCEDURE — 85025 COMPLETE CBC W/AUTO DIFF WBC: CPT | Performed by: PHYSICIAN ASSISTANT

## 2024-10-06 PROCEDURE — 85007 BL SMEAR W/DIFF WBC COUNT: CPT | Performed by: PHYSICIAN ASSISTANT

## 2024-10-06 PROCEDURE — 84145 PROCALCITONIN (PCT): CPT | Performed by: PHYSICIAN ASSISTANT

## 2024-10-06 PROCEDURE — 63710000001 INSULIN LISPRO (HUMAN) PER 5 UNITS: Performed by: PHYSICIAN ASSISTANT

## 2024-10-06 PROCEDURE — 80053 COMPREHEN METABOLIC PANEL: CPT | Performed by: PHYSICIAN ASSISTANT

## 2024-10-06 RX ADMIN — DILTIAZEM HYDROCHLORIDE 180 MG: 180 CAPSULE, EXTENDED RELEASE ORAL at 08:56

## 2024-10-06 RX ADMIN — CARVEDILOL 25 MG: 25 TABLET, FILM COATED ORAL at 08:56

## 2024-10-06 RX ADMIN — BUDESONIDE AND FORMOTEROL FUMARATE DIHYDRATE 2 PUFF: 160; 4.5 AEROSOL RESPIRATORY (INHALATION) at 08:58

## 2024-10-06 RX ADMIN — FUROSEMIDE 40 MG: 40 TABLET ORAL at 08:56

## 2024-10-06 RX ADMIN — GUAIFENESIN 1200 MG: 600 TABLET ORAL at 21:11

## 2024-10-06 RX ADMIN — APIXABAN 5 MG: 5 TABLET, FILM COATED ORAL at 21:11

## 2024-10-06 RX ADMIN — CLOPIDOGREL BISULFATE 75 MG: 75 TABLET ORAL at 08:56

## 2024-10-06 RX ADMIN — PANTOPRAZOLE SODIUM 40 MG: 40 TABLET, DELAYED RELEASE ORAL at 05:45

## 2024-10-06 RX ADMIN — ROSUVASTATIN 20 MG: 20 TABLET, FILM COATED ORAL at 21:11

## 2024-10-06 RX ADMIN — APIXABAN 5 MG: 5 TABLET, FILM COATED ORAL at 08:56

## 2024-10-06 RX ADMIN — PREGABALIN 25 MG: 25 CAPSULE ORAL at 08:56

## 2024-10-06 RX ADMIN — HYDROCODONE BITARTRATE AND ACETAMINOPHEN 1 TABLET: 7.5; 325 TABLET ORAL at 10:57

## 2024-10-06 RX ADMIN — GUAIFENESIN 1200 MG: 600 TABLET ORAL at 10:34

## 2024-10-06 RX ADMIN — CARVEDILOL 25 MG: 25 TABLET, FILM COATED ORAL at 17:53

## 2024-10-06 RX ADMIN — Medication 1 TABLET: at 08:56

## 2024-10-06 RX ADMIN — POTASSIUM CHLORIDE 20 MEQ: 750 CAPSULE, EXTENDED RELEASE ORAL at 08:56

## 2024-10-06 RX ADMIN — INSULIN LISPRO 2 UNITS: 100 INJECTION, SOLUTION INTRAVENOUS; SUBCUTANEOUS at 21:10

## 2024-10-06 RX ADMIN — BUDESONIDE AND FORMOTEROL FUMARATE DIHYDRATE 2 PUFF: 160; 4.5 AEROSOL RESPIRATORY (INHALATION) at 21:11

## 2024-10-06 RX ADMIN — EMPAGLIFLOZIN 10 MG: 10 TABLET, FILM COATED ORAL at 08:56

## 2024-10-06 RX ADMIN — INSULIN LISPRO 2 UNITS: 100 INJECTION, SOLUTION INTRAVENOUS; SUBCUTANEOUS at 17:53

## 2024-10-06 RX ADMIN — ONDANSETRON 4 MG: 4 TABLET, ORALLY DISINTEGRATING ORAL at 13:38

## 2024-10-06 NOTE — PLAN OF CARE
Goal Outcome Evaluation:  Plan of Care Reviewed With: patient           Outcome Evaluation: Alert and oriented. Able to communicate needs. Transferred to bedside commode with one person assistance, per patient request, during night. Refuses TLSO brace when toileting. Fentanyl patch to left arm/ shoulder intact at HS. [place] administered at 2208 for back pain. Continue plan of care.

## 2024-10-06 NOTE — PLAN OF CARE
Goal Outcome Evaluation:              Outcome Evaluation: Patient is alert and oriented. Pleasant with staff. [place]. C/O nausea off and on during shift. Given PRN Zofran ODT at 0924 and again at 1647. Med effective but results do not last. Given Sprite which patient stated helped. New orders received today related to elevated blood glucose. See eMAR. Voices needs. Con't current POC.

## 2024-10-06 NOTE — PLAN OF CARE
Goal Outcome Evaluation:  Plan of Care Reviewed With: patient        Progress: improving  Outcome Evaluation: AOx4, call light and personal items within reach. Able to make needs know to staff. C/o lower back pain, medications given, postional changes done. Fentanyl patch intact. Walked with staffing during the shift. Tolorated wound care well. Education given about TLSO brace. 1x to the BR and BSC, walker and gait belt. Con't plan of care

## 2024-10-06 NOTE — PROGRESS NOTES
[facility]   Hospitalist Progress Note       Patient Name: Ion PROCTOR Case  : [id number]  MRN: [id number]  Primary Care Physician: Maurice Mixon MD  Date of admission: 10/2/2024  Today's Date: 10/6/2024  Room / Bed:   305/2  Subjective   Chief Complaint: Hospital-acquired weakness     HPI:                 Ion PROCTOR Case is a 83 y.o. male with a past medical history of atrial fibrillation on Eliquis, CAD and prior PCI, CKD stage III, recent hospitalization for COVID-19 infection in late August.  The patient presented to the ED 24 for generalized weakness.  He was hypoxic.  Patient does not use home oxygen. CT chest with contrast showed left pleural effusion and trace right pleural effusion.  Bibasilar densities greater on the left, atelectasis.  Left basilar pneumonia cannot be excluded.  Compression fracture deformity of T8 similar to prior. Subacute fractures involving the ischium and the superior pubic ramus area on the right as well as the symphysis of the pubis area. Admitted for further management of acute hypoxia due to pneumonia and pleural effusion. Pulmonology consulted.  He underwent bedside left sided thoracentesis on 2024 with removal of 200 cc of pleural fluid. Respiratory status improved.  Neurosurgery following for recent T8 compression fracture.  OT was consulted for brace placement as inpatient, was not able to tolerate brace on , encouraged out of bed and to use brace while ambulating.  Also developed left lower leg cellulitis for which patient was switched to IV Zosyn which he tolerated well without side effects/allergic reaction.  Patient's erythema improved.  Patient's respiratory status improved.  Will transition patient to cefdinir to complete a full course of antibiotics.  Patient transition to Symbicort and as needed DuoNebs.  Patient weak and debilitated from hospitalization and is being discharged to the rehab facility today in stable condition.     Interval Followup:  10/6/2024    Pt resting in bed. Alert & conversational.  No fever.  /77. On room air with sats 95%.   Morning labs: Cr 0.8, K 3.8, Na 138, BNP 1226, Glu 163 (101-163 range), AST 23, ALT 22, T bili 0.3, WBC 13K, Hgb 9.9, Plats 371, Procal 0.1  He reports some nausea. Concerned may be from meds.  Has had nausea on Lyrica in past.  Also a little light headed with standing too fast.  Will DC Lyrica  Will check ortho BP  Anti-emetics and prn meds for dyspepsia      REVIEW OF SYSTEMS:   Gerneralized weakness  Objective   Temp:  [98.2 °F (36.8 °C)-98.6 °F (37 °C)] 98.2 °F (36.8 °C)  Heart Rate:  [] 116  Resp:  [18] 18  BP: (124-134)/(64-77) 134/77  PHYSICAL EXAM   CON: WN. WD. NAD.   NECK:  No thyromegaly. No stridor.   RESP:  CTA. No wheezes. No crackles.  No work of breathing or tachypnea.   CV:  Rhythm regular. Rate WNL. No murmur noted.  No edema.  GI:  Soft and nontender. Nondistended.    EXT: Peripheral pulses intact.  No cyanosis.  PSYCH:  Alert. Oriented. Normal affect and mood.  NEURO:  No dysarthria or aphasia. No unilateral weakness or paresthesia.  SKIN: No chronic venous stasis changes or varicosities.  No cellulitis  Results from last 7 days   Lab Units 10/06/24  0948 10/04/24  0441 10/02/24  0451 10/01/24  0457 09/30/24  0457   WBC 10*3/mm3 13.77* 13.49* 15.11* 17.36* 17.22*   HEMOGLOBIN g/dL 9.9* 9.6* 9.0* 9.8* 9.7*   HEMATOCRIT % 32.1* 31.0* 29.4* 32.6* 31.2*   PLATELETS 10*3/mm3 371 334 279 276 291     Results from last 7 days   Lab Units 10/06/24  0948 10/04/24  0441 10/03/24  0756 10/02/24  0451 10/01/24  0457 09/30/24  0457   SODIUM mmol/L 138 138 136 136 137 138   POTASSIUM mmol/L 3.8 3.0* 3.1* 3.0* 3.1* 3.4*   CO2 mmol/L 27.9 30.1* 26.7 26.8 24.6 25.7   CHLORIDE mmol/L 99 99 99 101 103 103   ANION GAP mmol/L 11.1 8.9 10.3 8.2 9.4 9.3   BUN mg/dL 11 13 13 14 17 16   CREATININE mg/dL 0.80 0.77 0.74* 0.66* 0.71* 0.69*   GLUCOSE mg/dL 163* 115* 115* 119* 100* 99         COMPLEXITY OF DATA /  DECISION MAKING     []  Moderate: One acute illness or mild exacerbation of chronic or 2 stable chronic or tx side effects   []  High:  Severe acute illness or severe exacerbation of chronic - potential for major debility / life threatening         I have personally reviewed the results from the time of this admission to 10/6/2024 15:07 EDT:  []  Laboratory:  []  Microbiology: []  Radiology:  []  Telemetry:   []  Cardiology/Vascular:  []  Pathology:  []  Prior external records:  []  Independent historian provided additional details:      []  Discussed case with specialists:    []  Independent interpretation of ECG/Imaging etc:             []  Moderate: Rx management, low risk surgery, suboptimal social situation   []  High:  Rx with close monitoring for toxicity, mod-high risk surgery, DNR decision, Comfort initiated, IV pain meds    Assessment / Plan   Assessment:     Hospital-acquired weakness  Recent acute hypoxic respiratory failure  Recent left lower lobe pneumonia, CAP  Recent bilateral pleural effusions  Recent thoracentesis  Hx diastolic CHF  History of T8 compression fracture  History atrial fibrillation (on Eliquis)  History CAD/PCI (Dr. Varghese)  CKD 3  Leukocytosis, likely from steroid injections; has Andriy's disease ([facility]/Dr. Massey; failed MTX Imuran; gets steroids bimonthly - periodically.)  Anemia  Hypokalemia, resolved  DM  Hx pelvic fracture      Plan:     DC Lyrica.  Anit-emetics prn. Check ortho BP.  Daily PT and OT  Finish course of antibiotics with cefdinir  Encouraged patient to use brace while out of bed, as tolerated  Monitor volume status and renal indices.  Adjust diuretics accordingly.  Continue home Eliquis and clopidogrel  Home Januvia not on formulary. Give Jardiance here. Also low dose SSI.  Continue carvedilol and diltiazem  Continue scheduled bronchodilators/nebulizers  Will need neurosurgery follow-up for T8 fracture  Prior dermatology notes reviewed.  Received Kenalog  periodically for [place].    Continue low-dose fentanyl patch  VTE Prophylaxis:  Pharmacologic VTE prophylaxis orders are present.      CODE STATUS:      Level Of Support Discussed With: Patient  Code Status (Patient has no pulse and is not breathing): CPR (Attempt to Resuscitate)  Medical Interventions (Patient has pulse or is breathing): Full Support       Electronically signed by ARIANA Hardy, 10/06/24, 3:07 PM EDT.

## 2024-10-07 VITALS
OXYGEN SATURATION: 91 % | HEART RATE: 94 BPM | SYSTOLIC BLOOD PRESSURE: 118 MMHG | DIASTOLIC BLOOD PRESSURE: 70 MMHG | WEIGHT: 163.58 LBS | RESPIRATION RATE: 18 BRPM | BODY MASS INDEX: 23.47 KG/M2 | TEMPERATURE: 97.9 F

## 2024-10-07 LAB
ANION GAP SERPL CALCULATED.3IONS-SCNC: 10.3 MMOL/L (ref 5–15)
BUN SERPL-MCNC: 12 MG/DL (ref 8–23)
BUN/CREAT SERPL: 17.6 (ref 7–25)
CALCIUM SPEC-SCNC: 8.9 MG/DL (ref 8.6–10.5)
CHLORIDE SERPL-SCNC: 101 MMOL/L (ref 98–107)
CO2 SERPL-SCNC: 29.7 MMOL/L (ref 22–29)
CREAT SERPL-MCNC: 0.68 MG/DL (ref 0.76–1.27)
EGFRCR SERPLBLD CKD-EPI 2021: 93.4 ML/MIN/1.73
GLUCOSE BLDC GLUCOMTR-MCNC: 153 MG/DL (ref 70–99)
GLUCOSE BLDC GLUCOMTR-MCNC: 193 MG/DL (ref 70–99)
GLUCOSE BLDC GLUCOMTR-MCNC: 213 MG/DL (ref 70–99)
GLUCOSE BLDC GLUCOMTR-MCNC: 223 MG/DL (ref 70–99)
GLUCOSE SERPL-MCNC: 119 MG/DL (ref 65–99)
POTASSIUM SERPL-SCNC: 3.5 MMOL/L (ref 3.5–5.2)
SARS-COV-2 RNA RESP QL NAA+PROBE: NOT DETECTED
SODIUM SERPL-SCNC: 141 MMOL/L (ref 136–145)
WHOLE BLOOD HOLD SPECIMEN: NORMAL

## 2024-10-07 PROCEDURE — 63710000001 INSULIN LISPRO (HUMAN) PER 5 UNITS: Performed by: PHYSICIAN ASSISTANT

## 2024-10-07 PROCEDURE — 80048 BASIC METABOLIC PNL TOTAL CA: CPT | Performed by: PHYSICIAN ASSISTANT

## 2024-10-07 PROCEDURE — 97530 THERAPEUTIC ACTIVITIES: CPT

## 2024-10-07 PROCEDURE — 97535 SELF CARE MNGMENT TRAINING: CPT

## 2024-10-07 PROCEDURE — 97110 THERAPEUTIC EXERCISES: CPT

## 2024-10-07 PROCEDURE — 87635 SARS-COV-2 COVID-19 AMP PRB: CPT | Performed by: PHYSICIAN ASSISTANT

## 2024-10-07 PROCEDURE — 97116 GAIT TRAINING THERAPY: CPT

## 2024-10-07 PROCEDURE — 63710000001 ONDANSETRON ODT 4 MG TABLET DISPERSIBLE: Performed by: PHYSICIAN ASSISTANT

## 2024-10-07 PROCEDURE — 82948 REAGENT STRIP/BLOOD GLUCOSE: CPT

## 2024-10-07 PROCEDURE — 82948 REAGENT STRIP/BLOOD GLUCOSE: CPT | Performed by: PHYSICIAN ASSISTANT

## 2024-10-07 RX ORDER — SUCRALFATE 1 G/1
1 TABLET ORAL
Status: DISCONTINUED | OUTPATIENT
Start: 2024-10-07 | End: 2024-10-11 | Stop reason: HOSPADM

## 2024-10-07 RX ORDER — PANTOPRAZOLE SODIUM 40 MG/1
40 TABLET, DELAYED RELEASE ORAL 2 TIMES DAILY
Status: DISCONTINUED | OUTPATIENT
Start: 2024-10-07 | End: 2024-10-10

## 2024-10-07 RX ADMIN — INSULIN LISPRO 3 UNITS: 100 INJECTION, SOLUTION INTRAVENOUS; SUBCUTANEOUS at 22:10

## 2024-10-07 RX ADMIN — CARVEDILOL 25 MG: 25 TABLET, FILM COATED ORAL at 17:50

## 2024-10-07 RX ADMIN — Medication 1 TABLET: at 09:20

## 2024-10-07 RX ADMIN — BUDESONIDE AND FORMOTEROL FUMARATE DIHYDRATE 2 PUFF: 160; 4.5 AEROSOL RESPIRATORY (INHALATION) at 22:15

## 2024-10-07 RX ADMIN — HYDROCODONE BITARTRATE AND ACETAMINOPHEN 1 TABLET: 7.5; 325 TABLET ORAL at 17:54

## 2024-10-07 RX ADMIN — CLOPIDOGREL BISULFATE 75 MG: 75 TABLET ORAL at 09:20

## 2024-10-07 RX ADMIN — GUAIFENESIN 1200 MG: 600 TABLET ORAL at 22:10

## 2024-10-07 RX ADMIN — ROSUVASTATIN 20 MG: 20 TABLET, FILM COATED ORAL at 22:10

## 2024-10-07 RX ADMIN — CARVEDILOL 25 MG: 25 TABLET, FILM COATED ORAL at 08:25

## 2024-10-07 RX ADMIN — DILTIAZEM HYDROCHLORIDE 180 MG: 180 CAPSULE, EXTENDED RELEASE ORAL at 09:20

## 2024-10-07 RX ADMIN — SENNOSIDES AND DOCUSATE SODIUM 2 TABLET: 50; 8.6 TABLET ORAL at 22:10

## 2024-10-07 RX ADMIN — SENNOSIDES AND DOCUSATE SODIUM 2 TABLET: 50; 8.6 TABLET ORAL at 09:20

## 2024-10-07 RX ADMIN — INSULIN LISPRO 2 UNITS: 100 INJECTION, SOLUTION INTRAVENOUS; SUBCUTANEOUS at 11:56

## 2024-10-07 RX ADMIN — SUCRALFATE 1 G: 1 TABLET ORAL at 17:50

## 2024-10-07 RX ADMIN — APIXABAN 5 MG: 5 TABLET, FILM COATED ORAL at 22:10

## 2024-10-07 RX ADMIN — PANTOPRAZOLE SODIUM 40 MG: 40 TABLET, DELAYED RELEASE ORAL at 05:34

## 2024-10-07 RX ADMIN — GUAIFENESIN 1200 MG: 600 TABLET ORAL at 09:20

## 2024-10-07 RX ADMIN — BUDESONIDE AND FORMOTEROL FUMARATE DIHYDRATE 2 PUFF: 160; 4.5 AEROSOL RESPIRATORY (INHALATION) at 09:25

## 2024-10-07 RX ADMIN — APIXABAN 5 MG: 5 TABLET, FILM COATED ORAL at 09:20

## 2024-10-07 RX ADMIN — FENTANYL 1 PATCH: 12 PATCH TRANSDERMAL at 09:19

## 2024-10-07 RX ADMIN — INSULIN LISPRO 3 UNITS: 100 INJECTION, SOLUTION INTRAVENOUS; SUBCUTANEOUS at 17:50

## 2024-10-07 RX ADMIN — EMPAGLIFLOZIN 10 MG: 10 TABLET, FILM COATED ORAL at 09:20

## 2024-10-07 RX ADMIN — PANTOPRAZOLE SODIUM 40 MG: 40 TABLET, DELAYED RELEASE ORAL at 22:10

## 2024-10-07 RX ADMIN — POTASSIUM CHLORIDE 20 MEQ: 750 CAPSULE, EXTENDED RELEASE ORAL at 09:20

## 2024-10-07 RX ADMIN — HYDROCODONE BITARTRATE AND ACETAMINOPHEN 1 TABLET: 7.5; 325 TABLET ORAL at 02:58

## 2024-10-07 RX ADMIN — ONDANSETRON 4 MG: 4 TABLET, ORALLY DISINTEGRATING ORAL at 10:56

## 2024-10-07 RX ADMIN — FUROSEMIDE 40 MG: 40 TABLET ORAL at 09:20

## 2024-10-07 RX ADMIN — BISMUTH SUBSALICYLATE 524 MG: 262 TABLET, CHEWABLE ORAL at 11:06

## 2024-10-07 NOTE — PLAN OF CARE
Goal Outcome Evaluation:  Plan of Care Reviewed With: patient        Progress: improving  Outcome Evaluation: Alert and oriented and pleasant with staff. x1 assist for transfers and ambulation, pt at times requires additional education on TLSO brace use. x1 admin PRN pain medication with relief of symptoms noted. x1 admin antiemetics for c/o severe nausea this shift, with relief of symptoms noted. New order for additional Protonix and new order for carafate recieved this shift. Sitting up in bed, family at bedsdie, call light in reach.

## 2024-10-07 NOTE — THERAPY TREATMENT NOTE
SNF - Occupational Therapy Treatment Note  AMELIE Estes    Patient Name: Ion PROCTOR Case  : 1940    MRN: [id number]                              Today's Date: 10/7/2024       Admit Date: 10/2/2024    Visit Dx:     ICD-10-CM ICD-9-CM   1. Difficulty walking  R26.2 719.7   2. Decreased activities of daily living (ADL)  Z78.9 V49.89     Patient Active Problem List   Diagnosis    Hyperlipidemia LDL goal <70    Vitamin D deficiency    Atrial fibrillation, persistent    Essential hypertension    Chronic superficial gastritis without bleeding    Chronic right flank pain    Adenomatous polyp of colon    Left-sided chest wall pain    Type 2 diabetes mellitus without complication, without long-term current use of insulin    CAD S/P percutaneous coronary angioplasty    Weight loss    Medicare annual wellness visit, subsequent    Chronic heart failure with preserved ejection fraction (HFpEF)    Prerenal azotemia    Elevated liver enzymes    Cellulitis of left lower extremity    Stage 3a chronic kidney disease    Absent peripheral pulse    History of colon polyps    Venous insufficiency of both lower extremities    Pelvic fracture    Multifocal pneumonia    Left upper quadrant pain    Closed wedge compression fracture of T8 vertebra with delayed healing    Pleural effusion    Debility     Past Medical History:   Diagnosis Date    Cataract     Removed    Chronic heart failure with preserved ejection fraction (HFpEF) 2023    Colon polyp     Removed     Coronary artery disease     Degeneration of lumbosacral intervertebral disc 2020    Diverticulosis     Elevated cholesterol     Essential hypertension 10/04/2021    Facet arthropathy, lumbar 2020    GERD (gastroesophageal reflux disease)     HL (hearing loss)     Hyperlipidemia LDL goal <100 2021    Kidney stone     Lumbar herniated disc 2020    (R) L3-4    Lumbar spinal stenosis 2020    Paroxysmal atrial fibrillation 10/04/2021     Pneumonia     Spinal headache     post 3 injections in back     Trigger finger of right thumb 11/01/2017    all fingers both hands     Past Surgical History:   Procedure Laterality Date    BACK SURGERY      CARDIAC CATHETERIZATION N/A 08/23/2022    Procedure: LEFT HEART CATH with coronary artery angiography and right heart catheterization possible percutaneous intervention possible closure device;  Surgeon: Emili Cheng MD;  Location: [facility] CATH INVASIVE LOCATION;  Service: Cardiovascular;  Laterality: N/A;    CAROTID STENT      COLONOSCOPY  2017    Dr. Oneal    COLONOSCOPY N/A 10/06/2021    Procedure: COLONOSCOPY WITH BIOPSIES, POLYPECTOMY;  Surgeon: Vazquez Estrada MD;  Location: [facility] ENDOSCOPY;  Service: Gastroenterology;  Laterality: N/A;  COLON POLYPS, DIVERTICULOSIS, HEMORRHOIDS    COLONOSCOPY N/A 7/13/2023    Procedure: COLONOSCOPY with polypectomy with cold snare;  Surgeon: Vazquez Estrada MD;  Location: [facility] ENDOSCOPY;  Service: Gastroenterology;  Laterality: N/A;  colon polyps, diverticulosis, hemorrhoids    CORONARY ANGIOPLASTY      ENDOSCOPY N/A 10/06/2021    Procedure: ESOPHAGOGASTRODUODENOSCOPY WITH BIOPSIES;  Surgeon: Vazquez Estrada MD;  Location: [facility] ENDOSCOPY;  Service: Gastroenterology;  Laterality: N/A;  GASTRITIS    ENDOSCOPY N/A 7/13/2023    Procedure: ESOPHAGOGASTRODUODENOSCOPY with biopsies, with dilation with 15- 18 mm balloon;  Surgeon: Vazquez Estrada MD;  Location: [facility] ENDOSCOPY;  Service: Gastroenterology;  Laterality: N/A;  hiatal hernia, gastric polyp, schatzki's ring    KIDNEY STONE SURGERY      Unspecified    LUMBAR DISC SURGERY Right 06/26/2020    L3-4  Minimally Invasive    TRIGGER FINGER RELEASE Bilateral     UPPER GASTROINTESTINAL ENDOSCOPY  10/06/2021    Dr. Estrada      General Information       Row Name 10/07/24 1144          OT Time and Intention    Document Type therapy note (daily note)  -EG     Mode of Treatment individual  therapy;occupational therapy  -       Row Name 10/07/24 1144          General Information    Existing Precautions/Restrictions fall;spinal;TLSO;brace worn when out of bed  -       Row Name 10/07/24 1144          Cognition    Orientation Status (Cognition) oriented x 3  -[facility] Name 10/07/24 1144          Safety Issues, Functional Mobility    Impairments Affecting Function (Mobility) balance;endurance/activity tolerance;pain;range of motion (ROM);strength  -EG               User Key  (r) = Recorded By, (t) = Taken By, (c) = Cosigned By      Initials Name Provider Type    EG Karely Hensley OT Occupational Therapist                     Mobility/ADL's       Row Name 10/07/24 1144          Bed Mobility    Bed Mobility supine-sit-supine  -EG     Supine-Sit-Supine [place] (Bed Mobility) contact guard;minimum assist (75% patient effort);verbal cues  -EG     Comment, (Bed Mobility) assist for BLE's when returning to supine; Patient requires assist to transition UB to sitting from supine despite bed rail use; Declines to stay up OOB despite encouragement  -[facility] Name 10/07/24 1144          Transfers    Transfers sit-stand transfer;stand-sit transfer;toilet transfer  -EG     Comment, (Transfers) shower bench transfer; w/c transfer all with RW CGA  -[facility] Name 10/07/24 1144          Sit-Stand Transfer    Sit-Stand [place] (Transfers) contact guard;verbal cues  -EG     Assistive Device (Sit-Stand Transfers) walker, front-wheeled  -[facility] Name 10/07/24 1144          Stand-Sit Transfer    Stand-Sit [place] (Transfers) contact guard;verbal cues  -EG     Assistive Device (Stand-Sit Transfers) walker, front-wheeled  -[facility] Name 10/07/24 1144          Toilet Transfer    [place] Level (Toilet Transfer) contact guard  -EG     Assistive Device (Toilet Transfer) walker, front-wheeled;commode chair;grab bars/safety frame  -[facility] Name 10/07/24 1144          Functional  "Mobility    Functional Mobility- Ind. Level contact guard assist  -EG     Functional Mobility- Device walker, front-wheeled  -EG     Functional Mobility- Comment Functional mobility performed to shower room with no seated rest break this date; Declines to perform back to room after shower stating too \"worn out, going to puke\"; requires use of w/c  -EG       Row Name 10/07/24 1144          Activities of Daily Living    BADL Assessment/Intervention bathing;upper body dressing;lower body dressing;grooming;toileting  -EG       Row Name 10/07/24 1144          Lower Body Dressing Assessment/Training    [place] Level (Lower Body Dressing) lower body dressing skills;maximum assist (25% patient effort)  -EG     Comment, (Lower Body Dressing) declines use of AE; reports too exhausted and painful to attempt to perform and participate in LB ADL's; max encouragement and extended time  -EG       Row Name 10/07/24 1144          Upper Body Dressing Assessment/Training    [place] Level (Upper Body Dressing) upper body dressing skills;maximum assist (25% patient effort);minimum assist (75% patient effort)  -EG     Comment, (Upper Body Dressing) MaxA for TLSO and [place] for shirt  -EG       Row Name 10/07/24 1144          Bathing Assessment/Intervention    [place] Level (Bathing) bathing skills;moderate assist (50% patient effort);lower body;upper body  -EG     Assistive Devices (Bathing) tub bench;hand-held shower spray hose;grab bar, tub/shower  -EG       Row Name 10/07/24 1144          Grooming Assessment/Training    [place] Level (Grooming) grooming skills;set up  -EG       Row Name 10/07/24 1144          Toileting Assessment/Training    [place] Level (Toileting) toileting skills;minimum assist (75% patient effort);adjust/manage clothing;perform perineal hygiene  -EG               User Key  (r) = Recorded By, (t) = Taken By, (c) = Cosigned By      Initials Name Provider Type    EG Karely Hensley, OT " Occupational Therapist                   Obj/Interventions       Row Name 10/07/24 1148          Sensory Assessment (Somatosensory)    Sensory Assessment (Somatosensory) sensation intact  -EG       Row Name 10/07/24 1148          Vision Assessment/Intervention    Visual Impairment/Limitations WFL  -EG       Row Name 10/07/24 1148          Motor Skills    Motor Skills functional endurance  -EG     Functional Endurance Poor+; Continues to require frequent and prolonged breaks; multiple complaints of pain with statements that patient is going to vomit; educated on OOB tolerance and benefits but patient is noncompliant  -EG     Therapeutic Exercise --  Adamantly declines participation in any therex post ADL's despite OT encouragement and education  -EG       Row Name 10/07/24 1148          Balance    Balance Interventions standing;sit to stand;supported;static;weight shifting activity;occupation based/functional task;dynamic  -EG               User Key  (r) = Recorded By, (t) = Taken By, (c) = Cosigned By      Initials Name Provider Type    EG Karely Hensley OT Occupational Therapist                   Goals/Plan    No documentation.                  Clinical Impression       Row Name 10/07/24 1150          Pain Assessment    Pretreatment Pain Rating 0/10 - no pain  -EG     Posttreatment Pain Rating 9/10  -EG     Pain Location generalized  -EG     Pain Location - back  -EG       Row Name 10/07/24 1150          Plan of Care Review    Plan of Care Reviewed With patient;spouse  spouse arrived at end of session when patient returning to room  -EG     Progress no change  -EG     Outcome Evaluation Patient continues to be limited and unable to finish a full OT treatment session despite encouragement, education and pain medication regimen being scheduled; No functional gains in progress noted at this time in ADL's; slight gain in mobility endurance and tolerance noted to shower room this date; Pain is reported with OOB  activity and movement but states no pain when supine in bed; OT will continue treatment to facilitate highest practical level of function; Ax1 w/RW and gait belt/ TLSO donned.; PCA reporting that patient has been refusing TLSO donning when getting up to use restroom, noncompliance education provided.  -EG       Row Name 10/07/24 1150          Therapy Assessment/Plan (OT)    Rehab Potential (OT) good, to achieve stated therapy goals  -EG     Criteria for Skilled Therapeutic Interventions Met (OT) yes;meets criteria;skilled treatment is necessary  -EG     Therapy Frequency (OT) 5 times/wk  -EG       Row Name 10/07/24 1150          Therapy Plan Review/Discharge Plan (OT)    Anticipated Discharge Disposition (OT) home with home health  -EG       Row Name 10/07/24 1150          Positioning and Restraints    Pre-Treatment Position in bed  -EG     Post Treatment Position bed  -EG     In Bed supine;call light within reach;encouraged to call for assist;exit alarm on  -EG               User Key  (r) = Recorded By, (t) = Taken By, (c) = Cosigned By      Initials Name Provider Type    EG Karely Hensley, OT Occupational Therapist                   Outcome Measures       Row Name 10/07/24 1152          How much help from another is currently needed...    Putting on and taking off regular lower body clothing? 2  -EG     Bathing (including washing, rinsing, and drying) 2  -EG     Toileting (which includes using toilet bed pan or urinal) 2  -EG     Putting on and taking off regular upper body clothing 3  -EG     Taking care of personal grooming (such as brushing teeth) 3  -EG     Eating meals 4  -EG     AM-PAC 6 Clicks Score (OT) 16  -EG       Row Name 10/07/24 0819 10/07/24 0017       How much help from another person do you currently need...    Turning from your back to your side while in flat bed without using bedrails? 3  -WM 3  -CC    Moving from lying on back to sitting on the side of a flat bed without bedrails? 3  -WM 3   -CC    Moving to and from a bed to a chair (including a wheelchair)? 3  -WM 3  -CC    Standing up from a chair using your arms (e.g., wheelchair, bedside chair)? 3  -WM 3  -CC    Climbing 3-5 steps with a railing? 2  -WM 2  -CC    To walk in hospital room? 3  -WM 3  -CC    AM-PAC 6 Clicks Score (PT) 17  -WM 17  -CC    Highest Level of Mobility Goal 5 --> Static standing  -WM 5 --> Static standing  -CC      Row Name 10/07/24 1152          Functional Assessment    Outcome Measure Options AM-PAC 6 Clicks Daily Activity (OT);Optimal Instrument  -EG       Row Name 10/07/24 1152          Optimal Instrument    Optimal Instrument Optimal - 3  -EG     Bending/Stooping 5  -EG     Standing 2  -EG     Reaching 1  -EG               User Key  (r) = Recorded By, (t) = Taken By, (c) = Cosigned By      Initials Name Provider Type    CC Sandra Wolfe RN Registered Nurse    Brenton Rutledge PTA Physical Therapist Assistant    Karely Oseguera, OT Occupational Therapist                  Section G  Mobility  Bed mobility - self performance: limited assistance (staff provide guided maneuvering of limbs or other non-weight bearing assistance)  Bed mobility support/assistance: One person assist  Transfer - self performance: limited assistance (staff provide guided maneuvering of limbs or other non-weight bearing assistance)  Transfer support/assistance: One person assist  Walking in room - self performance: limited assistance (staff provide guided maneuvering of limbs or other non-weight bearing assistance)  Walking in room support/assistance: One person assist  Walking in corridors/hallway - self performance: limited assistance (staff provide guided maneuvering of limbs or other non-weight bearing assistance)  Walking in corridors/hallway support/assistance: One person assist  Locomotion on unit - self performance: limited assistance (staff provide guided maneuvering of limbs or other non-weight bearing  assistance)  Locomotion on unit support/assistance: One person assist  Locomotion off unit - self performance: activity did not occur  Locomotion off unit support/assistance: Activity did not occur  Dressing - self performance: limited assistance (staff provide guided maneuvering of limbs or other non-weight bearing assistance)  Dressing support/assistance: One person assist  Eating - self performance: independent  Eating support/assistance: Setup help only  Toileting - self performance: total dependence (full staff performance)  Personal hygiene - self performance: limited assistance (staff provide guided maneuvering of limbs or other non-weight bearing assistance)  Personal hygiene support/assistance: One person assist  Bathing  Bathing - self performance: Physical help with bathing (exclude washing back and hair for patient)  Bathing support/assistance: One person assist  Balance  Balance during transitions & walking: Not steady, requires assist to steady  Moving from seated to standing position: Not steady, requires assist to steady  Walking: Not steady, requires assist to steady  Turning around while walking: Not steady, requires assist to steady  Moving on and off toilet: Not steady, requires assist to steady  Surface-to-surface transfer: Not steady, requires assist to steady  Mobility devices: Walker  Range of Motion  Upper Extremity: No impairment  Lower Extremity: No impairment  Section GG  Functional Ability/Goals, Adm (Section GG)  Self Care, Prior Functioning (GX2226R): 3. Independent  Functional Cognition, Prior Functioning (CN7687A): 3. Independent  Prior Device Use (JB4016): none of the above (Z)  Upper Extremity Range of Motion (DT9727G): No impairment  Lower Extremity Range of Motion (BF0455S): No impairment  Self Care, Admission (Section GG)  Eating: Self-Care Admission Performance (HE0252Z7): setup or clean-up assistance (05)  Oral Hygiene: Self-Care Admission Performance (TM8591G9): setup or  clean-up assistance (05)  Toileting Hygiene: Self-Care Admission Performance (EW0980K2): dependent (01)  Shower/Bathe Self: Self-Care Admission Performance (XV6763K0): partial/moderate assistance (03)  Upper Body Dressing: Self-Care Admission Performance (MF9538U9): supervision or touching assistance (04)  Lower Body Dressing: Self-Care Admission Performance (ZJ0614L9): partial/moderate assistance (03)  Putting On/Taking Off Footwear: Self-Care Admission Performance (GL0348F8): partial/moderate assistance (03)  Personal Hygiene: Self-Care Admission Performance ([id number]): supervision or touching assistance (04)  Mobility, Admission Performance (RO2304)  Toilet Transfer: Mobility Admission Performance (XQ0640Q5): supervision or touching assistance (04)  Tub/shower Transfer: Mobility Admission Performance (YF0548GI2): partial/moderate assistance (03)                Occupational Therapy Education       Title: PT OT SLP Therapies (In Progress)       Topic: Occupational Therapy (In Progress)       Point: ADL training (In Progress)       Description:   Instruct learner(s) on proper safety adaptation and remediation techniques during self care or transfers.   Instruct in proper use of assistive devices.                  Learning Progress Summary             Patient Eager, E, NR by EG at 10/3/2024 1345    Comment: TLSO education and spinal precautions  Education on use of AD and fall risk prevention  Reacher and compensatory techniques for LB ADL's  OT benefits education                         Point: Home exercise program (In Progress)       Description:   Instruct learner(s) on appropriate technique for monitoring, assisting and/or progressing therapeutic exercises/activities.                  Learning Progress Summary             Patient Eager, E, NR by EG at 10/3/2024 1345    Comment: TLSO education and spinal precautions  Education on use of AD and fall risk prevention  Reacher and compensatory techniques for LB  ADL's  OT benefits education                         Point: Precautions (In Progress)       Description:   Instruct learner(s) on prescribed precautions during self-care and functional transfers.                  Learning Progress Summary             Patient JANIS Arnold, NR by EG at 10/3/2024 1345    Comment: TLSO education and spinal precautions  Education on use of AD and fall risk prevention  Reacher and compensatory techniques for LB ADL's  OT benefits education                         Point: Body mechanics (In Progress)       Description:   Instruct learner(s) on proper positioning and spine alignment during self-care, functional mobility activities and/or exercises.                  Learning Progress Summary             Patient JANIS Arnold, NR by EG at 10/3/2024 1345    Comment: TLSO education and spinal precautions  Education on use of AD and fall risk prevention  Reacher and compensatory techniques for LB ADL's  OT benefits education                                         User Key       Initials Effective Dates Name Provider Type Discipline    EG 09/14/22 -  Karely Hensley OT Occupational Therapist OT                  OT Recommendation and Plan  Planned Therapy Interventions (OT): activity tolerance training, functional balance retraining, occupation/activity based interventions, adaptive equipment training, BADL retraining, neuromuscular control/coordination retraining, patient/caregiver education/training, transfer/mobility retraining, strengthening exercise  Therapy Frequency (OT): 5 times/wk  Plan of Care Review  Plan of Care Reviewed With: patient, spouse (spouse arrived at end of session when patient returning to room)  Progress: no change  Outcome Evaluation: Patient continues to be limited and unable to finish a full OT treatment session despite encouragement, education and pain medication regimen being scheduled; No functional gains in progress noted at this time in ADL's; slight gain in mobility  endurance and tolerance noted to shower room this date; Pain is reported with OOB activity and movement but states no pain when supine in bed; OT will continue treatment to facilitate highest practical level of function; Ax1 w/RW and gait belt/ TLSO donned.; PCA reporting that patient has been refusing TLSO donning when getting up to use restroom, noncompliance education provided.     Time Calculation:   Evaluation Complexity (OT)  Review Occupational Profile/Medical/Therapy History Complexity: expanded/moderate complexity  Assessment, Occupational Performance/Identification of Deficit Complexity: 3-5 performance deficits  Clinical Decision Making Complexity (OT): detailed assessment/moderate complexity  Overall Complexity of Evaluation (OT): moderate complexity     Time Calculation- OT       Row Name 10/07/24 1153 10/07/24 0759          Time Calculation- OT    OT Received On 10/07/24  -EG --     OT Goal Re-Cert Due Date 11/02/24  -EG --        Timed Charges    80442 - Gait Training Minutes  -- 10  -WM     76902 - OT Therapeutic Activity Minutes 14  -EG --     66989 - OT Self Care/Mgmt Minutes 31  -EG --        SNF Occupational Therapy Minutes    Skilled Minutes- OT 45 min  -EG --        Total Minutes    Timed Charges Total Minutes 45  -EG 10  -WM      Total Minutes 45  -EG 10  -WM               User Key  (r) = Recorded By, (t) = Taken By, (c) = Cosigned By      Initials Name Provider Type    Brenton Rutledge PTA Physical Therapist Assistant    Karely Oseguera OT Occupational Therapist                  Therapy Charges for Today       Code Description Service Date Service Provider Modifiers Qty    46840899244 HC OT THERAPEUTIC ACT EA 15 MIN 10/7/2024 Karely Hensley OT GO 1    [id number] HC OT SELF CARE/MGMT/TRAIN EA 15 MIN 10/7/2024 Karely Hensley OT GO 2                 Karely Hensley OT  10/7/2024

## 2024-10-07 NOTE — THERAPY TREATMENT NOTE
SNF - Physical Therapy Treatment Note  AMELIE Estes     Patient Name: Ion PROCTOR Case  : [id number]  MRN: [id number]  Today's Date: 10/7/2024      Visit Dx:    ICD-10-CM ICD-9-CM   1. Difficulty walking  R26.2 719.7   2. Decreased activities of daily living (ADL)  Z78.9 V49.89     Patient Active Problem List   Diagnosis    Hyperlipidemia LDL goal <70    Vitamin D deficiency    Atrial fibrillation, persistent    Essential hypertension    Chronic superficial gastritis without bleeding    Chronic right flank pain    Adenomatous polyp of colon    Left-sided chest wall pain    Type 2 diabetes mellitus without complication, without long-term current use of insulin    CAD S/P percutaneous coronary angioplasty    Weight loss    Medicare annual wellness visit, subsequent    Chronic heart failure with preserved ejection fraction (HFpEF)    Prerenal azotemia    Elevated liver enzymes    Cellulitis of left lower extremity    Stage 3a chronic kidney disease    Absent peripheral pulse    History of colon polyps    Venous insufficiency of both lower extremities    Pelvic fracture    Multifocal pneumonia    Left upper quadrant pain    Closed wedge compression fracture of T8 vertebra with delayed healing    Pleural effusion    Debility     Past Medical History:   Diagnosis Date    Cataract     Removed    Chronic heart failure with preserved ejection fraction (HFpEF) 2023    Colon polyp     Removed     Coronary artery disease     Degeneration of lumbosacral intervertebral disc 2020    Diverticulosis     Elevated cholesterol     Essential hypertension 10/04/2021    Facet arthropathy, lumbar 2020    GERD (gastroesophageal reflux disease)     HL (hearing loss)     Hyperlipidemia LDL goal <100 2021    Kidney stone     Lumbar herniated disc 2020    (R) L3-4    Lumbar spinal stenosis 2020    Paroxysmal atrial fibrillation 10/04/2021    Pneumonia     Spinal headache     post 3 injections in back      Trigger finger of right thumb 11/01/2017    all fingers both hands     Past Surgical History:   Procedure Laterality Date    BACK SURGERY      CARDIAC CATHETERIZATION N/A 08/23/2022    Procedure: LEFT HEART CATH with coronary artery angiography and right heart catheterization possible percutaneous intervention possible closure device;  Surgeon: Emili Cheng MD;  Location: [facility] CATH INVASIVE LOCATION;  Service: Cardiovascular;  Laterality: N/A;    CAROTID STENT      COLONOSCOPY  2017    Dr. Oneal    COLONOSCOPY N/A 10/06/2021    Procedure: COLONOSCOPY WITH BIOPSIES, POLYPECTOMY;  Surgeon: Vazquez Estrada MD;  Location: [facility] ENDOSCOPY;  Service: Gastroenterology;  Laterality: N/A;  COLON POLYPS, DIVERTICULOSIS, HEMORRHOIDS    COLONOSCOPY N/A 7/13/2023    Procedure: COLONOSCOPY with polypectomy with cold snare;  Surgeon: Vazquez Estrada MD;  Location: [facility] ENDOSCOPY;  Service: Gastroenterology;  Laterality: N/A;  colon polyps, diverticulosis, hemorrhoids    CORONARY ANGIOPLASTY      ENDOSCOPY N/A 10/06/2021    Procedure: ESOPHAGOGASTRODUODENOSCOPY WITH BIOPSIES;  Surgeon: Vazquez Estrada MD;  Location: [facility] ENDOSCOPY;  Service: Gastroenterology;  Laterality: N/A;  GASTRITIS    ENDOSCOPY N/A 7/13/2023    Procedure: ESOPHAGOGASTRODUODENOSCOPY with biopsies, with dilation with 15- 18 mm balloon;  Surgeon: Vazquez Estrada MD;  Location: [facility] ENDOSCOPY;  Service: Gastroenterology;  Laterality: N/A;  hiatal hernia, gastric polyp, schatzki's ring    KIDNEY STONE SURGERY      Unspecified    LUMBAR DISC SURGERY Right 06/26/2020    L3-4  Minimally Invasive    TRIGGER FINGER RELEASE Bilateral     UPPER GASTROINTESTINAL ENDOSCOPY  10/06/2021    Dr. Estrada       PT Assessment (Last 12 Hours)       PT Evaluation and Treatment       Row Name 10/07/24 0808          Physical Therapy Time and Intention    Document Type therapy note (daily note)  -WM     Mode of Treatment individual therapy;physical  therapy  -     Patient Effort good  -WM     Symptoms Noted During/After Treatment fatigue;dizziness  -       Row Name 10/07/24 0808          Bed Mobility    Supine-Sit-Supine [place] (Bed Mobility) contact guard;minimum assist (75% patient effort);verbal cues  -     Assistive Device (Bed Mobility) bed rails;head of bed elevated  -       Row Name 10/07/24 0808          Sit-Stand Transfer    Sit-Stand [place] (Transfers) contact guard;verbal cues  -WM     Assistive Device (Sit-Stand Transfers) walker, front-wheeled  -WM       Row Name 10/07/24 0808          Stand-Sit Transfer    Stand-Sit [place] (Transfers) contact guard;verbal cues  -     Assistive Device (Stand-Sit Transfers) walker, front-wheeled  -       Row Name 10/07/24 0808          Gait/Stairs (Locomotion)    [place] Level (Gait) contact guard;verbal cues  -     Assistive Device (Gait) walker, front-wheeled  -     Distance in Feet (Gait) 45  x2  -     Pattern (Gait) 4-point;step-through  -     Deviations/Abnormal Patterns (Gait) gait speed decreased;stride length decreased  -       Row Name 10/07/24 0808          Safety Issues, Functional Mobility    Impairments Affecting Function (Mobility) balance;endurance/activity tolerance;pain;range of motion (ROM)  TLSO  -       Row Name 10/07/24 0808          Hip (Therapeutic Exercise)    Hip (Therapeutic Exercise) strengthening exercise;isometric exercises  -     Hip Isometrics (Therapeutic Exercise) bilateral;gluteal sets;supine;10 repetitions;5 repetitions;3 second hold;2 sets  -     Hip Strengthening (Therapeutic Exercise) bilateral;aBduction;aDduction;heel slides;marching while seated;sitting;supine;2 lb free weight;resistance band;green;10 repetitions;15 repititions;2 sets  Manual resistance  -       Row Name 10/07/24 0808          Knee (Therapeutic Exercise)    Knee (Therapeutic Exercise) strengthening exercise;isometric exercises  -     Knee Isometrics  (Therapeutic Exercise) bilateral;quad sets;supine;10 repetitions;5 repetitions;3 second hold;2 sets  -WM     Knee Strengthening (Therapeutic Exercise) bilateral;SAQ (short arc quad);LAQ (long arc quad);hamstring curls;sitting;supine;2 lb free weight;resistance band;green;15 repititions;2 sets  -WM       Row Name 10/07/24 0808          Ankle (Therapeutic Exercise)    Ankle AROM (Therapeutic Exercise) bilateral;dorsiflexion;plantarflexion;supine;30 repititions  -       Row Name 10/07/24 0808          Aerobic Exercise    Time Performed (Aerobic Exercise) NuStep x 15 minutes, load 3  -WM       Row Name             Wound 09/25/24 1903 Left anterior knee    Wound - Properties Group Placement Date: 09/25/24  -AP Placement Time: 1903  -AP Side: Left  -AP Orientation: anterior  -AP Location: knee  -AP    Retired Wound - Properties Group Placement Date: 09/25/24  -AP Placement Time: 1903  -AP Side: Left  -AP Orientation: anterior  -AP Location: knee  -AP    Retired Wound - Properties Group Date first assessed: 09/25/24  -AP Time first assessed: 1903  -AP Side: Left  -AP Location: knee  -AP      Row Name             Wound 09/25/24 1903 Right anterior knee    Wound - Properties Group Placement Date: 09/25/24  -AP Placement Time: 1903  -AP Present on Original Admission: Y  -AP Side: Right  -AP Orientation: anterior  -AP Location: knee  -AP    Retired Wound - Properties Group Placement Date: 09/25/24  -AP Placement Time: 1903  -AP Present on Original Admission: Y  -AP Side: Right  -AP Orientation: anterior  -AP Location: knee  -AP    Retired Wound - Properties Group Date first assessed: 09/25/24  -AP Time first assessed: 1903  -AP Present on Original Admission: Y  -AP Side: Right  -AP Location: knee  -AP      Row Name             Wound 09/25/24 1906 Right posterior hand Traumatic    Wound - Properties Group Placement Date: 09/25/24  -AP Placement Time: 1906  -AP Side: Right  -AP Orientation: posterior  -AP Location: hand  -AP  Primary Wound Type: Traumatic  -AP    Retired Wound - Properties Group Placement Date: 09/25/24  -AP Placement Time: 1906  -AP Side: Right  -AP Orientation: posterior  -AP Location: hand  -AP Primary Wound Type: Traumatic  -AP    Retired Wound - Properties Group Date first assessed: 09/25/24  -AP Time first assessed: 1906  -AP Side: Right  -AP Location: hand  -AP Primary Wound Type: Traumatic  -AP      Row Name             Wound 09/25/24 1920 Left proximal leg Skin Tear    Wound - Properties Group Placement Date: 09/25/24  -SM Placement Time: 1920  -SM Present on Original Admission: Y  -SM Side: Left  -SM Orientation: proximal  -SM Location: leg  -SM Primary Wound Type: Skin tear  -SM    Retired Wound - Properties Group Placement Date: 09/25/24  -SM Placement Time: 1920  -SM Present on Original Admission: Y  -SM Side: Left  -SM Orientation: proximal  -SM Location: leg  -SM Primary Wound Type: Skin tear  -SM    Retired Wound - Properties Group Date first assessed: 09/25/24  -SM Time first assessed: 1920  -SM Present on Original Admission: Y  -SM Side: Left  -SM Location: leg  -SM Primary Wound Type: Skin tear  -SM      Row Name             Wound 10/02/24 1700 Left lower leg Skin Tear    Wound - Properties Group Placement Date: 10/02/24  -FH Placement Time: 1700  -FH Present on Original Admission: N  -FH Side: Left  -FH Orientation: lower  -FH Location: leg  -FH Primary Wound Type: Skin tear  -FH    Retired Wound - Properties Group Placement Date: 10/02/24  -FH Placement Time: 1700  -FH Present on Original Admission: N  -FH Side: Left  -FH Orientation: lower  -FH Location: leg  -FH Primary Wound Type: Skin tear  -FH    Retired Wound - Properties Group Date first assessed: 10/02/24  -FH Time first assessed: 1700  -FH Present on Original Admission: N  -FH Side: Left  -FH Location: leg  -FH Primary Wound Type: Skin tear  -FH      Row Name             Wound 10/02/24 2008 Left medial knee Skin Tear    Wound - Properties  Group Placement Date: 10/02/24  - Placement Time: 2008 -FH Present on Original Admission: Y  -FH Side: Left  -FH Orientation: medial  -FH Location: knee  -FH Primary Wound Type: Skin tear  -FH    Retired Wound - Properties Group Placement Date: 10/02/24  - Placement Time: 2008 -FH Present on Original Admission: Y  -FH Side: Left  -FH Orientation: medial  -FH Location: knee  -FH Primary Wound Type: Skin tear  -FH    Retired Wound - Properties Group Date first assessed: 10/02/24  - Time first assessed: 2008 - Present on Original Admission: Y  -FH Side: Left  -FH Location: knee  -FH Primary Wound Type: Skin tear  -FH      Row Name 10/07/24 0808          Positioning and Restraints    Pre-Treatment Position in bed  -WM     Post Treatment Position bed  -WM     In Bed fowlers;call light within reach;encouraged to call for assist  -WM       Row Name 10/07/24 0808          Progress Summary (PT)    Progress Toward Functional Goals (PT) progress toward functional goals is good  -WM               User Key  (r) = Recorded By, (t) = Taken By, (c) = Cosigned By      Initials Name Provider Type     Wilma Powers RN Registered Nurse    Brenton Rutledge PTA Physical Therapist Assistant    Laura Alexis RN Registered Nurse    Monae Artis RN Registered Nurse                  Section G              Section GG                       Physical Therapy Education       Title: PT OT SLP Therapies (In Progress)       Topic: Physical Therapy (Done)       Point: Mobility training (Done)       Learning Progress Summary             Patient Acceptance, E,TB, VU by AV at 10/3/2024 1522                         Point: Home exercise program (Done)       Learning Progress Summary             Patient Acceptance, E,TB, VU by AV at 10/3/2024 1522                         Point: Body mechanics (Done)       Learning Progress Summary             Patient Acceptance, E,TB, VU by AV at 10/3/2024 1522                          Point: Precautions (Done)       Learning Progress Summary             Patient Acceptance, E,TB, VU by AV at 10/3/2024 1522                                         User Key       Initials Effective Dates Name Provider Type Discipline     06/11/21 -  Luke Limon, PT Physical Therapist PT                  PT Recommendation and Plan     Progress Summary (PT)  Progress Toward Functional Goals (PT): progress toward functional goals is good   Outcome Measures       Row Name 10/07/24 0819 10/05/24 1202          How much help from another person do you currently need...    Turning from your back to your side while in flat bed without using bedrails? 3  -WM 3  -WM     Moving from lying on back to sitting on the side of a flat bed without bedrails? 3  -WM 3  -WM     Moving to and from a bed to a chair (including a wheelchair)? 3  -WM 3  -WM     Standing up from a chair using your arms (e.g., wheelchair, bedside chair)? 3  -WM 3  -WM     Climbing 3-5 steps with a railing? 2  -WM 2  -WM     To walk in hospital room? 3  -WM 3  -WM     AM-PAC 6 Clicks Score (PT) 17  -WM 17  -WM     Highest Level of Mobility Goal 5 --> Static standing  -WM 5 --> Static standing  -WM               User Key  (r) = Recorded By, (t) = Taken By, (c) = Cosigned By      Initials Name Provider Type    Brenton Rutledge PTA Physical Therapist Assistant                     Time Calculation:    PT Charges       Row Name 10/07/24 0759             Time Calculation    PT Received On 10/07/24  -WM         Timed Charges    95804 - PT Therapeutic Exercise Minutes 33  -WM      57420 - Gait Training Minutes  10  -WM      31083 - PT Therapeutic Activity Minutes 10  -WM         SNF Physical Therapy Minutes    Skilled Minutes- PT 53 min  -WM         Total Minutes    Timed Charges Total Minutes 53  -WM       Total Minutes 53  -WM                User Key  (r) = Recorded By, (t) = Taken By, (c) = Cosigned By      Initials Name Provider Type    Brenton Rutledge  PTA Physical Therapist Assistant                      PT G-Codes  Outcome Measure Options: AM-PAC 6 Clicks Daily Activity (OT), Optimal Instrument  AM-PAC 6 Clicks Score (PT): 17  AM-PAC 6 Clicks Score (OT): 16    Brenton De La Garza PTA  10/7/2024

## 2024-10-07 NOTE — PLAN OF CARE
"Goal Outcome Evaluation:  Plan of Care Reviewed With: patient           Outcome Evaluation: Alert and oriented. Able to communicate needs. Repositioned self during night. Norco administered X 1 for \"all over\" body aches. Vital signs stable. Continue plan of care.                               "

## 2024-10-08 ENCOUNTER — ANESTHESIA EVENT (OUTPATIENT)
Dept: GASTROENTEROLOGY | Facility: HOSPITAL | Age: 84
DRG: 947 | End: 2024-10-08
Payer: MEDICARE

## 2024-10-08 ENCOUNTER — APPOINTMENT (OUTPATIENT)
Dept: GENERAL RADIOLOGY | Facility: HOSPITAL | Age: 84
DRG: 947 | End: 2024-10-08
Payer: MEDICARE

## 2024-10-08 PROBLEM — R10.13 EPIGASTRIC PAIN: Status: ACTIVE | Noted: 2024-10-02

## 2024-10-08 LAB
GLUCOSE BLDC GLUCOMTR-MCNC: 121 MG/DL (ref 70–99)
GLUCOSE BLDC GLUCOMTR-MCNC: 165 MG/DL (ref 70–99)
GLUCOSE BLDC GLUCOMTR-MCNC: 191 MG/DL (ref 70–99)
GLUCOSE BLDC GLUCOMTR-MCNC: 199 MG/DL (ref 70–99)

## 2024-10-08 PROCEDURE — G0008 ADMIN INFLUENZA VIRUS VAC: HCPCS | Performed by: FAMILY MEDICINE

## 2024-10-08 PROCEDURE — 97116 GAIT TRAINING THERAPY: CPT

## 2024-10-08 PROCEDURE — 97530 THERAPEUTIC ACTIVITIES: CPT

## 2024-10-08 PROCEDURE — 94799 UNLISTED PULMONARY SVC/PX: CPT

## 2024-10-08 PROCEDURE — 99310 SBSQ NF CARE HIGH MDM 45: CPT | Performed by: PHYSICIAN ASSISTANT

## 2024-10-08 PROCEDURE — 74018 RADEX ABDOMEN 1 VIEW: CPT

## 2024-10-08 PROCEDURE — 97535 SELF CARE MNGMENT TRAINING: CPT

## 2024-10-08 PROCEDURE — 97110 THERAPEUTIC EXERCISES: CPT

## 2024-10-08 PROCEDURE — 63710000001 ONDANSETRON ODT 4 MG TABLET DISPERSIBLE: Performed by: PHYSICIAN ASSISTANT

## 2024-10-08 PROCEDURE — 82948 REAGENT STRIP/BLOOD GLUCOSE: CPT

## 2024-10-08 PROCEDURE — 82948 REAGENT STRIP/BLOOD GLUCOSE: CPT | Performed by: PHYSICIAN ASSISTANT

## 2024-10-08 PROCEDURE — 25010000002 INFLUENZA VAC SPLIT HIGH-DOSE 0.5 ML SUSPENSION PREFILLED SYRINGE: Performed by: FAMILY MEDICINE

## 2024-10-08 PROCEDURE — 63710000001 INSULIN LISPRO (HUMAN) PER 5 UNITS: Performed by: PHYSICIAN ASSISTANT

## 2024-10-08 PROCEDURE — 90662 IIV NO PRSV INCREASED AG IM: CPT | Performed by: FAMILY MEDICINE

## 2024-10-08 RX ORDER — SODIUM CHLORIDE, SODIUM LACTATE, POTASSIUM CHLORIDE, CALCIUM CHLORIDE 600; 310; 30; 20 MG/100ML; MG/100ML; MG/100ML; MG/100ML
30 INJECTION, SOLUTION INTRAVENOUS CONTINUOUS
Status: CANCELLED | OUTPATIENT
Start: 2024-10-08 | End: 2024-10-09

## 2024-10-08 RX ADMIN — FUROSEMIDE 40 MG: 40 TABLET ORAL at 10:36

## 2024-10-08 RX ADMIN — CARVEDILOL 25 MG: 25 TABLET, FILM COATED ORAL at 10:36

## 2024-10-08 RX ADMIN — Medication 1 TABLET: at 10:36

## 2024-10-08 RX ADMIN — POTASSIUM CHLORIDE 20 MEQ: 750 CAPSULE, EXTENDED RELEASE ORAL at 10:36

## 2024-10-08 RX ADMIN — CLOPIDOGREL BISULFATE 75 MG: 75 TABLET ORAL at 10:36

## 2024-10-08 RX ADMIN — GUAIFENESIN 1200 MG: 600 TABLET ORAL at 21:25

## 2024-10-08 RX ADMIN — DILTIAZEM HYDROCHLORIDE 180 MG: 180 CAPSULE, EXTENDED RELEASE ORAL at 10:36

## 2024-10-08 RX ADMIN — SENNOSIDES AND DOCUSATE SODIUM 2 TABLET: 50; 8.6 TABLET ORAL at 10:36

## 2024-10-08 RX ADMIN — INFLUENZA A VIRUS A/VICTORIA/4897/2022 IVR-238 (H1N1) ANTIGEN (FORMALDEHYDE INACTIVATED), INFLUENZA A VIRUS A/CALIFORNIA/122/2022 SAN-022 (H3N2) ANTIGEN (FORMALDEHYDE INACTIVATED), AND INFLUENZA B VIRUS B/MICHIGAN/01/2021 ANTIGEN (FORMALDEHYDE INACTIVATED) 0.5 ML: 60; 60; 60 INJECTION, SUSPENSION INTRAMUSCULAR at 10:37

## 2024-10-08 RX ADMIN — BUDESONIDE AND FORMOTEROL FUMARATE DIHYDRATE 2 PUFF: 160; 4.5 AEROSOL RESPIRATORY (INHALATION) at 21:37

## 2024-10-08 RX ADMIN — SUCRALFATE 1 G: 1 TABLET ORAL at 10:36

## 2024-10-08 RX ADMIN — HYDROCODONE BITARTRATE AND ACETAMINOPHEN 1 TABLET: 7.5; 325 TABLET ORAL at 10:35

## 2024-10-08 RX ADMIN — SUCRALFATE 1 G: 1 TABLET ORAL at 18:45

## 2024-10-08 RX ADMIN — INSULIN LISPRO 2 UNITS: 100 INJECTION, SOLUTION INTRAVENOUS; SUBCUTANEOUS at 18:45

## 2024-10-08 RX ADMIN — SENNOSIDES AND DOCUSATE SODIUM 2 TABLET: 50; 8.6 TABLET ORAL at 21:26

## 2024-10-08 RX ADMIN — BUDESONIDE AND FORMOTEROL FUMARATE DIHYDRATE 2 PUFF: 160; 4.5 AEROSOL RESPIRATORY (INHALATION) at 10:38

## 2024-10-08 RX ADMIN — CARVEDILOL 25 MG: 25 TABLET, FILM COATED ORAL at 18:45

## 2024-10-08 RX ADMIN — ONDANSETRON 4 MG: 4 TABLET, ORALLY DISINTEGRATING ORAL at 05:27

## 2024-10-08 RX ADMIN — APIXABAN 5 MG: 5 TABLET, FILM COATED ORAL at 10:36

## 2024-10-08 RX ADMIN — SUCRALFATE 1 G: 1 TABLET ORAL at 13:30

## 2024-10-08 RX ADMIN — PANTOPRAZOLE SODIUM 40 MG: 40 TABLET, DELAYED RELEASE ORAL at 10:36

## 2024-10-08 RX ADMIN — PANTOPRAZOLE SODIUM 40 MG: 40 TABLET, DELAYED RELEASE ORAL at 21:25

## 2024-10-08 RX ADMIN — GUAIFENESIN 1200 MG: 600 TABLET ORAL at 10:36

## 2024-10-08 RX ADMIN — ROSUVASTATIN 20 MG: 20 TABLET, FILM COATED ORAL at 21:25

## 2024-10-08 RX ADMIN — INSULIN LISPRO 2 UNITS: 100 INJECTION, SOLUTION INTRAVENOUS; SUBCUTANEOUS at 21:26

## 2024-10-08 RX ADMIN — INSULIN LISPRO 2 UNITS: 100 INJECTION, SOLUTION INTRAVENOUS; SUBCUTANEOUS at 13:30

## 2024-10-08 RX ADMIN — EMPAGLIFLOZIN 10 MG: 10 TABLET, FILM COATED ORAL at 10:36

## 2024-10-08 NOTE — THERAPY TREATMENT NOTE
SNF - Occupational Therapy Treatment Note  AMELIE Estes    Patient Name: Ion PROCTOR Case  : 1940    MRN: [id number]                              Today's Date: 10/8/2024       Admit Date: 10/2/2024    Visit Dx:     ICD-10-CM ICD-9-CM   1. Difficulty walking  R26.2 719.7   2. Decreased activities of daily living (ADL)  Z78.9 V49.89   3. Epigastric pain  R10.13 789.06     Patient Active Problem List   Diagnosis    Hyperlipidemia LDL goal <70    Vitamin D deficiency    Atrial fibrillation, persistent    Essential hypertension    Chronic superficial gastritis without bleeding    Chronic right flank pain    Adenomatous polyp of colon    Left-sided chest wall pain    Type 2 diabetes mellitus without complication, without long-term current use of insulin    CAD S/P percutaneous coronary angioplasty    Weight loss    Medicare annual wellness visit, subsequent    Chronic heart failure with preserved ejection fraction (HFpEF)    Prerenal azotemia    Elevated liver enzymes    Cellulitis of left lower extremity    Stage 3a chronic kidney disease    Absent peripheral pulse    History of colon polyps    Venous insufficiency of both lower extremities    Pelvic fracture    Multifocal pneumonia    Left upper quadrant pain    Closed wedge compression fracture of T8 vertebra with delayed healing    Pleural effusion    Debility    Epigastric pain     Past Medical History:   Diagnosis Date    Cataract     Removed    Chronic heart failure with preserved ejection fraction (HFpEF) 2023    Colon polyp     Removed     Coronary artery disease     Degeneration of lumbosacral intervertebral disc 2020    Diverticulosis     Elevated cholesterol     Essential hypertension 10/04/2021    Facet arthropathy, lumbar 2020    GERD (gastroesophageal reflux disease)     HL (hearing loss)     Hyperlipidemia LDL goal <100 2021    Kidney stone     Lumbar herniated disc 2020    (R) L3-4    Lumbar spinal stenosis 2020     Paroxysmal atrial fibrillation 10/04/2021    Pneumonia     Spinal headache     post 3 injections in back     Trigger finger of right thumb 11/01/2017    all fingers both hands     Past Surgical History:   Procedure Laterality Date    BACK SURGERY      CARDIAC CATHETERIZATION N/A 08/23/2022    Procedure: LEFT HEART CATH with coronary artery angiography and right heart catheterization possible percutaneous intervention possible closure device;  Surgeon: Emili Cheng MD;  Location: [facility] CATH INVASIVE LOCATION;  Service: Cardiovascular;  Laterality: N/A;    CAROTID STENT      COLONOSCOPY  2017    Dr. Oneal    COLONOSCOPY N/A 10/06/2021    Procedure: COLONOSCOPY WITH BIOPSIES, POLYPECTOMY;  Surgeon: Vazquez Estrada MD;  Location: [facility] ENDOSCOPY;  Service: Gastroenterology;  Laterality: N/A;  COLON POLYPS, DIVERTICULOSIS, HEMORRHOIDS    COLONOSCOPY N/A 7/13/2023    Procedure: COLONOSCOPY with polypectomy with cold snare;  Surgeon: Vazquez Estrada MD;  Location: [facility] ENDOSCOPY;  Service: Gastroenterology;  Laterality: N/A;  colon polyps, diverticulosis, hemorrhoids    CORONARY ANGIOPLASTY      ENDOSCOPY N/A 10/06/2021    Procedure: ESOPHAGOGASTRODUODENOSCOPY WITH BIOPSIES;  Surgeon: Vazquez Estrada MD;  Location: [facility] ENDOSCOPY;  Service: Gastroenterology;  Laterality: N/A;  GASTRITIS    ENDOSCOPY N/A 7/13/2023    Procedure: ESOPHAGOGASTRODUODENOSCOPY with biopsies, with dilation with 15- 18 mm balloon;  Surgeon: Vazquez Estrada MD;  Location: [facility] ENDOSCOPY;  Service: Gastroenterology;  Laterality: N/A;  hiatal hernia, gastric polyp, schatzki's ring    KIDNEY STONE SURGERY      Unspecified    LUMBAR DISC SURGERY Right 06/26/2020    L3-4  Minimally Invasive    TRIGGER FINGER RELEASE Bilateral     UPPER GASTROINTESTINAL ENDOSCOPY  10/06/2021    Dr. Estrada      General Information       Row Name 10/08/24 1332          OT Time and Intention    Document Type therapy note (daily note)  -EG      Mode of Treatment individual therapy;occupational therapy  -EG       Row Name 10/08/24 1332          General Information    Existing Precautions/Restrictions fall;spinal;TLSO;brace worn when out of bed  -EG       Row Name 10/08/24 1332          Cognition    Orientation Status (Cognition) oriented x 3  -EG       Row Name 10/08/24 1332          Safety Issues, Functional Mobility    Impairments Affecting Function (Mobility) balance;endurance/activity tolerance;pain;range of motion (ROM);strength  -EG               User Key  (r) = Recorded By, (t) = Taken By, (c) = Cosigned By      Initials Name Provider Type    EG Karely Hensley OT Occupational Therapist                     Mobility/ADL's       Row Name 10/08/24 1332          Bed Mobility    Bed Mobility supine-sit-supine  -EG     Supine-Sit-Supine [place] (Bed Mobility) contact guard;minimum assist (75% patient effort);verbal cues  -EG     Bed Mobility, Safety Issues decreased use of legs for bridging/pushing  -EG     Assistive Device (Bed Mobility) bed rails;head of bed elevated  -EG     Comment, (Bed Mobility) supine-sit-supine performed 3 different times this date due to multiple attempts to get full session from patient; Patient with minimal task engagement for consistently lifting BLE's back into bed; OT educated multiple times on attempting to use bedrails onlys for transitioning to EOB sitting from supine but patient declines insisting he needs assist from OT for UE support  -EG       Row Name 10/08/24 1332          Transfers    Transfers sit-stand transfer;stand-sit transfer;toilet transfer  -EG     Comment, (Transfers) transfer to chair with arms in therapy gym; in and out of wheelchair as well; declines staying up OOB outside of therapy tasks at all depsite multiple attempts to encourage and educate from this OT  -EG       Row Name 10/08/24 1332          Bed-Chair Transfer    Bed-Chair [place] (Transfers) contact guard;verbal cues  -EG      Assistive Device (Bed-Chair Transfers) walker, front-wheeled  -EG       Row Name 10/08/24 1332          Sit-Stand Transfer    Sit-Stand [place] (Transfers) contact guard;verbal cues  -EG     Assistive Device (Sit-Stand Transfers) walker, front-wheeled  -EG       Row Name 10/08/24 1332          Stand-Sit Transfer    Stand-Sit [place] (Transfers) contact guard;verbal cues  -EG     Assistive Device (Stand-Sit Transfers) walker, front-wheeled  -EG       Row Name 10/08/24 1332          Toilet Transfer    [place] Level (Toilet Transfer) contact guard;verbal cues  -EG     Assistive Device (Toilet Transfer) raised toilet seat;walker, front-wheeled  -EG       Row Name 10/08/24 1332          Functional Mobility    Functional Mobility- Ind. Level contact guard assist  -EG     Functional Mobility- Device walker, front-wheeled  -EG     Functional Mobility- Comment Mobility performed to and from bathroom in room; to therapy gym; unable to perform back due to reports of dizziness and pain stating he is going to puke  -EG       Row Name 10/08/24 1332          Activities of Daily Living    BADL Assessment/Intervention grooming;toileting  -EG       Row Name 10/08/24 1332          Grooming Assessment/Training    [place] Level (Grooming) grooming skills;set up;wash face, hands  -EG     Position (Grooming) sink side;supported standing  -EG       Row Name 10/08/24 1332          Toileting Assessment/Training    [place] Level (Toileting) toileting skills;contact guard assist;nonverbal cues (demo/gesture);verbal cues  -EG     Assistive Devices (Toileting) grab bar/safety frame;commode chair  -EG     Comment, (Toileting) Patient requires cueing and encouragement to don brace when getting for toileting tasks specifically, OT educated with nsg present; Patient encouraged to perform tasks as ind. as possible  -EG               User Key  (r) = Recorded By, (t) = Taken By, (c) = Cosigned By      Initials Name Provider  Type    EG Karely Hensley OT Occupational Therapist                   Obj/Interventions       Row Name 10/08/24 1337          Sensory Assessment (Somatosensory)    Sensory Assessment (Somatosensory) sensation intact  -EG       Row Name 10/08/24 1337          Vision Assessment/Intervention    Visual Impairment/Limitations WFL  -EG       Row Name 10/08/24 1337          Motor Skills    Motor Skills functional endurance  -EG     Functional Endurance Poor+ ; patient still on room air sats reading high 90's every time checked; patient reports increased nausea but has yet to vomit with OT; OT continues to spend extensive amounts of time educating patient on need for OOB activity throughout the day, educated on sitting up for meals and vital regulation needs; Patient continues to be noncompliant with all education  -EG     Therapeutic Exercise aerobic  -EG       Row Name 10/08/24 1337          Balance    Balance Interventions standing;sit to stand;supported;static;weight shifting activity;occupation based/functional task  -EG       Row Name 10/08/24 1337          Aerobic Exercise    Type (Aerobic Exercise) arm bike  -EG     Comment, Aerobic Exercise (Therapeutic Exercise) Omnicycle performed total of 5:00 requires a prolonged rest break after sets of 2:30 on cardiac interval setting; Refuses continued participation after 2 sets and request to return to bed  -EG               User Key  (r) = Recorded By, (t) = Taken By, (c) = Cosigned By      Initials Name Provider Type    EG Karely Hensley OT Occupational Therapist                   Goals/Plan    No documentation.                  Clinical Impression       Row Name 10/08/24 6452          Pain Scale: FACES Pre/Post-Treatment    Pain: FACES Scale, Pretreatment 0-->no hurt  -EG     Posttreatment Pain Rating 8-->hurts whole lot  -EG     Pain Location - back;neck  -EG       Row Name 10/08/24 1343          Plan of Care Review    Plan of Care Reviewed With patient  -EG      Progress no change  -EG     Outcome Evaluation Patient continues to require increased encouragement and cues for all OOB activity; Still unable to tolerate full session and unable to participate in most functional exercises outside of ADL training at this time; Patient with frequently complaints of nausea but no vomitting has occured at this time; Patient with no functional gains or improvents for OT services at this time; Prolonged and frequent education provided on need to demonstrate gains and more consistently participate in OOB activities with noncompliance noted; Continue POC; Ax1 w/RW and TLSO donned at all times.  -EG       Row Name 10/08/24 1340          Therapy Assessment/Plan (OT)    Rehab Potential (OT) good, to achieve stated therapy goals  -EG     Criteria for Skilled Therapeutic Interventions Met (OT) yes;meets criteria;skilled treatment is necessary  -EG     Therapy Frequency (OT) 5 times/wk  -EG       Row Name 10/08/24 1340          Therapy Plan Review/Discharge Plan (OT)    Anticipated Discharge Disposition (OT) home with home health  -EG       Row Name 10/08/24 1340          Positioning and Restraints    Pre-Treatment Position in bed  -EG     Post Treatment Position bed  -EG               User Key  (r) = Recorded By, (t) = Taken By, (c) = Cosigned By      Initials Name Provider Type    EG Karely Hensley, OT Occupational Therapist                   Outcome Measures       Row Name 10/08/24 1343          How much help from another is currently needed...    Putting on and taking off regular lower body clothing? 2  -EG     Bathing (including washing, rinsing, and drying) 2  -EG     Toileting (which includes using toilet bed pan or urinal) 3  -EG     Putting on and taking off regular upper body clothing 3  -EG     Taking care of personal grooming (such as brushing teeth) 4  -EG     Eating meals 4  -EG     AM-PAC 6 Clicks Score (OT) 18  -EG       Row Name 10/08/24 1000          How much help from  another person do you currently need...    Turning from your back to your side while in flat bed without using bedrails? 3  -VK     Moving from lying on back to sitting on the side of a flat bed without bedrails? 2  -VK     Moving to and from a bed to a chair (including a wheelchair)? 3  -VK     Standing up from a chair using your arms (e.g., wheelchair, bedside chair)? 3  -VK     Climbing 3-5 steps with a railing? 2  -VK     To walk in hospital room? 3  -VK     AM-PAC 6 Clicks Score (PT) 16  -VK     Highest Level of Mobility Goal 5 --> Static standing  -VK       Row Name 10/08/24 1343          Functional Assessment    Outcome Measure Options AM-PAC 6 Clicks Daily Activity (OT);Optimal Instrument  -EG       Row Name 10/08/24 1343          Optimal Instrument    Optimal Instrument Optimal - 3  -EG     Bending/Stooping 5  -EG     Standing 2  -EG     Reaching 1  -EG               User Key  (r) = Recorded By, (t) = Taken By, (c) = Cosigned By      Initials Name Provider Type    EG Karely Hensley OT Occupational Therapist    VK Lidya Dueñas PTA Physical Therapist Assistant                  Section G  Mobility  Bed mobility - self performance: limited assistance (staff provide guided maneuvering of limbs or other non-weight bearing assistance)  Bed mobility support/assistance: One person assist  Transfer - self performance: limited assistance (staff provide guided maneuvering of limbs or other non-weight bearing assistance)  Transfer support/assistance: One person assist  Walking in room - self performance: limited assistance (staff provide guided maneuvering of limbs or other non-weight bearing assistance)  Walking in room support/assistance: One person assist  Walking in corridors/hallway - self performance: limited assistance (staff provide guided maneuvering of limbs or other non-weight bearing assistance)  Walking in corridors/hallway support/assistance: One person assist  Locomotion on unit - self performance:  limited assistance (staff provide guided maneuvering of limbs or other non-weight bearing assistance)  Locomotion on unit support/assistance: One person assist  Locomotion off unit - self performance: activity did not occur  Locomotion off unit support/assistance: Activity did not occur  Dressing - self performance: limited assistance (staff provide guided maneuvering of limbs or other non-weight bearing assistance)  Dressing support/assistance: One person assist  Eating - self performance: independent  Eating support/assistance: Setup help only  Toileting - self performance: total dependence (full staff performance)  Personal hygiene - self performance: limited assistance (staff provide guided maneuvering of limbs or other non-weight bearing assistance)  Personal hygiene support/assistance: One person assist  Bathing  Bathing - self performance: Physical help with bathing (exclude washing back and hair for patient)  Bathing support/assistance: One person assist  Balance  Balance during transitions & walking: Not steady, requires assist to steady  Moving from seated to standing position: Not steady, requires assist to steady  Walking: Not steady, requires assist to steady  Turning around while walking: Not steady, requires assist to steady  Moving on and off toilet: Not steady, requires assist to steady  Surface-to-surface transfer: Not steady, requires assist to steady  Mobility devices: Walker  Range of Motion  Upper Extremity: No impairment  Lower Extremity: No impairment  Section GG  Functional Ability/Goals, Adm (Section GG)  Self Care, Prior Functioning (FN8280I): 3. Independent  Functional Cognition, Prior Functioning (EG0575R): 3. Independent  Prior Device Use (NZ1606): none of the above (Z)  Upper Extremity Range of Motion (IB6772V): No impairment  Lower Extremity Range of Motion (NT0836G): No impairment  Self Care, Admission (Section GG)  Eating: Self-Care Admission Performance ([id number]): setup or  clean-up assistance (05)  Oral Hygiene: Self-Care Admission Performance (VD6634G9): setup or clean-up assistance (05)  Toileting Hygiene: Self-Care Admission Performance (OR0083I6): dependent (01)  Shower/Bathe Self: Self-Care Admission Performance (EQ1265X9): partial/moderate assistance (03)  Upper Body Dressing: Self-Care Admission Performance ([id number]): supervision or touching assistance (04)  Lower Body Dressing: Self-Care Admission Performance (BC8140X1): partial/moderate assistance (03)  Putting On/Taking Off Footwear: Self-Care Admission Performance (SS3612I8): partial/moderate assistance (03)  Personal Hygiene: Self-Care Admission Performance (HP1099N4): supervision or touching assistance (04)  Mobility, Admission Performance (QM0026)  Toilet Transfer: Mobility Admission Performance (DI8048W1): supervision or touching assistance (04)  Tub/shower Transfer: Mobility Admission Performance ([id number]): partial/moderate assistance (03)                Occupational Therapy Education       Title: PT OT SLP Therapies (In Progress)       Topic: Occupational Therapy (In Progress)       Point: ADL training (In Progress)       Description:   Instruct learner(s) on proper safety adaptation and remediation techniques during self care or transfers.   Instruct in proper use of assistive devices.                  Learning Progress Summary             Patient Eager, E, NR by EG at 10/3/2024 1345    Comment: TLSO education and spinal precautions  Education on use of AD and fall risk prevention  Reacher and compensatory techniques for LB ADL's  OT benefits education                         Point: Home exercise program (In Progress)       Description:   Instruct learner(s) on appropriate technique for monitoring, assisting and/or progressing therapeutic exercises/activities.                  Learning Progress Summary             Patient Eager, E, NR by EG at 10/3/2024 1345    Comment: TLSO education and spinal  precautions  Education on use of AD and fall risk prevention  Reacher and compensatory techniques for LB ADL's  OT benefits education                         Point: Precautions (In Progress)       Description:   Instruct learner(s) on prescribed precautions during self-care and functional transfers.                  Learning Progress Summary             Patient Clayton, JANIS, NR by EG at 10/3/2024 1345    Comment: TLSO education and spinal precautions  Education on use of AD and fall risk prevention  Reacher and compensatory techniques for LB ADL's  OT benefits education                         Point: Body mechanics (In Progress)       Description:   Instruct learner(s) on proper positioning and spine alignment during self-care, functional mobility activities and/or exercises.                  Learning Progress Summary             Patient Clayton, JANIS, NR by EG at 10/3/2024 1345    Comment: TLSO education and spinal precautions  Education on use of AD and fall risk prevention  Reacher and compensatory techniques for LB ADL's  OT benefits education                                         User Key       Initials Effective Dates Name Provider Type Discipline    EG 09/14/22 -  Karely Hensley OT Occupational Therapist OT                  OT Recommendation and Plan  Planned Therapy Interventions (OT): activity tolerance training, functional balance retraining, occupation/activity based interventions, adaptive equipment training, BADL retraining, neuromuscular control/coordination retraining, patient/caregiver education/training, transfer/mobility retraining, strengthening exercise  Therapy Frequency (OT): 5 times/wk  Plan of Care Review  Plan of Care Reviewed With: patient  Progress: no change  Outcome Evaluation: Patient continues to require increased encouragement and cues for all OOB activity; Still unable to tolerate full session and unable to participate in most functional exercises outside of ADL training at this time;  Patient with frequently complaints of nausea but no vomitting has occured at this time; Patient with no functional gains or improvents for OT services at this time; Prolonged and frequent education provided on need to demonstrate gains and more consistently participate in OOB activities with noncompliance noted; Continue POC; Ax1 w/RW and TLSO donned at all times.     Time Calculation:   Evaluation Complexity (OT)  Review Occupational Profile/Medical/Therapy History Complexity: expanded/moderate complexity  Assessment, Occupational Performance/Identification of Deficit Complexity: 3-5 performance deficits  Clinical Decision Making Complexity (OT): detailed assessment/moderate complexity  Overall Complexity of Evaluation (OT): moderate complexity     Time Calculation- OT       Row Name 10/08/24 1344 10/08/24 1045          Time Calculation- OT    OT Received On 10/08/24  -EG --     OT Goal Re-Cert Due Date 11/02/24  -EG --        Timed Charges    36628 - OT Therapeutic Exercise Minutes 10  -EG --     24541 - Gait Training Minutes  -- 10  -VK     87699 - OT Therapeutic Activity Minutes 19  -EG --     75860 - OT Self Care/Mgmt Minutes 11  -EG --        SNF Occupational Therapy Minutes    Skilled Minutes- OT 40 min  -EG --        Total Minutes    Timed Charges Total Minutes 40  -EG 10  -VK      Total Minutes 40  -EG 10  -VK               User Key  (r) = Recorded By, (t) = Taken By, (c) = Cosigned By      Initials Name Provider Type    EG Karely Hensley OT Occupational Therapist    Lidya Hardy PTA Physical Therapist Assistant                  Therapy Charges for Today       Code Description Service Date Service Provider Modifiers Qty    62874208302 HC OT THERAPEUTIC ACT EA 15 MIN 10/7/2024 Karely Hensley OT GO 1    56933811063 HC OT SELF CARE/MGMT/TRAIN EA 15 MIN 10/7/2024 Karely Hensley OT GO 2    77241346680 HC OT THER PROC EA 15 MIN 10/8/2024 Karely Hensley OT GO 1    91885802517 HC OT THERAPEUTIC  ACT EA 15 MIN 10/8/2024 Karely Hensley OT GO 1    75968399335 HC OT SELF CARE/MGMT/TRAIN EA 15 MIN 10/8/2024 Karely Hensley OT GO 1                 Karely Hensley OT  10/8/2024

## 2024-10-08 NOTE — PROGRESS NOTES
[facility]   Hospitalist Progress Note       Patient Name: Ion PROCTOR Case  : 1940  MRN: [id number]  Primary Care Physician: Maurice Mixon MD  Date of admission: 10/2/2024  Today's Date: 10/8/2024  Room / Bed:   305/2  Subjective   Chief Complaint: Hospital-acquired weakness     HPI:                 Ion PROCTOR Case is a 83 y.o. male with a past medical history of atrial fibrillation on Eliquis, CAD and prior PCI, CKD stage III, recent hospitalization for COVID-19 infection in late August.  The patient presented to the ED 24 for generalized weakness.  He was hypoxic.  Patient does not use home oxygen. CT chest with contrast showed left pleural effusion and trace right pleural effusion.  Bibasilar densities greater on the left, atelectasis.  Left basilar pneumonia cannot be excluded.  Compression fracture deformity of T8 similar to prior. Subacute fractures involving the ischium and the superior pubic ramus area on the right as well as the symphysis of the pubis area. Admitted for further management of acute hypoxia due to pneumonia and pleural effusion. Pulmonology consulted.  He underwent bedside left sided thoracentesis on 2024 with removal of 200 cc of pleural fluid. Respiratory status improved.  Neurosurgery following for recent T8 compression fracture.  OT was consulted for brace placement as inpatient, was not able to tolerate brace on , encouraged out of bed and to use brace while ambulating.  Also developed left lower leg cellulitis for which patient was switched to IV Zosyn which he tolerated well without side effects/allergic reaction.  Patient's erythema improved.  Patient's respiratory status improved.  Will transition patient to cefdinir to complete a full course of antibiotics.  Patient transition to Symbicort and as needed DuoNebs.  Patient weak and debilitated from hospitalization and is being discharged to the rehab facility today in stable condition.     Interval Followup:  10/8/2024    Pt resting in bed. Alert & conversational.  No fever.  /77. On room air with sats 95%.   Morning labs: Cr 0.8, K 3.8, Na 138, BNP 1226, Glu 163 (101-163 range), AST 23, ALT 22, T bili 0.3, WBC 13K, Hgb 9.9, Plats 371, Procal 0.1  Reports nausea not necessarily related to food  Has not been eating well for months.  Also some abdominal pain  Had BM yesterday  Blood pressures 118//88      REVIEW OF SYSTEMS:   Gerneralized weakness  Objective   Temp:  [97.9 °F (36.6 °C)-98.1 °F (36.7 °C)] 98.1 °F (36.7 °C)  Heart Rate:  [] 112  Resp:  [18] 18  BP: (118-158)/(70-88) 158/88  PHYSICAL EXAM   CON: WN. WD. NAD.   NECK:  No thyromegaly. No stridor.   RESP:  CTA. No wheezes. No crackles.   CV:  Rhythm regular. Rate WNL. No murmur noted.  No edema.  GI:  Abdomen nontender.  Nondistended.  No RUQ tenderness on deep palpation.  EXT: Peripheral pulses intact.  No cyanosis.  PSYCH:  Alert. Oriented. Normal affect and mood.  NEURO:  No dysarthria or aphasia. No unilateral weakness or paresthesia.  SKIN: No chronic venous stasis changes or varicosities.  No cellulitis  Results from last 7 days   Lab Units 10/06/24  0948 10/04/24  0441 10/02/24  0451   WBC 10*3/mm3 13.77* 13.49* 15.11*   HEMOGLOBIN g/dL 9.9* 9.6* 9.0*   HEMATOCRIT % 32.1* 31.0* 29.4*   PLATELETS 10*3/mm3 371 334 279     Results from last 7 days   Lab Units 10/07/24  0431 10/06/24  0948 10/04/24  0441 10/03/24  0756 10/02/24  0451   SODIUM mmol/L 141 138 138 136 136   POTASSIUM mmol/L 3.5 3.8 3.0* 3.1* 3.0*   CO2 mmol/L 29.7* 27.9 30.1* 26.7 26.8   CHLORIDE mmol/L 101 99 99 99 101   ANION GAP mmol/L 10.3 11.1 8.9 10.3 8.2   BUN mg/dL 12 11 13 13 14   CREATININE mg/dL 0.68* 0.80 0.77 0.74* 0.66*   GLUCOSE mg/dL 119* 163* 115* 115* 119*         COMPLEXITY OF DATA / DECISION MAKING     []  Moderate: One acute illness or mild exacerbation of chronic or 2 stable chronic or tx side effects   []  High:  Severe acute illness or severe  exacerbation of chronic - potential for major debility / life threatening         I have personally reviewed the results from the time of this admission to 10/8/2024 11:30 EDT:  []  Laboratory:  []  Microbiology: []  Radiology:  []  Telemetry:   []  Cardiology/Vascular:  []  Pathology:  []  Prior external records:  []  Independent historian provided additional details:      []  Discussed case with specialists:    []  Independent interpretation of ECG/Imaging etc:             []  Moderate: Rx management, low risk surgery, suboptimal social situation   []  High:  Rx with close monitoring for toxicity, mod-high risk surgery, DNR decision, Comfort initiated, IV pain meds    Assessment / Plan   Assessment:     Hospital-acquired weakness  Recent acute hypoxic respiratory failure  Recent left lower lobe pneumonia, CAP  Recent bilateral pleural effusions  Recent thoracentesis  Hx diastolic CHF  History of T8 compression fracture  History atrial fibrillation (on Eliquis)  History CAD/PCI (Dr. Varghese)  CKD 3  Leukocytosis, likely from steroid injections; has [place]'s disease ([facility]/Dr. Massey; failed MTX Imuran; gets steroids bimonthly - periodically.)  Anemia  Hypokalemia, resolved  DM  Hx pelvic fracture  Abdominal pain, nausea, poor oral intake      Plan:     Patient with abd pain, recent persistent nausea, and poor oral intake couple months.  PPI and Carafate added.  KUB ordered.  Had large BM yesterday.  His gastroenterologist will be consulted.  Discussed with family.  Check bladder scan  Holding Lyrica and low dose fentanyl patch.  Daily PT and OT  Finish course of antibiotics with cefdinir  Encouraged patient to use brace while out of bed, as tolerated  Monitor volume status and renal indices.  Adjust diuretics accordingly.  Continue home Eliquis and clopidogrel  Home Januvia not on formulary. Give Jardiance here. Also low dose SSI.  Continue carvedilol and diltiazem  Continue scheduled  bronchodilators/nebulizers  Will need neurosurgery follow-up for T8 fracture  Prior dermatology notes reviewed.  Received Kenalog periodically for [place].    Continue low-dose fentanyl patch  VTE Prophylaxis:  Pharmacologic VTE prophylaxis orders are present.      CODE STATUS:      Level Of Support Discussed With: Patient  Code Status (Patient has no pulse and is not breathing): CPR (Attempt to Resuscitate)  Medical Interventions (Patient has pulse or is breathing): Full Support       Electronically signed by ARIANA Hardy, 10/06/24, 3:07 PM EDT.

## 2024-10-08 NOTE — PLAN OF CARE
Goal Outcome Evaluation:           Progress: improving  Outcome Evaluation: Rsd. is alert and oriented x4, able to make needs known to staff.  Rsd. has rested well this shift, denies pain.  Rsd. transfers x1 assist to bsc with gaitbelt and rolling walker, refuses TLSO brace.  Rsd. medicated this morning per request with zofran, to see if that helps with am nausea in the mornings.  Call light and personal items within reach.  Rsd. reminded to use call light for assistance, verbalizes understanding.  Will continue to monitor and notify on-coming staff.  Current plan of care remains in place at this time.  Bed/chair alerts remain in place.  Urinal at bedside.

## 2024-10-08 NOTE — THERAPY TREATMENT NOTE
SNF - Physical Therapy Treatment Note  AMELIE Estes     Patient Name: Ion PROCTOR Case  : 1940  MRN: [id number]  Today's Date: 10/8/2024      Visit Dx:    ICD-10-CM ICD-9-CM   1. Difficulty walking  R26.2 719.7   2. Decreased activities of daily living (ADL)  Z78.9 V49.89     Patient Active Problem List   Diagnosis    Hyperlipidemia LDL goal <70    Vitamin D deficiency    Atrial fibrillation, persistent    Essential hypertension    Chronic superficial gastritis without bleeding    Chronic right flank pain    Adenomatous polyp of colon    Left-sided chest wall pain    Type 2 diabetes mellitus without complication, without long-term current use of insulin    CAD S/P percutaneous coronary angioplasty    Weight loss    Medicare annual wellness visit, subsequent    Chronic heart failure with preserved ejection fraction (HFpEF)    Prerenal azotemia    Elevated liver enzymes    Cellulitis of left lower extremity    Stage 3a chronic kidney disease    Absent peripheral pulse    History of colon polyps    Venous insufficiency of both lower extremities    Pelvic fracture    Multifocal pneumonia    Left upper quadrant pain    Closed wedge compression fracture of T8 vertebra with delayed healing    Pleural effusion    Debility     Past Medical History:   Diagnosis Date    Cataract     Removed    Chronic heart failure with preserved ejection fraction (HFpEF) 2023    Colon polyp     Removed     Coronary artery disease     Degeneration of lumbosacral intervertebral disc 2020    Diverticulosis     Elevated cholesterol     Essential hypertension 10/04/2021    Facet arthropathy, lumbar 2020    GERD (gastroesophageal reflux disease)     HL (hearing loss)     Hyperlipidemia LDL goal <100 2021    Kidney stone     Lumbar herniated disc 2020    (R) L3-4    Lumbar spinal stenosis 2020    Paroxysmal atrial fibrillation 10/04/2021    Pneumonia     Spinal headache     post 3 injections in back      Trigger finger of right thumb 11/01/2017    all fingers both hands     Past Surgical History:   Procedure Laterality Date    BACK SURGERY      CARDIAC CATHETERIZATION N/A 08/23/2022    Procedure: LEFT HEART CATH with coronary artery angiography and right heart catheterization possible percutaneous intervention possible closure device;  Surgeon: Emili Cheng MD;  Location: [facility] CATH INVASIVE LOCATION;  Service: Cardiovascular;  Laterality: N/A;    CAROTID STENT      COLONOSCOPY  2017    Dr. Oneal    COLONOSCOPY N/A 10/06/2021    Procedure: COLONOSCOPY WITH BIOPSIES, POLYPECTOMY;  Surgeon: Vazquez Estrada MD;  Location: [facility] ENDOSCOPY;  Service: Gastroenterology;  Laterality: N/A;  COLON POLYPS, DIVERTICULOSIS, HEMORRHOIDS    COLONOSCOPY N/A 7/13/2023    Procedure: COLONOSCOPY with polypectomy with cold snare;  Surgeon: Vazquez Estrada MD;  Location: [facility] ENDOSCOPY;  Service: Gastroenterology;  Laterality: N/A;  colon polyps, diverticulosis, hemorrhoids    CORONARY ANGIOPLASTY      ENDOSCOPY N/A 10/06/2021    Procedure: ESOPHAGOGASTRODUODENOSCOPY WITH BIOPSIES;  Surgeon: Vazquez Estrada MD;  Location: [facility] ENDOSCOPY;  Service: Gastroenterology;  Laterality: N/A;  GASTRITIS    ENDOSCOPY N/A 7/13/2023    Procedure: ESOPHAGOGASTRODUODENOSCOPY with biopsies, with dilation with 15- 18 mm balloon;  Surgeon: Vazquez Estrada MD;  Location: [facility] ENDOSCOPY;  Service: Gastroenterology;  Laterality: N/A;  hiatal hernia, gastric polyp, schatzki's ring    KIDNEY STONE SURGERY      Unspecified    LUMBAR DISC SURGERY Right 06/26/2020    L3-4  Minimally Invasive    TRIGGER FINGER RELEASE Bilateral     UPPER GASTROINTESTINAL ENDOSCOPY  10/06/2021    Dr. Estrada       PT Assessment (Last 12 Hours)       PT Evaluation and Treatment       Row Name 10/08/24 0956          Physical Therapy Time and Intention    Subjective Information complains of;nausea/vomiting  -VK     Document Type therapy note (daily  note)  -VK     Mode of Treatment individual therapy;physical therapy  -VK     Patient Effort adequate  -VK       Row Name 10/08/24 0956          General Information    Patient Profile Reviewed yes  -VK     Existing Precautions/Restrictions fall;spinal;TLSO;brace worn when out of bed  -VK       Row Name 10/08/24 0956          Pain Scale: FACES Pre/Post-Treatment    Pain: FACES Scale, Pretreatment 0-->no hurt  -VK     Posttreatment Pain Rating 6-->hurts even more  Post tx, pt being adjusted on bed for in room xray, board under him very painful.  -VK     Pain Location - back  -VK       Row Name 10/08/24 0956          Cognition    Affect/Mental Status (Cognition) WFL;sad/depressed affect  -VK     Orientation Status (Cognition) oriented x 3  -VK     Personal Safety Interventions nonskid shoes/slippers when out of bed;gait belt;fall prevention program maintained  -VK       Row Name 10/08/24 0956          Bed Mobility    Supine-Sit [place] (Bed Mobility) contact guard;minimum assist (75% patient effort);verbal cues  -VK     Sit-Supine [place] (Bed Mobility) minimum assist (75% patient effort);verbal cues  -VK     Bed Mobility, Safety Issues decreased use of legs for bridging/pushing  -VK     Assistive Device (Bed Mobility) bed rails;head of bed elevated  -VK       Row Name 10/08/24 0956          Transfers    Transfers sit-stand transfer;stand-sit transfer  -       Row Name 10/08/24 0956          Sit-Stand Transfer    Sit-Stand Hunt (Transfers) contact guard;verbal cues  -VK     Assistive Device (Sit-Stand Transfers) walker, front-wheeled  -VK       Row Name 10/08/24 0956          Stand-Sit Transfer    Stand-Sit Hunt (Transfers) contact guard;verbal cues  -VK     Assistive Device (Stand-Sit Transfers) walker, front-wheeled  -VK       Row Name 10/08/24 0956          Toilet Transfer    Type (Toilet Transfer) sit-stand;stand-sit  -VK     [place] Level (Toilet Transfer) contact guard;minimum  assist (75% patient effort);verbal cues  -VK     Assistive Device (Toilet Transfer) raised toilet seat;walker, front-wheeled  -VK     Comment, (Toilet Transfer) Daughter assisted patient with getting his pants donned/doffed.  -VK       Row Name 10/08/24 0956          Gait/Stairs (Locomotion)    [place] Level (Gait) contact guard;verbal cues  -VK     Assistive Device (Gait) walker, front-wheeled  -VK     Patient was able to Ambulate yes  -VK     Distance in Feet (Gait) --  61ft, 51ft, 10ft  -VK     Pattern (Gait) 4-point;step-through  -VK     Deviations/Abnormal Patterns (Gait) gait speed decreased;stride length decreased;tiesha decreased  -VK     Bilateral Gait Deviations forward flexed posture  -VK       Row Name 10/08/24 0956          Safety Issues, Functional Mobility    Impairments Affecting Function (Mobility) balance;endurance/activity tolerance;pain;range of motion (ROM);strength  -VK       Row Name 10/08/24 0956          Balance    Sit to Stand Dynamic Balance contact guard  -VK     Static Standing Balance contact guard  -VK     Dynamic Standing Balance contact guard  -VK     Position/Device Used, Standing Balance walker, front-wheeled  -VK       Row Name 10/08/24 0956          Aerobic Exercise    Type (Aerobic Exercise) recumbent stationary bike  -VK     Time Performed (Aerobic Exercise) 10 minutes  Pt declined further time  -VK     Comment, Aerobic Exercise (Therapeutic Exercise) Load 3  -VK       Row Name             Wound 09/25/24 1903 Left anterior knee    Wound - Properties Group Placement Date: 09/25/24  -AP Placement Time: 1903  -AP Side: Left  -AP Orientation: anterior  -AP Location: knee  -AP    Retired Wound - Properties Group Placement Date: 09/25/24  -AP Placement Time: 1903  -AP Side: Left  -AP Orientation: anterior  -AP Location: knee  -AP    Retired Wound - Properties Group Date first assessed: 09/25/24  -AP Time first assessed: 1903  -AP Side: Left  -AP Location: knee  -AP      Row  Name             Wound 09/25/24 1903 Right anterior knee    Wound - Properties Group Placement Date: 09/25/24  -AP Placement Time: 1903  -AP Present on Original Admission: Y  -AP Side: Right  -AP Orientation: anterior  -AP Location: knee  -AP    Retired Wound - Properties Group Placement Date: 09/25/24  -AP Placement Time: 1903  -AP Present on Original Admission: Y  -AP Side: Right  -AP Orientation: anterior  -AP Location: knee  -AP    Retired Wound - Properties Group Date first assessed: 09/25/24  -AP Time first assessed: 1903  -AP Present on Original Admission: Y  -AP Side: Right  -AP Location: knee  -AP      Row Name             Wound 09/25/24 1906 Right posterior hand Traumatic    Wound - Properties Group Placement Date: 09/25/24  -AP Placement Time: 1906  -AP Side: Right  -AP Orientation: posterior  -AP Location: hand  -AP Primary Wound Type: Traumatic  -AP    Retired Wound - Properties Group Placement Date: 09/25/24  -AP Placement Time: 1906  -AP Side: Right  -AP Orientation: posterior  -AP Location: hand  -AP Primary Wound Type: Traumatic  -AP    Retired Wound - Properties Group Date first assessed: 09/25/24  -AP Time first assessed: 1906  -AP Side: Right  -AP Location: hand  -AP Primary Wound Type: Traumatic  -AP      Row Name             Wound 09/25/24 1920 Left proximal leg Skin Tear    Wound - Properties Group Placement Date: 09/25/24  -SM Placement Time: 1920  -SM Present on Original Admission: Y  -SM Side: Left  -SM Orientation: proximal  -SM Location: leg  -SM Primary Wound Type: Skin tear  -SM    Retired Wound - Properties Group Placement Date: 09/25/24  -SM Placement Time: 1920  -SM Present on Original Admission: Y  -SM Side: Left  -SM Orientation: proximal  -SM Location: leg  -SM Primary Wound Type: Skin tear  -SM    Retired Wound - Properties Group Date first assessed: 09/25/24  -SM Time first assessed: 1920  -SM Present on Original Admission: Y  -SM Side: Left  -SM Location: leg  -SM Primary Wound  Type: Skin tear  -SM      Row Name             Wound 10/02/24 1700 Left lower leg Skin Tear    Wound - Properties Group Placement Date: 10/02/24  - Placement Time: 1700 -FH Present on Original Admission: N  -FH Side: Left  -FH Orientation: lower  -FH Location: leg  -FH Primary Wound Type: Skin tear  -FH    Retired Wound - Properties Group Placement Date: 10/02/24  - Placement Time: 1700 -FH Present on Original Admission: N  -FH Side: Left  -FH Orientation: lower  -FH Location: leg  -FH Primary Wound Type: Skin tear  -FH    Retired Wound - Properties Group Date first assessed: 10/02/24  - Time first assessed: 1700  -FH Present on Original Admission: N  -FH Side: Left  -FH Location: leg  -FH Primary Wound Type: Skin tear  -FH      Row Name             Wound 10/02/24 2008 Left medial knee Skin Tear    Wound - Properties Group Placement Date: 10/02/24  - Placement Time: 2008 -FH Present on Original Admission: Y  -FH Side: Left  -FH Orientation: medial  -FH Location: knee  -FH Primary Wound Type: Skin tear  -FH    Retired Wound - Properties Group Placement Date: 10/02/24  - Placement Time: 2008 -FH Present on Original Admission: Y  -FH Side: Left  -FH Orientation: medial  -FH Location: knee  -FH Primary Wound Type: Skin tear  -FH    Retired Wound - Properties Group Date first assessed: 10/02/24  - Time first assessed: 2008 -FH Present on Original Admission: Y  -FH Side: Left  -FH Location: knee  -FH Primary Wound Type: Skin tear  -FH      Row Name 10/08/24 0956          Positioning and Restraints    Pre-Treatment Position in bed  -VK     Post Treatment Position bed  -VK     In Bed supine;call light within reach;encouraged to call for assist;exit alarm on;with family/caregiver;with other staff  Treatment ended early today due to nausea and xray showing up to room to take images, left in bed with xray staff, nursing notified.  -VK       Row Name 10/08/24 0956          Progress Summary (PT)    Progress  Toward Functional Goals (PT) progress toward functional goals is fair  -VK               User Key  (r) = Recorded By, (t) = Taken By, (c) = Cosigned By      Initials Name Provider Type     Wilma Powers, RN Registered Nurse    Laura Alexis, RN Registered Nurse    Monae Artis RN Registered Nurse    Lidya Hardy, PTA Physical Therapist Assistant                  Section G              Section GG                       Physical Therapy Education       Title: PT OT SLP Therapies (In Progress)       Topic: Physical Therapy (Done)       Point: Mobility training (Done)       Learning Progress Summary             Patient Acceptance, E,TB, VU by AV at 10/3/2024 1522                         Point: Home exercise program (Done)       Learning Progress Summary             Patient Acceptance, E,TB, VU by AV at 10/3/2024 1522                         Point: Body mechanics (Done)       Learning Progress Summary             Patient Acceptance, E,TB, VU by AV at 10/3/2024 1522                         Point: Precautions (Done)       Learning Progress Summary             Patient Acceptance, E,TB, VU by AV at 10/3/2024 1522                                         User Key       Initials Effective Dates Name Provider Type Discipline     06/11/21 -  Luke Limon, PT Physical Therapist PT                  PT Recommendation and Plan     Progress Summary (PT)  Progress Toward Functional Goals (PT): progress toward functional goals is fair   Outcome Measures       Row Name 10/08/24 1000 10/07/24 0819 10/05/24 1202       How much help from another person do you currently need...    Turning from your back to your side while in flat bed without using bedrails? 3  -VK 3  -WM 3  -WM    Moving from lying on back to sitting on the side of a flat bed without bedrails? 2  -VK 3  -WM 3  -WM    Moving to and from a bed to a chair (including a wheelchair)? 3  -VK 3  -WM 3  -WM    Standing up from a chair using your arms  (e.g., wheelchair, bedside chair)? 3  -VK 3  -WM 3  -WM    Climbing 3-5 steps with a railing? 2  -VK 2  -WM 2  -WM    To walk in hospital room? 3  -VK 3  -WM 3  -WM    AM-PAC 6 Clicks Score (PT) 16  -VK 17  -WM 17  -WM    Highest Level of Mobility Goal 5 --> Static standing  -VK 5 --> Static standing  -WM 5 --> Static standing  -WM              User Key  (r) = Recorded By, (t) = Taken By, (c) = Cosigned By      Initials Name Provider Type    WM Brenton De La Garza PTA Physical Therapist Assistant    Lidya Hardy PTA Physical Therapist Assistant                     Time Calculation:    PT Charges       Row Name 10/08/24 1045             Time Calculation    PT Received On 10/08/24  -VK         Timed Charges    38593 - PT Therapeutic Exercise Minutes 10  -VK      36942 - Gait Training Minutes  10  -VK      12475 - PT Therapeutic Activity Minutes 10  -VK         SNF Physical Therapy Minutes    Skilled Minutes- PT 30 min  -VK         Total Minutes    Timed Charges Total Minutes 30  -VK       Total Minutes 30  -VK                User Key  (r) = Recorded By, (t) = Taken By, (c) = Cosigned By      Initials Name Provider Type    Lidya Hardy PTA Physical Therapist Assistant                  Therapy Charges for Today       Code Description Service Date Service Provider Modifiers Qty    19726457817 HC PT THER PROC EA 15 MIN 10/8/2024 Lidya Dueñas PTA GP 1    63433653994 HC GAIT TRAINING EA 15 MIN 10/8/2024 Lidya Dueñas PTA GP 1            PT G-Codes  Outcome Measure Options: AM-PAC 6 Clicks Daily Activity (OT), Optimal Instrument  AM-PAC 6 Clicks Score (PT): 16  AM-PAC 6 Clicks Score (OT): 16    Lidya Dueñas PTA  10/8/2024

## 2024-10-08 NOTE — PLAN OF CARE
Goal Outcome Evaluation:              Outcome Evaluation: Calm and cooperative this shift. Pain controlled this shift. Able to make needs known, with call light within reach. Plan of care ongoing.

## 2024-10-09 ENCOUNTER — ANESTHESIA (OUTPATIENT)
Dept: GASTROENTEROLOGY | Facility: HOSPITAL | Age: 84
DRG: 947 | End: 2024-10-09
Payer: MEDICARE

## 2024-10-09 ENCOUNTER — HOSPITAL ENCOUNTER (OUTPATIENT)
Facility: HOSPITAL | Age: 84
Setting detail: HOSPITAL OUTPATIENT SURGERY
Discharge: HOME | DRG: 947 | End: 2024-10-09
Attending: INTERNAL MEDICINE | Admitting: INTERNAL MEDICINE
Payer: MEDICARE

## 2024-10-09 VITALS
TEMPERATURE: 97.8 F | OXYGEN SATURATION: 95 % | SYSTOLIC BLOOD PRESSURE: 122 MMHG | HEART RATE: 88 BPM | DIASTOLIC BLOOD PRESSURE: 92 MMHG | RESPIRATION RATE: 21 BRPM

## 2024-10-09 DIAGNOSIS — R10.13 EPIGASTRIC PAIN: ICD-10-CM

## 2024-10-09 LAB
ALBUMIN SERPL-MCNC: 2.7 G/DL (ref 3.5–5.2)
ALBUMIN/GLOB SERPL: 1 G/DL
ALP SERPL-CCNC: 115 U/L (ref 39–117)
ALT SERPL W P-5'-P-CCNC: 15 U/L (ref 1–41)
ANION GAP SERPL CALCULATED.3IONS-SCNC: 9 MMOL/L (ref 5–15)
ANISOCYTOSIS BLD QL: NORMAL
AST SERPL-CCNC: 17 U/L (ref 1–40)
BASOPHILS # BLD AUTO: 0.1 10*3/MM3 (ref 0–0.2)
BASOPHILS NFR BLD AUTO: 0.8 % (ref 0–1.5)
BILIRUB SERPL-MCNC: 0.3 MG/DL (ref 0–1.2)
BUN SERPL-MCNC: 12 MG/DL (ref 8–23)
BUN/CREAT SERPL: 16 (ref 7–25)
BURR CELLS BLD QL SMEAR: NORMAL
CALCIUM SPEC-SCNC: 9.1 MG/DL (ref 8.6–10.5)
CHLORIDE SERPL-SCNC: 100 MMOL/L (ref 98–107)
CO2 SERPL-SCNC: 32 MMOL/L (ref 22–29)
CREAT SERPL-MCNC: 0.75 MG/DL (ref 0.76–1.27)
DEPRECATED RDW RBC AUTO: 71.8 FL (ref 37–54)
EGFRCR SERPLBLD CKD-EPI 2021: 90.1 ML/MIN/1.73
EOSINOPHIL # BLD AUTO: 0.38 10*3/MM3 (ref 0–0.4)
EOSINOPHIL NFR BLD AUTO: 2.9 % (ref 0.3–6.2)
ERYTHROCYTE [DISTWIDTH] IN BLOOD BY AUTOMATED COUNT: 23.8 % (ref 12.3–15.4)
GLOBULIN UR ELPH-MCNC: 2.8 GM/DL
GLUCOSE BLDC GLUCOMTR-MCNC: 117 MG/DL (ref 70–99)
GLUCOSE BLDC GLUCOMTR-MCNC: 147 MG/DL (ref 70–99)
GLUCOSE BLDC GLUCOMTR-MCNC: 217 MG/DL (ref 70–99)
GLUCOSE BLDC GLUCOMTR-MCNC: 223 MG/DL (ref 70–99)
GLUCOSE SERPL-MCNC: 114 MG/DL (ref 65–99)
HCT VFR BLD AUTO: 34.3 % (ref 37.5–51)
HGB BLD-MCNC: 10.5 G/DL (ref 13–17.7)
HYPOCHROMIA BLD QL: NORMAL
IMM GRANULOCYTES # BLD AUTO: 0.64 10*3/MM3 (ref 0–0.05)
IMM GRANULOCYTES NFR BLD AUTO: 4.9 % (ref 0–0.5)
LYMPHOCYTES # BLD AUTO: 1.34 10*3/MM3 (ref 0.7–3.1)
LYMPHOCYTES NFR BLD AUTO: 10.2 % (ref 19.6–45.3)
MAGNESIUM SERPL-MCNC: 1.7 MG/DL (ref 1.6–2.4)
MCH RBC QN AUTO: 25.5 PG (ref 26.6–33)
MCHC RBC AUTO-ENTMCNC: 30.6 G/DL (ref 31.5–35.7)
MCV RBC AUTO: 83.5 FL (ref 79–97)
MICROCYTES BLD QL: NORMAL
MONOCYTES # BLD AUTO: 1.32 10*3/MM3 (ref 0.1–0.9)
MONOCYTES NFR BLD AUTO: 10.1 % (ref 5–12)
NEUTROPHILS NFR BLD AUTO: 71.1 % (ref 42.7–76)
NEUTROPHILS NFR BLD AUTO: 9.31 10*3/MM3 (ref 1.7–7)
NRBC BLD AUTO-RTO: 0 /100 WBC (ref 0–0.2)
OVALOCYTES BLD QL SMEAR: NORMAL
PHOSPHATE SERPL-MCNC: 3.9 MG/DL (ref 2.5–4.5)
PLAT MORPH BLD: NORMAL
PLATELET # BLD AUTO: 472 10*3/MM3 (ref 140–450)
PMV BLD AUTO: 9.2 FL (ref 6–12)
POIKILOCYTOSIS BLD QL SMEAR: NORMAL
POTASSIUM SERPL-SCNC: 3.3 MMOL/L (ref 3.5–5.2)
PROT SERPL-MCNC: 5.5 G/DL (ref 6–8.5)
RBC # BLD AUTO: 4.11 10*6/MM3 (ref 4.14–5.8)
SODIUM SERPL-SCNC: 141 MMOL/L (ref 136–145)
WBC MORPH BLD: NORMAL
WBC NRBC COR # BLD AUTO: 13.09 10*3/MM3 (ref 3.4–10.8)

## 2024-10-09 PROCEDURE — 88305 TISSUE EXAM BY PATHOLOGIST: CPT | Performed by: INTERNAL MEDICINE

## 2024-10-09 PROCEDURE — 0DB68ZZ EXCISION OF STOMACH, VIA NATURAL OR ARTIFICIAL OPENING ENDOSCOPIC: ICD-10-PCS | Performed by: INTERNAL MEDICINE

## 2024-10-09 PROCEDURE — 85007 BL SMEAR W/DIFF WBC COUNT: CPT | Performed by: PHYSICIAN ASSISTANT

## 2024-10-09 PROCEDURE — 43251 EGD REMOVE LESION SNARE: CPT | Performed by: INTERNAL MEDICINE

## 2024-10-09 PROCEDURE — 25010000002 MAGNESIUM SULFATE IN D5W 1G/100ML (PREMIX) 1-5 GM/100ML-% SOLUTION: Performed by: PHYSICIAN ASSISTANT

## 2024-10-09 PROCEDURE — 99308 SBSQ NF CARE LOW MDM 20: CPT | Performed by: PHYSICIAN ASSISTANT

## 2024-10-09 PROCEDURE — 97535 SELF CARE MNGMENT TRAINING: CPT

## 2024-10-09 PROCEDURE — 97530 THERAPEUTIC ACTIVITIES: CPT

## 2024-10-09 PROCEDURE — 82948 REAGENT STRIP/BLOOD GLUCOSE: CPT | Performed by: PHYSICIAN ASSISTANT

## 2024-10-09 PROCEDURE — 84100 ASSAY OF PHOSPHORUS: CPT | Performed by: PHYSICIAN ASSISTANT

## 2024-10-09 PROCEDURE — 85025 COMPLETE CBC W/AUTO DIFF WBC: CPT | Performed by: PHYSICIAN ASSISTANT

## 2024-10-09 PROCEDURE — 94640 AIRWAY INHALATION TREATMENT: CPT

## 2024-10-09 PROCEDURE — 83735 ASSAY OF MAGNESIUM: CPT | Performed by: PHYSICIAN ASSISTANT

## 2024-10-09 PROCEDURE — 25010000002 PROPOFOL 10 MG/ML EMULSION

## 2024-10-09 PROCEDURE — 97116 GAIT TRAINING THERAPY: CPT

## 2024-10-09 PROCEDURE — 80053 COMPREHEN METABOLIC PANEL: CPT | Performed by: PHYSICIAN ASSISTANT

## 2024-10-09 PROCEDURE — 43239 EGD BIOPSY SINGLE/MULTIPLE: CPT | Performed by: INTERNAL MEDICINE

## 2024-10-09 PROCEDURE — 25010000002 LIDOCAINE PF 2% 2 % SOLUTION

## 2024-10-09 PROCEDURE — 63710000001 INSULIN LISPRO (HUMAN) PER 5 UNITS: Performed by: PHYSICIAN ASSISTANT

## 2024-10-09 PROCEDURE — 99222 1ST HOSP IP/OBS MODERATE 55: CPT | Performed by: INTERNAL MEDICINE

## 2024-10-09 PROCEDURE — 25810000003 LACTATED RINGERS PER 1000 ML: Performed by: MARRIAGE & FAMILY THERAPIST

## 2024-10-09 PROCEDURE — 25810000003 LACTATED RINGERS PER 1000 ML: Performed by: NURSE ANESTHETIST, CERTIFIED REGISTERED

## 2024-10-09 PROCEDURE — 97110 THERAPEUTIC EXERCISES: CPT

## 2024-10-09 PROCEDURE — 82948 REAGENT STRIP/BLOOD GLUCOSE: CPT

## 2024-10-09 RX ORDER — PROPOFOL 10 MG/ML
VIAL (ML) INTRAVENOUS AS NEEDED
Status: DISCONTINUED | OUTPATIENT
Start: 2024-10-09 | End: 2024-10-09 | Stop reason: SURG

## 2024-10-09 RX ORDER — PHENYLEPHRINE HCL IN 0.9% NACL 1 MG/10 ML
SYRINGE (ML) INTRAVENOUS AS NEEDED
Status: DISCONTINUED | OUTPATIENT
Start: 2024-10-09 | End: 2024-10-09 | Stop reason: SURG

## 2024-10-09 RX ORDER — SODIUM CHLORIDE, SODIUM LACTATE, POTASSIUM CHLORIDE, CALCIUM CHLORIDE 600; 310; 30; 20 MG/100ML; MG/100ML; MG/100ML; MG/100ML
30 INJECTION, SOLUTION INTRAVENOUS CONTINUOUS
Status: DISCONTINUED | OUTPATIENT
Start: 2024-10-09 | End: 2024-10-09

## 2024-10-09 RX ORDER — SODIUM CHLORIDE, SODIUM LACTATE, POTASSIUM CHLORIDE, CALCIUM CHLORIDE 600; 310; 30; 20 MG/100ML; MG/100ML; MG/100ML; MG/100ML
30 INJECTION, SOLUTION INTRAVENOUS CONTINUOUS
Status: DISCONTINUED | OUTPATIENT
Start: 2024-10-09 | End: 2024-10-09 | Stop reason: HOSPADM

## 2024-10-09 RX ORDER — POTASSIUM CHLORIDE 750 MG/1
20 CAPSULE, EXTENDED RELEASE ORAL ONCE
Status: COMPLETED | OUTPATIENT
Start: 2024-10-09 | End: 2024-10-09

## 2024-10-09 RX ORDER — MAGNESIUM SULFATE 1 G/100ML
1 INJECTION INTRAVENOUS ONCE
Status: COMPLETED | OUTPATIENT
Start: 2024-10-09 | End: 2024-10-09

## 2024-10-09 RX ORDER — LIDOCAINE HYDROCHLORIDE 20 MG/ML
INJECTION, SOLUTION EPIDURAL; INFILTRATION; INTRACAUDAL; PERINEURAL AS NEEDED
Status: DISCONTINUED | OUTPATIENT
Start: 2024-10-09 | End: 2024-10-09 | Stop reason: SURG

## 2024-10-09 RX ORDER — SUCRALFATE 1 G/1
1 TABLET ORAL 4 TIMES DAILY
Qty: 120 TABLET | Refills: 1 | Status: SHIPPED | OUTPATIENT
Start: 2024-10-09 | End: 2024-10-18 | Stop reason: HOSPADM

## 2024-10-09 RX ADMIN — Medication 100 MCG: at 11:13

## 2024-10-09 RX ADMIN — POTASSIUM CHLORIDE 20 MEQ: 750 CAPSULE, EXTENDED RELEASE ORAL at 08:32

## 2024-10-09 RX ADMIN — BUDESONIDE AND FORMOTEROL FUMARATE DIHYDRATE 2 PUFF: 160; 4.5 AEROSOL RESPIRATORY (INHALATION) at 21:14

## 2024-10-09 RX ADMIN — ROSUVASTATIN 20 MG: 20 TABLET, FILM COATED ORAL at 20:27

## 2024-10-09 RX ADMIN — INSULIN LISPRO 3 UNITS: 100 INJECTION, SOLUTION INTRAVENOUS; SUBCUTANEOUS at 20:29

## 2024-10-09 RX ADMIN — CARVEDILOL 25 MG: 25 TABLET, FILM COATED ORAL at 17:44

## 2024-10-09 RX ADMIN — Medication 150 MCG: at 11:19

## 2024-10-09 RX ADMIN — LIDOCAINE HYDROCHLORIDE 40 MG: 20 INJECTION, SOLUTION EPIDURAL; INFILTRATION; INTRACAUDAL; PERINEURAL at 11:09

## 2024-10-09 RX ADMIN — HYDROCODONE BITARTRATE AND ACETAMINOPHEN 1 TABLET: 7.5; 325 TABLET ORAL at 03:44

## 2024-10-09 RX ADMIN — SUCRALFATE 1 G: 1 TABLET ORAL at 13:32

## 2024-10-09 RX ADMIN — SODIUM CHLORIDE, POTASSIUM CHLORIDE, SODIUM LACTATE AND CALCIUM CHLORIDE: 600; 310; 30; 20 INJECTION, SOLUTION INTRAVENOUS at 11:05

## 2024-10-09 RX ADMIN — DILTIAZEM HYDROCHLORIDE 180 MG: 180 CAPSULE, EXTENDED RELEASE ORAL at 08:32

## 2024-10-09 RX ADMIN — CARVEDILOL 25 MG: 25 TABLET, FILM COATED ORAL at 08:32

## 2024-10-09 RX ADMIN — MAGNESIUM SULFATE 1 G: 1 INJECTION INTRAVENOUS at 13:24

## 2024-10-09 RX ADMIN — APIXABAN 5 MG: 5 TABLET, FILM COATED ORAL at 20:28

## 2024-10-09 RX ADMIN — SENNOSIDES AND DOCUSATE SODIUM 2 TABLET: 50; 8.6 TABLET ORAL at 20:27

## 2024-10-09 RX ADMIN — PANTOPRAZOLE SODIUM 40 MG: 40 TABLET, DELAYED RELEASE ORAL at 08:32

## 2024-10-09 RX ADMIN — GUAIFENESIN 1200 MG: 600 TABLET ORAL at 20:28

## 2024-10-09 RX ADMIN — PROPOFOL 150 MCG/KG/MIN: 10 INJECTION, EMULSION INTRAVENOUS at 11:10

## 2024-10-09 RX ADMIN — POTASSIUM CHLORIDE 20 MEQ: 750 CAPSULE, EXTENDED RELEASE ORAL at 13:32

## 2024-10-09 RX ADMIN — TUBERCULIN PURIFIED PROTEIN DERIVATIVE 5 UNITS: 5 INJECTION, SOLUTION INTRADERMAL at 20:29

## 2024-10-09 RX ADMIN — PROPOFOL 20 MG: 10 INJECTION, EMULSION INTRAVENOUS at 11:11

## 2024-10-09 RX ADMIN — INSULIN LISPRO 3 UNITS: 100 INJECTION, SOLUTION INTRAVENOUS; SUBCUTANEOUS at 17:44

## 2024-10-09 RX ADMIN — BUDESONIDE AND FORMOTEROL FUMARATE DIHYDRATE 2 PUFF: 160; 4.5 AEROSOL RESPIRATORY (INHALATION) at 08:34

## 2024-10-09 RX ADMIN — SODIUM CHLORIDE, POTASSIUM CHLORIDE, SODIUM LACTATE AND CALCIUM CHLORIDE 30 ML/HR: 600; 310; 30; 20 INJECTION, SOLUTION INTRAVENOUS at 11:01

## 2024-10-09 RX ADMIN — SUCRALFATE 1 G: 1 TABLET ORAL at 17:44

## 2024-10-09 RX ADMIN — Medication 150 MCG: at 11:16

## 2024-10-09 RX ADMIN — FUROSEMIDE 40 MG: 40 TABLET ORAL at 08:32

## 2024-10-09 RX ADMIN — PROPOFOL 50 MG: 10 INJECTION, EMULSION INTRAVENOUS at 11:09

## 2024-10-09 RX ADMIN — PANTOPRAZOLE SODIUM 40 MG: 40 TABLET, DELAYED RELEASE ORAL at 20:27

## 2024-10-09 NOTE — SIGNIFICANT NOTE
Wound Eval / Progress Noted     [facility]     Patient Name: Ion PROCTOR Case  : 1940  MRN: [id number]  Today's Date: 10/9/2024                 Admit Date: 10/2/2024    Visit Dx:    ICD-10-CM ICD-9-CM   1. Difficulty walking  R26.2 719.7   2. Decreased activities of daily living (ADL)  Z78.9 V49.89   3. Epigastric pain  R10.13 789.06         Debility        Past Medical History:   Diagnosis Date    Cataract     Removed    Chronic heart failure with preserved ejection fraction (HFpEF) 2023    Colon polyp     Removed     Coronary artery disease     Degeneration of lumbosacral intervertebral disc 2020    Diverticulosis     Elevated cholesterol     Essential hypertension 10/04/2021    Facet arthropathy, lumbar 2020    GERD (gastroesophageal reflux disease)     HL (hearing loss)     Hyperlipidemia LDL goal <100 2021    Kidney stone     Lumbar herniated disc 2020    (R) L3-4    Lumbar spinal stenosis 2020    Paroxysmal atrial fibrillation 10/04/2021    Pneumonia     Spinal headache     post 3 injections in back     Trigger finger of right thumb 2017    all fingers both hands        Past Surgical History:   Procedure Laterality Date    BACK SURGERY      CARDIAC CATHETERIZATION N/A 2022    Procedure: LEFT HEART CATH with coronary artery angiography and right heart catheterization possible percutaneous intervention possible closure device;  Surgeon: Emili Cheng MD;  Location: [facility] CATH INVASIVE LOCATION;  Service: Cardiovascular;  Laterality: N/A;    CAROTID STENT      COLONOSCOPY      Dr. Oneal    COLONOSCOPY N/A 10/06/2021    Procedure: COLONOSCOPY WITH BIOPSIES, POLYPECTOMY;  Surgeon: Vazquez Estrada MD;  Location: [facility] ENDOSCOPY;  Service: Gastroenterology;  Laterality: N/A;  COLON POLYPS, DIVERTICULOSIS, HEMORRHOIDS    COLONOSCOPY N/A 2023    Procedure: COLONOSCOPY with polypectomy with cold snare;  Surgeon: Vazquez Estrada MD;   Location: [facility] ENDOSCOPY;  Service: Gastroenterology;  Laterality: N/A;  colon polyps, diverticulosis, hemorrhoids    CORONARY ANGIOPLASTY      ENDOSCOPY N/A 10/06/2021    Procedure: ESOPHAGOGASTRODUODENOSCOPY WITH BIOPSIES;  Surgeon: Vazquez Estrada MD;  Location: [facility] ENDOSCOPY;  Service: Gastroenterology;  Laterality: N/A;  GASTRITIS    ENDOSCOPY N/A 7/13/2023    Procedure: ESOPHAGOGASTRODUODENOSCOPY with biopsies, with dilation with 15- 18 mm balloon;  Surgeon: Vazquez Estrada MD;  Location: [facility] ENDOSCOPY;  Service: Gastroenterology;  Laterality: N/A;  hiatal hernia, gastric polyp, schatzki's ring    KIDNEY STONE SURGERY      Unspecified    LUMBAR DISC SURGERY Right 06/26/2020    L3-4  Minimally Invasive    TRIGGER FINGER RELEASE Bilateral     UPPER GASTROINTESTINAL ENDOSCOPY  10/06/2021    Dr. Estrada         Physical Assessment:  Wound 09/25/24 1903 Right anterior knee (Active)   Wound Image   10/09/24 1705   Dressing Appearance dry;intact 10/09/24 1705   Closure None 10/09/24 1705   Base moist;red 10/09/24 1705   Red (%), Wound Tissue Color 100 10/09/24 1705   Periwound dry;pink 10/09/24 1705   Periwound Temperature warm 10/09/24 1705   Periwound Skin Turgor soft 10/09/24 1705   Edges open 10/09/24 1705   Drainage Characteristics/Odor serosanguineous 10/09/24 1705   Drainage Amount scant 10/09/24 1705   Care, Wound cleansed with;sterile normal saline 10/09/24 1705   Dressing Care dressing removed;dressing applied;non-adherent;petroleum-based;gauze;silicone;border dressing 10/09/24 1705   Periwound Care absorptive dressing applied 10/09/24 1705   Wound 09/25/24 1920 Left proximal leg Skin Tear (Active)   Wound Image   10/09/24 1705   Dressing Appearance dry;intact 10/09/24 1705   Closure None 10/09/24 1705   Base moist;red;dry;pink;epithelialization;scab 10/09/24 1705   Periwound dry;pink 10/09/24 1705   Periwound Temperature warm 10/09/24 1705   Periwound Skin Turgor soft 10/09/24 1705   Edges  open 10/09/24 1705   Drainage Characteristics/Odor serosanguineous 10/09/24 1705   Drainage Amount scant 10/09/24 1705   Care, Wound cleansed with;sterile normal saline 10/09/24 1705   Dressing Care dressing removed;dressing applied;non-adherent;petroleum-based;gauze;silicone;border dressing 10/09/24 1705   Periwound Care absorptive dressing applied 10/09/24 1705        Wound Check / Follow-up:  Patient seen today for wound follow-up.  Patient is currently in skilled nursing facility for rehab.  Patient is awake, alert, and oriented.  Patient's wife is present at bedside.    Left proximal lower leg skin tear.  Wound base is primarily covered by dry brown/tan tissue, with a small area of moist red tissue noted.  Area of pink epithelialization noted to 9 o'clock aspect of wound.  Periwound tissue is dry and pink.  Right knee skin tear.  Wound base is moist and red.  Area of pink epithelialization noted circumferentially.    Cleanse with normal saline and gauze, blotted dry.  Recommending every other day dressing changes with non-adherent, petroleum-based gauze and silicone border dressing securement.    Bilateral lower extremity edema appears improved with compression therapy.  Patient had his home compression stockings brought in and request to utilize these rather than elastic bandages.  Recommending to maintain daily quality skin care and hygiene with application of purple top moisturizer and patient's personal compression socks.    Patient denies any additional areas of skin breakdown.  Will recommend to maintain preventative measures with application of blue top moisture barrier 2 times a day and as needed for incontinence.  Implement every 2 hour turns and offload at all times until patient's mobility improves.  Keep patient clean, dry, and free from all moisture.    Impression: Left proximal lower leg and right knee skin tear, bilateral lower extremity edema improved    Short term goals: Regain skin integrity,  skin protection, moisture prevention, pressure reduction, quality skin care and hygiene, every other day dressing changes, edema management    Rhoda Mixon RN    10/9/2024    18:36 EDT

## 2024-10-09 NOTE — PROGRESS NOTES
[facility]   Hospitalist Progress Note       Patient Name: Ion PROCTOR Case  : 1940  MRN: [id number]  Primary Care Physician: Maurice Mixon MD  Date of admission: 10/2/2024  Today's Date: 10/9/2024  Room / Bed:   305/2  Subjective   Chief Complaint: Hospital-acquired weakness     HPI:                 Ion PROCTOR Case is a 83 y.o. male with a past medical history of atrial fibrillation on Eliquis, CAD and prior PCI, CKD stage III, recent hospitalization for COVID-19 infection in late August.  The patient presented to the ED 24 for generalized weakness.  He was hypoxic.  Patient does not use home oxygen. CT chest with contrast showed left pleural effusion and trace right pleural effusion.  Bibasilar densities greater on the left, atelectasis.  Left basilar pneumonia cannot be excluded.  Compression fracture deformity of T8 similar to prior. Subacute fractures involving the ischium and the superior pubic ramus area on the right as well as the symphysis of the pubis area. Admitted for further management of acute hypoxia due to pneumonia and pleural effusion. Pulmonology consulted.  He underwent bedside left sided thoracentesis on 2024 with removal of 200 cc of pleural fluid. Respiratory status improved.  Neurosurgery following for recent T8 compression fracture.  OT was consulted for brace placement as inpatient, was not able to tolerate brace on , encouraged out of bed and to use brace while ambulating.  Also developed left lower leg cellulitis for which patient was switched to IV Zosyn which he tolerated well without side effects/allergic reaction.  Patient's erythema improved.  Patient's respiratory status improved.  Will transition patient to cefdinir to complete a full course of antibiotics.  Patient transition to Symbicort and as needed DuoNebs.  Patient weak and debilitated from hospitalization and is being discharged to the rehab facility today in stable condition.     Interval Followup:  Patient seen and examined resting comfortably no acute events through the night patient with ongoing abdominal pain nausea poor appetite for several weeks going for EGD today this has been completed showing gastritis characterized by congestion/edema and erythema starting daily PPI with Carafate avoid NSAIDs.  Continue with bowel regimen holding Lyrica and fentanyl patch replacing potassium and magnesium    REVIEW OF SYSTEMS:   Gerneralized weakness  Objective   Temp:  [97.5 °F (36.4 °C)-98.2 °F (36.8 °C)] 97.8 °F (36.6 °C)  Heart Rate:  [] 88  Resp:  [12-30] 21  BP: ()/(63-93) 122/92  PHYSICAL EXAM   General: Awake alert oriented no acute distress  Respiratory: Clear  Cardiovascular RRR  GI: Abdomen soft nontender  Results from last 7 days   Lab Units 10/09/24  0441 10/06/24  0948 10/04/24  0441   WBC 10*3/mm3 13.09* 13.77* 13.49*   HEMOGLOBIN g/dL 10.5* 9.9* 9.6*   HEMATOCRIT % 34.3* 32.1* 31.0*   PLATELETS 10*3/mm3 472* 371 334     Results from last 7 days   Lab Units 10/09/24  0441 10/07/24  0431 10/06/24  0948 10/04/24  0441 10/03/24  0756   SODIUM mmol/L 141 141 138 138 136   POTASSIUM mmol/L 3.3* 3.5 3.8 3.0* 3.1*   CO2 mmol/L 32.0* 29.7* 27.9 30.1* 26.7   CHLORIDE mmol/L 100 101 99 99 99   ANION GAP mmol/L 9.0 10.3 11.1 8.9 10.3   BUN mg/dL 12 12 11 13 13   CREATININE mg/dL 0.75* 0.68* 0.80 0.77 0.74*   GLUCOSE mg/dL 114* 119* 163* 115* 115*         COMPLEXITY OF DATA / DECISION MAKING     []  Moderate: One acute illness or mild exacerbation of chronic or 2 stable chronic or tx side effects   []  High:  Severe acute illness or severe exacerbation of chronic - potential for major debility / life threatening         I have personally reviewed the results from the time of this admission to 10/9/2024 12:06 EDT:  []  Laboratory:  []  Microbiology: []  Radiology:  []  Telemetry:   []  Cardiology/Vascular:  []  Pathology:  []  Prior external records:  []  Independent historian provided additional  details:      []  Discussed case with specialists:    []  Independent interpretation of ECG/Imaging etc:             []  Moderate: Rx management, low risk surgery, suboptimal social situation   []  High:  Rx with close monitoring for toxicity, mod-high risk surgery, DNR decision, Comfort initiated, IV pain meds    Assessment / Plan   Assessment:     Hospital-acquired weakness  Recent acute hypoxic respiratory failure  Recent left lower lobe pneumonia, CAP  Recent bilateral pleural effusions  Recent thoracentesis  Hx diastolic CHF  History of T8 compression fracture  History atrial fibrillation (on Eliquis)  History CAD/PCI (Dr. Varghese)  CKD 3  Leukocytosis, likely from steroid injections; has Westland's disease ([facility]/Dr. Massey; failed MTX Imuran; gets steroids bimonthly - periodically.)  Anemia  Hypokalemia, resolved  DM  Hx pelvic fracture  Abdominal pain, nausea, poor oral intake      Plan:     GI consulted EGD completed showing gastritis characterized by congestion/edema and erythema starting PPI once daily with Carafate  Holding Lyrica and low dose fentanyl patch.  Daily PT and OT  Encouraged patient to use brace while out of bed, as tolerated  Monitor volume status and renal indices.  Adjust diuretics accordingly.  Continue home Eliquis and clopidogrel  Home Januvia not on formulary. Give Jardiance here. Also low dose SSI.  Continue carvedilol and diltiazem  Continue scheduled bronchodilators/nebulizers  Will need neurosurgery follow-up for T8 fracture  Prior dermatology notes reviewed.  Received Kenalog periodically for [place].      VTE Prophylaxis:  Pharmacologic VTE prophylaxis orders are present.      CODE STATUS:      Level Of Support Discussed With: Patient  Code Status (Patient has no pulse and is not breathing): CPR (Attempt to Resuscitate)  Medical Interventions (Patient has pulse or is breathing): Full Support    Electronically signed by ARIANA Dominguez, 10/09/24, 12:15 PM EDT.     EDT.    Electronically signed by Marcelino Reynoso MD, 11/10/24, 6:29 PM EST.

## 2024-10-09 NOTE — PLAN OF CARE
Goal Outcome Evaluation:  Plan of Care Reviewed With: patient           Outcome Evaluation: PT is AAOx4, [place], VSS, went for EGD this shift, see MD note, no c/o of SOA, N/V/D or pain requesting PRN pain medication, intermittently compliant with therapy plan, x1 assist with walker, last BM noted 10/9, remains on SSI with insulin given as ordered, tolerated IV Mag x1, bed/chair in low/locked position, alarm audible, call light and personal items within reach, continue with current POC at this time.

## 2024-10-09 NOTE — PLAN OF CARE
Goal Outcome Evaluation:           Progress: improving  Outcome Evaluation: Rsd. is alert and oriented x4, able to make needs known to staff.  Rsd. is very [place].  Rsd. is to have an EGD today outpatient,  Will notify  when arrives to get with scheduling and make a new encounter number for orders.  Per Michelle Gray RN Rsd. is to be NPO after midnight, hold his eliquis, plavix, diuretics and oral diabetic medications.  Give other meds with sips of water only.  IV placed to L) wrist for procedure.  Call light and personal items within reach.  Rsd. reminded to use call light for assistance, verbalizes understanding.  Transfers x1 assist to bathroom with gaitbelt and rolling walker.  Will continue to monitor and notify on-coming staff.  Rsd. has rested well this shift, denies pain.

## 2024-10-09 NOTE — THERAPY TREATMENT NOTE
SNF - Physical Therapy Treatment Note  AMELIE Estes     Patient Name: Ion PROCTOR Case  : [id number]  MRN: [id number]  Today's Date: 10/9/2024      Visit Dx:    ICD-10-CM ICD-9-CM   1. Difficulty walking  R26.2 719.7   2. Decreased activities of daily living (ADL)  Z78.9 V49.89   3. Epigastric pain  R10.13 789.06     Patient Active Problem List   Diagnosis    Hyperlipidemia LDL goal <70    Vitamin D deficiency    Atrial fibrillation, persistent    Essential hypertension    Chronic superficial gastritis without bleeding    Chronic right flank pain    Adenomatous polyp of colon    Left-sided chest wall pain    Type 2 diabetes mellitus without complication, without long-term current use of insulin    CAD S/P percutaneous coronary angioplasty    Weight loss    Medicare annual wellness visit, subsequent    Chronic heart failure with preserved ejection fraction (HFpEF)    Prerenal azotemia    Elevated liver enzymes    Cellulitis of left lower extremity    Stage 3a chronic kidney disease    Absent peripheral pulse    History of colon polyps    Venous insufficiency of both lower extremities    Pelvic fracture    Multifocal pneumonia    Left upper quadrant pain    Closed wedge compression fracture of T8 vertebra with delayed healing    Pleural effusion    Debility    Epigastric pain     Past Medical History:   Diagnosis Date    Cataract     Removed    Chronic heart failure with preserved ejection fraction (HFpEF) 2023    Colon polyp     Removed     Coronary artery disease     Degeneration of lumbosacral intervertebral disc 2020    Diverticulosis     Elevated cholesterol     Essential hypertension 10/04/2021    Facet arthropathy, lumbar 2020    GERD (gastroesophageal reflux disease)     HL (hearing loss)     Hyperlipidemia LDL goal <100 2021    Kidney stone     Lumbar herniated disc 2020    (R) L3-4    Lumbar spinal stenosis 2020    Paroxysmal atrial fibrillation 10/04/2021    Pneumonia      Spinal headache     post 3 injections in back     Trigger finger of right thumb 11/01/2017    all fingers both hands     Past Surgical History:   Procedure Laterality Date    BACK SURGERY      CARDIAC CATHETERIZATION N/A 08/23/2022    Procedure: LEFT HEART CATH with coronary artery angiography and right heart catheterization possible percutaneous intervention possible closure device;  Surgeon: Emili Cheng MD;  Location: [facility] CATH INVASIVE LOCATION;  Service: Cardiovascular;  Laterality: N/A;    CAROTID STENT      COLONOSCOPY  2017    Dr. Oneal    COLONOSCOPY N/A 10/06/2021    Procedure: COLONOSCOPY WITH BIOPSIES, POLYPECTOMY;  Surgeon: Vazquez Estrada MD;  Location: [facility] ENDOSCOPY;  Service: Gastroenterology;  Laterality: N/A;  COLON POLYPS, DIVERTICULOSIS, HEMORRHOIDS    COLONOSCOPY N/A 7/13/2023    Procedure: COLONOSCOPY with polypectomy with cold snare;  Surgeon: Vazquez Estrada MD;  Location: [facility] ENDOSCOPY;  Service: Gastroenterology;  Laterality: N/A;  colon polyps, diverticulosis, hemorrhoids    CORONARY ANGIOPLASTY      ENDOSCOPY N/A 10/06/2021    Procedure: ESOPHAGOGASTRODUODENOSCOPY WITH BIOPSIES;  Surgeon: Vazquez Estarda MD;  Location: [facility] ENDOSCOPY;  Service: Gastroenterology;  Laterality: N/A;  GASTRITIS    ENDOSCOPY N/A 7/13/2023    Procedure: ESOPHAGOGASTRODUODENOSCOPY with biopsies, with dilation with 15- 18 mm balloon;  Surgeon: Vazquez Estrada MD;  Location: [facility] ENDOSCOPY;  Service: Gastroenterology;  Laterality: N/A;  hiatal hernia, gastric polyp, schatzki's ring    KIDNEY STONE SURGERY      Unspecified    LUMBAR DISC SURGERY Right 06/26/2020    L3-4  Minimally Invasive    TRIGGER FINGER RELEASE Bilateral     UPPER GASTROINTESTINAL ENDOSCOPY  10/06/2021    Dr. Estrada       PT Assessment (Last 12 Hours)       PT Evaluation and Treatment       Row Name 10/09/24 1600          Physical Therapy Time and Intention    Subjective Information complains of;fatigue   -VK     Document Type therapy note (daily note)  -VK     Mode of Treatment individual therapy;physical therapy  -VK     Patient Effort poor  -VK     Comment 1st attempt of day was at 9am, pt about to head to EGD per nsg.  -VK       Row Name 10/09/24 1600          General Information    Patient Profile Reviewed yes  -VK     Existing Precautions/Restrictions fall;spinal;TLSO;brace worn when out of bed  -VK       Row Name 10/09/24 1600          Cognition    Affect/Mental Status (Cognition) WFL;sad/depressed affect  -VK     Orientation Status (Cognition) oriented x 3  -VK     Personal Safety Interventions nonskid shoes/slippers when out of bed;gait belt;fall prevention program maintained  -VK       Row Name 10/09/24 1600          Bed Mobility    Supine-Sit [place] (Bed Mobility) contact guard;minimum assist (75% patient effort);verbal cues  -VK     Sit-Supine [place] (Bed Mobility) minimum assist (75% patient effort);verbal cues  -VK     Bed Mobility, Safety Issues decreased use of legs for bridging/pushing  -VK     Assistive Device (Bed Mobility) bed rails;head of bed elevated  -VK       Row Name 10/09/24 1600          Sit-Stand Transfer    Sit-Stand [place] (Transfers) standby assist;contact guard;verbal cues  -VK     Assistive Device (Sit-Stand Transfers) walker, front-wheeled  -VK       Row Name 10/09/24 1600          Stand-Sit Transfer    Stand-Sit [place] (Transfers) standby assist;contact guard;verbal cues  -VK     Assistive Device (Stand-Sit Transfers) walker, front-wheeled  -VK       Row Name 10/09/24 1600          Toilet Transfer    Type (Toilet Transfer) sit-stand;stand-sit  -VK     [place] Level (Toilet Transfer) standby assist  -VK     Assistive Device (Toilet Transfer) raised toilet seat;walker, front-wheeled  -VK     Comment, (Toilet Transfer) After standing up and beginning to ambulate, pt requested to use toilet.  -VK       Row Name 10/09/24 1600          Gait/Stairs  "(Locomotion)    [place] Level (Gait) contact guard;verbal cues  -VK     Assistive Device (Gait) walker, front-wheeled  -VK     Patient was able to Ambulate yes  -VK     Distance in Feet (Gait) --  12ft, 12ft  -VK     Pattern (Gait) 4-point;step-through  -VK     Deviations/Abnormal Patterns (Gait) gait speed decreased;stride length decreased;tiesha decreased  -VK     Bilateral Gait Deviations forward flexed posture  -VK     Gait Assessment/Intervention Pt declined further ambulation, reporting \"That bowel movement took everything out of me\".  -VK       Row Name 10/09/24 1600          Safety Issues, Functional Mobility    Impairments Affecting Function (Mobility) balance;endurance/activity tolerance;pain;range of motion (ROM);strength  -VK       Row Name 10/09/24 1600          Balance    Sit to Stand Dynamic Balance standby assist;contact guard  -VK     Static Standing Balance standby assist  -VK     Dynamic Standing Balance contact guard  -VK     Position/Device Used, Standing Balance walker, front-wheeled  -VK     Balance Interventions sit to stand  x2  -VK       Row Name 10/09/24 1600          Motor Skills    Therapeutic Exercise other (see comments)  Pt declined further treatment  -VK       Row Name             Wound 09/25/24 1903 Left anterior knee    Wound - Properties Group Placement Date: 09/25/24  -AP Placement Time: 1903  -AP Side: Left  -AP Orientation: anterior  -AP Location: knee  -AP    Retired Wound - Properties Group Placement Date: 09/25/24  -AP Placement Time: 1903  -AP Side: Left  -AP Orientation: anterior  -AP Location: knee  -AP    Retired Wound - Properties Group Date first assessed: 09/25/24  -AP Time first assessed: 1903  -AP Side: Left  -AP Location: knee  -AP      Row Name             Wound 09/25/24 1903 Right anterior knee    Wound - Properties Group Placement Date: 09/25/24  -AP Placement Time: 1903  -AP Present on Original Admission: Y  -AP Side: Right  -AP Orientation: anterior  -AP " Location: knee  -AP    Retired Wound - Properties Group Placement Date: 09/25/24  -AP Placement Time: 1903  -AP Present on Original Admission: Y  -AP Side: Right  -AP Orientation: anterior  -AP Location: knee  -AP    Retired Wound - Properties Group Date first assessed: 09/25/24  -AP Time first assessed: 1903  -AP Present on Original Admission: Y  -AP Side: Right  -AP Location: knee  -AP      Row Name             Wound 09/25/24 1906 Right posterior hand Traumatic    Wound - Properties Group Placement Date: 09/25/24  -AP Placement Time: 1906  -AP Side: Right  -AP Orientation: posterior  -AP Location: hand  -AP Primary Wound Type: Traumatic  -AP    Retired Wound - Properties Group Placement Date: 09/25/24  -AP Placement Time: 1906  -AP Side: Right  -AP Orientation: posterior  -AP Location: hand  -AP Primary Wound Type: Traumatic  -AP    Retired Wound - Properties Group Date first assessed: 09/25/24  -AP Time first assessed: 1906  -AP Side: Right  -AP Location: hand  -AP Primary Wound Type: Traumatic  -AP      Row Name             Wound 09/25/24 1920 Left proximal leg Skin Tear    Wound - Properties Group Placement Date: 09/25/24  -SM Placement Time: 1920  -SM Present on Original Admission: Y  -SM Side: Left  -SM Orientation: proximal  -SM Location: leg  -SM Primary Wound Type: Skin tear  -SM    Retired Wound - Properties Group Placement Date: 09/25/24  -SM Placement Time: 1920  -SM Present on Original Admission: Y  -SM Side: Left  -SM Orientation: proximal  -SM Location: leg  -SM Primary Wound Type: Skin tear  -SM    Retired Wound - Properties Group Date first assessed: 09/25/24  -SM Time first assessed: 1920  -SM Present on Original Admission: Y  -SM Side: Left  -SM Location: leg  -SM Primary Wound Type: Skin tear  -SM      Row Name             Wound 10/02/24 1700 Left lower leg Skin Tear    Wound - Properties Group Placement Date: 10/02/24  -FH Placement Time: 1700  -FH Present on Original Admission: N  -FH Side:  Left  -FH Orientation: lower  -FH Location: leg  -FH Primary Wound Type: Skin tear  -FH    Retired Wound - Properties Group Placement Date: 10/02/24  - Placement Time: 1700 -FH Present on Original Admission: N  -FH Side: Left  -FH Orientation: lower  -FH Location: leg  -FH Primary Wound Type: Skin tear  -FH    Retired Wound - Properties Group Date first assessed: 10/02/24  - Time first assessed: 1700 -FH Present on Original Admission: N  -FH Side: Left  -FH Location: leg  -FH Primary Wound Type: Skin tear  -FH      Row Name             Wound 10/02/24 2008 Left medial knee Skin Tear    Wound - Properties Group Placement Date: 10/02/24  - Placement Time: 2008 -FH Present on Original Admission: Y  -FH Side: Left  -FH Orientation: medial  -FH Location: knee  -FH Primary Wound Type: Skin tear  -FH    Retired Wound - Properties Group Placement Date: 10/02/24  - Placement Time: 2008 -FH Present on Original Admission: Y  -FH Side: Left  -FH Orientation: medial  -FH Location: knee  -FH Primary Wound Type: Skin tear  -FH    Retired Wound - Properties Group Date first assessed: 10/02/24  - Time first assessed: 2008 -FH Present on Original Admission: Y  -FH Side: Left  -FH Location: knee  -FH Primary Wound Type: Skin tear  -FH      Row Name 10/09/24 1600          Positioning and Restraints    Pre-Treatment Position in bed  -VK     Post Treatment Position bed  -VK     In Bed fowlers;call light within reach;encouraged to call for assist;exit alarm on;with family/caregiver  -VK       Row Name 10/09/24 1600          Progress Summary (PT)    Progress Toward Functional Goals (PT) progress toward functional goals is fair  -VK               User Key  (r) = Recorded By, (t) = Taken By, (c) = Cosigned By      Initials Name Provider Type    FH Wilma Powers RN Registered Nurse    Laura Alexis RN Registered Nurse    Monae Artis RN Registered Nurse    Lidya Hardy PTA Physical Therapist  Assistant                  Section G              Section GG                       Physical Therapy Education       Title: PT OT SLP Therapies (In Progress)       Topic: Physical Therapy (Done)       Point: Mobility training (Done)       Learning Progress Summary             Patient Acceptance, E,TB, VU by AV at 10/3/2024 1522                         Point: Home exercise program (Done)       Learning Progress Summary             Patient Acceptance, E,TB, VU by AV at 10/3/2024 1522                         Point: Body mechanics (Done)       Learning Progress Summary             Patient Acceptance, E,TB, VU by AV at 10/3/2024 1522                         Point: Precautions (Done)       Learning Progress Summary             Patient Acceptance, E,TB, VU by AV at 10/3/2024 1522                                         User Key       Initials Effective Dates Name Provider Type Discipline    AV 06/11/21 -  Luke Limon, PT Physical Therapist PT                  PT Recommendation and Plan     Progress Summary (PT)  Progress Toward Functional Goals (PT): progress toward functional goals is fair   Outcome Measures       Row Name 10/09/24 1600 10/08/24 1000 10/07/24 0819       How much help from another person do you currently need...    Turning from your back to your side while in flat bed without using bedrails? 3  -VK 3  -VK 3  -WM    Moving from lying on back to sitting on the side of a flat bed without bedrails? 3  -VK 2  -VK 3  -WM    Moving to and from a bed to a chair (including a wheelchair)? 3  -VK 3  -VK 3  -WM    Standing up from a chair using your arms (e.g., wheelchair, bedside chair)? 3  -VK 3  -VK 3  -WM    Climbing 3-5 steps with a railing? 2  -VK 2  -VK 2  -WM    To walk in hospital room? 3  -VK 3  -VK 3  -WM    AM-PAC 6 Clicks Score (PT) 17  -VK 16  -VK 17  -WM    Highest Level of Mobility Goal 5 --> Static standing  -VK 5 --> Static standing  -VK 5 --> Static standing  -WM              User Key  (r) =  Recorded By, (t) = Taken By, (c) = Cosigned By      Initials Name Provider Type    Brenton Rutledge, GEOVANNY Physical Therapist Assistant    Lidya Hardy PTA Physical Therapist Assistant                     Time Calculation:    PT Charges       Row Name 10/09/24 1634             Time Calculation    PT Received On 10/09/24  -VK         Timed Charges    03417 - Gait Training Minutes  8  -VK      60810 - PT Therapeutic Activity Minutes 15  -VK         SNF Physical Therapy Minutes    Skilled Minutes- PT 23 min  -VK         Total Minutes    Timed Charges Total Minutes 23  -VK       Total Minutes 23  -VK                User Key  (r) = Recorded By, (t) = Taken By, (c) = Cosigned By      Initials Name Provider Type    Lidya Hardy PTA Physical Therapist Assistant                  Therapy Charges for Today       Code Description Service Date Service Provider Modifiers Qty    98153206061 HC PT THER PROC EA 15 MIN 10/8/2024 Lidya Dueñas, PTA GP 1    09111216465 HC GAIT TRAINING EA 15 MIN 10/8/2024 Lidya Dueñas PTA GP 1    01795015142 HC GAIT TRAINING EA 15 MIN 10/9/2024 Lidya Dueñas PTA GP 1    54782244064 HC PT THERAPEUTIC ACT EA 15 MIN 10/9/2024 Lidya Dueñas PTA GP 1            PT G-Codes  Outcome Measure Options: AM-PAC 6 Clicks Daily Activity (OT), Optimal Instrument  AM-PAC 6 Clicks Score (PT): 17  AM-PAC 6 Clicks Score (OT): 18    Lidya Dueñas PTA  10/9/2024

## 2024-10-09 NOTE — NURSING NOTE
Spoke with Nikky PACK in Endo, notified that patient had to have a new encounter number for procedure and orders would have to  be placed under new encounter number for EGD procedure.  That dayshift staff were working on it.  Nikky requested for staff to call once it had been completed.  Karina Perez LPN aware.

## 2024-10-09 NOTE — THERAPY TREATMENT NOTE
SNF - Occupational Therapy Treatment Note  AMELIE Estes    Patient Name: Ion PROCTOR Case  : [id number]    MRN: [id number]                              Today's Date: 10/9/2024       Admit Date: 10/2/2024    Visit Dx:     ICD-10-CM ICD-9-CM   1. Difficulty walking  R26.2 719.7   2. Decreased activities of daily living (ADL)  Z78.9 V49.89   3. Epigastric pain  R10.13 789.06     Patient Active Problem List   Diagnosis    Hyperlipidemia LDL goal <70    Vitamin D deficiency    Atrial fibrillation, persistent    Essential hypertension    Chronic superficial gastritis without bleeding    Chronic right flank pain    Adenomatous polyp of colon    Left-sided chest wall pain    Type 2 diabetes mellitus without complication, without long-term current use of insulin    CAD S/P percutaneous coronary angioplasty    Weight loss    Medicare annual wellness visit, subsequent    Chronic heart failure with preserved ejection fraction (HFpEF)    Prerenal azotemia    Elevated liver enzymes    Cellulitis of left lower extremity    Stage 3a chronic kidney disease    Absent peripheral pulse    History of colon polyps    Venous insufficiency of both lower extremities    Pelvic fracture    Multifocal pneumonia    Left upper quadrant pain    Closed wedge compression fracture of T8 vertebra with delayed healing    Pleural effusion    Debility    Epigastric pain     Past Medical History:   Diagnosis Date    Cataract     Removed    Chronic heart failure with preserved ejection fraction (HFpEF) 2023    Colon polyp     Removed     Coronary artery disease     Degeneration of lumbosacral intervertebral disc 2020    Diverticulosis     Elevated cholesterol     Essential hypertension 10/04/2021    Facet arthropathy, lumbar 2020    GERD (gastroesophageal reflux disease)     HL (hearing loss)     Hyperlipidemia LDL goal <100 2021    Kidney stone     Lumbar herniated disc 2020    (R) L3-4    Lumbar spinal stenosis 2020     Paroxysmal atrial fibrillation 10/04/2021    Pneumonia     Spinal headache     post 3 injections in back     Trigger finger of right thumb 11/01/2017    all fingers both hands     Past Surgical History:   Procedure Laterality Date    BACK SURGERY      CARDIAC CATHETERIZATION N/A 08/23/2022    Procedure: LEFT HEART CATH with coronary artery angiography and right heart catheterization possible percutaneous intervention possible closure device;  Surgeon: Emili Cheng MD;  Location: [facility] CATH INVASIVE LOCATION;  Service: Cardiovascular;  Laterality: N/A;    CAROTID STENT      COLONOSCOPY  2017    Dr. Oneal    COLONOSCOPY N/A 10/06/2021    Procedure: COLONOSCOPY WITH BIOPSIES, POLYPECTOMY;  Surgeon: Vazquez Estrada MD;  Location: [facility] ENDOSCOPY;  Service: Gastroenterology;  Laterality: N/A;  COLON POLYPS, DIVERTICULOSIS, HEMORRHOIDS    COLONOSCOPY N/A 7/13/2023    Procedure: COLONOSCOPY with polypectomy with cold snare;  Surgeon: Vazquez Estrada MD;  Location: [facility] ENDOSCOPY;  Service: Gastroenterology;  Laterality: N/A;  colon polyps, diverticulosis, hemorrhoids    CORONARY ANGIOPLASTY      ENDOSCOPY N/A 10/06/2021    Procedure: ESOPHAGOGASTRODUODENOSCOPY WITH BIOPSIES;  Surgeon: Vazquez Estrada MD;  Location: [facility] ENDOSCOPY;  Service: Gastroenterology;  Laterality: N/A;  GASTRITIS    ENDOSCOPY N/A 7/13/2023    Procedure: ESOPHAGOGASTRODUODENOSCOPY with biopsies, with dilation with 15- 18 mm balloon;  Surgeon: Vazquez Estrada MD;  Location: [facility] ENDOSCOPY;  Service: Gastroenterology;  Laterality: N/A;  hiatal hernia, gastric polyp, schatzki's ring    KIDNEY STONE SURGERY      Unspecified    LUMBAR DISC SURGERY Right 06/26/2020    L3-4  Minimally Invasive    TRIGGER FINGER RELEASE Bilateral     UPPER GASTROINTESTINAL ENDOSCOPY  10/06/2021    Dr. Estrada      General Information       Row Name 10/09/24 0807          OT Time and Intention    Document Type therapy note (daily note)  -EG      Mode of Treatment individual therapy;occupational therapy  -EG       Row Name 10/09/24 0807          General Information    Existing Precautions/Restrictions fall;spinal;TLSO;brace worn when out of bed  -EG       Row Name 10/09/24 0807          Cognition    Orientation Status (Cognition) oriented x 3  -EG       Row Name 10/09/24 0807          Safety Issues, Functional Mobility    Impairments Affecting Function (Mobility) balance;endurance/activity tolerance;pain;range of motion (ROM);strength  -EG               User Key  (r) = Recorded By, (t) = Taken By, (c) = Cosigned By      Initials Name Provider Type    EG Karely Hensley OT Occupational Therapist                     Mobility/ADL's       Row Name 10/09/24 0807          Bed Mobility    Bed Mobility supine-sit-supine  -EG     Supine-Sit-Supine [place] (Bed Mobility) contact guard;minimum assist (75% patient effort);verbal cues  -EG     Assistive Device (Bed Mobility) bed rails;head of bed elevated  -EG     Comment, (Bed Mobility) Continues to decline sitting up OOB despite education and encouragement; in bed upon arrival and returns to bed at end of session; also continues to display inconsistent performance and need for support/assist  -EG       Row Name 10/09/24 0807          Transfers    Transfers sit-stand transfer;stand-sit transfer;toilet transfer  -EG     Comment, (Transfers) transfer to chair with arms in therapy gym; Continues to decline to sit in recliner in room  -EG       Row Name 10/09/24 0807          Sit-Stand Transfer    Sit-Stand [place] (Transfers) contact guard;verbal cues  -EG     Assistive Device (Sit-Stand Transfers) walker, front-wheeled  -EG     Comment, (Sit-Stand Transfer) 2x8 yellow theraband repetitive functional exercises performed for strengthening and carryover into ADL performance; prolonged breaks inbetween sets and rests inbetween reps at times as well  -EG       Row Name 10/09/24 0807          Stand-Sit Transfer     Stand-Sit [place] (Transfers) contact guard;verbal cues  -EG     Assistive Device (Stand-Sit Transfers) walker, front-wheeled  -EG     Comment, (Stand-Sit Transfer) 2x8 yellow theraband repetitive functional exercises performed for strengthening and carryover into ADL performance; prolonged breaks inbetween sets and rests inbetween reps at times as well  -EG       Row Name 10/09/24 0807          Toilet Transfer    [place] Level (Toilet Transfer) contact guard;verbal cues  -EG     Assistive Device (Toilet Transfer) raised toilet seat;walker, front-wheeled;commode, 3-in-1  -EG       Row Name 10/09/24 0807          Functional Mobility    Functional Mobility- Ind. Level contact guard assist  -EG     Functional Mobility- Device walker, front-wheeled  -EG     Functional Mobility- Comment Patient performed mobility to and from therapy gym using RW; reports increased pain during performance but no dizziness this date  -EG       Row Name 10/09/24 0807          Activities of Daily Living    BADL Assessment/Intervention feeding;grooming;toileting  Patient declines shower this date  -EG       Row Name 10/09/24 0807          Upper Body Dressing Assessment/Training    Comment, (Upper Body Dressing) TD to don TLSO  -EG       Row Name 10/09/24 0807          Grooming Assessment/Training    [place] Level (Grooming) grooming skills;oral care regimen;wash face, hands  -EG     Position (Grooming) edge of bed sitting  -EG     Comment, (Grooming) Patient refuses to stand at sink and perform any grooming tasks, reports too much pain and needing to return to bed despite education and encouragement  -EG       Row Name 10/09/24 0807          Toileting Assessment/Training    [place] Level (Toileting) toileting skills;contact guard assist;nonverbal cues (demo/gesture);verbal cues  -EG     Assistive Devices (Toileting) grab bar/safety frame;commode chair  -EG               User Key  (r) = Recorded By, (t) = Taken By, (c) =  Cosigned By      Initials Name Provider Type    EG Karely Hensley OT Occupational Therapist                   Obj/Interventions       Row Name 10/09/24 0819          Sensory Assessment (Somatosensory)    Sensory Assessment (Somatosensory) sensation intact  -EG       Row Name 10/09/24 0819          Vision Assessment/Intervention    Visual Impairment/Limitations WFL  -EG       Row Name 10/09/24 0819          Shoulder (Therapeutic Exercise)    Shoulder (Therapeutic Exercise) strengthening exercise  -EG     Shoulder Strengthening (Therapeutic Exercise) bilateral;flexion;extension;scapular stabilization;2 lb free weight;3 sets;10 repetitions;sitting  -EG       Row Name 10/09/24 0819          Elbow/Forearm (Therapeutic Exercise)    Elbow/Forearm (Therapeutic Exercise) strengthening exercise  -EG     Elbow/Forearm Strengthening (Therapeutic Exercise) bilateral;flexion;extension;supination;pronation;10 repetitions;3 sets;sitting  -EG       Row Name 10/09/24 0819          Motor Skills    Functional Endurance Poor+ on room air; patient continues to be self-limiting at times with motivation and noncompliance for OOB endurance  -EG     Therapeutic Exercise shoulder;elbow/forearm  -EG       Row Name 10/09/24 0819          Balance    Balance Interventions standing;sit to stand;supported;static;weight shifting activity;occupation based/functional task;foam  -EG     Comment, Balance Patient participated in BUE supported standing tasks alternating BLE's to tap on foam airex balance pad for dynamic weight shifting, only tolerated 1x15 and reports fatigue and needing to return to room  -EG               User Key  (r) = Recorded By, (t) = Taken By, (c) = Cosigned By      Initials Name Provider Type    Karely Oseguera OT Occupational Therapist                   Goals/Plan    No documentation.                  Clinical Impression       Row Name 10/09/24 0822          Plan of Care Review    Plan of Care Reviewed With patient  -EG      Progress no change  -EG     Outcome Evaluation Patient NPO today due to EGD procedure later in day; Patient requires encouragement for OOB activity, continues to be noncompliant with getting up OOB outside of therapy tasks; Patient demonstrating no true functional gains for performance in ADL's at this time; Ax1 for transfers and mobilit using RW; inconsistent performance for mobility endurance at this time; OT continue POC.  -EG       Row Name 10/09/24 0822          Therapy Assessment/Plan (OT)    Rehab Potential (OT) good, to achieve stated therapy goals  -EG     Criteria for Skilled Therapeutic Interventions Met (OT) yes;meets criteria;skilled treatment is necessary  -EG     Therapy Frequency (OT) 5 times/wk  -EG       Row Name 10/09/24 0822          Therapy Plan Review/Discharge Plan (OT)    Anticipated Discharge Disposition (OT) home with home health  -EG       Row Name 10/09/24 0822          Positioning and Restraints    Pre-Treatment Position in bed  -EG     Post Treatment Position bed  -EG     In Bed supine;encouraged to call for assist;call light within reach;exit alarm on  -EG               User Key  (r) = Recorded By, (t) = Taken By, (c) = Cosigned By      Initials Name Provider Type    EG Karely Hensley, ROLANDO Occupational Therapist                   Outcome Measures       Row Name 10/09/24 0824          How much help from another is currently needed...    Putting on and taking off regular lower body clothing? 2  -EG     Bathing (including washing, rinsing, and drying) 2  -EG     Toileting (which includes using toilet bed pan or urinal) 3  -EG     Putting on and taking off regular upper body clothing 3  -EG     Taking care of personal grooming (such as brushing teeth) 4  -EG     Eating meals 4  -EG     AM-PAC 6 Clicks Score (OT) 18  -EG       Row Name 10/08/24 2139          How much help from another person do you currently need...    Turning from your back to your side while in flat bed without using  bedrails? 3  -FM     Moving from lying on back to sitting on the side of a flat bed without bedrails? 3  -FM     Moving to and from a bed to a chair (including a wheelchair)? 3  -FM     Standing up from a chair using your arms (e.g., wheelchair, bedside chair)? 3  -FM     Climbing 3-5 steps with a railing? 2  -FM     To walk in hospital room? 3  -FM     AM-PAC 6 Clicks Score (PT) 17  -FM     Highest Level of Mobility Goal 5 --> Static standing  -FM       Row Name 10/09/24 0824          Functional Assessment    Outcome Measure Options AM-PAC 6 Clicks Daily Activity (OT);Optimal Instrument  -EG       Row Name 10/09/24 0824          Optimal Instrument    Optimal Instrument Optimal - 3  -EG     Bending/Stooping 5  -EG     Standing 2  -EG     Reaching 1  -EG               User Key  (r) = Recorded By, (t) = Taken By, (c) = Cosigned By      Initials Name Provider Type    FM Bhavna Thorpe, RN Registered Nurse    EG Karely Hensley OT Occupational Therapist                  Section G  Mobility  Bed mobility - self performance: limited assistance (staff provide guided maneuvering of limbs or other non-weight bearing assistance)  Bed mobility support/assistance: One person assist  Transfer - self performance: limited assistance (staff provide guided maneuvering of limbs or other non-weight bearing assistance)  Transfer support/assistance: One person assist  Walking in room - self performance: limited assistance (staff provide guided maneuvering of limbs or other non-weight bearing assistance)  Walking in room support/assistance: One person assist  Walking in corridors/hallway - self performance: limited assistance (staff provide guided maneuvering of limbs or other non-weight bearing assistance)  Walking in corridors/hallway support/assistance: One person assist  Locomotion on unit - self performance: limited assistance (staff provide guided maneuvering of limbs or other non-weight bearing assistance)  Locomotion on unit  support/assistance: One person assist  Locomotion off unit - self performance: activity did not occur  Locomotion off unit support/assistance: Activity did not occur  Dressing - self performance: limited assistance (staff provide guided maneuvering of limbs or other non-weight bearing assistance)  Dressing support/assistance: One person assist  Eating - self performance: independent  Eating support/assistance: Setup help only  Toileting - self performance: total dependence (full staff performance)  Personal hygiene - self performance: limited assistance (staff provide guided maneuvering of limbs or other non-weight bearing assistance)  Personal hygiene support/assistance: One person assist  Bathing  Bathing - self performance: Physical help with bathing (exclude washing back and hair for patient)  Bathing support/assistance: One person assist  Balance  Balance during transitions & walking: Not steady, requires assist to steady  Moving from seated to standing position: Not steady, requires assist to steady  Walking: Not steady, requires assist to steady  Turning around while walking: Not steady, requires assist to steady  Moving on and off toilet: Not steady, requires assist to steady  Surface-to-surface transfer: Not steady, requires assist to steady  Mobility devices: Walker  Range of Motion  Upper Extremity: No impairment  Lower Extremity: No impairment  Section GG  Functional Ability/Goals, Adm (Section GG)  Self Care, Prior Functioning (FW0223I): 3. Independent  Functional Cognition, Prior Functioning (RV0659P): 3. Independent  Prior Device Use (PB2077): none of the above (Z)  Upper Extremity Range of Motion (OW5930J): No impairment  Lower Extremity Range of Motion (FT3516Q): No impairment  Self Care, Admission (Section GG)  Eating: Self-Care Admission Performance (ES0229T7): setup or clean-up assistance (05)  Oral Hygiene: Self-Care Admission Performance (SN5580Q9): setup or clean-up assistance (05)  Toileting  Hygiene: Self-Care Admission Performance (PM9946Q4): dependent (01)  Shower/Bathe Self: Self-Care Admission Performance (NV7696T3): partial/moderate assistance (03)  Upper Body Dressing: Self-Care Admission Performance (HX7022J0): supervision or touching assistance (04)  Lower Body Dressing: Self-Care Admission Performance (QB5083F4): partial/moderate assistance (03)  Putting On/Taking Off Footwear: Self-Care Admission Performance (UT7497O3): partial/moderate assistance (03)  Personal Hygiene: Self-Care Admission Performance (JN1695Q1): supervision or touching assistance (04)  Mobility, Admission Performance (RO9810)  Toilet Transfer: Mobility Admission Performance (KF0870P5): supervision or touching assistance (04)  Tub/shower Transfer: Mobility Admission Performance ([id number]): partial/moderate assistance (03)                Occupational Therapy Education       Title: PT OT SLP Therapies (In Progress)       Topic: Occupational Therapy (In Progress)       Point: ADL training (In Progress)       Description:   Instruct learner(s) on proper safety adaptation and remediation techniques during self care or transfers.   Instruct in proper use of assistive devices.                  Learning Progress Summary             Patient Clayton, JANIS, NR by EG at 10/3/2024 1345    Comment: TLSO education and spinal precautions  Education on use of AD and fall risk prevention  Reacher and compensatory techniques for LB ADL's  OT benefits education                         Point: Home exercise program (In Progress)       Description:   Instruct learner(s) on appropriate technique for monitoring, assisting and/or progressing therapeutic exercises/activities.                  Learning Progress Summary             Patient Clayton, JANIS, NR by EG at 10/3/2024 1345    Comment: TLSO education and spinal precautions  Education on use of AD and fall risk prevention  Reacher and compensatory techniques for LB ADL's  OT benefits education                          Point: Precautions (In Progress)       Description:   Instruct learner(s) on prescribed precautions during self-care and functional transfers.                  Learning Progress Summary             Patient Clayton, JANIS, NR by EG at 10/3/2024 1345    Comment: TLSO education and spinal precautions  Education on use of AD and fall risk prevention  Reacher and compensatory techniques for LB ADL's  OT benefits education                         Point: Body mechanics (In Progress)       Description:   Instruct learner(s) on proper positioning and spine alignment during self-care, functional mobility activities and/or exercises.                  Learning Progress Summary             Patient Clayton, E, NR by EG at 10/3/2024 1345    Comment: TLSO education and spinal precautions  Education on use of AD and fall risk prevention  Reacher and compensatory techniques for LB ADL's  OT benefits education                                         User Key       Initials Effective Dates Name Provider Type Discipline    EG 09/14/22 -  Karely Hensley OT Occupational Therapist OT                  OT Recommendation and Plan  Planned Therapy Interventions (OT): activity tolerance training, functional balance retraining, occupation/activity based interventions, adaptive equipment training, BADL retraining, neuromuscular control/coordination retraining, patient/caregiver education/training, transfer/mobility retraining, strengthening exercise  Therapy Frequency (OT): 5 times/wk  Plan of Care Review  Plan of Care Reviewed With: patient  Progress: no change  Outcome Evaluation: Patient NPO today due to EGD procedure later in day; Patient requires encouragement for OOB activity, continues to be noncompliant with getting up OOB outside of therapy tasks; Patient demonstrating no true functional gains for performance in ADL's at this time; Ax1 for transfers and mobilit using RW; inconsistent performance for mobility endurance at this  time; OT continue POC.     Time Calculation:   Evaluation Complexity (OT)  Review Occupational Profile/Medical/Therapy History Complexity: expanded/moderate complexity  Assessment, Occupational Performance/Identification of Deficit Complexity: 3-5 performance deficits  Clinical Decision Making Complexity (OT): detailed assessment/moderate complexity  Overall Complexity of Evaluation (OT): moderate complexity     Time Calculation- OT       Row Name 10/09/24 0824             Time Calculation- OT    OT Received On 10/09/24  -EG      OT Goal Re-Cert Due Date 11/02/24  -EG         Timed Charges    06245 - OT Therapeutic Exercise Minutes 12  -EG      41890 - OT Therapeutic Activity Minutes 18  -EG      46712 - OT Self Care/Mgmt Minutes 11  -EG         SNF Occupational Therapy Minutes    Skilled Minutes- OT 41 min  -EG         Total Minutes    Timed Charges Total Minutes 41  -EG       Total Minutes 41  -EG                User Key  (r) = Recorded By, (t) = Taken By, (c) = Cosigned By      Initials Name Provider Type    EG Karely Hensley OT Occupational Therapist                  Therapy Charges for Today       Code Description Service Date Service Provider Modifiers Qty    02298160327 HC OT THER PROC EA 15 MIN 10/8/2024 Karely Hensley OT GO 1    09219867093 HC OT THERAPEUTIC ACT EA 15 MIN 10/8/2024 Karely Hensley, OT GO 1    27254255845 HC OT SELF CARE/MGMT/TRAIN EA 15 MIN 10/8/2024 Karely Hensley OT GO 1    33033231421 HC OT THER PROC EA 15 MIN 10/9/2024 Karely Hensley OT GO 1    29432942755 HC OT THERAPEUTIC ACT EA 15 MIN 10/9/2024 Karely Hensley OT GO 1    33507879552 HC OT SELF CARE/MGMT/TRAIN EA 15 MIN 10/9/2024 Karely Hensley, OT GO 1                 Karely Hensley OT  10/9/2024

## 2024-10-10 ENCOUNTER — APPOINTMENT (OUTPATIENT)
Dept: CT IMAGING | Facility: HOSPITAL | Age: 84
DRG: 439 | End: 2024-10-10
Payer: MEDICARE

## 2024-10-10 LAB
ALBUMIN SERPL-MCNC: 2.6 G/DL (ref 3.5–5.2)
ANION GAP SERPL CALCULATED.3IONS-SCNC: 10.5 MMOL/L (ref 5–15)
BASOPHILS # BLD AUTO: 0.11 10*3/MM3 (ref 0–0.2)
BASOPHILS NFR BLD AUTO: 0.9 % (ref 0–1.5)
BUN SERPL-MCNC: 17 MG/DL (ref 8–23)
BUN/CREAT SERPL: 22.4 (ref 7–25)
CALCIUM SPEC-SCNC: 8.9 MG/DL (ref 8.6–10.5)
CHLORIDE SERPL-SCNC: 100 MMOL/L (ref 98–107)
CO2 SERPL-SCNC: 30.5 MMOL/L (ref 22–29)
CREAT SERPL-MCNC: 0.76 MG/DL (ref 0.76–1.27)
CYTO UR: NORMAL
DEPRECATED RDW RBC AUTO: 70.9 FL (ref 37–54)
EGFRCR SERPLBLD CKD-EPI 2021: 89.7 ML/MIN/1.73
EOSINOPHIL # BLD AUTO: 0.31 10*3/MM3 (ref 0–0.4)
EOSINOPHIL NFR BLD AUTO: 2.5 % (ref 0.3–6.2)
ERYTHROCYTE [DISTWIDTH] IN BLOOD BY AUTOMATED COUNT: 23.9 % (ref 12.3–15.4)
FUNGUS WND CULT: NORMAL
GLUCOSE BLDC GLUCOMTR-MCNC: 168 MG/DL (ref 70–99)
GLUCOSE BLDC GLUCOMTR-MCNC: 187 MG/DL (ref 70–99)
GLUCOSE BLDC GLUCOMTR-MCNC: 187 MG/DL (ref 70–99)
GLUCOSE BLDC GLUCOMTR-MCNC: 201 MG/DL (ref 70–99)
GLUCOSE SERPL-MCNC: 141 MG/DL (ref 65–99)
HCT VFR BLD AUTO: 35.3 % (ref 37.5–51)
HGB BLD-MCNC: 10.6 G/DL (ref 13–17.7)
IMM GRANULOCYTES # BLD AUTO: 0.58 10*3/MM3 (ref 0–0.05)
IMM GRANULOCYTES NFR BLD AUTO: 4.7 % (ref 0–0.5)
LAB AP CASE REPORT: NORMAL
LAB AP CLINICAL INFORMATION: NORMAL
LYMPHOCYTES # BLD AUTO: 1.39 10*3/MM3 (ref 0.7–3.1)
LYMPHOCYTES NFR BLD AUTO: 11.3 % (ref 19.6–45.3)
MAGNESIUM SERPL-MCNC: 1.9 MG/DL (ref 1.6–2.4)
MCH RBC QN AUTO: 24.9 PG (ref 26.6–33)
MCHC RBC AUTO-ENTMCNC: 30 G/DL (ref 31.5–35.7)
MCV RBC AUTO: 82.9 FL (ref 79–97)
MONOCYTES # BLD AUTO: 1.53 10*3/MM3 (ref 0.1–0.9)
MONOCYTES NFR BLD AUTO: 12.4 % (ref 5–12)
MYCOBACTERIUM SPEC CULT: NORMAL
NEUTROPHILS NFR BLD AUTO: 68.2 % (ref 42.7–76)
NEUTROPHILS NFR BLD AUTO: 8.38 10*3/MM3 (ref 1.7–7)
NIGHT BLUE STAIN TISS: NORMAL
NRBC BLD AUTO-RTO: 0 /100 WBC (ref 0–0.2)
PATH REPORT.FINAL DX SPEC: NORMAL
PATH REPORT.GROSS SPEC: NORMAL
PHOSPHATE SERPL-MCNC: 3.6 MG/DL (ref 2.5–4.5)
PLATELET # BLD AUTO: 472 10*3/MM3 (ref 140–450)
PMV BLD AUTO: 9 FL (ref 6–12)
POTASSIUM SERPL-SCNC: 3.3 MMOL/L (ref 3.5–5.2)
PROCALCITONIN SERPL-MCNC: 0.08 NG/ML (ref 0–0.25)
RBC # BLD AUTO: 4.26 10*6/MM3 (ref 4.14–5.8)
SARS-COV-2 RNA RESP QL NAA+PROBE: NOT DETECTED
SODIUM SERPL-SCNC: 141 MMOL/L (ref 136–145)
WBC NRBC COR # BLD AUTO: 12.3 10*3/MM3 (ref 3.4–10.8)

## 2024-10-10 PROCEDURE — 83690 ASSAY OF LIPASE: CPT | Performed by: PHYSICIAN ASSISTANT

## 2024-10-10 PROCEDURE — 97110 THERAPEUTIC EXERCISES: CPT

## 2024-10-10 PROCEDURE — 0 IOHEXOL 12 MG/ML SOLUTION: Performed by: PHYSICIAN ASSISTANT

## 2024-10-10 PROCEDURE — 83735 ASSAY OF MAGNESIUM: CPT | Performed by: PHYSICIAN ASSISTANT

## 2024-10-10 PROCEDURE — 25010000002 ONDANSETRON PER 1 MG: Performed by: PHYSICIAN ASSISTANT

## 2024-10-10 PROCEDURE — 63710000001 ONDANSETRON ODT 4 MG TABLET DISPERSIBLE: Performed by: PHYSICIAN ASSISTANT

## 2024-10-10 PROCEDURE — 84145 PROCALCITONIN (PCT): CPT | Performed by: PHYSICIAN ASSISTANT

## 2024-10-10 PROCEDURE — 82948 REAGENT STRIP/BLOOD GLUCOSE: CPT | Performed by: PHYSICIAN ASSISTANT

## 2024-10-10 PROCEDURE — 85025 COMPLETE CBC W/AUTO DIFF WBC: CPT | Performed by: PHYSICIAN ASSISTANT

## 2024-10-10 PROCEDURE — 74176 CT ABD & PELVIS W/O CONTRAST: CPT

## 2024-10-10 PROCEDURE — 25810000003 LACTATED RINGERS PER 1000 ML: Performed by: PHYSICIAN ASSISTANT

## 2024-10-10 PROCEDURE — 97530 THERAPEUTIC ACTIVITIES: CPT

## 2024-10-10 PROCEDURE — 80069 RENAL FUNCTION PANEL: CPT | Performed by: PHYSICIAN ASSISTANT

## 2024-10-10 PROCEDURE — 99309 SBSQ NF CARE MODERATE MDM 30: CPT | Performed by: PHYSICIAN ASSISTANT

## 2024-10-10 PROCEDURE — 63710000001 INSULIN LISPRO (HUMAN) PER 5 UNITS: Performed by: PHYSICIAN ASSISTANT

## 2024-10-10 PROCEDURE — 87635 SARS-COV-2 COVID-19 AMP PRB: CPT | Performed by: PHYSICIAN ASSISTANT

## 2024-10-10 RX ORDER — POTASSIUM CHLORIDE 750 MG/1
30 CAPSULE, EXTENDED RELEASE ORAL ONCE
Status: COMPLETED | OUTPATIENT
Start: 2024-10-10 | End: 2024-10-10

## 2024-10-10 RX ORDER — ONDANSETRON 2 MG/ML
4 INJECTION INTRAMUSCULAR; INTRAVENOUS EVERY 6 HOURS PRN
Status: DISCONTINUED | OUTPATIENT
Start: 2024-10-10 | End: 2024-10-11 | Stop reason: HOSPADM

## 2024-10-10 RX ORDER — METOCLOPRAMIDE HYDROCHLORIDE 5 MG/ML
5 INJECTION INTRAMUSCULAR; INTRAVENOUS EVERY 6 HOURS PRN
Status: DISCONTINUED | OUTPATIENT
Start: 2024-10-10 | End: 2024-10-11 | Stop reason: HOSPADM

## 2024-10-10 RX ORDER — SODIUM CHLORIDE, SODIUM LACTATE, POTASSIUM CHLORIDE, CALCIUM CHLORIDE 600; 310; 30; 20 MG/100ML; MG/100ML; MG/100ML; MG/100ML
50 INJECTION, SOLUTION INTRAVENOUS CONTINUOUS
Status: DISCONTINUED | OUTPATIENT
Start: 2024-10-10 | End: 2024-10-11

## 2024-10-10 RX ORDER — PANTOPRAZOLE SODIUM 40 MG/10ML
40 INJECTION, POWDER, LYOPHILIZED, FOR SOLUTION INTRAVENOUS
Status: DISCONTINUED | OUTPATIENT
Start: 2024-10-10 | End: 2024-10-11 | Stop reason: HOSPADM

## 2024-10-10 RX ADMIN — Medication 800 MG: at 08:19

## 2024-10-10 RX ADMIN — SUCRALFATE 1 G: 1 TABLET ORAL at 17:47

## 2024-10-10 RX ADMIN — GUAIFENESIN 1200 MG: 600 TABLET ORAL at 09:57

## 2024-10-10 RX ADMIN — PANTOPRAZOLE SODIUM 40 MG: 40 TABLET, DELAYED RELEASE ORAL at 09:57

## 2024-10-10 RX ADMIN — POTASSIUM CHLORIDE 20 MEQ: 750 CAPSULE, EXTENDED RELEASE ORAL at 08:19

## 2024-10-10 RX ADMIN — ONDANSETRON 4 MG: 4 TABLET, ORALLY DISINTEGRATING ORAL at 10:43

## 2024-10-10 RX ADMIN — INSULIN LISPRO 3 UNITS: 100 INJECTION, SOLUTION INTRAVENOUS; SUBCUTANEOUS at 17:47

## 2024-10-10 RX ADMIN — APIXABAN 5 MG: 5 TABLET, FILM COATED ORAL at 22:34

## 2024-10-10 RX ADMIN — EMPAGLIFLOZIN 10 MG: 10 TABLET, FILM COATED ORAL at 09:57

## 2024-10-10 RX ADMIN — CLOPIDOGREL BISULFATE 75 MG: 75 TABLET ORAL at 09:57

## 2024-10-10 RX ADMIN — ROSUVASTATIN 20 MG: 20 TABLET, FILM COATED ORAL at 22:34

## 2024-10-10 RX ADMIN — PANTOPRAZOLE SODIUM 40 MG: 40 INJECTION, POWDER, FOR SOLUTION INTRAVENOUS at 18:32

## 2024-10-10 RX ADMIN — SUCRALFATE 1 G: 1 TABLET ORAL at 08:19

## 2024-10-10 RX ADMIN — BUDESONIDE AND FORMOTEROL FUMARATE DIHYDRATE 2 PUFF: 160; 4.5 AEROSOL RESPIRATORY (INHALATION) at 10:01

## 2024-10-10 RX ADMIN — ONDANSETRON 4 MG: 2 INJECTION INTRAMUSCULAR; INTRAVENOUS at 20:45

## 2024-10-10 RX ADMIN — INSULIN LISPRO 2 UNITS: 100 INJECTION, SOLUTION INTRAVENOUS; SUBCUTANEOUS at 08:19

## 2024-10-10 RX ADMIN — IOHEXOL 500 ML: 12 SOLUTION ORAL at 16:33

## 2024-10-10 RX ADMIN — Medication 1 TABLET: at 09:57

## 2024-10-10 RX ADMIN — DILTIAZEM HYDROCHLORIDE 180 MG: 180 CAPSULE, EXTENDED RELEASE ORAL at 09:57

## 2024-10-10 RX ADMIN — CARVEDILOL 25 MG: 25 TABLET, FILM COATED ORAL at 18:06

## 2024-10-10 RX ADMIN — SUCRALFATE 1 G: 1 TABLET ORAL at 12:16

## 2024-10-10 RX ADMIN — SODIUM CHLORIDE, POTASSIUM CHLORIDE, SODIUM LACTATE AND CALCIUM CHLORIDE 50 ML/HR: 600; 310; 30; 20 INJECTION, SOLUTION INTRAVENOUS at 15:45

## 2024-10-10 RX ADMIN — SENNOSIDES AND DOCUSATE SODIUM 2 TABLET: 50; 8.6 TABLET ORAL at 09:57

## 2024-10-10 RX ADMIN — INSULIN LISPRO 2 UNITS: 100 INJECTION, SOLUTION INTRAVENOUS; SUBCUTANEOUS at 22:39

## 2024-10-10 RX ADMIN — POTASSIUM CHLORIDE 30 MEQ: 750 CAPSULE, EXTENDED RELEASE ORAL at 09:56

## 2024-10-10 RX ADMIN — CARVEDILOL 25 MG: 25 TABLET, FILM COATED ORAL at 08:19

## 2024-10-10 RX ADMIN — FUROSEMIDE 40 MG: 40 TABLET ORAL at 09:57

## 2024-10-10 RX ADMIN — APIXABAN 5 MG: 5 TABLET, FILM COATED ORAL at 09:57

## 2024-10-10 NOTE — THERAPY TREATMENT NOTE
SNF - Occupational Therapy Treatment Note  AMELIE Estes    Patient Name: Ion PROCTOR Case  : 1940    MRN: [id number]                              Today's Date: 10/10/2024       Admit Date: 10/2/2024    Visit Dx:     ICD-10-CM ICD-9-CM   1. Difficulty walking  R26.2 719.7   2. Decreased activities of daily living (ADL)  Z78.9 V49.89   3. Epigastric pain  R10.13 789.06     Patient Active Problem List   Diagnosis    Hyperlipidemia LDL goal <70    Vitamin D deficiency    Atrial fibrillation, persistent    Essential hypertension    Chronic superficial gastritis without bleeding    Chronic right flank pain    Adenomatous polyp of colon    Left-sided chest wall pain    Type 2 diabetes mellitus without complication, without long-term current use of insulin    CAD S/P percutaneous coronary angioplasty    Weight loss    Medicare annual wellness visit, subsequent    Chronic heart failure with preserved ejection fraction (HFpEF)    Prerenal azotemia    Elevated liver enzymes    Cellulitis of left lower extremity    Stage 3a chronic kidney disease    Absent peripheral pulse    History of colon polyps    Venous insufficiency of both lower extremities    Pelvic fracture    Multifocal pneumonia    Left upper quadrant pain    Closed wedge compression fracture of T8 vertebra with delayed healing    Pleural effusion    Debility    Epigastric pain     Past Medical History:   Diagnosis Date    Cataract     Removed    Chronic heart failure with preserved ejection fraction (HFpEF) 2023    Colon polyp     Removed     Coronary artery disease     Degeneration of lumbosacral intervertebral disc 2020    Diverticulosis     Elevated cholesterol     Essential hypertension 10/04/2021    Facet arthropathy, lumbar 2020    GERD (gastroesophageal reflux disease)     HL (hearing loss)     Hyperlipidemia LDL goal <100 2021    Kidney stone     Lumbar herniated disc 2020    (R) L3-4    Lumbar spinal stenosis  08/04/2020    Paroxysmal atrial fibrillation 10/04/2021    Pneumonia     Spinal headache     post 3 injections in back     Trigger finger of right thumb 11/01/2017    all fingers both hands     Past Surgical History:   Procedure Laterality Date    BACK SURGERY      CARDIAC CATHETERIZATION N/A 08/23/2022    Procedure: LEFT HEART CATH with coronary artery angiography and right heart catheterization possible percutaneous intervention possible closure device;  Surgeon: Emili Cheng MD;  Location: [facility] CATH INVASIVE LOCATION;  Service: Cardiovascular;  Laterality: N/A;    CAROTID STENT      COLONOSCOPY  2017    Dr. Oneal    COLONOSCOPY N/A 10/06/2021    Procedure: COLONOSCOPY WITH BIOPSIES, POLYPECTOMY;  Surgeon: Vazquez Estrada MD;  Location: [facility] ENDOSCOPY;  Service: Gastroenterology;  Laterality: N/A;  COLON POLYPS, DIVERTICULOSIS, HEMORRHOIDS    COLONOSCOPY N/A 7/13/2023    Procedure: COLONOSCOPY with polypectomy with cold snare;  Surgeon: Vazquez Estrada MD;  Location: [facility] ENDOSCOPY;  Service: Gastroenterology;  Laterality: N/A;  colon polyps, diverticulosis, hemorrhoids    CORONARY ANGIOPLASTY      ENDOSCOPY N/A 10/06/2021    Procedure: ESOPHAGOGASTRODUODENOSCOPY WITH BIOPSIES;  Surgeon: Vazquez Estrada MD;  Location: [facility] ENDOSCOPY;  Service: Gastroenterology;  Laterality: N/A;  GASTRITIS    ENDOSCOPY N/A 7/13/2023    Procedure: ESOPHAGOGASTRODUODENOSCOPY with biopsies, with dilation with 15- 18 mm balloon;  Surgeon: Vazquez Estrada MD;  Location: [facility] ENDOSCOPY;  Service: Gastroenterology;  Laterality: N/A;  hiatal hernia, gastric polyp, schatzki's ring    ENDOSCOPY N/A 10/9/2024    Procedure: ESOPHAGOGASTRODUODENOSCOPY WITH BIOPSIES AND COLD SNARE POLYPECTOMY;  Surgeon: Vazquez Estrada MD;  Location: [facility] ENDOSCOPY;  Service: Gastroenterology;  Laterality: N/A;  GASTRITIS, GASTRIC POLYP AND HIATAL HERNIA    KIDNEY STONE SURGERY      Unspecified    LUMBAR DISC SURGERY  Right 06/26/2020    L3-4  Minimally Invasive    TRIGGER FINGER RELEASE Bilateral     UPPER GASTROINTESTINAL ENDOSCOPY  10/06/2021    Dr. Estrada      General Information       Row Name 10/10/24 1116          OT Time and Intention    Document Type therapy note (daily note)  -EG     Mode of Treatment individual therapy;occupational therapy  -EG       Row Name 10/10/24 1116          General Information    Existing Precautions/Restrictions fall;spinal;TLSO;brace worn when out of bed  -EG       Row Name 10/10/24 1116          Cognition    Orientation Status (Cognition) oriented x 3  -EG       Row Name 10/10/24 1116          Safety Issues, Functional Mobility    Impairments Affecting Function (Mobility) balance;endurance/activity tolerance;strength  -EG               User Key  (r) = Recorded By, (t) = Taken By, (c) = Cosigned By      Initials Name Provider Type    EG Karely Hensley, ROLANDO Occupational Therapist                     Mobility/ADL's       Row Name 10/10/24 1117          Bed Mobility    Bed Mobility supine-sit-supine  -EG     Supine-Sit-Supine [place] (Bed Mobility) contact guard;minimum assist (75% patient effort);verbal cues  -EG     Bed Mobility, Safety Issues decreased use of legs for bridging/pushing  -EG     Assistive Device (Bed Mobility) bed rails;head of bed elevated  -EG     Comment, (Bed Mobility) Patient continues to decline OOB activity; only up to go to bathroom with OT this date; request to return right back to bed despite education and encouragement multiple times this date  -EG       Row Name 10/10/24 1117          Transfers    Transfers sit-stand transfer;stand-sit transfer;toilet transfer  -EG       Row Name 10/10/24 1117          Sit-Stand Transfer    Sit-Stand [place] (Transfers) contact guard;verbal cues;1 person assist  -EG     Assistive Device (Sit-Stand Transfers) walker, front-wheeled  -EG       Row Name 10/10/24 1117          Stand-Sit Transfer    Stand-Sit [place]  (Transfers) contact guard;verbal cues;1 person assist  -EG     Assistive Device (Stand-Sit Transfers) walker, front-wheeled  -EG       Row Name 10/10/24 1117          Toilet Transfer    [place] Level (Toilet Transfer) contact guard  -EG     Assistive Device (Toilet Transfer) raised toilet seat;walker, front-wheeled;grab bars/safety frame  -EG       Row Name 10/10/24 1117          Functional Mobility    Functional Mobility- Ind. Level contact guard assist  -EG     Functional Mobility- Device walker, front-wheeled  -EG     Functional Mobility- Comment Patient performed mobility to and from bathroom in room using RW; Begins to perform to gym then states unable to and needs to return to bed; continues to complain of nausea and inability to perform and participate in most OOB activities  -EG       Row Name 10/10/24 1117          Activities of Daily Living    BADL Assessment/Intervention toileting;grooming;upper body dressing  -EG       Row Name 10/10/24 1117          Upper Body Dressing Assessment/Training    Comment, (Upper Body Dressing) TD to don TLSO brace  -EG       Row Name 10/10/24 1117          Grooming Assessment/Training    [place] Level (Grooming) grooming skills;wash face, hands  -EG       Row Name 10/10/24 1117          Toileting Assessment/Training    [place] Level (Toileting) toileting skills;contact guard assist;nonverbal cues (demo/gesture);verbal cues  -EG     Assistive Devices (Toileting) grab bar/safety frame;commode chair  -EG               User Key  (r) = Recorded By, (t) = Taken By, (c) = Cosigned By      Initials Name Provider Type    EG Karely Hensley OT Occupational Therapist                   Obj/Interventions       Row Name 10/10/24 1119          Vision Assessment/Intervention    Visual Impairment/Limitations WFL  -EG       Row Name 10/10/24 1119          Motor Skills    Motor Skills functional endurance  -EG     Functional Endurance Poor+ on room air  -EG     Therapeutic  Exercise --  Declines exercise participation multiple times this date despite encouragement and education  -EG       Row Name 10/10/24 1119          Balance    Balance Interventions standing;sit to stand;supported;static;weight shifting activity;occupation based/functional task  -EG               User Key  (r) = Recorded By, (t) = Taken By, (c) = Cosigned By      Initials Name Provider Type    Karely Oseguera OT Occupational Therapist                   Goals/Plan    No documentation.                  Clinical Impression       Row Name 10/10/24 1120          Plan of Care Review    Plan of Care Reviewed With patient  -EG     Progress no change  -EG     Outcome Evaluation Patient continues to complain of increased nausea, states he has diarrhea but per nursing all stools have been formed with no major concern; Patient educated on need for OOB activity and progress for OT services; OT continue POC, discuss concerns in meeting for progress; Minimal tolerance for sessions; Ax1 w/RW.  -EG       Row Name 10/10/24 1120          Therapy Assessment/Plan (OT)    Rehab Potential (OT) fair, will monitor progress closely  -EG     Criteria for Skilled Therapeutic Interventions Met (OT) yes;meets criteria;skilled treatment is necessary  -EG     Therapy Frequency (OT) 5 times/wk  -EG       Row Name 10/10/24 1120          Therapy Plan Review/Discharge Plan (OT)    Anticipated Discharge Disposition (OT) home with home health  -EG       Row Name 10/10/24 1120          Positioning and Restraints    Pre-Treatment Position in bed  -EG     Post Treatment Position bed  -EG     In Bed supine;encouraged to call for assist;call light within reach;exit alarm on  -EG               User Key  (r) = Recorded By, (t) = Taken By, (c) = Cosigned By      Initials Name Provider Type    Karely Oseguera OT Occupational Therapist                   Outcome Measures       Row Name 10/10/24 1126          How much help from another is currently  needed...    Putting on and taking off regular lower body clothing? 2  -EG     Bathing (including washing, rinsing, and drying) 2  -EG     Toileting (which includes using toilet bed pan or urinal) 3  -EG     Putting on and taking off regular upper body clothing 3  -EG     Taking care of personal grooming (such as brushing teeth) 4  -EG     Eating meals 4  -EG     AM-PAC 6 Clicks Score (OT) 18  -EG       Row Name 10/10/24 0902 10/09/24 8885       How much help from another person do you currently need...    Turning from your back to your side while in flat bed without using bedrails? 3  -WM 3  -BR    Moving from lying on back to sitting on the side of a flat bed without bedrails? 3  -WM 3  -BR    Moving to and from a bed to a chair (including a wheelchair)? 3  -WM 3  -BR    Standing up from a chair using your arms (e.g., wheelchair, bedside chair)? 3  -WM 3  -BR    Climbing 3-5 steps with a railing? 2  -WM 2  -BR    To walk in hospital room? 3  -WM 3  -BR    AM-PAC 6 Clicks Score (PT) 17  -WM 17  -BR    Highest Level of Mobility Goal 5 --> Static standing  -WM 5 --> Static standing  -BR      Row Name 10/10/24 1126          Functional Assessment    Outcome Measure Options AM-[facility] 6 Clicks Daily Activity (OT);Optimal Instrument  -EG       Row Name 10/10/24 1126          Optimal Instrument    Optimal Instrument Optimal - 3  -EG     Bending/Stooping 5  -EG     Standing 2  -EG     Reaching 1  -EG               User Key  (r) = Recorded By, (t) = Taken By, (c) = Cosigned By      Initials Name Provider Type    Taya Morales, RN Registered Nurse    Brenton Rutledge PTA Physical Therapist Assistant    Karely Oseguera OT Occupational Therapist                  Section G  Mobility  Bed mobility - self performance: limited assistance (staff provide guided maneuvering of limbs or other non-weight bearing assistance)  Bed mobility support/assistance: One person assist  Transfer - self performance: limited assistance  (staff provide guided maneuvering of limbs or other non-weight bearing assistance)  Transfer support/assistance: One person assist  Walking in room - self performance: limited assistance (staff provide guided maneuvering of limbs or other non-weight bearing assistance)  Walking in room support/assistance: One person assist  Walking in corridors/hallway - self performance: limited assistance (staff provide guided maneuvering of limbs or other non-weight bearing assistance)  Walking in corridors/hallway support/assistance: One person assist  Locomotion on unit - self performance: limited assistance (staff provide guided maneuvering of limbs or other non-weight bearing assistance)  Locomotion on unit support/assistance: One person assist  Locomotion off unit - self performance: activity did not occur  Locomotion off unit support/assistance: Activity did not occur  Dressing - self performance: limited assistance (staff provide guided maneuvering of limbs or other non-weight bearing assistance)  Dressing support/assistance: One person assist  Eating - self performance: independent  Eating support/assistance: Setup help only  Toileting - self performance: total dependence (full staff performance)  Personal hygiene - self performance: limited assistance (staff provide guided maneuvering of limbs or other non-weight bearing assistance)  Personal hygiene support/assistance: One person assist  Bathing  Bathing - self performance: Physical help with bathing (exclude washing back and hair for patient)  Bathing support/assistance: One person assist  Balance  Balance during transitions & walking: Not steady, requires assist to steady  Moving from seated to standing position: Not steady, requires assist to steady  Walking: Not steady, requires assist to steady  Turning around while walking: Not steady, requires assist to steady  Moving on and off toilet: Not steady, requires assist to steady  Surface-to-surface transfer: Not  steady, requires assist to steady  Mobility devices: Walker  Range of Motion  Upper Extremity: No impairment  Lower Extremity: No impairment  Section GG  Functional Ability/Goals, Adm (Section GG)  Self Care, Prior Functioning (KA3030G): 3. Independent  Functional Cognition, Prior Functioning (GY2373A): 3. Independent  Prior Device Use (VQ5568): none of the above (Z)  Upper Extremity Range of Motion (LN1975Y): No impairment  Lower Extremity Range of Motion (OR2059Q): No impairment  Self Care, Admission (Section GG)  Eating: Self-Care Admission Performance ([id number]): setup or clean-up assistance (05)  Oral Hygiene: Self-Care Admission Performance (FX2957E3): setup or clean-up assistance (05)  Toileting Hygiene: Self-Care Admission Performance (HA6065W8): dependent (01)  Shower/Bathe Self: Self-Care Admission Performance (ZA2463N7): partial/moderate assistance (03)  Upper Body Dressing: Self-Care Admission Performance (MV9410Q1): supervision or touching assistance (04)  Lower Body Dressing: Self-Care Admission Performance (PH5821D0): partial/moderate assistance (03)  Putting On/Taking Off Footwear: Self-Care Admission Performance (GU4483J6): partial/moderate assistance (03)  Personal Hygiene: Self-Care Admission Performance (FM1537F9): supervision or touching assistance (04)  Mobility, Admission Performance (FE0006)  Toilet Transfer: Mobility Admission Performance (LN4829P1): supervision or touching assistance (04)  Tub/shower Transfer: Mobility Admission Performance ([id number]): partial/moderate assistance (03)                Occupational Therapy Education       Title: PT OT SLP Therapies (In Progress)       Topic: Occupational Therapy (In Progress)       Point: ADL training (In Progress)       Description:   Instruct learner(s) on proper safety adaptation and remediation techniques during self care or transfers.   Instruct in proper use of assistive devices.                  Learning Progress Summary              Patient Eager, E, NR by EG at 10/3/2024 1345    Comment: TLSO education and spinal precautions  Education on use of AD and fall risk prevention  Reacher and compensatory techniques for LB ADL's  OT benefits education                         Point: Home exercise program (In Progress)       Description:   Instruct learner(s) on appropriate technique for monitoring, assisting and/or progressing therapeutic exercises/activities.                  Learning Progress Summary             Patient Eager, E, NR by EG at 10/3/2024 1345    Comment: TLSO education and spinal precautions  Education on use of AD and fall risk prevention  Reacher and compensatory techniques for LB ADL's  OT benefits education                         Point: Precautions (In Progress)       Description:   Instruct learner(s) on prescribed precautions during self-care and functional transfers.                  Learning Progress Summary             Patient Eager, E, NR by EG at 10/3/2024 1345    Comment: TLSO education and spinal precautions  Education on use of AD and fall risk prevention  Reacher and compensatory techniques for LB ADL's  OT benefits education                         Point: Body mechanics (In Progress)       Description:   Instruct learner(s) on proper positioning and spine alignment during self-care, functional mobility activities and/or exercises.                  Learning Progress Summary             Patient Eager, E, NR by EG at 10/3/2024 1345    Comment: TLSO education and spinal precautions  Education on use of AD and fall risk prevention  Reacher and compensatory techniques for LB ADL's  OT benefits education                                         User Key       Initials Effective Dates Name Provider Type Discipline    EG 09/14/22 -  Karely Hensley OT Occupational Therapist OT                  OT Recommendation and Plan  Planned Therapy Interventions (OT): activity tolerance training, functional balance retraining,  occupation/activity based interventions, adaptive equipment training, BADL retraining, neuromuscular control/coordination retraining, patient/caregiver education/training, transfer/mobility retraining, strengthening exercise  Therapy Frequency (OT): 5 times/wk  Plan of Care Review  Plan of Care Reviewed With: patient  Progress: no change  Outcome Evaluation: Patient continues to complain of increased nausea, states he has diarrhea but per nursing all stools have been formed with no major concern; Patient educated on need for OOB activity and progress for OT services; OT continue POC, discuss concerns in meeting for progress; Minimal tolerance for sessions; Ax1 w/RW.     Time Calculation:   Evaluation Complexity (OT)  Review Occupational Profile/Medical/Therapy History Complexity: expanded/moderate complexity  Assessment, Occupational Performance/Identification of Deficit Complexity: 3-5 performance deficits  Clinical Decision Making Complexity (OT): detailed assessment/moderate complexity  Overall Complexity of Evaluation (OT): moderate complexity     Time Calculation- OT       Row Name 10/10/24 1127 10/10/24 0851          Time Calculation- OT    OT Received On 10/10/24  -EG --     OT Goal Re-Cert Due Date 11/02/24  -EG --        Timed Charges    58074 - Gait Training Minutes  -- 6  -WM     84094 - OT Therapeutic Activity Minutes 8  -EG --     53158 - OT Self Care/Mgmt Minutes 8  -EG --        SNF Occupational Therapy Minutes    Skilled Minutes- OT 16 min  -EG --        Total Minutes    Timed Charges Total Minutes 16  -EG 6  -WM      Total Minutes 16  -EG 6  -WM               User Key  (r) = Recorded By, (t) = Taken By, (c) = Cosigned By      Initials Name Provider Type    Brenton Rutledge, GEOVANNY Physical Therapist Assistant    Karely Oseguera, OT Occupational Therapist                  Therapy Charges for Today       Code Description Service Date Service Provider Modifiers Qty    [id number]  OT THER PROC EA  15 MIN 10/9/2024 Karely Hensley, OT GO 1    19829982519 HC OT THERAPEUTIC ACT EA 15 MIN 10/9/2024 Karely Hensley, OT GO 1    [id number] HC OT SELF CARE/MGMT/TRAIN EA 15 MIN 10/9/2024 Karely Hensley, OT GO 1    00925624842 HC OT THERAPEUTIC ACT EA 15 MIN 10/10/2024 Karely Hensley, OT GO 1                 Karely Hensley OT  10/10/2024   no tender lymph nodes/no swelling of extremity/no enlarged lymph nodes

## 2024-10-10 NOTE — THERAPY TREATMENT NOTE
SNF - Physical Therapy Treatment Note  AMELIE Estes     Patient Name: Ion PROCTOR Case  : 1940  MRN: [id number]  Today's Date: 10/10/2024      Visit Dx:    ICD-10-CM ICD-9-CM   1. Difficulty walking  R26.2 719.7   2. Decreased activities of daily living (ADL)  Z78.9 V49.89   3. Epigastric pain  R10.13 789.06     Patient Active Problem List   Diagnosis    Hyperlipidemia LDL goal <70    Vitamin D deficiency    Atrial fibrillation, persistent    Essential hypertension    Chronic superficial gastritis without bleeding    Chronic right flank pain    Adenomatous polyp of colon    Left-sided chest wall pain    Type 2 diabetes mellitus without complication, without long-term current use of insulin    CAD S/P percutaneous coronary angioplasty    Weight loss    Medicare annual wellness visit, subsequent    Chronic heart failure with preserved ejection fraction (HFpEF)    Prerenal azotemia    Elevated liver enzymes    Cellulitis of left lower extremity    Stage 3a chronic kidney disease    Absent peripheral pulse    History of colon polyps    Venous insufficiency of both lower extremities    Pelvic fracture    Multifocal pneumonia    Left upper quadrant pain    Closed wedge compression fracture of T8 vertebra with delayed healing    Pleural effusion    Debility    Epigastric pain     Past Medical History:   Diagnosis Date    Cataract     Removed    Chronic heart failure with preserved ejection fraction (HFpEF) 2023    Colon polyp     Removed     Coronary artery disease     Degeneration of lumbosacral intervertebral disc 2020    Diverticulosis     Elevated cholesterol     Essential hypertension 10/04/2021    Facet arthropathy, lumbar 2020    GERD (gastroesophageal reflux disease)     HL (hearing loss)     Hyperlipidemia LDL goal <100 2021    Kidney stone     Lumbar herniated disc 2020    (R) L3-4    Lumbar spinal stenosis 2020    Paroxysmal atrial fibrillation 10/04/2021    Pneumonia      Spinal headache     post 3 injections in back     Trigger finger of right thumb 11/01/2017    all fingers both hands     Past Surgical History:   Procedure Laterality Date    BACK SURGERY      CARDIAC CATHETERIZATION N/A 08/23/2022    Procedure: LEFT HEART CATH with coronary artery angiography and right heart catheterization possible percutaneous intervention possible closure device;  Surgeon: Emili Cheng MD;  Location: [facility] CATH INVASIVE LOCATION;  Service: Cardiovascular;  Laterality: N/A;    CAROTID STENT      COLONOSCOPY  2017    Dr. Oneal    COLONOSCOPY N/A 10/06/2021    Procedure: COLONOSCOPY WITH BIOPSIES, POLYPECTOMY;  Surgeon: Vazquez Estrada MD;  Location: [facility] ENDOSCOPY;  Service: Gastroenterology;  Laterality: N/A;  COLON POLYPS, DIVERTICULOSIS, HEMORRHOIDS    COLONOSCOPY N/A 7/13/2023    Procedure: COLONOSCOPY with polypectomy with cold snare;  Surgeon: Vazquez Estrada MD;  Location: [facility] ENDOSCOPY;  Service: Gastroenterology;  Laterality: N/A;  colon polyps, diverticulosis, hemorrhoids    CORONARY ANGIOPLASTY      ENDOSCOPY N/A 10/06/2021    Procedure: ESOPHAGOGASTRODUODENOSCOPY WITH BIOPSIES;  Surgeon: Vazquez Estrada MD;  Location: [facility] ENDOSCOPY;  Service: Gastroenterology;  Laterality: N/A;  GASTRITIS    ENDOSCOPY N/A 7/13/2023    Procedure: ESOPHAGOGASTRODUODENOSCOPY with biopsies, with dilation with 15- 18 mm balloon;  Surgeon: Vazquez Estrada MD;  Location: [facility] ENDOSCOPY;  Service: Gastroenterology;  Laterality: N/A;  hiatal hernia, gastric polyp, schatzki's ring    ENDOSCOPY N/A 10/9/2024    Procedure: ESOPHAGOGASTRODUODENOSCOPY WITH BIOPSIES AND COLD SNARE POLYPECTOMY;  Surgeon: Vazquez Estrada MD;  Location: [facility] ENDOSCOPY;  Service: Gastroenterology;  Laterality: N/A;  GASTRITIS, GASTRIC POLYP AND HIATAL HERNIA    KIDNEY STONE SURGERY      Unspecified    LUMBAR DISC SURGERY Right 06/26/2020    L3-4  Minimally Invasive    TRIGGER FINGER RELEASE  Bilateral     UPPER GASTROINTESTINAL ENDOSCOPY  10/06/2021    Dr. Estrada       PT Assessment (Last 12 Hours)       PT Evaluation and Treatment       Row Name 10/10/24 0852          Physical Therapy Time and Intention    Document Type therapy note (daily note)  -WM     Mode of Treatment individual therapy;physical therapy  -WM     Patient Effort good  -WM     Symptoms Noted During/After Treatment fatigue;dizziness  -WM       Row Name 10/10/24 0852          Bed Mobility    Supine-Sit [place] (Bed Mobility) minimum assist (75% patient effort);verbal cues;1 person assist  -WM     Sit-Supine [place] (Bed Mobility) contact guard;verbal cues  -WM     Assistive Device (Bed Mobility) bed rails;head of bed elevated  -WM       Row Name 10/10/24 0852          Sit-Stand Transfer    Sit-Stand [place] (Transfers) contact guard;minimum assist (75% patient effort);verbal cues;1 person assist  -WM     Assistive Device (Sit-Stand Transfers) walker, front-wheeled  -WM       Row Name 10/10/24 0852          Stand-Sit Transfer    Stand-Sit [place] (Transfers) contact guard;minimum assist (75% patient effort);verbal cues;1 person assist  -WM     Assistive Device (Stand-Sit Transfers) walker, front-wheeled  -WM       Row Name 10/10/24 0852          Toilet Transfer    [place] Level (Toilet Transfer) contact guard  -WM     Assistive Device (Toilet Transfer) raised toilet seat;walker, front-wheeled  -WM       Row Name 10/10/24 0852          Gait/Stairs (Locomotion)    [place] Level (Gait) contact guard;minimum assist (75% patient effort);verbal cues;1 person assist  -WM     Assistive Device (Gait) walker, front-wheeled  -WM     Distance in Feet (Gait) 60  x2  -WM     Pattern (Gait) 4-point;step-through  -WM     Deviations/Abnormal Patterns (Gait) gait speed decreased;stride length decreased  -WM       Row Name 10/10/24 0852          Safety Issues, Functional Mobility    Impairments Affecting Function (Mobility)  balance;endurance/activity tolerance;strength  -       Row Name 10/10/24 0852          Hip (Therapeutic Exercise)    Hip Strengthening (Therapeutic Exercise) bilateral;aBduction;aDduction;marching while seated;sitting;2 lb free weight;resistance band;green;15 repititions;2 sets  -       Row Name 10/10/24 0852          Knee (Therapeutic Exercise)    Knee Strengthening (Therapeutic Exercise) bilateral;LAQ (long arc quad);hamstring curls;sitting;2 lb free weight;resistance band;green;15 repititions;2 sets  -       Row Name 10/10/24 0852          Ankle (Therapeutic Exercise)    Ankle AROM (Therapeutic Exercise) bilateral;dorsiflexion;plantarflexion;supine;30 repititions  -       Row Name 10/10/24 0852          Aerobic Exercise    Time Performed (Aerobic Exercise) NuStep x 10 minutes, load 3  -       Row Name             Wound 09/25/24 1903 Right anterior knee    Wound - Properties Group Placement Date: 09/25/24  -AP Placement Time: 1903  -AP Present on Original Admission: Y  -AP Side: Right  -AP Orientation: anterior  -AP Location: knee  -AP    Retired Wound - Properties Group Placement Date: 09/25/24  -AP Placement Time: 1903  -AP Present on Original Admission: Y  -AP Side: Right  -AP Orientation: anterior  -AP Location: knee  -AP    Retired Wound - Properties Group Date first assessed: 09/25/24  -AP Time first assessed: 1903  -AP Present on Original Admission: Y  -AP Side: Right  -AP Location: knee  -AP      Row Name             Wound 09/25/24 1906 Right posterior hand Traumatic    Wound - Properties Group Placement Date: 09/25/24  -AP Placement Time: 1906  -AP Side: Right  -AP Orientation: posterior  -AP Location: hand  -AP Primary Wound Type: Traumatic  -AP    Retired Wound - Properties Group Placement Date: 09/25/24  -AP Placement Time: 1906  -AP Side: Right  -AP Orientation: posterior  -AP Location: hand  -AP Primary Wound Type: Traumatic  -AP    Retired Wound - Properties Group Date first assessed:  09/25/24  -AP Time first assessed: 1906  -AP Side: Right  -AP Location: hand  -AP Primary Wound Type: Traumatic  -AP      Row Name             Wound 09/25/24 1920 Left proximal leg Skin Tear    Wound - Properties Group Placement Date: 09/25/24  -SM Placement Time: 1920  -SM Present on Original Admission: Y  -SM Side: Left  -SM Orientation: proximal  -SM Location: leg  -SM Primary Wound Type: Skin tear  -SM    Retired Wound - Properties Group Placement Date: 09/25/24  -SM Placement Time: 1920  -SM Present on Original Admission: Y  -SM Side: Left  -SM Orientation: proximal  -SM Location: leg  -SM Primary Wound Type: Skin tear  -SM    Retired Wound - Properties Group Date first assessed: 09/25/24  -SM Time first assessed: 1920  -SM Present on Original Admission: Y  -SM Side: Left  -SM Location: leg  -SM Primary Wound Type: Skin tear  -SM      Row Name 10/10/24 0852          Positioning and Restraints    Pre-Treatment Position in bed  -WM     Post Treatment Position bed  -WM     In Bed fowlers;call light within reach;encouraged to call for assist  -       Row Name 10/10/24 0852          Progress Summary (PT)    Progress Toward Functional Goals (PT) progress toward functional goals is fair  -WM               User Key  (r) = Recorded By, (t) = Taken By, (c) = Cosigned By      Initials Name Provider Type    Brenton Rutledge PTA Physical Therapist Assistant    Laura Alexis, RN Registered Nurse    Monae Artis RN Registered Nurse                  Section G              Section GG                       Physical Therapy Education       Title: PT OT SLP Therapies (In Progress)       Topic: Physical Therapy (Done)       Point: Mobility training (Done)       Learning Progress Summary             Patient Acceptance, E,TB, VU by AV at 10/3/2024 1522                         Point: Home exercise program (Done)       Learning Progress Summary             Patient Acceptance, E,TB, VU by AV at 10/3/2024 1522                          Point: Body mechanics (Done)       Learning Progress Summary             Patient Acceptance, E,TB, VU by AV at 10/3/2024 1522                         Point: Precautions (Done)       Learning Progress Summary             Patient Acceptance, E,TB, VU by AV at 10/3/2024 1522                                         User Key       Initials Effective Dates Name Provider Type Discipline     06/11/21 -  Luke Limon, PT Physical Therapist PT                  PT Recommendation and Plan     Progress Summary (PT)  Progress Toward Functional Goals (PT): progress toward functional goals is fair   Outcome Measures       Row Name 10/10/24 0902 10/09/24 1600 10/08/24 1000       How much help from another person do you currently need...    Turning from your back to your side while in flat bed without using bedrails? 3  -WM 3  -VK 3  -VK    Moving from lying on back to sitting on the side of a flat bed without bedrails? 3  -WM 3  -VK 2  -VK    Moving to and from a bed to a chair (including a wheelchair)? 3  -WM 3  -VK 3  -VK    Standing up from a chair using your arms (e.g., wheelchair, bedside chair)? 3  -WM 3  -VK 3  -VK    Climbing 3-5 steps with a railing? 2  -WM 2  -VK 2  -VK    To walk in hospital room? 3  -WM 3  -VK 3  -VK    AM-PAC 6 Clicks Score (PT) 17  -WM 17  -VK 16  -VK    Highest Level of Mobility Goal 5 --> Static standing  -WM 5 --> Static standing  -VK 5 --> Static standing  -VK              User Key  (r) = Recorded By, (t) = Taken By, (c) = Cosigned By      Initials Name Provider Type     Brenton De La Garza, GEOVANNY Physical Therapist Assistant     Lidya Dueñas PTA Physical Therapist Assistant                     Time Calculation:    PT Charges       Row Name 10/10/24 0851             Time Calculation    PT Received On 10/10/24  -         Timed Charges    44753 - PT Therapeutic Exercise Minutes 23  -      34046 - Gait Training Minutes  6  -      00242 - PT Therapeutic Activity Minutes 10   -WM         SNF Physical Therapy Minutes    Skilled Minutes- PT 39 min  -WM         Total Minutes    Timed Charges Total Minutes 39  -WM       Total Minutes 39  -WM                User Key  (r) = Recorded By, (t) = Taken By, (c) = Cosigned By      Initials Name Provider Type    Brenton Rutledge PTA Physical Therapist Assistant                      PT G-Codes  Outcome Measure Options: AM-PAC 6 Clicks Daily Activity (OT), Optimal Instrument  AM-PAC 6 Clicks Score (PT): 17  AM-PAC 6 Clicks Score (OT): 18    Brenton De La Garza PTA  10/10/2024

## 2024-10-10 NOTE — PLAN OF CARE
Goal Outcome Evaluation:  Plan of Care Reviewed With: patient           Outcome Evaluation: PT IS ao X 4 and VSS. he is [place]. He is a 1 assist to the bathroom with a walker. Blood sugar was 217 and was covered by insulin. No co pain or discomfort. Will continue with his plan of care. Call light and personal items within reach.

## 2024-10-10 NOTE — PROGRESS NOTES
[facility]   Hospitalist Progress Note       Patient Name: Ion PROCTOR Case  : 1940  MRN: [id number]  Primary Care Physician: Maurice Mixon MD  Date of admission: 10/2/2024  Today's Date: 10/10/2024  Room / Bed:   305/2  Subjective   Chief Complaint: Hospital-acquired weakness     HPI:                 Ion PROCTOR Case is a 83 y.o. male with a past medical history of atrial fibrillation on Eliquis, CAD and prior PCI, CKD stage III, recent hospitalization for COVID-19 infection in late August.  The patient presented to the ED 24 for generalized weakness.  He was hypoxic.  Patient does not use home oxygen. CT chest with contrast showed left pleural effusion and trace right pleural effusion.  Bibasilar densities greater on the left, atelectasis.  Left basilar pneumonia cannot be excluded.  Compression fracture deformity of T8 similar to prior. Subacute fractures involving the ischium and the superior pubic ramus area on the right as well as the symphysis of the pubis area. Admitted for further management of acute hypoxia due to pneumonia and pleural effusion. Pulmonology consulted.  He underwent bedside left sided thoracentesis on 2024 with removal of 200 cc of pleural fluid. Respiratory status improved.  Neurosurgery following for recent T8 compression fracture.  OT was consulted for brace placement as inpatient, was not able to tolerate brace on , encouraged out of bed and to use brace while ambulating.  Also developed left lower leg cellulitis for which patient was switched to IV Zosyn which he tolerated well without side effects/allergic reaction.  Patient's erythema improved.  Patient's respiratory status improved.  Will transition patient to cefdinir to complete a full course of antibiotics.  Patient transition to Symbicort and as needed DuoNebs.  Patient weak and debilitated from hospitalization and is being discharged to the rehab facility today in stable condition.     Interval Followup:  Notified by nursing staff that patient is extremely nauseated unable to take any food down family very concerned patient's daughter at the bedside is very concerned about the patient's decline and ongoing symptoms had EGD yesterday showing gastritis but given ongoing nausea with poor oral intake and other concerns will repeat CT scan abdomen and pelvis with oral contrast only as these are evidently new symptoms per the family.  Additionally we will hold many medications that are unnecessary that could be contributing to his nausea we will add as needed Reglan to help with nausea will also change from oral to IV PPI.    REVIEW OF SYSTEMS:   Gerneralized weakness  Objective   Temp:  [97.5 °F (36.4 °C)-97.9 °F (36.6 °C)] 97.5 °F (36.4 °C)  Heart Rate:  [107-113] 113  Resp:  [16-18] 18  BP: (141-152)/(84-94) 152/88  PHYSICAL EXAM   General: Awake alert oriented no acute distress  Respiratory: Clear  Cardiovascular RRR  GI: Abdomen soft nontender  Results from last 7 days   Lab Units 10/10/24  0506 10/09/24  0441 10/06/24  0948 10/04/24  0441   WBC 10*3/mm3 12.30* 13.09* 13.77* 13.49*   HEMOGLOBIN g/dL 10.6* 10.5* 9.9* 9.6*   HEMATOCRIT % 35.3* 34.3* 32.1* 31.0*   PLATELETS 10*3/mm3 472* 472* 371 334     Results from last 7 days   Lab Units 10/10/24  0506 10/09/24  0441 10/07/24  0431 10/06/24  0948 10/04/24  0441   SODIUM mmol/L 141 141 141 138 138   POTASSIUM mmol/L 3.3* 3.3* 3.5 3.8 3.0*   CO2 mmol/L 30.5* 32.0* 29.7* 27.9 30.1*   CHLORIDE mmol/L 100 100 101 99 99   ANION GAP mmol/L 10.5 9.0 10.3 11.1 8.9   BUN mg/dL 17 12 12 11 13   CREATININE mg/dL 0.76 0.75* 0.68* 0.80 0.77   GLUCOSE mg/dL 141* 114* 119* 163* 115*         COMPLEXITY OF DATA / DECISION MAKING     []  Moderate: One acute illness or mild exacerbation of chronic or 2 stable chronic or tx side effects   []  High:  Severe acute illness or severe exacerbation of chronic - potential for major debility / life threatening         I have personally reviewed  the results from the time of this admission to 10/10/2024 14:48 EDT:  []  Laboratory:  []  Microbiology: []  Radiology:  []  Telemetry:   []  Cardiology/Vascular:  []  Pathology:  []  Prior external records:  []  Independent historian provided additional details:      []  Discussed case with specialists:    []  Independent interpretation of ECG/Imaging etc:             []  Moderate: Rx management, low risk surgery, suboptimal social situation   []  High:  Rx with close monitoring for toxicity, mod-high risk surgery, DNR decision, Comfort initiated, IV pain meds    Assessment / Plan   Assessment:     Hospital-acquired weakness  Recent acute hypoxic respiratory failure  Recent left lower lobe pneumonia, CAP  Recent bilateral pleural effusions  Recent thoracentesis  Hx diastolic CHF  History of T8 compression fracture  History atrial fibrillation (on Eliquis)  History CAD/PCI (Dr. Varghese)  CKD 3  Leukocytosis, likely from steroid injections; has Dows's disease ([facility]/Dr. Massey; failed MTX Imuran; gets steroids bimonthly - periodically.)  Anemia  Hypokalemia, resolved  DM  Hx pelvic fracture  Abdominal pain, nausea, poor oral intake      Plan:     Continued ongoing nausea poor oral intake for the past 4 to 5 days per family underwent EGD showing gastritis yesterday is on PPI and Carafate given ongoing symptoms we will repeat CT scan abdomen pelvis with oral contrast only  Gentle IV fluids x 500 cc  Hold diuretics given poor oral intake  Hold other nonessential medications which can be contributing to nausea  Changed from oral to IV PPI  Hold bowel medicines given multiple bowel movements today if persist will check for C. Difficile   GI consulted EGD completed showing gastritis characterized by congestion/edema and erythema starting PPI once daily with Carafate  Holding Lyrica and low dose fentanyl patch.  Daily PT and OT  Continue home Eliquis and clopidogrel  Home Januvia not on formulary. Give Jardiance  here. Also low dose SSI.  Continue carvedilol and diltiazem  Will need neurosurgery follow-up for T8 fracture  Prior dermatology notes reviewed.  Received Kenalog periodically for Grovelucia.      VTE Prophylaxis:  Pharmacologic VTE prophylaxis orders are present.      CODE STATUS:      Level Of Support Discussed With: Patient  Code Status (Patient has no pulse and is not breathing): CPR (Attempt to Resuscitate)  Medical Interventions (Patient has pulse or is breathing): Full Support  Electronically signed by ARIANA Dominguez, 10/10/24, 2:53 PM EDT.

## 2024-10-11 ENCOUNTER — HOSPITAL ENCOUNTER (INPATIENT)
Facility: HOSPITAL | Age: 84
LOS: 6 days | Discharge: NURSING, REHAB OR LONG-TERM CARE | DRG: 439 | End: 2024-10-18
Attending: FAMILY MEDICINE | Admitting: INTERNAL MEDICINE
Payer: MEDICARE

## 2024-10-11 VITALS
WEIGHT: 160.72 LBS | HEART RATE: 96 BPM | BODY MASS INDEX: 23.06 KG/M2 | DIASTOLIC BLOOD PRESSURE: 81 MMHG | RESPIRATION RATE: 16 BRPM | TEMPERATURE: 98 F | OXYGEN SATURATION: 94 % | SYSTOLIC BLOOD PRESSURE: 136 MMHG

## 2024-10-11 DIAGNOSIS — R26.2 DIFFICULTY WALKING: ICD-10-CM

## 2024-10-11 DIAGNOSIS — I50.32 CHRONIC HEART FAILURE WITH PRESERVED EJECTION FRACTION (HFPEF): ICD-10-CM

## 2024-10-11 DIAGNOSIS — Z00.00 MEDICARE ANNUAL WELLNESS VISIT, SUBSEQUENT: ICD-10-CM

## 2024-10-11 DIAGNOSIS — S22.060G CLOSED WEDGE COMPRESSION FRACTURE OF T8 VERTEBRA WITH DELAYED HEALING: Primary | ICD-10-CM

## 2024-10-11 DIAGNOSIS — S32.9XXA CLOSED NONDISPLACED FRACTURE OF PELVIS, UNSPECIFIED PART OF PELVIS, INITIAL ENCOUNTER: ICD-10-CM

## 2024-10-11 PROBLEM — K85.90 PANCREATITIS: Status: ACTIVE | Noted: 2024-10-11

## 2024-10-11 LAB
ALBUMIN SERPL-MCNC: 2.7 G/DL (ref 3.5–5.2)
ANION GAP SERPL CALCULATED.3IONS-SCNC: 8.6 MMOL/L (ref 5–15)
BUN SERPL-MCNC: 14 MG/DL (ref 8–23)
BUN/CREAT SERPL: 19.7 (ref 7–25)
CALCIUM SPEC-SCNC: 8.8 MG/DL (ref 8.6–10.5)
CHLORIDE SERPL-SCNC: 99 MMOL/L (ref 98–107)
CO2 SERPL-SCNC: 31.4 MMOL/L (ref 22–29)
CREAT SERPL-MCNC: 0.71 MG/DL (ref 0.76–1.27)
EGFRCR SERPLBLD CKD-EPI 2021: 91.6 ML/MIN/1.73
FOLATE SERPL-MCNC: 15.1 NG/ML (ref 4.78–24.2)
GLUCOSE BLDC GLUCOMTR-MCNC: 115 MG/DL (ref 70–99)
GLUCOSE BLDC GLUCOMTR-MCNC: 121 MG/DL (ref 70–99)
GLUCOSE BLDC GLUCOMTR-MCNC: 133 MG/DL (ref 70–99)
GLUCOSE SERPL-MCNC: 102 MG/DL (ref 65–99)
LIPASE SERPL-CCNC: 34 U/L (ref 13–60)
LIPASE SERPL-CCNC: 36 U/L (ref 13–60)
PHOSPHATE SERPL-MCNC: 3.6 MG/DL (ref 2.5–4.5)
POTASSIUM SERPL-SCNC: 3.3 MMOL/L (ref 3.5–5.2)
SODIUM SERPL-SCNC: 139 MMOL/L (ref 136–145)
T4 FREE SERPL-MCNC: 1.35 NG/DL (ref 0.92–1.68)
TSH SERPL DL<=0.05 MIU/L-ACNC: 1.26 UIU/ML (ref 0.27–4.2)
VIT B12 BLD-MCNC: 793 PG/ML (ref 211–946)
WHOLE BLOOD HOLD SPECIMEN: NORMAL

## 2024-10-11 PROCEDURE — 84443 ASSAY THYROID STIM HORMONE: CPT | Performed by: FAMILY MEDICINE

## 2024-10-11 PROCEDURE — 25010000002 POTASSIUM CHLORIDE 10 MEQ/100ML SOLUTION: Performed by: PHYSICIAN ASSISTANT

## 2024-10-11 PROCEDURE — 99316 NF DSCHRG MGMT 30 MIN+: CPT | Performed by: PHYSICIAN ASSISTANT

## 2024-10-11 PROCEDURE — 94799 UNLISTED PULMONARY SVC/PX: CPT

## 2024-10-11 PROCEDURE — 82948 REAGENT STRIP/BLOOD GLUCOSE: CPT | Performed by: FAMILY MEDICINE

## 2024-10-11 PROCEDURE — 82948 REAGENT STRIP/BLOOD GLUCOSE: CPT

## 2024-10-11 PROCEDURE — 82746 ASSAY OF FOLIC ACID SERUM: CPT | Performed by: FAMILY MEDICINE

## 2024-10-11 PROCEDURE — 25810000003 LACTATED RINGERS PER 1000 ML: Performed by: PHYSICIAN ASSISTANT

## 2024-10-11 PROCEDURE — G0378 HOSPITAL OBSERVATION PER HR: HCPCS

## 2024-10-11 PROCEDURE — 25010000002 HYDROMORPHONE 1 MG/ML SOLUTION: Performed by: FAMILY MEDICINE

## 2024-10-11 PROCEDURE — 25810000003 LACTATED RINGERS PER 1000 ML: Performed by: FAMILY MEDICINE

## 2024-10-11 PROCEDURE — 82607 VITAMIN B-12: CPT | Performed by: FAMILY MEDICINE

## 2024-10-11 PROCEDURE — 80069 RENAL FUNCTION PANEL: CPT | Performed by: PHYSICIAN ASSISTANT

## 2024-10-11 PROCEDURE — 94640 AIRWAY INHALATION TREATMENT: CPT

## 2024-10-11 PROCEDURE — 83690 ASSAY OF LIPASE: CPT | Performed by: FAMILY MEDICINE

## 2024-10-11 PROCEDURE — 84439 ASSAY OF FREE THYROXINE: CPT | Performed by: FAMILY MEDICINE

## 2024-10-11 PROCEDURE — 82948 REAGENT STRIP/BLOOD GLUCOSE: CPT | Performed by: PHYSICIAN ASSISTANT

## 2024-10-11 RX ORDER — CLOPIDOGREL BISULFATE 75 MG/1
75 TABLET ORAL DAILY
Status: CANCELLED | OUTPATIENT
Start: 2024-10-12

## 2024-10-11 RX ORDER — ONDANSETRON 4 MG/1
4 TABLET, ORALLY DISINTEGRATING ORAL EVERY 6 HOURS PRN
Status: CANCELLED | OUTPATIENT
Start: 2024-10-11

## 2024-10-11 RX ORDER — SODIUM CHLORIDE 9 MG/ML
40 INJECTION, SOLUTION INTRAVENOUS AS NEEDED
Status: ACTIVE | OUTPATIENT
Start: 2024-10-11 | End: 2024-10-16

## 2024-10-11 RX ORDER — BISMUTH SUBSALICYLATE 262 MG/1
524 TABLET, CHEWABLE ORAL
Status: CANCELLED | OUTPATIENT
Start: 2024-10-11

## 2024-10-11 RX ORDER — FUROSEMIDE 40 MG
40 TABLET ORAL DAILY
Status: CANCELLED | OUTPATIENT
Start: 2024-10-12

## 2024-10-11 RX ORDER — CALCIUM CARBONATE 500 MG/1
2 TABLET, CHEWABLE ORAL 3 TIMES DAILY PRN
Status: CANCELLED | OUTPATIENT
Start: 2024-10-11

## 2024-10-11 RX ORDER — DILTIAZEM HYDROCHLORIDE 180 MG/1
180 CAPSULE, COATED, EXTENDED RELEASE ORAL
Status: CANCELLED | OUTPATIENT
Start: 2024-10-12

## 2024-10-11 RX ORDER — CARVEDILOL 25 MG/1
25 TABLET ORAL 2 TIMES DAILY WITH MEALS
Status: CANCELLED | OUTPATIENT
Start: 2024-10-11

## 2024-10-11 RX ORDER — NICOTINE POLACRILEX 4 MG
15 LOZENGE BUCCAL
Status: DISCONTINUED | OUTPATIENT
Start: 2024-10-11 | End: 2024-10-18 | Stop reason: HOSPADM

## 2024-10-11 RX ORDER — ALUMINA, MAGNESIA, AND SIMETHICONE 2400; 2400; 240 MG/30ML; MG/30ML; MG/30ML
15 SUSPENSION ORAL EVERY 6 HOURS PRN
Status: DISCONTINUED | OUTPATIENT
Start: 2024-10-11 | End: 2024-10-18 | Stop reason: HOSPADM

## 2024-10-11 RX ORDER — SODIUM CHLORIDE 9 MG/ML
40 INJECTION, SOLUTION INTRAVENOUS AS NEEDED
Status: CANCELLED | OUTPATIENT
Start: 2024-10-11 | End: 2024-10-16

## 2024-10-11 RX ORDER — ROSUVASTATIN CALCIUM 20 MG/1
20 TABLET, COATED ORAL NIGHTLY
Status: DISCONTINUED | OUTPATIENT
Start: 2024-10-11 | End: 2024-10-18 | Stop reason: HOSPADM

## 2024-10-11 RX ORDER — CALCIUM CARBONATE 500 MG/1
2 TABLET, CHEWABLE ORAL 3 TIMES DAILY PRN
Status: DISCONTINUED | OUTPATIENT
Start: 2024-10-11 | End: 2024-10-18 | Stop reason: HOSPADM

## 2024-10-11 RX ORDER — DILTIAZEM HYDROCHLORIDE 180 MG/1
180 CAPSULE, COATED, EXTENDED RELEASE ORAL
Status: DISCONTINUED | OUTPATIENT
Start: 2024-10-12 | End: 2024-10-18 | Stop reason: HOSPADM

## 2024-10-11 RX ORDER — SODIUM CHLORIDE, SODIUM LACTATE, POTASSIUM CHLORIDE, CALCIUM CHLORIDE 600; 310; 30; 20 MG/100ML; MG/100ML; MG/100ML; MG/100ML
150 INJECTION, SOLUTION INTRAVENOUS CONTINUOUS
Status: CANCELLED | OUTPATIENT
Start: 2024-10-11 | End: 2024-10-11

## 2024-10-11 RX ORDER — IBUPROFEN 600 MG/1
1 TABLET ORAL
Status: DISCONTINUED | OUTPATIENT
Start: 2024-10-11 | End: 2024-10-18 | Stop reason: HOSPADM

## 2024-10-11 RX ORDER — SODIUM CHLORIDE, SODIUM LACTATE, POTASSIUM CHLORIDE, CALCIUM CHLORIDE 600; 310; 30; 20 MG/100ML; MG/100ML; MG/100ML; MG/100ML
150 INJECTION, SOLUTION INTRAVENOUS CONTINUOUS
Status: DISCONTINUED | OUTPATIENT
Start: 2024-10-11 | End: 2024-10-11 | Stop reason: HOSPADM

## 2024-10-11 RX ORDER — POTASSIUM CHLORIDE 750 MG/1
20 CAPSULE, EXTENDED RELEASE ORAL DAILY
Status: CANCELLED | OUTPATIENT
Start: 2024-10-12

## 2024-10-11 RX ORDER — DEXTROSE MONOHYDRATE 25 G/50ML
25 INJECTION, SOLUTION INTRAVENOUS
Status: DISCONTINUED | OUTPATIENT
Start: 2024-10-11 | End: 2024-10-18 | Stop reason: HOSPADM

## 2024-10-11 RX ORDER — IPRATROPIUM BROMIDE AND ALBUTEROL SULFATE 2.5; .5 MG/3ML; MG/3ML
3 SOLUTION RESPIRATORY (INHALATION) EVERY 6 HOURS PRN
Status: CANCELLED | OUTPATIENT
Start: 2024-10-11

## 2024-10-11 RX ORDER — PANTOPRAZOLE SODIUM 40 MG/10ML
40 INJECTION, POWDER, LYOPHILIZED, FOR SOLUTION INTRAVENOUS
Status: DISCONTINUED | OUTPATIENT
Start: 2024-10-11 | End: 2024-10-14

## 2024-10-11 RX ORDER — BISACODYL 10 MG
10 SUPPOSITORY, RECTAL RECTAL DAILY PRN
Status: DISCONTINUED | OUTPATIENT
Start: 2024-10-11 | End: 2024-10-11 | Stop reason: SDUPTHER

## 2024-10-11 RX ORDER — ALUMINA, MAGNESIA, AND SIMETHICONE 2400; 2400; 240 MG/30ML; MG/30ML; MG/30ML
15 SUSPENSION ORAL EVERY 6 HOURS PRN
Status: CANCELLED | OUTPATIENT
Start: 2024-10-11

## 2024-10-11 RX ORDER — BISACODYL 5 MG/1
5 TABLET, DELAYED RELEASE ORAL DAILY PRN
Status: CANCELLED | OUTPATIENT
Start: 2024-10-11

## 2024-10-11 RX ORDER — SODIUM CHLORIDE 0.9 % (FLUSH) 0.9 %
10 SYRINGE (ML) INJECTION EVERY 12 HOURS SCHEDULED
Status: CANCELLED | OUTPATIENT
Start: 2024-10-11

## 2024-10-11 RX ORDER — FUROSEMIDE 40 MG
40 TABLET ORAL DAILY
Status: DISCONTINUED | OUTPATIENT
Start: 2024-10-12 | End: 2024-10-16

## 2024-10-11 RX ORDER — BISACODYL 5 MG/1
5 TABLET, DELAYED RELEASE ORAL DAILY PRN
Status: DISCONTINUED | OUTPATIENT
Start: 2024-10-11 | End: 2024-10-18 | Stop reason: HOSPADM

## 2024-10-11 RX ORDER — ROSUVASTATIN CALCIUM 20 MG/1
20 TABLET, COATED ORAL NIGHTLY
Status: CANCELLED | OUTPATIENT
Start: 2024-10-11

## 2024-10-11 RX ORDER — ONDANSETRON 2 MG/ML
4 INJECTION INTRAMUSCULAR; INTRAVENOUS EVERY 6 HOURS PRN
Status: DISCONTINUED | OUTPATIENT
Start: 2024-10-11 | End: 2024-10-18 | Stop reason: HOSPADM

## 2024-10-11 RX ORDER — POTASSIUM CHLORIDE 750 MG/1
20 CAPSULE, EXTENDED RELEASE ORAL DAILY
Status: DISCONTINUED | OUTPATIENT
Start: 2024-10-12 | End: 2024-10-18 | Stop reason: HOSPADM

## 2024-10-11 RX ORDER — SODIUM CHLORIDE 0.9 % (FLUSH) 0.9 %
10 SYRINGE (ML) INJECTION AS NEEDED
Status: DISCONTINUED | OUTPATIENT
Start: 2024-10-11 | End: 2024-10-18 | Stop reason: HOSPADM

## 2024-10-11 RX ORDER — BISACODYL 10 MG
10 SUPPOSITORY, RECTAL RECTAL DAILY PRN
Status: CANCELLED | OUTPATIENT
Start: 2024-10-11

## 2024-10-11 RX ORDER — IPRATROPIUM BROMIDE AND ALBUTEROL SULFATE 2.5; .5 MG/3ML; MG/3ML
3 SOLUTION RESPIRATORY (INHALATION) EVERY 6 HOURS PRN
Status: DISCONTINUED | OUTPATIENT
Start: 2024-10-11 | End: 2024-10-18 | Stop reason: HOSPADM

## 2024-10-11 RX ORDER — BISMUTH SUBSALICYLATE 262 MG/1
524 TABLET, CHEWABLE ORAL
Status: DISCONTINUED | OUTPATIENT
Start: 2024-10-11 | End: 2024-10-18 | Stop reason: HOSPADM

## 2024-10-11 RX ORDER — BISACODYL 10 MG
10 SUPPOSITORY, RECTAL RECTAL DAILY PRN
Status: DISCONTINUED | OUTPATIENT
Start: 2024-10-11 | End: 2024-10-18 | Stop reason: HOSPADM

## 2024-10-11 RX ORDER — PANTOPRAZOLE SODIUM 40 MG/10ML
40 INJECTION, POWDER, LYOPHILIZED, FOR SOLUTION INTRAVENOUS
Status: CANCELLED | OUTPATIENT
Start: 2024-10-11

## 2024-10-11 RX ORDER — CARVEDILOL 25 MG/1
25 TABLET ORAL 2 TIMES DAILY WITH MEALS
Status: DISCONTINUED | OUTPATIENT
Start: 2024-10-11 | End: 2024-10-18 | Stop reason: HOSPADM

## 2024-10-11 RX ORDER — INSULIN LISPRO 100 [IU]/ML
2-7 INJECTION, SOLUTION INTRAVENOUS; SUBCUTANEOUS
Status: CANCELLED | OUTPATIENT
Start: 2024-10-11

## 2024-10-11 RX ORDER — METOCLOPRAMIDE HYDROCHLORIDE 5 MG/ML
5 INJECTION INTRAMUSCULAR; INTRAVENOUS EVERY 6 HOURS PRN
Status: CANCELLED | OUTPATIENT
Start: 2024-10-11

## 2024-10-11 RX ORDER — CALCIUM CARBONATE/VITAMIN D3 600 MG-10
1 TABLET ORAL DAILY
Status: CANCELLED | OUTPATIENT
Start: 2024-10-12

## 2024-10-11 RX ORDER — BISACODYL 5 MG/1
5 TABLET, DELAYED RELEASE ORAL DAILY PRN
Status: DISCONTINUED | OUTPATIENT
Start: 2024-10-11 | End: 2024-10-11 | Stop reason: SDUPTHER

## 2024-10-11 RX ORDER — INSULIN LISPRO 100 [IU]/ML
2-7 INJECTION, SOLUTION INTRAVENOUS; SUBCUTANEOUS
Status: DISCONTINUED | OUTPATIENT
Start: 2024-10-11 | End: 2024-10-18 | Stop reason: HOSPADM

## 2024-10-11 RX ORDER — IBUPROFEN 600 MG/1
1 TABLET ORAL
Status: CANCELLED | OUTPATIENT
Start: 2024-10-11

## 2024-10-11 RX ORDER — SODIUM CHLORIDE 0.9 % (FLUSH) 0.9 %
10 SYRINGE (ML) INJECTION AS NEEDED
Status: CANCELLED | OUTPATIENT
Start: 2024-10-11

## 2024-10-11 RX ORDER — POLYETHYLENE GLYCOL 3350 17 G/17G
17 POWDER, FOR SOLUTION ORAL DAILY PRN
Status: CANCELLED | OUTPATIENT
Start: 2024-10-11

## 2024-10-11 RX ORDER — DEXTROSE MONOHYDRATE 25 G/50ML
25 INJECTION, SOLUTION INTRAVENOUS
Status: CANCELLED | OUTPATIENT
Start: 2024-10-11

## 2024-10-11 RX ORDER — BUDESONIDE AND FORMOTEROL FUMARATE DIHYDRATE 160; 4.5 UG/1; UG/1
2 AEROSOL RESPIRATORY (INHALATION)
Status: CANCELLED | OUTPATIENT
Start: 2024-10-11

## 2024-10-11 RX ORDER — CLOPIDOGREL BISULFATE 75 MG/1
75 TABLET ORAL DAILY
Status: DISCONTINUED | OUTPATIENT
Start: 2024-10-12 | End: 2024-10-18 | Stop reason: HOSPADM

## 2024-10-11 RX ORDER — BUDESONIDE AND FORMOTEROL FUMARATE DIHYDRATE 160; 4.5 UG/1; UG/1
2 AEROSOL RESPIRATORY (INHALATION)
Status: DISCONTINUED | OUTPATIENT
Start: 2024-10-11 | End: 2024-10-18 | Stop reason: HOSPADM

## 2024-10-11 RX ORDER — POTASSIUM CHLORIDE 7.45 MG/ML
10 INJECTION INTRAVENOUS
Status: DISPENSED | OUTPATIENT
Start: 2024-10-11 | End: 2024-10-11

## 2024-10-11 RX ORDER — SODIUM CHLORIDE, SODIUM LACTATE, POTASSIUM CHLORIDE, CALCIUM CHLORIDE 600; 310; 30; 20 MG/100ML; MG/100ML; MG/100ML; MG/100ML
150 INJECTION, SOLUTION INTRAVENOUS CONTINUOUS
Status: DISCONTINUED | OUTPATIENT
Start: 2024-10-11 | End: 2024-10-11

## 2024-10-11 RX ORDER — ONDANSETRON 4 MG/1
4 TABLET, ORALLY DISINTEGRATING ORAL EVERY 6 HOURS PRN
Status: DISCONTINUED | OUTPATIENT
Start: 2024-10-11 | End: 2024-10-18 | Stop reason: HOSPADM

## 2024-10-11 RX ORDER — NICOTINE POLACRILEX 4 MG
15 LOZENGE BUCCAL
Status: CANCELLED | OUTPATIENT
Start: 2024-10-11

## 2024-10-11 RX ORDER — ONDANSETRON 2 MG/ML
4 INJECTION INTRAMUSCULAR; INTRAVENOUS EVERY 6 HOURS PRN
Status: CANCELLED | OUTPATIENT
Start: 2024-10-11

## 2024-10-11 RX ORDER — SODIUM CHLORIDE 0.9 % (FLUSH) 0.9 %
10 SYRINGE (ML) INJECTION EVERY 12 HOURS SCHEDULED
Status: DISCONTINUED | OUTPATIENT
Start: 2024-10-11 | End: 2024-10-18 | Stop reason: HOSPADM

## 2024-10-11 RX ORDER — POLYETHYLENE GLYCOL 3350 17 G/17G
17 POWDER, FOR SOLUTION ORAL DAILY PRN
Status: DISCONTINUED | OUTPATIENT
Start: 2024-10-11 | End: 2024-10-11 | Stop reason: SDUPTHER

## 2024-10-11 RX ORDER — CALCIUM CARBONATE/VITAMIN D3 600 MG-10
1 TABLET ORAL DAILY
Status: DISCONTINUED | OUTPATIENT
Start: 2024-10-12 | End: 2024-10-18 | Stop reason: HOSPADM

## 2024-10-11 RX ORDER — AMOXICILLIN 250 MG
2 CAPSULE ORAL 2 TIMES DAILY PRN
Status: DISCONTINUED | OUTPATIENT
Start: 2024-10-11 | End: 2024-10-11 | Stop reason: SDUPTHER

## 2024-10-11 RX ORDER — METOCLOPRAMIDE HYDROCHLORIDE 5 MG/ML
5 INJECTION INTRAMUSCULAR; INTRAVENOUS EVERY 6 HOURS PRN
Status: DISCONTINUED | OUTPATIENT
Start: 2024-10-11 | End: 2024-10-18 | Stop reason: HOSPADM

## 2024-10-11 RX ORDER — SUCRALFATE 1 G/1
1 TABLET ORAL
Status: DISCONTINUED | OUTPATIENT
Start: 2024-10-11 | End: 2024-10-14

## 2024-10-11 RX ORDER — ACETAMINOPHEN 325 MG/1
650 TABLET ORAL EVERY 6 HOURS PRN
Status: DISCONTINUED | OUTPATIENT
Start: 2024-10-11 | End: 2024-10-18 | Stop reason: HOSPADM

## 2024-10-11 RX ORDER — AMOXICILLIN 250 MG
2 CAPSULE ORAL 2 TIMES DAILY PRN
Status: CANCELLED | OUTPATIENT
Start: 2024-10-11

## 2024-10-11 RX ORDER — POLYETHYLENE GLYCOL 3350 17 G/17G
17 POWDER, FOR SOLUTION ORAL DAILY PRN
Status: DISCONTINUED | OUTPATIENT
Start: 2024-10-11 | End: 2024-10-18 | Stop reason: HOSPADM

## 2024-10-11 RX ORDER — SODIUM CHLORIDE, SODIUM LACTATE, POTASSIUM CHLORIDE, CALCIUM CHLORIDE 600; 310; 30; 20 MG/100ML; MG/100ML; MG/100ML; MG/100ML
50 INJECTION, SOLUTION INTRAVENOUS CONTINUOUS
Status: ACTIVE | OUTPATIENT
Start: 2024-10-11 | End: 2024-10-12

## 2024-10-11 RX ORDER — SUCRALFATE 1 G/1
1 TABLET ORAL
Status: CANCELLED | OUTPATIENT
Start: 2024-10-11 | End: 2024-10-21

## 2024-10-11 RX ORDER — MORPHINE SULFATE 2 MG/ML
2 INJECTION, SOLUTION INTRAMUSCULAR; INTRAVENOUS ONCE
Status: COMPLETED | OUTPATIENT
Start: 2024-10-11 | End: 2024-10-12

## 2024-10-11 RX ADMIN — ROSUVASTATIN 20 MG: 20 TABLET, FILM COATED ORAL at 22:20

## 2024-10-11 RX ADMIN — APIXABAN 5 MG: 5 TABLET, FILM COATED ORAL at 22:20

## 2024-10-11 RX ADMIN — SODIUM CHLORIDE, POTASSIUM CHLORIDE, SODIUM LACTATE AND CALCIUM CHLORIDE 50 ML/HR: 600; 310; 30; 20 INJECTION, SOLUTION INTRAVENOUS at 17:52

## 2024-10-11 RX ADMIN — PANTOPRAZOLE SODIUM 40 MG: 40 INJECTION, POWDER, FOR SOLUTION INTRAVENOUS at 08:41

## 2024-10-11 RX ADMIN — SODIUM CHLORIDE, POTASSIUM CHLORIDE, SODIUM LACTATE AND CALCIUM CHLORIDE 150 ML/HR: 600; 310; 30; 20 INJECTION, SOLUTION INTRAVENOUS at 04:25

## 2024-10-11 RX ADMIN — SUCRALFATE 1 G: 1 TABLET ORAL at 17:51

## 2024-10-11 RX ADMIN — CARVEDILOL 25 MG: 25 TABLET, FILM COATED ORAL at 08:40

## 2024-10-11 RX ADMIN — BUDESONIDE AND FORMOTEROL FUMARATE DIHYDRATE 2 PUFF: 160; 4.5 AEROSOL RESPIRATORY (INHALATION) at 22:13

## 2024-10-11 RX ADMIN — PANTOPRAZOLE SODIUM 40 MG: 40 INJECTION, POWDER, LYOPHILIZED, FOR SOLUTION INTRAVENOUS at 17:51

## 2024-10-11 RX ADMIN — SUCRALFATE 1 G: 1 TABLET ORAL at 11:33

## 2024-10-11 RX ADMIN — HYDROCODONE BITARTRATE AND ACETAMINOPHEN 1 TABLET: 7.5; 325 TABLET ORAL at 13:25

## 2024-10-11 RX ADMIN — CLOPIDOGREL BISULFATE 75 MG: 75 TABLET ORAL at 08:58

## 2024-10-11 RX ADMIN — POTASSIUM CHLORIDE 10 MEQ: 7.46 INJECTION, SOLUTION INTRAVENOUS at 17:52

## 2024-10-11 RX ADMIN — CARVEDILOL 25 MG: 25 TABLET, FILM COATED ORAL at 17:51

## 2024-10-11 RX ADMIN — APIXABAN 5 MG: 5 TABLET, FILM COATED ORAL at 08:58

## 2024-10-11 RX ADMIN — DILTIAZEM HYDROCHLORIDE 180 MG: 180 CAPSULE, EXTENDED RELEASE ORAL at 08:58

## 2024-10-11 RX ADMIN — HYDROMORPHONE HYDROCHLORIDE 0.5 MG: 1 INJECTION, SOLUTION INTRAMUSCULAR; INTRAVENOUS; SUBCUTANEOUS at 04:25

## 2024-10-11 RX ADMIN — HYDROMORPHONE HYDROCHLORIDE 0.5 MG: 1 INJECTION, SOLUTION INTRAMUSCULAR; INTRAVENOUS; SUBCUTANEOUS at 00:38

## 2024-10-11 NOTE — DISCHARGE SUMMARY
[facility]         HOSPITALIST  DISCHARGE SUMMARY    Patient Name: Ion PROCTOR Case  : 1940  MRN: [id number]    Date of Admission: 10/2/2024  Date of Discharge: 10/11/2024  Primary Care Physician: Maurice Mixno MD  Reason for admission:  Weakness    Final diagnosis:  Acute to subacute pancreatitis  Intractable nausea secondary to the above  Dehydration  Hospital-acquired weakness  Recent acute hypoxic respiratory failure  Recent left lower lobe pneumonia, CAP  Recent bilateral pleural effusions  Recent thoracentesis  Hx diastolic CHF  History of T8 compression fracture  History atrial fibrillation (on Eliquis)  History CAD/PCI (Dr. Varghese)  CKD 3  Leukocytosis, likely from steroid injections; has Andriy's disease ([facility]/Dr. Massey; failed MTX Imuran; gets steroids bimonthly - periodically.)  Anemia  Hypokalemia, resolved  DM  Hx pelvic fracture  Abdominal pain, nausea, poor oral intake      Consults       Date and Time Order Name Status Description    10/8/2024  9:56 AM Inpatient Gastroenterology Consult Completed     2024  6:24 PM Inpatient Pulmonology Consult Completed             Active and Resolved Hospital Problems:  Active Hospital Problems    Diagnosis POA    **Debility [R53.81] Yes      Resolved Hospital Problems   No resolved problems to display.       Hospital Course     Hospital Course:  Ion PROCTOR Case is a 84 y.o. male Ion PROCTOR Case is a 83 y.o. male with a past medical history of atrial fibrillation on Eliquis, CAD and prior PCI, CKD stage III, recent hospitalization for COVID-19 infection in late August.  The patient presented to the ED 24 for generalized weakness.  He was hypoxic.  Patient does not use home oxygen. CT chest with contrast showed left pleural effusion and trace right pleural effusion.  Bibasilar densities greater on the left, atelectasis.  Left basilar pneumonia cannot be excluded.  Compression fracture deformity of T8 similar to prior. Subacute  fractures involving the ischium and the superior pubic ramus area on the right as well as the symphysis of the pubis area. Admitted for further management of acute hypoxia due to pneumonia and pleural effusion. Pulmonology consulted.  He underwent bedside left sided thoracentesis on 09/26/2024 with removal of 200 cc of pleural fluid. Respiratory status improved.  Neurosurgery following for recent T8 compression fracture.  OT was consulted for brace placement as inpatient, was not able to tolerate brace on 9/27, encouraged out of bed and to use brace while ambulating.  Also developed left lower leg cellulitis for which patient was switched to IV Zosyn which he tolerated well without side effects/allergic reaction.  Patient's erythema improved.  Patient's respiratory status improved.  Was transition to oral cefdinir for completion of antibiotics seen by PT OT transition to skilled nursing facility.  While in skilled nursing facility patient developed intractable nausea had poor oral intake and had clinical decline GI consulted EGD pursued showing gastritis started on PPI and Carafate persistent nausea pursued CT scan abdomen and pelvis which showed acute to subacute pancreatitis given inability to participate given his clinical decline and ongoing intractable nausea will transfer the patient back to the acute side of the hospital for close monitoring and treatment.      DISCHARGE Follow Up Recommendations for labs and diagnostics: As above      Day of Discharge     Vital Signs:  Temp:  [98 °F (36.7 °C)-98.2 °F (36.8 °C)] 98 °F (36.7 °C)  Heart Rate:  [96-98] 96  Resp:  [16-18] 16  BP: (121-136)/(64-81) 136/81  Physical Exam:   Constitutional: Awake alert  Respiratory: Clear  Cardiovascular RRR  GI: Abdomen soft mild epigastric tenderness  Extremities without edema      Discharge Details        Discharge Medications        ASK your doctor about these medications        Instructions Start Date   albuterol 0.63 MG/3ML  nebulizer solution  Commonly known as: ACCUNEB   0.63 mg, Nebulization, Every 6 Hours PRN      apixaban 5 MG tablet tablet  Commonly known as: ELIQUIS   5 mg, Oral, 2 Times Daily      budesonide-formoterol 160-4.5 MCG/ACT inhaler  Commonly known as: Symbicort   2 puffs, Inhalation, 2 Times Daily - RT      calcitonin (salmon) 200 UNIT/ACT nasal spray  Commonly known as: Miacalcin   1 spray, Alternating Nares, Daily      carvedilol 25 MG tablet  Commonly known as: COREG   25 mg, Oral, 2 Times Daily With Meals      clopidogrel 75 MG tablet  Commonly known as: PLAVIX   75 mg, Oral, Daily      dilTIAZem 120 MG 24 hr capsule  Commonly known as: Tiadylt ER   120 mg, Oral, Daily      ezetimibe 10 MG tablet  Commonly known as: ZETIA   10 mg, Oral, Daily      furosemide 20 MG tablet  Commonly known as: LASIX   20 mg, Oral, 3 Times Weekly, Monday , Wednesday , and Friday       furosemide 20 MG tablet  Commonly known as: LASIX   40 mg, Oral, Take As Directed, Tuesday , Thursday , Saturday, and Sunday       glimepiride 2 MG tablet  Commonly known as: AMARYL   2 mg, Oral, Every Morning Before Breakfast      HYDROcodone-acetaminophen 7.5-325 MG per tablet  Commonly known as: NORCO   2 tablets, Oral, Daily      pantoprazole 40 MG EC tablet  Commonly known as: PROTONIX   40 mg, Oral, Daily      pregabalin 25 MG capsule  Commonly known as: Lyrica   Take 1 capsule twice daily, may increase to 2 capsules twice daily if no benefit in 3 days.      rosuvastatin 20 MG tablet  Commonly known as: CRESTOR   20 mg, Oral, Daily      SITagliptin 25 MG tablet  Commonly known as: JANUVIA   25 mg, Oral, Daily      sucralfate 1 g tablet  Commonly known as: Carafate   1 g, Oral, 4 Times Daily      sulfamethoxazole-trimethoprim 800-160 MG per tablet  Commonly known as: Bactrim DS   1 tablet, Oral, 2 Times Daily               Allergies   Allergen Reactions    Penicillins Swelling    Lisinopril Cough       Discharge Disposition:  Short Term Hospital  ([facility])    Diet:  Hospital:  Diet Order   Procedures    NPO Diet NPO Type: Strict NPO       Discharge Activity: As tolerated      CODE STATUS:  Code Status and Medical Interventions: CPR (Attempt to Resuscitate); Full Support   Ordered at: 10/02/24 1637     Level Of Support Discussed With:    Patient     Code Status (Patient has no pulse and is not breathing):    CPR (Attempt to Resuscitate)     Medical Interventions (Patient has pulse or is breathing):    Full Support         Future Appointments   Date Time Provider Department Center   10/29/2024  9:00 AM Maurice Mixon MD [facility]   10/29/2024 12:45 PM Marya Berry APRN [facility]   11/20/2024  2:00 PM Ren Salinas Au.D [facility]   11/20/2024  2:30 PM Vivek Fernandez MD [facility]   1/15/2025 10:45 AM Ortega [facility]arghese [facility]           Pertinent  and/or Most Recent Results     PROCEDURES:   EGD showing gastritis  LAB RESULTS:      Lab 10/10/24  0506 10/09/24  0441 10/06/24  0948   WBC 12.30* 13.09* 13.77*   HEMOGLOBIN 10.6* 10.5* 9.9*   HEMATOCRIT 35.3* 34.3* 32.1*   PLATELETS 472* 472* 371   NEUTROS ABS 8.38* 9.31* 10.24*   IMMATURE GRANS (ABS) 0.58* 0.64* 0.73*   LYMPHS ABS 1.39 1.34 1.12   MONOS ABS 1.53* 1.32* 1.08*   EOS ABS 0.31 0.38 0.48*   MCV 82.9 83.5 82.5   PROCALCITONIN 0.08  --  0.10         Lab 10/11/24  0512 10/10/24  0506 10/09/24  0441 10/07/24  0431 10/06/24  0948   SODIUM 139 141 141 141 138   POTASSIUM 3.3* 3.3* 3.3* 3.5 3.8   CHLORIDE 99 100 100 101 99   CO2 31.4* 30.5* 32.0* 29.7* 27.9   ANION GAP 8.6 10.5 9.0 10.3 11.1   BUN 14 17 12 12 11   CREATININE 0.71* 0.76 0.75* 0.68* 0.80   EGFR 91.6 89.7 90.1 93.4 88.9   GLUCOSE 102* 141* 114* 119* 163*   CALCIUM 8.8 8.9 9.1 8.9 8.8   MAGNESIUM  --  1.9 1.7  --   --    PHOSPHORUS 3.6 3.6 3.9  --   --          Lab 10/11/24  0512 10/10/24  0506 10/09/24  0441 10/06/24  0948   TOTAL PROTEIN  --   --  5.5* 5.4*   ALBUMIN 2.7*  2.6* 2.7* 2.5*   GLOBULIN  --   --  2.8 2.9   ALT (SGPT)  --   --  15 22   AST (SGOT)  --   --  17 23   BILIRUBIN  --   --  0.3 0.3   ALK PHOS  --   --  115 104   LIPASE 36 34  --   --          Lab 10/06/24  0948   PROBNP 1,226.0                 Brief Urine Lab Results  (Last result in the past 365 days)        Color   Clarity   Blood   Leuk Est   Nitrite   Protein   CREAT   Urine HCG        09/25/24 1247 Yellow   Clear   Negative   Negative   Negative   Negative                 Microbiology Results (last 10 days)       Procedure Component Value - Date/Time    COVID PRE-OP / PRE-PROCEDURE SCREENING ORDER (NO ISOLATION) - Swab, Nasal Cavity [[id number]]  (Normal) Collected: 10/10/24 0821    Lab Status: Final result Specimen: Swab from Nasal Cavity Updated: 10/10/24 1054    Narrative:      The following orders were created for panel order COVID PRE-OP / PRE-PROCEDURE SCREENING ORDER (NO ISOLATION) - Swab, Nasal Cavity.  Procedure                               Abnormality         Status                     ---------                               -----------         ------                     COVID-19,CEPHEID/GHADA,CO...[[id number]]  Normal              Final result                 Please view results for these tests on the individual orders.    COVID-19,CEPHEID/GHADA,COR/ELLY/PAD/MANNY/LAG/LEIA IN-HOUSE,NP SWAB IN TRANSPORT MEDIA 1 HR TAT, RT-PCR - Swab, Nasopharynx [[id number]]  (Normal) Collected: 10/10/24 0821    Lab Status: Final result Specimen: Swab from Nasopharynx Updated: 10/10/24 1054     COVID19 Not Detected    Narrative:      Fact sheet for providers: https://www.fda.gov/media/253302/download     Fact sheet for patients: https://www.fda.gov/media/578158/download  Fact sheet for providers: https://www.fda.gov/media/662801/download     Fact sheet for patients: https://www.fda.gov/media/023977/download    COVID PRE-OP / PRE-PROCEDURE SCREENING ORDER (NO ISOLATION) - Swab, Nasal Cavity [[id number]]  (Normal) Collected:  10/07/24 0826    Lab Status: Final result Specimen: Swab from Nasal Cavity Updated: 10/07/24 1217    Narrative:      The following orders were created for panel order COVID PRE-OP / PRE-PROCEDURE SCREENING ORDER (NO ISOLATION) - Swab, Nasal Cavity.  Procedure                               Abnormality         Status                     ---------                               -----------         ------                     COVID-19,CEPHEID/GHADA,CO...[[id number]]  Normal              Final result                 Please view results for these tests on the individual orders.    COVID-19,CEPHEID/GHADA,COR/ELLY/PAD/MANNY/LAG/LEIA IN-HOUSE,NP SWAB IN TRANSPORT MEDIA 1 HR TAT, RT-PCR - Swab, Nasopharynx [[id number]]  (Normal) Collected: 10/07/24 0826    Lab Status: Final result Specimen: Swab from Nasopharynx Updated: 10/07/24 1217     COVID19 Not Detected    Narrative:      Fact sheet for providers: https://www.fda.gov/media/471953/download     Fact sheet for patients: https://www.fda.gov/media/433893/download  Fact sheet for providers: https://www.fda.gov/media/227425/download     Fact sheet for patients: https://www.fda.gov/media/543270/download    COVID PRE-OP / PRE-PROCEDURE SCREENING ORDER (NO ISOLATION) - Swab, Nasal Cavity [[id number]]  (Normal) Collected: 10/03/24 0920    Lab Status: Final result Specimen: Swab from Nasal Cavity Updated: 10/03/24 1904    Narrative:      The following orders were created for panel order COVID PRE-OP / PRE-PROCEDURE SCREENING ORDER (NO ISOLATION) - Swab, Nasal Cavity.  Procedure                               Abnormality         Status                     ---------                               -----------         ------                     COVID-19,CEPHEID/GHADA,CO...[[id number]]  Normal              Final result                 Please view results for these tests on the individual orders.    COVID-19,CEPHEID/GHADA,COR/ELLY/PAD/MANNY/LAG/LEIA IN-HOUSE,NP SWAB IN TRANSPORT MEDIA 1 HR TAT, RT-PCR -  Swab, Nasopharynx [[id number]]  (Normal) Collected: 10/03/24 0920    Lab Status: Final result Specimen: Swab from Nasopharynx Updated: 10/03/24 1904     COVID19 Not Detected    Narrative:      Fact sheet for providers: https://www.fda.gov/media/830784/download     Fact sheet for patients: https://www.fda.gov/media/486372/download  Fact sheet for providers: https://www.fda.gov/media/840591/download     Fact sheet for patients: https://www.fda.gov/media/576121/download    COVID PRE-OP / PRE-PROCEDURE SCREENING ORDER (NO ISOLATION) - Swab, Nasopharynx [[id number]]  (Normal) Collected: 10/01/24 1731    Lab Status: Final result Specimen: Swab from Nasopharynx Updated: 10/01/24 1819    Narrative:      The following orders were created for panel order COVID PRE-OP / PRE-PROCEDURE SCREENING ORDER (NO ISOLATION) - Swab, Nasopharynx.  Procedure                               Abnormality         Status                     ---------                               -----------         ------                     COVID-19, FLU A/B, RSV P...[[id number]]  Normal              Final result                 Please view results for these tests on the individual orders.    COVID-19, FLU A/B, RSV PCR 1 HR TAT - Swab, Nasopharynx [[id number]]  (Normal) Collected: 10/01/24 1731    Lab Status: Final result Specimen: Swab from Nasopharynx Updated: 10/01/24 1819     COVID19 Not Detected     Influenza A PCR Not Detected     Influenza B PCR Not Detected     RSV, PCR Not Detected    Narrative:      Fact sheet for providers: https://www.fda.gov/media/619255/download    Fact sheet for patients: https://www.fda.gov/media/010186/download    Test performed by PCR.            CT Abdomen Pelvis Without Contrast    Result Date: 10/10/2024  Impression: 1.Possible mild acute-to-subacute pancreatitis, especially involving the pancreatic tail. Please correlate with pertinent lab values. No definite pseudocyst formation is suspected. Lack of intravenous contrast  agent administration precludes assessment for possible pancreatic necrosis. 2.Also, there is possible acute-to-subacute gastritis. 3.Renal cysts are seen. Some of these may be complex, such as in the upper pole of the left kidney. Consider imaging follow-up if clinically warranted. 4.Osteonecrosis involving the bilateral femoral heads cannot be excluded, especially on the left. Consider MRI examination of the bilateral hip joints/pelvis for further imaging assessment if clinically warranted and if not contraindicated. 5.Otherwise, no significant interval change is seen since the prior study from 9/25/2024, allowing for technique differences. 6.Please see above comments for further detail. Portions of this note were completed with a voice recognition program. Electronically Signed: Waldo Heller MD  10/10/2024 10:03 PM EDT  Workstation ID: [id number]    XR Abdomen KUB    Result Date: 10/8/2024  Impression: Impression: 1. No evidence of bowel obstruction. Electronically Signed: Reza Venegas MD  10/8/2024 10:40 AM EDT  Workstation ID: [id number]    XR Chest 1 View    Result Date: 9/26/2024  Impression: Impression: 1. No visible pneumothorax status post left thoracentesis. No significant residual left pleural fluid is identified. Mild left basilar atelectasis. Electronically Signed: Kristie Ramirez MD  9/26/2024 11:53 AM EDT  Workstation ID: [id number]    CT Chest With Contrast Diagnostic    Result Date: 9/25/2024  Impression: Impression: 1.Persistent left pleural effusion and trace right pleural effusion 2.Bibasilar densities greater on the left that in part relate to atelectasis. A left basilar pneumonia could not be excluded. 3.No definite pulmonary embolus. 4.Atherosclerotic vascular calcification including coronary artery calcification. 5.Unchanged small pericardial effusion. 6.Compression fracture deformity of T8 similar to the prior more recent exam. Electronically Signed: Fidencio Leija MD  9/25/2024 2:47 PM EDT   Workstation ID: [id number]    CT Abdomen Pelvis With Contrast    Result Date: 9/25/2024  Impression: Impression: 1.Left pleural effusion and trace right pleural effusion 2.Bibasilar densities greater on the left that could relate to atelectasis although a left basilar pneumonia not excluded. 3.Small pericardial effusion 4.Atherosclerotic vascular calcification including coronary artery calcification 5.Hepatic steatosis 6.Tiny lesions involving the liver that could reflect cysts. 7.Left renal cyst 8.Small cyst tail the pancreas that might relate to sidebranch IPMN 9.Colonic diverticulosis 10.Distended bladder 11.Subacute fractures involving the ischium and superior pubic ramus area on the right as well as the symphysis pubis area. Electronically Signed: Fidencio Leija MD  9/25/2024 11:55 AM EDT  Workstation ID: [id number]    XR Chest 1 View    Result Date: 9/25/2024  Impression: Impression: Small left pleural effusion with adjacent left basilar airspace opacity. No new focal airspace consolidation. Electronically Signed: Schuyler Pacheco MD  9/25/2024 10:41 AM EDT  Workstation ID: [id number]    MRI Thoracic Spine Without Contrast    Result Date: 9/23/2024  Impression: 1. Recent appearing compression fracture of T8 with approximately 60% loss of height and retropulsion of the posterior cortex resulting in mild canal stenosis. 2. Mild degenerative changes of the thoracic spine. Electronically Signed: Tabitha Méndez MD  9/23/2024 11:04 AM EDT  Workstation ID: [id number]    CT Chest With Contrast Diagnostic    Result Date: 9/16/2024  Impression: Impression: 1.Infiltration of the patient's IV in the right antecubital fossa, as detailed above. 2.No evidence of pulmonary embolism. 3.Mild to moderate cardiomegaly. 4.Bibasilar airspace opacity could represent pulmonary edema or pneumonia. Correlate clinically. 5.Small left pleural effusion. 6.T8 burst fracture which appears worse since the recent 9/2/2024 exam. Electronically Signed:  Han Mason MD  9/16/2024 6:25 PM EDT  Workstation ID: [id number]    CT Abdomen Pelvis With Contrast    Result Date: 9/16/2024  Impression: Small left pleural effusion with overlying atelectasis. Electronically Signed: Waldo Sykes MD  9/16/2024 6:18 PM EDT  Workstation ID: [id number]     Results for orders placed during the hospital encounter of 09/25/24    Duplex Venous Lower Extremity - Left CV-READ    Interpretation Summary    Normal left lower extremity venous duplex scan.      Results for orders placed during the hospital encounter of 09/25/24    Duplex Venous Lower Extremity - Left CV-READ    Interpretation Summary    Normal left lower extremity venous duplex scan.      Results for orders placed during the hospital encounter of 09/25/24    Adult Transthoracic Echo Complete W/ Cont if Necessary Per Protocol    Interpretation Summary    Left ventricular ejection fraction appears to be 51 - 55%.    Left ventricular wall thickness is consistent with mild to moderate concentric hypertrophy.    Left ventricular diastolic function is consistent with (grade I) impaired relaxation.    The right ventricular cavity is borderline dilated.    Mild dilation of the aortic root is present.    There is a trivial pericardial effusion.      Labs Pending at Discharge:        Time spent on Discharge including face to face service: 35 minutes    Electronically signed by ARIANA Dominguez, 10/11/24, 11:48 AM EDT.

## 2024-10-11 NOTE — THERAPY DISCHARGE NOTE
SNF - Occupational Therapy Discharge   [place]    Patient Name: Ion PROCTOR Case  : [id number]    MRN: [id number]                              Today's Date: 10/11/2024       Admit Date: 10/2/2024    Visit Dx:     ICD-10-CM ICD-9-CM   1. Difficulty walking  R26.2 719.7   2. Decreased activities of daily living (ADL)  Z78.9 V49.89   3. Epigastric pain  R10.13 789.06     Patient Active Problem List   Diagnosis    Hyperlipidemia LDL goal <70    Vitamin D deficiency    Atrial fibrillation, persistent    Essential hypertension    Chronic superficial gastritis without bleeding    Chronic right flank pain    Adenomatous polyp of colon    Left-sided chest wall pain    Type 2 diabetes mellitus without complication, without long-term current use of insulin    CAD S/P percutaneous coronary angioplasty    Weight loss    Medicare annual wellness visit, subsequent    Chronic heart failure with preserved ejection fraction (HFpEF)    Prerenal azotemia    Elevated liver enzymes    Cellulitis of left lower extremity    Stage 3a chronic kidney disease    Absent peripheral pulse    History of colon polyps    Venous insufficiency of both lower extremities    Pelvic fracture    Multifocal pneumonia    Left upper quadrant pain    Closed wedge compression fracture of T8 vertebra with delayed healing    Pleural effusion    Debility    Epigastric pain    Pancreatitis     Past Medical History:   Diagnosis Date    Cataract     Removed    Chronic heart failure with preserved ejection fraction (HFpEF) 2023    Colon polyp     Removed     Coronary artery disease     Degeneration of lumbosacral intervertebral disc 2020    Diverticulosis     Elevated cholesterol     Essential hypertension 10/04/2021    Facet arthropathy, lumbar 2020    GERD (gastroesophageal reflux disease)     HL (hearing loss)     Hyperlipidemia LDL goal <100 2021    Kidney stone     Lumbar herniated disc 2020    (R) L3-4    Lumbar spinal  stenosis 08/04/2020    Paroxysmal atrial fibrillation 10/04/2021    Pneumonia     Spinal headache     post 3 injections in back     Trigger finger of right thumb 11/01/2017    all fingers both hands     Past Surgical History:   Procedure Laterality Date    BACK SURGERY      CARDIAC CATHETERIZATION N/A 08/23/2022    Procedure: LEFT HEART CATH with coronary artery angiography and right heart catheterization possible percutaneous intervention possible closure device;  Surgeon: Emili Cheng MD;  Location: [facility] CATH INVASIVE LOCATION;  Service: Cardiovascular;  Laterality: N/A;    CAROTID STENT      COLONOSCOPY  2017    Dr. Oneal    COLONOSCOPY N/A 10/06/2021    Procedure: COLONOSCOPY WITH BIOPSIES, POLYPECTOMY;  Surgeon: Vazquez Estrada MD;  Location: [facility] ENDOSCOPY;  Service: Gastroenterology;  Laterality: N/A;  COLON POLYPS, DIVERTICULOSIS, HEMORRHOIDS    COLONOSCOPY N/A 7/13/2023    Procedure: COLONOSCOPY with polypectomy with cold snare;  Surgeon: Vazquez Estrada MD;  Location: [facility] ENDOSCOPY;  Service: Gastroenterology;  Laterality: N/A;  colon polyps, diverticulosis, hemorrhoids    CORONARY ANGIOPLASTY      ENDOSCOPY N/A 10/06/2021    Procedure: ESOPHAGOGASTRODUODENOSCOPY WITH BIOPSIES;  Surgeon: Vazquez Estrada MD;  Location: [facility] ENDOSCOPY;  Service: Gastroenterology;  Laterality: N/A;  GASTRITIS    ENDOSCOPY N/A 7/13/2023    Procedure: ESOPHAGOGASTRODUODENOSCOPY with biopsies, with dilation with 15- 18 mm balloon;  Surgeon: Vazquez Estrada MD;  Location: [facility] ENDOSCOPY;  Service: Gastroenterology;  Laterality: N/A;  hiatal hernia, gastric polyp, schatzki's ring    ENDOSCOPY N/A 10/9/2024    Procedure: ESOPHAGOGASTRODUODENOSCOPY WITH BIOPSIES AND COLD SNARE POLYPECTOMY;  Surgeon: Vazquez Estrada MD;  Location: [facility] ENDOSCOPY;  Service: Gastroenterology;  Laterality: N/A;  GASTRITIS, GASTRIC POLYP AND HIATAL HERNIA    KIDNEY STONE SURGERY      Unspecified    LUMBAR DISC  SURGERY Right 06/26/2020    L3-4  Minimally Invasive    TRIGGER FINGER RELEASE Bilateral     UPPER GASTROINTESTINAL ENDOSCOPY  10/06/2021    Dr. Estrada      General Information    No documentation.                  Mobility/ADL's    No documentation.                  Obj/Interventions    No documentation.                  Goals/Plan       Row Name 10/11/24 1314          Transfer Goal 1 (OT)    Activity/Assistive Device (Transfer Goal 1, OT) transfers, all  -EG     [place] Level/Cues Needed (Transfer Goal 1, OT) modified independence  -EG     Time Frame (Transfer Goal 1, OT) long term goal (LTG);30 days  -EG     Progress/Outcome (Transfer Goal 1, OT) goal not met  -EG       Row Name 10/11/24 1314          Bathing Goal 1 (OT)    Activity/Device (Bathing Goal 1, OT) bathing skills, all  -EG     [place] Level/Cues Needed (Bathing Goal 1, OT) supervision required;set-up required  -EG     Time Frame (Bathing Goal 1, OT) long term goal (LTG);30 days  -EG     Progress/Outcomes (Bathing Goal 1, OT) goal not met  -EG       Row Name 10/11/24 1314          Dressing Goal 1 (OT)    Activity/Device (Dressing Goal 1, OT) dressing skills, all  -EG     [place]/Cues Needed (Dressing Goal 1, OT) modified independence  -EG     Time Frame (Dressing Goal 1, OT) long term goal (LTG);30 days  -EG     Progress/Outcome (Dressing Goal 1, OT) goal not met  -EG       Row Name 10/11/24 1314          Toileting Goal 1 (OT)    Activity/Device (Toileting Goal 1, OT) toileting skills, all  -EG     [place] Level/Cues Needed (Toileting Goal 1, OT) modified independence  -EG     Time Frame (Toileting Goal 1, OT) long term goal (LTG);30 days  -EG     Progress/Outcome (Toileting Goal 1, OT) goal not met  -EG       Row Name 10/11/24 1314          Grooming Goal 1 (OT)    Activity/Device (Grooming Goal 1, OT) grooming skills, all  -EG     Time Frame (Grooming Goal 1, OT) long term goal (LTG);30 days  -EG     Progress/Outcome (Grooming Goal  1, OT) goal not met  -EG       Row Name 10/11/24 1314          Strength Goal 1 (OT)    Strength Goal 1 (OT) Patient will improve bilateral upper extremity strength to 4+/5 to support independence with self-care activity and functional transfers  -EG     Time Frame (Strength Goal 1, OT) long term goal (LTG);30 days  -EG     Progress/Outcome (Strength Goal 1, OT) goal not met  -EG       Row Name 10/11/24 1314          Problem Specific Goal 1 (OT)    Problem Specific Goal 1 (OT) Patient will improve activity tolerance to fair plus to support independence with self-care tasks  -EG     Time Frame (Problem Specific Goal 1, OT) long term goal (LTG);30 days  -EG     Progress/Outcome (Problem Specific Goal 1, OT) goal not met  -EG               User Key  (r) = Recorded By, (t) = Taken By, (c) = Cosigned By      Initials Name Provider Type    EG Karely Hensley, OT Occupational Therapist                   Clinical Impression    No documentation.                  Outcome Measures       Row Name 10/11/24 1100          How much help from another person do you currently need...    Turning from your back to your side while in flat bed without using bedrails? 3  -CR     Moving from lying on back to sitting on the side of a flat bed without bedrails? 3  -CR     Moving to and from a bed to a chair (including a wheelchair)? 3  -CR     Standing up from a chair using your arms (e.g., wheelchair, bedside chair)? 3  -CR     Climbing 3-5 steps with a railing? 2  -CR     To walk in hospital room? 2  -CR     AM-PAC 6 Clicks Score (PT) 16  -CR     Highest Level of Mobility Goal 5 --> Static standing  -CR               User Key  (r) = Recorded By, (t) = Taken By, (c) = Cosigned By      Initials Name Provider Type    Cole Vanessa LPN Licensed Nurse                  Section G  Mobility  Bed mobility - self performance: limited assistance (staff provide guided maneuvering of limbs or other non-weight bearing assistance)  Bed mobility  support/assistance: One person assist  Transfer - self performance: limited assistance (staff provide guided maneuvering of limbs or other non-weight bearing assistance)  Transfer support/assistance: One person assist  Walking in room - self performance: limited assistance (staff provide guided maneuvering of limbs or other non-weight bearing assistance)  Walking in room support/assistance: One person assist  Walking in corridors/hallway - self performance: limited assistance (staff provide guided maneuvering of limbs or other non-weight bearing assistance)  Walking in corridors/hallway support/assistance: One person assist  Locomotion on unit - self performance: limited assistance (staff provide guided maneuvering of limbs or other non-weight bearing assistance)  Locomotion on unit support/assistance: One person assist  Locomotion off unit - self performance: activity did not occur  Locomotion off unit support/assistance: Activity did not occur  Dressing - self performance: limited assistance (staff provide guided maneuvering of limbs or other non-weight bearing assistance)  Dressing support/assistance: One person assist  Eating - self performance: independent  Eating support/assistance: Setup help only  Toileting - self performance: total dependence (full staff performance)  Personal hygiene - self performance: limited assistance (staff provide guided maneuvering of limbs or other non-weight bearing assistance)  Personal hygiene support/assistance: One person assist  Bathing  Bathing - self performance: Physical help with bathing (exclude washing back and hair for patient)  Bathing support/assistance: One person assist  Balance  Balance during transitions & walking: Not steady, requires assist to steady  Moving from seated to standing position: Not steady, requires assist to steady  Walking: Not steady, requires assist to steady  Turning around while walking: Not steady, requires assist to steady  Moving on and  off toilet: Not steady, requires assist to steady  Surface-to-surface transfer: Not steady, requires assist to steady  Mobility devices: Walker  Range of Motion  Upper Extremity: No impairment  Lower Extremity: No impairment  Section GG  Functional Ability/Goals, Adm (Section GG)  Self Care, Prior Functioning (EM0487Z): 3. Independent  Functional Cognition, Prior Functioning (PS2132K): 3. Independent  Prior Device Use (JH0335): none of the above (Z)  Upper Extremity Range of Motion (ZL2546G): No impairment  Lower Extremity Range of Motion (XO3820M): No impairment  Self Care, Admission (Section GG)  Eating: Self-Care Admission Performance (TR3274I2): setup or clean-up assistance (05)  Oral Hygiene: Self-Care Admission Performance (LZ3006P0): setup or clean-up assistance (05)  Toileting Hygiene: Self-Care Admission Performance (QS8734T8): dependent (01)  Shower/Bathe Self: Self-Care Admission Performance (XB4839R2): partial/moderate assistance (03)  Upper Body Dressing: Self-Care Admission Performance (YO0884G4): supervision or touching assistance (04)  Lower Body Dressing: Self-Care Admission Performance (XM3766Q4): partial/moderate assistance (03)  Putting On/Taking Off Footwear: Self-Care Admission Performance (KB7720J1): partial/moderate assistance (03)  Personal Hygiene: Self-Care Admission Performance (HM0697N5): supervision or touching assistance (04)  Mobility, Admission Performance (KI7510)  Toilet Transfer: Mobility Admission Performance (OJ1514A5): supervision or touching assistance (04)  Tub/shower Transfer: Mobility Admission Performance (YQ6283GG2): partial/moderate assistance (03)        Self Care, Discharge Performance (DL4984)  Eating: Self-Care Discharge Performance (BZ5290D8): setup or clean-up assistance (05)  Oral Hygiene: Self-Care Discharge Performance (FZ8786R6): setup or clean-up assistance (05)  Toileting Hygiene: Self-Care Discharge Performance (DT6847H1): partial/moderate assistance  (03)  Shower/Bathe Self: Self-Care Discharge Performance (HD6041U5): partial/moderate assistance (03)  Upper Body Dressing: Self-Care Discharge Performance (NJ8341L3): supervision or touching assistance (04)  Lower Body Dressing: Self-Care Discharge Performance (XF1181W9): substantial/maximal assistance (02)  Putting On/Taking Off Footwear: Self-Care Discharge Performance (RT5652U9): substantial/maximal assistance (02)  Personal Hygiene: Self-Care Discharge Performance (NT6051E0): partial/moderate assistance (03)  Mobility, Discharge Performance (BS5296)  Tub/shower Transfer: Mobility Discharge Performance (HY0054SV9): supervision or touching assistance (04)    Occupational Therapy Education       Title: PT OT SLP Therapies (Resolved)       Topic: Occupational Therapy (Resolved)       Point: ADL training (Resolved)       Description:   Instruct learner(s) on proper safety adaptation and remediation techniques during self care or transfers.   Instruct in proper use of assistive devices.                  Learning Progress Summary             Patient JANIS Arnold, NR by EG at 10/3/2024 1345    Comment: TLSO education and spinal precautions  Education on use of AD and fall risk prevention  Reacher and compensatory techniques for LB ADL's  OT benefits education                         Point: Home exercise program (Resolved)       Description:   Instruct learner(s) on appropriate technique for monitoring, assisting and/or progressing therapeutic exercises/activities.                  Learning Progress Summary             Patient JANIS Arnold, NR by EG at 10/3/2024 1345    Comment: TLSO education and spinal precautions  Education on use of AD and fall risk prevention  Reacher and compensatory techniques for LB ADL's  OT benefits education                         Point: Precautions (Resolved)       Description:   Instruct learner(s) on prescribed precautions during self-care and functional transfers.                  Learning  Progress Summary             Patient Eager, E, NR by EG at 10/3/2024 1345    Comment: TLSO education and spinal precautions  Education on use of AD and fall risk prevention  Reacher and compensatory techniques for LB ADL's  OT benefits education                         Point: Body mechanics (Resolved)       Description:   Instruct learner(s) on proper positioning and spine alignment during self-care, functional mobility activities and/or exercises.                  Learning Progress Summary             Patient Eager, E, NR by EG at 10/3/2024 1345    Comment: TLSO education and spinal precautions  Education on use of AD and fall risk prevention  Reacher and compensatory techniques for LB ADL's  OT benefits education                                         User Key       Initials Effective Dates Name Provider Type Discipline    EG 09/14/22 -  Karely Hensley OT Occupational Therapist OT                  OT Recommendation and Plan  Planned Therapy Interventions (OT): activity tolerance training, functional balance retraining, occupation/activity based interventions, adaptive equipment training, BADL retraining, neuromuscular control/coordination retraining, patient/caregiver education/training, transfer/mobility retraining, strengthening exercise  Therapy Frequency (OT): 5 times/wk  Plan of Care Review  Plan of Care Reviewed With: patient  Progress: no change  Outcome Evaluation: Patient continues to complain of increased nausea, states he has diarrhea but per nursing all stools have been formed with no major concern; Patient educated on need for OOB activity and progress for OT services; OT continue POC, discuss concerns in meeting for progress; Minimal tolerance for sessions; Ax1 w/RW.  Plan of Care Reviewed With: patient  Outcome Evaluation: Patient continues to complain of increased nausea, states he has diarrhea but per nursing all stools have been formed with no major concern; Patient educated on need for OOB  activity and progress for OT services; OT continue POC, discuss concerns in meeting for progress; Minimal tolerance for sessions; Ax1 w/RW.     Time Calculation:   Evaluation Complexity (OT)  Review Occupational Profile/Medical/Therapy History Complexity: expanded/moderate complexity  Assessment, Occupational Performance/Identification of Deficit Complexity: 3-5 performance deficits  Clinical Decision Making Complexity (OT): detailed assessment/moderate complexity  Overall Complexity of Evaluation (OT): moderate complexity     Time Calculation- OT       Row Name 10/11/24 0950             Time Calculation- OT    OT Received On 10/11/24  -EG                User Key  (r) = Recorded By, (t) = Taken By, (c) = Cosigned By      Initials Name Provider Type    EG Karely Hensley OT Occupational Therapist                  Therapy Charges for Today       Code Description Service Date Service Provider Modifiers Qty    [id number]  OT THERAPEUTIC ACT EA 15 MIN 10/10/2024 Karely Hensley OT GO 1                 Karely Hensley OT  10/11/2024

## 2024-10-11 NOTE — SIGNIFICANT NOTE
10/11/24 0929   Physical Therapy Time and Intention   Session Not Performed patient/family declined, not feeling well  (Patient and daugther both decline treatment, reporting a possible discharge to acute for possible pancreatisis.)

## 2024-10-11 NOTE — PLAN OF CARE
Goal Outcome Evaluation:           Progress: declining  Outcome Evaluation: Rsd. is Alert and oriented x4, forgetful at times.  Rsd. went for CT scan at beginning of shift.  Dr. Mccall notified of results and that Rsd. became nauseated when daughter fed patient and dry heaved in emesis bag.   Ordered for patient to be NPO, LR IV at 150ml/hr continuous, prn IV dilaudid ordered.  See emar.  Rsd. medicated with IV protonix x1, and IV dilaudid x2. Rsd. states medications effective and was able to rest well after medicated.  Rsd. transfers x1 assist to bathroom with gaitbelt and rolling walker.  Call light and personal items within reach.  Rsd. reminded to use call light for assistance. Vebalizes understanding.  Will continue to monitor and notify on-coming staff.  Current plan of care continues at this time.  Fátima Mccracken is requesting to speak to ARIANA Dominguez when he makes rounds this am via telephone.

## 2024-10-11 NOTE — SIGNIFICANT NOTE
10/11/24 0929   Physical Therapy Time and Intention   Session Not Performed patient/family declined, not feeling well  (Patient and daugther both decline treatment, reporting a possible discharge to acute for possible pancreatitis.)

## 2024-10-11 NOTE — SIGNIFICANT NOTE
10/11/24 0949   OTHER   Discipline occupational therapist   Rehab Time/Intention   Session Not Performed patient/family declined, not feeling well  (patient and daughter both decline treatment; possible d/c to acute side with new onset pancreatitis)   Therapy Assessment/Plan (PT)   Criteria for Skilled Interventions Met (PT) yes;skilled treatment is necessary

## 2024-10-11 NOTE — PLAN OF CARE
Goal Outcome Evaluation:  Plan of Care Reviewed With: patient        Progress: declining  Outcome Evaluation: Alert and oriented and pleasant with staff. x1 assist for transfers and ambulation. x1 admin PRN pain medication with relief of symptoms noted. Pt will be transfered to [facility] on [address] for further workup for abnormal CT of abdomen. Sitting up in bed, IV fluids running at 75ML/HR, family at bedside, call light in reach.

## 2024-10-12 LAB
ALBUMIN SERPL-MCNC: 2.7 G/DL (ref 3.5–5.2)
ALP SERPL-CCNC: 117 U/L (ref 39–117)
ALT SERPL W P-5'-P-CCNC: 10 U/L (ref 1–41)
ANION GAP SERPL CALCULATED.3IONS-SCNC: 9.5 MMOL/L (ref 5–15)
ANISOCYTOSIS BLD QL: NORMAL
AST SERPL-CCNC: 16 U/L (ref 1–40)
BASOPHILS # BLD AUTO: 0.14 10*3/MM3 (ref 0–0.2)
BASOPHILS NFR BLD AUTO: 1.2 % (ref 0–1.5)
BILIRUB CONJ SERPL-MCNC: 0.2 MG/DL (ref 0–0.3)
BILIRUB INDIRECT SERPL-MCNC: 0.2 MG/DL
BILIRUB SERPL-MCNC: 0.4 MG/DL (ref 0–1.2)
BUN SERPL-MCNC: 12 MG/DL (ref 8–23)
BUN/CREAT SERPL: 17.6 (ref 7–25)
BURR CELLS BLD QL SMEAR: NORMAL
CALCIUM SPEC-SCNC: 8.8 MG/DL (ref 8.6–10.5)
CHLORIDE SERPL-SCNC: 101 MMOL/L (ref 98–107)
CHOLEST SERPL-MCNC: 103 MG/DL (ref 0–200)
CLUMPED PLATELETS: PRESENT
CO2 SERPL-SCNC: 27.5 MMOL/L (ref 22–29)
CREAT SERPL-MCNC: 0.68 MG/DL (ref 0.76–1.27)
DACRYOCYTES BLD QL SMEAR: NORMAL
DEPRECATED RDW RBC AUTO: 72.3 FL (ref 37–54)
EGFRCR SERPLBLD CKD-EPI 2021: 92.8 ML/MIN/1.73
EOSINOPHIL # BLD AUTO: 0.46 10*3/MM3 (ref 0–0.4)
EOSINOPHIL NFR BLD AUTO: 3.9 % (ref 0.3–6.2)
ERYTHROCYTE [DISTWIDTH] IN BLOOD BY AUTOMATED COUNT: 23.5 % (ref 12.3–15.4)
GLUCOSE BLDC GLUCOMTR-MCNC: 102 MG/DL (ref 70–99)
GLUCOSE BLDC GLUCOMTR-MCNC: 116 MG/DL (ref 70–99)
GLUCOSE BLDC GLUCOMTR-MCNC: 123 MG/DL (ref 70–99)
GLUCOSE BLDC GLUCOMTR-MCNC: 126 MG/DL (ref 70–99)
GLUCOSE SERPL-MCNC: 101 MG/DL (ref 65–99)
HCT VFR BLD AUTO: 34.8 % (ref 37.5–51)
HDLC SERPL-MCNC: 28 MG/DL (ref 40–60)
HGB BLD-MCNC: 10.2 G/DL (ref 13–17.7)
IMM GRANULOCYTES # BLD AUTO: 0.52 10*3/MM3 (ref 0–0.05)
IMM GRANULOCYTES NFR BLD AUTO: 4.4 % (ref 0–0.5)
LARGE PLATELETS: NORMAL
LDLC SERPL CALC-MCNC: 53 MG/DL (ref 0–100)
LDLC/HDLC SERPL: 1.8 {RATIO}
LIPASE SERPL-CCNC: 34 U/L (ref 13–60)
LYMPHOCYTES # BLD AUTO: 1.26 10*3/MM3 (ref 0.7–3.1)
LYMPHOCYTES NFR BLD AUTO: 10.6 % (ref 19.6–45.3)
MACROCYTES BLD QL SMEAR: NORMAL
MAGNESIUM SERPL-MCNC: 1.9 MG/DL (ref 1.6–2.4)
MCH RBC QN AUTO: 24.8 PG (ref 26.6–33)
MCHC RBC AUTO-ENTMCNC: 29.3 G/DL (ref 31.5–35.7)
MCV RBC AUTO: 84.7 FL (ref 79–97)
MICROCYTES BLD QL: NORMAL
MONOCYTES # BLD AUTO: 1.22 10*3/MM3 (ref 0.1–0.9)
MONOCYTES NFR BLD AUTO: 10.2 % (ref 5–12)
NEUTROPHILS NFR BLD AUTO: 69.7 % (ref 42.7–76)
NEUTROPHILS NFR BLD AUTO: 8.34 10*3/MM3 (ref 1.7–7)
NRBC BLD AUTO-RTO: 0 /100 WBC (ref 0–0.2)
OVALOCYTES BLD QL SMEAR: NORMAL
PHOSPHATE SERPL-MCNC: 3.5 MG/DL (ref 2.5–4.5)
PLATELET # BLD AUTO: 470 10*3/MM3 (ref 140–450)
PMV BLD AUTO: 9 FL (ref 6–12)
POIKILOCYTOSIS BLD QL SMEAR: NORMAL
POTASSIUM SERPL-SCNC: 4.2 MMOL/L (ref 3.5–5.2)
PROT SERPL-MCNC: 5.5 G/DL (ref 6–8.5)
RBC # BLD AUTO: 4.11 10*6/MM3 (ref 4.14–5.8)
SMALL PLATELETS BLD QL SMEAR: NORMAL
SODIUM SERPL-SCNC: 138 MMOL/L (ref 136–145)
TRIGL SERPL-MCNC: 123 MG/DL (ref 0–150)
VLDLC SERPL-MCNC: 22 MG/DL (ref 5–40)
WBC MORPH BLD: NORMAL
WBC NRBC COR # BLD AUTO: 11.94 10*3/MM3 (ref 3.4–10.8)

## 2024-10-12 PROCEDURE — 25010000002 MORPHINE PER 10 MG: Performed by: FAMILY MEDICINE

## 2024-10-12 PROCEDURE — 99223 1ST HOSP IP/OBS HIGH 75: CPT | Performed by: INTERNAL MEDICINE

## 2024-10-12 PROCEDURE — 83735 ASSAY OF MAGNESIUM: CPT | Performed by: FAMILY MEDICINE

## 2024-10-12 PROCEDURE — 25810000003 LACTATED RINGERS PER 1000 ML: Performed by: INTERNAL MEDICINE

## 2024-10-12 PROCEDURE — 94799 UNLISTED PULMONARY SVC/PX: CPT

## 2024-10-12 PROCEDURE — 80048 BASIC METABOLIC PNL TOTAL CA: CPT | Performed by: FAMILY MEDICINE

## 2024-10-12 PROCEDURE — 84100 ASSAY OF PHOSPHORUS: CPT | Performed by: FAMILY MEDICINE

## 2024-10-12 PROCEDURE — 85007 BL SMEAR W/DIFF WBC COUNT: CPT | Performed by: FAMILY MEDICINE

## 2024-10-12 PROCEDURE — 83690 ASSAY OF LIPASE: CPT | Performed by: FAMILY MEDICINE

## 2024-10-12 PROCEDURE — 82948 REAGENT STRIP/BLOOD GLUCOSE: CPT | Performed by: FAMILY MEDICINE

## 2024-10-12 PROCEDURE — 82948 REAGENT STRIP/BLOOD GLUCOSE: CPT

## 2024-10-12 PROCEDURE — 85025 COMPLETE CBC W/AUTO DIFF WBC: CPT | Performed by: FAMILY MEDICINE

## 2024-10-12 PROCEDURE — 80061 LIPID PANEL: CPT | Performed by: INTERNAL MEDICINE

## 2024-10-12 PROCEDURE — 80076 HEPATIC FUNCTION PANEL: CPT | Performed by: FAMILY MEDICINE

## 2024-10-12 RX ORDER — SODIUM CHLORIDE, SODIUM LACTATE, POTASSIUM CHLORIDE, CALCIUM CHLORIDE 600; 310; 30; 20 MG/100ML; MG/100ML; MG/100ML; MG/100ML
100 INJECTION, SOLUTION INTRAVENOUS CONTINUOUS
Status: ACTIVE | OUTPATIENT
Start: 2024-10-12 | End: 2024-10-13

## 2024-10-12 RX ORDER — HYDROCODONE BITARTRATE AND ACETAMINOPHEN 5; 325 MG/1; MG/1
1 TABLET ORAL EVERY 4 HOURS PRN
Status: DISCONTINUED | OUTPATIENT
Start: 2024-10-12 | End: 2024-10-18 | Stop reason: HOSPADM

## 2024-10-12 RX ORDER — MORPHINE SULFATE 2 MG/ML
2 INJECTION, SOLUTION INTRAMUSCULAR; INTRAVENOUS EVERY 4 HOURS PRN
Status: DISCONTINUED | OUTPATIENT
Start: 2024-10-12 | End: 2024-10-16

## 2024-10-12 RX ADMIN — HYDROCODONE BITARTRATE AND ACETAMINOPHEN 1 TABLET: 5; 325 TABLET ORAL at 15:22

## 2024-10-12 RX ADMIN — Medication 1 TABLET: at 08:28

## 2024-10-12 RX ADMIN — EMPAGLIFLOZIN 10 MG: 10 TABLET, FILM COATED ORAL at 08:28

## 2024-10-12 RX ADMIN — APIXABAN 5 MG: 5 TABLET, FILM COATED ORAL at 08:28

## 2024-10-12 RX ADMIN — CARVEDILOL 25 MG: 25 TABLET, FILM COATED ORAL at 08:28

## 2024-10-12 RX ADMIN — Medication 10 ML: at 20:29

## 2024-10-12 RX ADMIN — HYDROCODONE BITARTRATE AND ACETAMINOPHEN 1 TABLET: 5; 325 TABLET ORAL at 23:49

## 2024-10-12 RX ADMIN — SUCRALFATE 1 G: 1 TABLET ORAL at 11:57

## 2024-10-12 RX ADMIN — CLOPIDOGREL BISULFATE 75 MG: 75 TABLET ORAL at 08:28

## 2024-10-12 RX ADMIN — MORPHINE SULFATE 2 MG: 2 INJECTION, SOLUTION INTRAMUSCULAR; INTRAVENOUS at 00:15

## 2024-10-12 RX ADMIN — Medication 10 ML: at 08:29

## 2024-10-12 RX ADMIN — SODIUM CHLORIDE, POTASSIUM CHLORIDE, SODIUM LACTATE AND CALCIUM CHLORIDE 100 ML/HR: 600; 310; 30; 20 INJECTION, SOLUTION INTRAVENOUS at 17:46

## 2024-10-12 RX ADMIN — SODIUM CHLORIDE, POTASSIUM CHLORIDE, SODIUM LACTATE AND CALCIUM CHLORIDE 100 ML/HR: 600; 310; 30; 20 INJECTION, SOLUTION INTRAVENOUS at 10:22

## 2024-10-12 RX ADMIN — ROSUVASTATIN 20 MG: 20 TABLET, FILM COATED ORAL at 20:29

## 2024-10-12 RX ADMIN — BUDESONIDE AND FORMOTEROL FUMARATE DIHYDRATE 2 PUFF: 160; 4.5 AEROSOL RESPIRATORY (INHALATION) at 19:40

## 2024-10-12 RX ADMIN — SUCRALFATE 1 G: 1 TABLET ORAL at 17:46

## 2024-10-12 RX ADMIN — DILTIAZEM HYDROCHLORIDE 180 MG: 180 CAPSULE, COATED, EXTENDED RELEASE ORAL at 10:22

## 2024-10-12 RX ADMIN — CARVEDILOL 25 MG: 25 TABLET, FILM COATED ORAL at 17:46

## 2024-10-12 RX ADMIN — BUDESONIDE AND FORMOTEROL FUMARATE DIHYDRATE 2 PUFF: 160; 4.5 AEROSOL RESPIRATORY (INHALATION) at 07:49

## 2024-10-12 RX ADMIN — PANTOPRAZOLE SODIUM 40 MG: 40 INJECTION, POWDER, LYOPHILIZED, FOR SOLUTION INTRAVENOUS at 08:29

## 2024-10-12 RX ADMIN — SUCRALFATE 1 G: 1 TABLET ORAL at 08:28

## 2024-10-12 RX ADMIN — APIXABAN 5 MG: 5 TABLET, FILM COATED ORAL at 20:29

## 2024-10-12 RX ADMIN — PANTOPRAZOLE SODIUM 40 MG: 40 INJECTION, POWDER, LYOPHILIZED, FOR SOLUTION INTRAVENOUS at 17:46

## 2024-10-13 ENCOUNTER — APPOINTMENT (OUTPATIENT)
Dept: ULTRASOUND IMAGING | Facility: HOSPITAL | Age: 84
DRG: 439 | End: 2024-10-13
Payer: MEDICARE

## 2024-10-13 LAB
ANION GAP SERPL CALCULATED.3IONS-SCNC: 11.7 MMOL/L (ref 5–15)
ANISOCYTOSIS BLD QL: NORMAL
BASOPHILS # BLD AUTO: 0.1 10*3/MM3 (ref 0–0.2)
BASOPHILS NFR BLD AUTO: 0.9 % (ref 0–1.5)
BUN SERPL-MCNC: 9 MG/DL (ref 8–23)
BUN/CREAT SERPL: 15.8 (ref 7–25)
BURR CELLS BLD QL SMEAR: NORMAL
CALCIUM SPEC-SCNC: 8.4 MG/DL (ref 8.6–10.5)
CHLORIDE SERPL-SCNC: 102 MMOL/L (ref 98–107)
CO2 SERPL-SCNC: 24.3 MMOL/L (ref 22–29)
CREAT SERPL-MCNC: 0.57 MG/DL (ref 0.76–1.27)
DEPRECATED RDW RBC AUTO: 70.8 FL (ref 37–54)
EGFRCR SERPLBLD CKD-EPI 2021: 97.9 ML/MIN/1.73
EOSINOPHIL # BLD AUTO: 0.38 10*3/MM3 (ref 0–0.4)
EOSINOPHIL NFR BLD AUTO: 3.3 % (ref 0.3–6.2)
ERYTHROCYTE [DISTWIDTH] IN BLOOD BY AUTOMATED COUNT: 23.6 % (ref 12.3–15.4)
GLUCOSE BLDC GLUCOMTR-MCNC: 143 MG/DL (ref 70–99)
GLUCOSE BLDC GLUCOMTR-MCNC: 178 MG/DL (ref 70–99)
GLUCOSE BLDC GLUCOMTR-MCNC: 92 MG/DL (ref 70–99)
GLUCOSE BLDC GLUCOMTR-MCNC: 98 MG/DL (ref 70–99)
GLUCOSE SERPL-MCNC: 81 MG/DL (ref 65–99)
HCT VFR BLD AUTO: 32.2 % (ref 37.5–51)
HGB BLD-MCNC: 9.8 G/DL (ref 13–17.7)
HYPOCHROMIA BLD QL: NORMAL
IMM GRANULOCYTES # BLD AUTO: 0.52 10*3/MM3 (ref 0–0.05)
IMM GRANULOCYTES NFR BLD AUTO: 4.5 % (ref 0–0.5)
LARGE PLATELETS: NORMAL
LYMPHOCYTES # BLD AUTO: 1.3 10*3/MM3 (ref 0.7–3.1)
LYMPHOCYTES NFR BLD AUTO: 11.2 % (ref 19.6–45.3)
MAGNESIUM SERPL-MCNC: 1.7 MG/DL (ref 1.6–2.4)
MCH RBC QN AUTO: 25.4 PG (ref 26.6–33)
MCHC RBC AUTO-ENTMCNC: 30.4 G/DL (ref 31.5–35.7)
MCV RBC AUTO: 83.4 FL (ref 79–97)
MONOCYTES # BLD AUTO: 1.18 10*3/MM3 (ref 0.1–0.9)
MONOCYTES NFR BLD AUTO: 10.2 % (ref 5–12)
NEUTROPHILS NFR BLD AUTO: 69.9 % (ref 42.7–76)
NEUTROPHILS NFR BLD AUTO: 8.08 10*3/MM3 (ref 1.7–7)
NRBC BLD AUTO-RTO: 0 /100 WBC (ref 0–0.2)
PHOSPHATE SERPL-MCNC: 3.5 MG/DL (ref 2.5–4.5)
PLATELET # BLD AUTO: 427 10*3/MM3 (ref 140–450)
PMV BLD AUTO: 9 FL (ref 6–12)
POLYCHROMASIA BLD QL SMEAR: NORMAL
POTASSIUM SERPL-SCNC: 3.7 MMOL/L (ref 3.5–5.2)
RBC # BLD AUTO: 3.86 10*6/MM3 (ref 4.14–5.8)
SODIUM SERPL-SCNC: 138 MMOL/L (ref 136–145)
WBC MORPH BLD: NORMAL
WBC NRBC COR # BLD AUTO: 11.56 10*3/MM3 (ref 3.4–10.8)

## 2024-10-13 PROCEDURE — 85007 BL SMEAR W/DIFF WBC COUNT: CPT | Performed by: INTERNAL MEDICINE

## 2024-10-13 PROCEDURE — 25010000002 MAGNESIUM SULFATE 2 GM/50ML SOLUTION: Performed by: INTERNAL MEDICINE

## 2024-10-13 PROCEDURE — 82948 REAGENT STRIP/BLOOD GLUCOSE: CPT

## 2024-10-13 PROCEDURE — 85025 COMPLETE CBC W/AUTO DIFF WBC: CPT | Performed by: INTERNAL MEDICINE

## 2024-10-13 PROCEDURE — 63710000001 INSULIN LISPRO (HUMAN) PER 5 UNITS: Performed by: FAMILY MEDICINE

## 2024-10-13 PROCEDURE — 99232 SBSQ HOSP IP/OBS MODERATE 35: CPT | Performed by: INTERNAL MEDICINE

## 2024-10-13 PROCEDURE — 94799 UNLISTED PULMONARY SVC/PX: CPT

## 2024-10-13 PROCEDURE — 83735 ASSAY OF MAGNESIUM: CPT | Performed by: INTERNAL MEDICINE

## 2024-10-13 PROCEDURE — 94760 N-INVAS EAR/PLS OXIMETRY 1: CPT

## 2024-10-13 PROCEDURE — 80048 BASIC METABOLIC PNL TOTAL CA: CPT | Performed by: INTERNAL MEDICINE

## 2024-10-13 PROCEDURE — 76705 ECHO EXAM OF ABDOMEN: CPT

## 2024-10-13 PROCEDURE — 82948 REAGENT STRIP/BLOOD GLUCOSE: CPT | Performed by: FAMILY MEDICINE

## 2024-10-13 PROCEDURE — 84100 ASSAY OF PHOSPHORUS: CPT | Performed by: INTERNAL MEDICINE

## 2024-10-13 RX ORDER — MAGNESIUM SULFATE HEPTAHYDRATE 40 MG/ML
2 INJECTION, SOLUTION INTRAVENOUS ONCE
Status: COMPLETED | OUTPATIENT
Start: 2024-10-13 | End: 2024-10-13

## 2024-10-13 RX ADMIN — CARVEDILOL 25 MG: 25 TABLET, FILM COATED ORAL at 17:44

## 2024-10-13 RX ADMIN — CLOPIDOGREL BISULFATE 75 MG: 75 TABLET ORAL at 10:09

## 2024-10-13 RX ADMIN — Medication 10 ML: at 20:14

## 2024-10-13 RX ADMIN — INSULIN LISPRO 2 UNITS: 100 INJECTION, SOLUTION INTRAVENOUS; SUBCUTANEOUS at 11:25

## 2024-10-13 RX ADMIN — BUDESONIDE AND FORMOTEROL FUMARATE DIHYDRATE 2 PUFF: 160; 4.5 AEROSOL RESPIRATORY (INHALATION) at 07:29

## 2024-10-13 RX ADMIN — SUCRALFATE 1 G: 1 TABLET ORAL at 11:25

## 2024-10-13 RX ADMIN — APIXABAN 5 MG: 5 TABLET, FILM COATED ORAL at 20:13

## 2024-10-13 RX ADMIN — PANTOPRAZOLE SODIUM 40 MG: 40 INJECTION, POWDER, LYOPHILIZED, FOR SOLUTION INTRAVENOUS at 10:58

## 2024-10-13 RX ADMIN — BUDESONIDE AND FORMOTEROL FUMARATE DIHYDRATE 2 PUFF: 160; 4.5 AEROSOL RESPIRATORY (INHALATION) at 20:44

## 2024-10-13 RX ADMIN — SUCRALFATE 1 G: 1 TABLET ORAL at 17:44

## 2024-10-13 RX ADMIN — CARVEDILOL 25 MG: 25 TABLET, FILM COATED ORAL at 10:09

## 2024-10-13 RX ADMIN — DILTIAZEM HYDROCHLORIDE 180 MG: 180 CAPSULE, COATED, EXTENDED RELEASE ORAL at 10:08

## 2024-10-13 RX ADMIN — MAGNESIUM SULFATE HEPTAHYDRATE 2 G: 40 INJECTION, SOLUTION INTRAVENOUS at 10:08

## 2024-10-13 RX ADMIN — Medication 1 TABLET: at 10:09

## 2024-10-13 RX ADMIN — PANTOPRAZOLE SODIUM 40 MG: 40 INJECTION, POWDER, LYOPHILIZED, FOR SOLUTION INTRAVENOUS at 17:44

## 2024-10-13 RX ADMIN — HYDROCODONE BITARTRATE AND ACETAMINOPHEN 1 TABLET: 5; 325 TABLET ORAL at 10:08

## 2024-10-13 RX ADMIN — Medication 10 ML: at 10:10

## 2024-10-13 RX ADMIN — SUCRALFATE 1 G: 1 TABLET ORAL at 10:09

## 2024-10-13 RX ADMIN — APIXABAN 5 MG: 5 TABLET, FILM COATED ORAL at 10:09

## 2024-10-13 RX ADMIN — ROSUVASTATIN 20 MG: 20 TABLET, FILM COATED ORAL at 20:13

## 2024-10-14 LAB
ANION GAP SERPL CALCULATED.3IONS-SCNC: 9 MMOL/L (ref 5–15)
ANISOCYTOSIS BLD QL: ABNORMAL
BUN SERPL-MCNC: 8 MG/DL (ref 8–23)
BUN/CREAT SERPL: 14 (ref 7–25)
BURR CELLS BLD QL SMEAR: ABNORMAL
CALCIUM SPEC-SCNC: 8.5 MG/DL (ref 8.6–10.5)
CHLORIDE SERPL-SCNC: 101 MMOL/L (ref 98–107)
CO2 SERPL-SCNC: 25 MMOL/L (ref 22–29)
CREAT SERPL-MCNC: 0.57 MG/DL (ref 0.76–1.27)
DEPRECATED RDW RBC AUTO: 69.3 FL (ref 37–54)
EGFRCR SERPLBLD CKD-EPI 2021: 97.9 ML/MIN/1.73
EOSINOPHIL # BLD MANUAL: 0.36 10*3/MM3 (ref 0–0.4)
EOSINOPHIL NFR BLD MANUAL: 3 % (ref 0.3–6.2)
ERYTHROCYTE [DISTWIDTH] IN BLOOD BY AUTOMATED COUNT: 23.1 % (ref 12.3–15.4)
GLUCOSE BLDC GLUCOMTR-MCNC: 110 MG/DL (ref 70–99)
GLUCOSE BLDC GLUCOMTR-MCNC: 132 MG/DL (ref 70–99)
GLUCOSE BLDC GLUCOMTR-MCNC: 148 MG/DL (ref 70–99)
GLUCOSE BLDC GLUCOMTR-MCNC: 172 MG/DL (ref 70–99)
GLUCOSE BLDC GLUCOMTR-MCNC: 90 MG/DL (ref 70–99)
GLUCOSE SERPL-MCNC: 95 MG/DL (ref 65–99)
HCT VFR BLD AUTO: 32.6 % (ref 37.5–51)
HGB BLD-MCNC: 9.9 G/DL (ref 13–17.7)
HYPOCHROMIA BLD QL: ABNORMAL
LYMPHOCYTES # BLD MANUAL: 0.96 10*3/MM3 (ref 0.7–3.1)
LYMPHOCYTES NFR BLD MANUAL: 6 % (ref 5–12)
MAGNESIUM SERPL-MCNC: 2 MG/DL (ref 1.6–2.4)
MCH RBC QN AUTO: 25.2 PG (ref 26.6–33)
MCHC RBC AUTO-ENTMCNC: 30.4 G/DL (ref 31.5–35.7)
MCV RBC AUTO: 83 FL (ref 79–97)
MONOCYTES # BLD: 0.72 10*3/MM3 (ref 0.1–0.9)
MYELOCYTES NFR BLD MANUAL: 2 % (ref 0–0)
NEUTROPHILS # BLD AUTO: 9.7 10*3/MM3 (ref 1.7–7)
NEUTROPHILS NFR BLD MANUAL: 78 % (ref 42.7–76)
NEUTS BAND NFR BLD MANUAL: 3 % (ref 0–5)
PHOSPHATE SERPL-MCNC: 3.4 MG/DL (ref 2.5–4.5)
PLAT MORPH BLD: NORMAL
PLATELET # BLD AUTO: 463 10*3/MM3 (ref 140–450)
PMV BLD AUTO: 8.9 FL (ref 6–12)
POTASSIUM SERPL-SCNC: 3.8 MMOL/L (ref 3.5–5.2)
RBC # BLD AUTO: 3.93 10*6/MM3 (ref 4.14–5.8)
SCAN SLIDE: NORMAL
SODIUM SERPL-SCNC: 135 MMOL/L (ref 136–145)
VARIANT LYMPHS NFR BLD MANUAL: 8 % (ref 19.6–45.3)
WBC MORPH BLD: NORMAL
WBC NRBC COR # BLD AUTO: 11.97 10*3/MM3 (ref 3.4–10.8)

## 2024-10-14 PROCEDURE — 85007 BL SMEAR W/DIFF WBC COUNT: CPT | Performed by: INTERNAL MEDICINE

## 2024-10-14 PROCEDURE — 84100 ASSAY OF PHOSPHORUS: CPT | Performed by: INTERNAL MEDICINE

## 2024-10-14 PROCEDURE — 83735 ASSAY OF MAGNESIUM: CPT | Performed by: INTERNAL MEDICINE

## 2024-10-14 PROCEDURE — 82948 REAGENT STRIP/BLOOD GLUCOSE: CPT

## 2024-10-14 PROCEDURE — 63710000001 INSULIN LISPRO (HUMAN) PER 5 UNITS: Performed by: FAMILY MEDICINE

## 2024-10-14 PROCEDURE — 85025 COMPLETE CBC W/AUTO DIFF WBC: CPT | Performed by: INTERNAL MEDICINE

## 2024-10-14 PROCEDURE — 94799 UNLISTED PULMONARY SVC/PX: CPT

## 2024-10-14 PROCEDURE — 82948 REAGENT STRIP/BLOOD GLUCOSE: CPT | Performed by: FAMILY MEDICINE

## 2024-10-14 PROCEDURE — 99232 SBSQ HOSP IP/OBS MODERATE 35: CPT | Performed by: INTERNAL MEDICINE

## 2024-10-14 PROCEDURE — 94760 N-INVAS EAR/PLS OXIMETRY 1: CPT

## 2024-10-14 PROCEDURE — 97165 OT EVAL LOW COMPLEX 30 MIN: CPT

## 2024-10-14 PROCEDURE — 97161 PT EVAL LOW COMPLEX 20 MIN: CPT

## 2024-10-14 PROCEDURE — 25010000002 ONDANSETRON PER 1 MG: Performed by: FAMILY MEDICINE

## 2024-10-14 PROCEDURE — 80048 BASIC METABOLIC PNL TOTAL CA: CPT | Performed by: INTERNAL MEDICINE

## 2024-10-14 RX ORDER — PANTOPRAZOLE SODIUM 40 MG/1
40 TABLET, DELAYED RELEASE ORAL
Status: DISCONTINUED | OUTPATIENT
Start: 2024-10-14 | End: 2024-10-18 | Stop reason: HOSPADM

## 2024-10-14 RX ADMIN — ONDANSETRON 4 MG: 2 INJECTION INTRAMUSCULAR; INTRAVENOUS at 13:38

## 2024-10-14 RX ADMIN — INSULIN LISPRO 2 UNITS: 100 INJECTION, SOLUTION INTRAVENOUS; SUBCUTANEOUS at 20:19

## 2024-10-14 RX ADMIN — APIXABAN 5 MG: 5 TABLET, FILM COATED ORAL at 20:20

## 2024-10-14 RX ADMIN — CLOPIDOGREL BISULFATE 75 MG: 75 TABLET ORAL at 08:37

## 2024-10-14 RX ADMIN — PANTOPRAZOLE SODIUM 40 MG: 40 TABLET, DELAYED RELEASE ORAL at 20:20

## 2024-10-14 RX ADMIN — Medication 1 TABLET: at 08:37

## 2024-10-14 RX ADMIN — Medication 10 ML: at 08:38

## 2024-10-14 RX ADMIN — BUDESONIDE AND FORMOTEROL FUMARATE DIHYDRATE 2 PUFF: 160; 4.5 AEROSOL RESPIRATORY (INHALATION) at 19:23

## 2024-10-14 RX ADMIN — APIXABAN 5 MG: 5 TABLET, FILM COATED ORAL at 08:38

## 2024-10-14 RX ADMIN — FUROSEMIDE 40 MG: 40 TABLET ORAL at 08:38

## 2024-10-14 RX ADMIN — CARVEDILOL 25 MG: 25 TABLET, FILM COATED ORAL at 17:53

## 2024-10-14 RX ADMIN — ACETAMINOPHEN 650 MG: 325 TABLET ORAL at 20:20

## 2024-10-14 RX ADMIN — HYDROCODONE BITARTRATE AND ACETAMINOPHEN 1 TABLET: 5; 325 TABLET ORAL at 01:15

## 2024-10-14 RX ADMIN — CARVEDILOL 25 MG: 25 TABLET, FILM COATED ORAL at 08:37

## 2024-10-14 RX ADMIN — PANTOPRAZOLE SODIUM 40 MG: 40 INJECTION, POWDER, LYOPHILIZED, FOR SOLUTION INTRAVENOUS at 05:39

## 2024-10-14 RX ADMIN — BUDESONIDE AND FORMOTEROL FUMARATE DIHYDRATE 2 PUFF: 160; 4.5 AEROSOL RESPIRATORY (INHALATION) at 10:35

## 2024-10-14 RX ADMIN — Medication 10 ML: at 20:20

## 2024-10-14 RX ADMIN — DILTIAZEM HYDROCHLORIDE 180 MG: 180 CAPSULE, COATED, EXTENDED RELEASE ORAL at 08:37

## 2024-10-14 RX ADMIN — ROSUVASTATIN 20 MG: 20 TABLET, FILM COATED ORAL at 20:20

## 2024-10-14 RX ADMIN — HYDROCODONE BITARTRATE AND ACETAMINOPHEN 1 TABLET: 5; 325 TABLET ORAL at 08:38

## 2024-10-15 LAB
ANION GAP SERPL CALCULATED.3IONS-SCNC: 7.2 MMOL/L (ref 5–15)
BASOPHILS # BLD AUTO: 0.12 10*3/MM3 (ref 0–0.2)
BASOPHILS NFR BLD AUTO: 1 % (ref 0–1.5)
BUN SERPL-MCNC: 9 MG/DL (ref 8–23)
BUN/CREAT SERPL: 14.5 (ref 7–25)
CALCIUM SPEC-SCNC: 8.5 MG/DL (ref 8.6–10.5)
CHLORIDE SERPL-SCNC: 103 MMOL/L (ref 98–107)
CO2 SERPL-SCNC: 26.8 MMOL/L (ref 22–29)
CREAT SERPL-MCNC: 0.62 MG/DL (ref 0.76–1.27)
DEPRECATED RDW RBC AUTO: 69.8 FL (ref 37–54)
EGFRCR SERPLBLD CKD-EPI 2021: 95.4 ML/MIN/1.73
EOSINOPHIL # BLD AUTO: 0.21 10*3/MM3 (ref 0–0.4)
EOSINOPHIL NFR BLD AUTO: 1.7 % (ref 0.3–6.2)
ERYTHROCYTE [DISTWIDTH] IN BLOOD BY AUTOMATED COUNT: 23.3 % (ref 12.3–15.4)
GLUCOSE BLDC GLUCOMTR-MCNC: 130 MG/DL (ref 70–99)
GLUCOSE BLDC GLUCOMTR-MCNC: 159 MG/DL (ref 70–99)
GLUCOSE BLDC GLUCOMTR-MCNC: 187 MG/DL (ref 70–99)
GLUCOSE BLDC GLUCOMTR-MCNC: 93 MG/DL (ref 70–99)
GLUCOSE SERPL-MCNC: 104 MG/DL (ref 65–99)
HCT VFR BLD AUTO: 34.8 % (ref 37.5–51)
HGB BLD-MCNC: 10.7 G/DL (ref 13–17.7)
IMM GRANULOCYTES # BLD AUTO: 0.64 10*3/MM3 (ref 0–0.05)
IMM GRANULOCYTES NFR BLD AUTO: 5.1 % (ref 0–0.5)
LYMPHOCYTES # BLD AUTO: 1.63 10*3/MM3 (ref 0.7–3.1)
LYMPHOCYTES NFR BLD AUTO: 13.1 % (ref 19.6–45.3)
MAGNESIUM SERPL-MCNC: 1.9 MG/DL (ref 1.6–2.4)
MCH RBC QN AUTO: 25.6 PG (ref 26.6–33)
MCHC RBC AUTO-ENTMCNC: 30.7 G/DL (ref 31.5–35.7)
MCV RBC AUTO: 83.3 FL (ref 79–97)
MONOCYTES # BLD AUTO: 1.39 10*3/MM3 (ref 0.1–0.9)
MONOCYTES NFR BLD AUTO: 11.1 % (ref 5–12)
NEUTROPHILS NFR BLD AUTO: 68 % (ref 42.7–76)
NEUTROPHILS NFR BLD AUTO: 8.48 10*3/MM3 (ref 1.7–7)
NRBC BLD AUTO-RTO: 0 /100 WBC (ref 0–0.2)
PHOSPHATE SERPL-MCNC: 3.6 MG/DL (ref 2.5–4.5)
PLATELET # BLD AUTO: 470 10*3/MM3 (ref 140–450)
PMV BLD AUTO: 9 FL (ref 6–12)
POTASSIUM SERPL-SCNC: 3.7 MMOL/L (ref 3.5–5.2)
RBC # BLD AUTO: 4.18 10*6/MM3 (ref 4.14–5.8)
SODIUM SERPL-SCNC: 137 MMOL/L (ref 136–145)
WBC NRBC COR # BLD AUTO: 12.47 10*3/MM3 (ref 3.4–10.8)

## 2024-10-15 PROCEDURE — 82948 REAGENT STRIP/BLOOD GLUCOSE: CPT

## 2024-10-15 PROCEDURE — 63710000001 INSULIN LISPRO (HUMAN) PER 5 UNITS: Performed by: FAMILY MEDICINE

## 2024-10-15 PROCEDURE — 97110 THERAPEUTIC EXERCISES: CPT

## 2024-10-15 PROCEDURE — 97116 GAIT TRAINING THERAPY: CPT

## 2024-10-15 PROCEDURE — 84100 ASSAY OF PHOSPHORUS: CPT | Performed by: INTERNAL MEDICINE

## 2024-10-15 PROCEDURE — 94664 DEMO&/EVAL PT USE INHALER: CPT

## 2024-10-15 PROCEDURE — 83735 ASSAY OF MAGNESIUM: CPT | Performed by: INTERNAL MEDICINE

## 2024-10-15 PROCEDURE — 94799 UNLISTED PULMONARY SVC/PX: CPT

## 2024-10-15 PROCEDURE — 97535 SELF CARE MNGMENT TRAINING: CPT

## 2024-10-15 PROCEDURE — 97530 THERAPEUTIC ACTIVITIES: CPT

## 2024-10-15 PROCEDURE — 85025 COMPLETE CBC W/AUTO DIFF WBC: CPT | Performed by: INTERNAL MEDICINE

## 2024-10-15 PROCEDURE — 82948 REAGENT STRIP/BLOOD GLUCOSE: CPT | Performed by: FAMILY MEDICINE

## 2024-10-15 PROCEDURE — 99232 SBSQ HOSP IP/OBS MODERATE 35: CPT | Performed by: INTERNAL MEDICINE

## 2024-10-15 PROCEDURE — 80048 BASIC METABOLIC PNL TOTAL CA: CPT | Performed by: INTERNAL MEDICINE

## 2024-10-15 RX ADMIN — BISACODYL 5 MG: 5 TABLET, COATED ORAL at 18:11

## 2024-10-15 RX ADMIN — CARVEDILOL 25 MG: 25 TABLET, FILM COATED ORAL at 18:11

## 2024-10-15 RX ADMIN — FUROSEMIDE 40 MG: 40 TABLET ORAL at 10:19

## 2024-10-15 RX ADMIN — BUDESONIDE AND FORMOTEROL FUMARATE DIHYDRATE 2 PUFF: 160; 4.5 AEROSOL RESPIRATORY (INHALATION) at 11:06

## 2024-10-15 RX ADMIN — APIXABAN 5 MG: 5 TABLET, FILM COATED ORAL at 10:20

## 2024-10-15 RX ADMIN — Medication 1 TABLET: at 10:19

## 2024-10-15 RX ADMIN — CARVEDILOL 25 MG: 25 TABLET, FILM COATED ORAL at 10:20

## 2024-10-15 RX ADMIN — PANTOPRAZOLE SODIUM 40 MG: 40 TABLET, DELAYED RELEASE ORAL at 18:11

## 2024-10-15 RX ADMIN — ROSUVASTATIN 20 MG: 20 TABLET, FILM COATED ORAL at 20:28

## 2024-10-15 RX ADMIN — INSULIN LISPRO 2 UNITS: 100 INJECTION, SOLUTION INTRAVENOUS; SUBCUTANEOUS at 20:29

## 2024-10-15 RX ADMIN — DILTIAZEM HYDROCHLORIDE 180 MG: 180 CAPSULE, COATED, EXTENDED RELEASE ORAL at 10:20

## 2024-10-15 RX ADMIN — APIXABAN 5 MG: 5 TABLET, FILM COATED ORAL at 20:28

## 2024-10-15 RX ADMIN — HYDROCODONE BITARTRATE AND ACETAMINOPHEN 1 TABLET: 5; 325 TABLET ORAL at 01:45

## 2024-10-15 RX ADMIN — PANTOPRAZOLE SODIUM 40 MG: 40 TABLET, DELAYED RELEASE ORAL at 05:13

## 2024-10-15 RX ADMIN — Medication 10 ML: at 10:20

## 2024-10-15 RX ADMIN — INSULIN LISPRO 2 UNITS: 100 INJECTION, SOLUTION INTRAVENOUS; SUBCUTANEOUS at 12:08

## 2024-10-15 RX ADMIN — ACETAMINOPHEN 650 MG: 325 TABLET ORAL at 10:25

## 2024-10-15 RX ADMIN — Medication 10 ML: at 20:29

## 2024-10-15 RX ADMIN — BUDESONIDE AND FORMOTEROL FUMARATE DIHYDRATE 2 PUFF: 160; 4.5 AEROSOL RESPIRATORY (INHALATION) at 19:00

## 2024-10-15 RX ADMIN — CLOPIDOGREL BISULFATE 75 MG: 75 TABLET ORAL at 10:20

## 2024-10-16 LAB
ALBUMIN SERPL-MCNC: 2.6 G/DL (ref 3.5–5.2)
ALBUMIN/GLOB SERPL: 1 G/DL
ALP SERPL-CCNC: 110 U/L (ref 39–117)
ALT SERPL W P-5'-P-CCNC: 8 U/L (ref 1–41)
ANION GAP SERPL CALCULATED.3IONS-SCNC: 9.7 MMOL/L (ref 5–15)
AST SERPL-CCNC: 13 U/L (ref 1–40)
BASOPHILS # BLD AUTO: 0.16 10*3/MM3 (ref 0–0.2)
BASOPHILS NFR BLD AUTO: 1.3 % (ref 0–1.5)
BILIRUB SERPL-MCNC: 0.3 MG/DL (ref 0–1.2)
BUN SERPL-MCNC: 9 MG/DL (ref 8–23)
BUN/CREAT SERPL: 14.5 (ref 7–25)
CALCIUM SPEC-SCNC: 8.3 MG/DL (ref 8.6–10.5)
CHLORIDE SERPL-SCNC: 101 MMOL/L (ref 98–107)
CO2 SERPL-SCNC: 27.3 MMOL/L (ref 22–29)
CREAT SERPL-MCNC: 0.62 MG/DL (ref 0.76–1.27)
DEPRECATED RDW RBC AUTO: 69.7 FL (ref 37–54)
EGFRCR SERPLBLD CKD-EPI 2021: 95.4 ML/MIN/1.73
EOSINOPHIL # BLD AUTO: 0.18 10*3/MM3 (ref 0–0.4)
EOSINOPHIL NFR BLD AUTO: 1.4 % (ref 0.3–6.2)
ERYTHROCYTE [DISTWIDTH] IN BLOOD BY AUTOMATED COUNT: 23.2 % (ref 12.3–15.4)
GLOBULIN UR ELPH-MCNC: 2.5 GM/DL
GLUCOSE BLDC GLUCOMTR-MCNC: 114 MG/DL (ref 70–99)
GLUCOSE BLDC GLUCOMTR-MCNC: 129 MG/DL (ref 70–99)
GLUCOSE BLDC GLUCOMTR-MCNC: 153 MG/DL (ref 70–99)
GLUCOSE BLDC GLUCOMTR-MCNC: 157 MG/DL (ref 70–99)
GLUCOSE SERPL-MCNC: 105 MG/DL (ref 65–99)
HCT VFR BLD AUTO: 33.8 % (ref 37.5–51)
HGB BLD-MCNC: 10.3 G/DL (ref 13–17.7)
IMM GRANULOCYTES # BLD AUTO: 0.62 10*3/MM3 (ref 0–0.05)
IMM GRANULOCYTES NFR BLD AUTO: 4.9 % (ref 0–0.5)
LYMPHOCYTES # BLD AUTO: 1.59 10*3/MM3 (ref 0.7–3.1)
LYMPHOCYTES NFR BLD AUTO: 12.5 % (ref 19.6–45.3)
MAGNESIUM SERPL-MCNC: 1.7 MG/DL (ref 1.6–2.4)
MCH RBC QN AUTO: 25.2 PG (ref 26.6–33)
MCHC RBC AUTO-ENTMCNC: 30.5 G/DL (ref 31.5–35.7)
MCV RBC AUTO: 82.8 FL (ref 79–97)
MONOCYTES # BLD AUTO: 1.57 10*3/MM3 (ref 0.1–0.9)
MONOCYTES NFR BLD AUTO: 12.4 % (ref 5–12)
NEUTROPHILS NFR BLD AUTO: 67.5 % (ref 42.7–76)
NEUTROPHILS NFR BLD AUTO: 8.56 10*3/MM3 (ref 1.7–7)
NRBC BLD AUTO-RTO: 0 /100 WBC (ref 0–0.2)
PHOSPHATE SERPL-MCNC: 3.2 MG/DL (ref 2.5–4.5)
PLATELET # BLD AUTO: 497 10*3/MM3 (ref 140–450)
PMV BLD AUTO: 9.3 FL (ref 6–12)
POTASSIUM SERPL-SCNC: 3.1 MMOL/L (ref 3.5–5.2)
PROT SERPL-MCNC: 5.1 G/DL (ref 6–8.5)
RBC # BLD AUTO: 4.08 10*6/MM3 (ref 4.14–5.8)
SODIUM SERPL-SCNC: 138 MMOL/L (ref 136–145)
WBC NRBC COR # BLD AUTO: 12.68 10*3/MM3 (ref 3.4–10.8)

## 2024-10-16 PROCEDURE — 84100 ASSAY OF PHOSPHORUS: CPT | Performed by: INTERNAL MEDICINE

## 2024-10-16 PROCEDURE — 82948 REAGENT STRIP/BLOOD GLUCOSE: CPT

## 2024-10-16 PROCEDURE — 83735 ASSAY OF MAGNESIUM: CPT | Performed by: INTERNAL MEDICINE

## 2024-10-16 PROCEDURE — 94799 UNLISTED PULMONARY SVC/PX: CPT

## 2024-10-16 PROCEDURE — 94664 DEMO&/EVAL PT USE INHALER: CPT

## 2024-10-16 PROCEDURE — 25010000002 MAGNESIUM SULFATE 2 GM/50ML SOLUTION: Performed by: INTERNAL MEDICINE

## 2024-10-16 PROCEDURE — 25010000002 ONDANSETRON PER 1 MG: Performed by: FAMILY MEDICINE

## 2024-10-16 PROCEDURE — 80053 COMPREHEN METABOLIC PANEL: CPT | Performed by: INTERNAL MEDICINE

## 2024-10-16 PROCEDURE — 63710000001 INSULIN LISPRO (HUMAN) PER 5 UNITS: Performed by: FAMILY MEDICINE

## 2024-10-16 PROCEDURE — 97116 GAIT TRAINING THERAPY: CPT

## 2024-10-16 PROCEDURE — 25010000002 METOCLOPRAMIDE PER 10 MG: Performed by: FAMILY MEDICINE

## 2024-10-16 PROCEDURE — 82948 REAGENT STRIP/BLOOD GLUCOSE: CPT | Performed by: FAMILY MEDICINE

## 2024-10-16 PROCEDURE — 97530 THERAPEUTIC ACTIVITIES: CPT

## 2024-10-16 PROCEDURE — 25810000003 SODIUM CHLORIDE 0.9 % SOLUTION 500 ML FLEX CONT: Performed by: FAMILY MEDICINE

## 2024-10-16 PROCEDURE — 99233 SBSQ HOSP IP/OBS HIGH 50: CPT | Performed by: INTERNAL MEDICINE

## 2024-10-16 PROCEDURE — 85025 COMPLETE CBC W/AUTO DIFF WBC: CPT | Performed by: INTERNAL MEDICINE

## 2024-10-16 RX ORDER — FUROSEMIDE 20 MG
20 TABLET ORAL DAILY
Status: DISCONTINUED | OUTPATIENT
Start: 2024-10-17 | End: 2024-10-18 | Stop reason: HOSPADM

## 2024-10-16 RX ORDER — POTASSIUM CHLORIDE 750 MG/1
40 CAPSULE, EXTENDED RELEASE ORAL EVERY 4 HOURS
Status: COMPLETED | OUTPATIENT
Start: 2024-10-16 | End: 2024-10-16

## 2024-10-16 RX ORDER — MAGNESIUM SULFATE HEPTAHYDRATE 40 MG/ML
2 INJECTION, SOLUTION INTRAVENOUS ONCE
Status: COMPLETED | OUTPATIENT
Start: 2024-10-16 | End: 2024-10-16

## 2024-10-16 RX ADMIN — Medication 1 TABLET: at 10:05

## 2024-10-16 RX ADMIN — ONDANSETRON 4 MG: 2 INJECTION INTRAMUSCULAR; INTRAVENOUS at 12:14

## 2024-10-16 RX ADMIN — APIXABAN 5 MG: 5 TABLET, FILM COATED ORAL at 10:05

## 2024-10-16 RX ADMIN — CARVEDILOL 25 MG: 25 TABLET, FILM COATED ORAL at 17:53

## 2024-10-16 RX ADMIN — INSULIN LISPRO 2 UNITS: 100 INJECTION, SOLUTION INTRAVENOUS; SUBCUTANEOUS at 17:53

## 2024-10-16 RX ADMIN — Medication 10 ML: at 12:13

## 2024-10-16 RX ADMIN — SODIUM CHLORIDE 40 ML: 9 INJECTION, SOLUTION INTRAVENOUS at 09:56

## 2024-10-16 RX ADMIN — MAGNESIUM SULFATE HEPTAHYDRATE 2 G: 40 INJECTION, SOLUTION INTRAVENOUS at 09:56

## 2024-10-16 RX ADMIN — DILTIAZEM HYDROCHLORIDE 180 MG: 180 CAPSULE, COATED, EXTENDED RELEASE ORAL at 10:06

## 2024-10-16 RX ADMIN — APIXABAN 5 MG: 5 TABLET, FILM COATED ORAL at 21:17

## 2024-10-16 RX ADMIN — BUDESONIDE AND FORMOTEROL FUMARATE DIHYDRATE 2 PUFF: 160; 4.5 AEROSOL RESPIRATORY (INHALATION) at 07:17

## 2024-10-16 RX ADMIN — Medication 10 ML: at 09:56

## 2024-10-16 RX ADMIN — METOCLOPRAMIDE 5 MG: 5 INJECTION, SOLUTION INTRAMUSCULAR; INTRAVENOUS at 12:13

## 2024-10-16 RX ADMIN — PANTOPRAZOLE SODIUM 40 MG: 40 TABLET, DELAYED RELEASE ORAL at 05:37

## 2024-10-16 RX ADMIN — Medication 10 ML: at 21:17

## 2024-10-16 RX ADMIN — CLOPIDOGREL BISULFATE 75 MG: 75 TABLET ORAL at 10:05

## 2024-10-16 RX ADMIN — ACETAMINOPHEN 650 MG: 325 TABLET ORAL at 10:05

## 2024-10-16 RX ADMIN — ROSUVASTATIN 20 MG: 20 TABLET, FILM COATED ORAL at 21:17

## 2024-10-16 RX ADMIN — CARVEDILOL 25 MG: 25 TABLET, FILM COATED ORAL at 10:06

## 2024-10-16 RX ADMIN — PANTOPRAZOLE SODIUM 40 MG: 40 TABLET, DELAYED RELEASE ORAL at 17:53

## 2024-10-16 RX ADMIN — BUDESONIDE AND FORMOTEROL FUMARATE DIHYDRATE 2 PUFF: 160; 4.5 AEROSOL RESPIRATORY (INHALATION) at 20:04

## 2024-10-16 RX ADMIN — POTASSIUM CHLORIDE 40 MEQ: 750 CAPSULE, EXTENDED RELEASE ORAL at 13:56

## 2024-10-16 RX ADMIN — POTASSIUM CHLORIDE 40 MEQ: 750 CAPSULE, EXTENDED RELEASE ORAL at 10:05

## 2024-10-17 LAB
ALBUMIN SERPL-MCNC: 2.7 G/DL (ref 3.5–5.2)
ALBUMIN/GLOB SERPL: 1 G/DL
ALP SERPL-CCNC: 113 U/L (ref 39–117)
ALT SERPL W P-5'-P-CCNC: 6 U/L (ref 1–41)
ANION GAP SERPL CALCULATED.3IONS-SCNC: 7.4 MMOL/L (ref 5–15)
AST SERPL-CCNC: 13 U/L (ref 1–40)
BASOPHILS # BLD AUTO: 0.2 10*3/MM3 (ref 0–0.2)
BASOPHILS NFR BLD AUTO: 1.7 % (ref 0–1.5)
BILIRUB SERPL-MCNC: 0.4 MG/DL (ref 0–1.2)
BUN SERPL-MCNC: 12 MG/DL (ref 8–23)
BUN/CREAT SERPL: 16.2 (ref 7–25)
CALCIUM SPEC-SCNC: 8.8 MG/DL (ref 8.6–10.5)
CHLORIDE SERPL-SCNC: 103 MMOL/L (ref 98–107)
CLUMPED PLATELETS: PRESENT
CO2 SERPL-SCNC: 26.6 MMOL/L (ref 22–29)
CREAT SERPL-MCNC: 0.74 MG/DL (ref 0.76–1.27)
DEPRECATED RDW RBC AUTO: 70.4 FL (ref 37–54)
EGFRCR SERPLBLD CKD-EPI 2021: 90.5 ML/MIN/1.73
EOSINOPHIL # BLD AUTO: 0.17 10*3/MM3 (ref 0–0.4)
EOSINOPHIL NFR BLD AUTO: 1.4 % (ref 0.3–6.2)
ERYTHROCYTE [DISTWIDTH] IN BLOOD BY AUTOMATED COUNT: 23.1 % (ref 12.3–15.4)
FUNGUS WND CULT: NORMAL
GLOBULIN UR ELPH-MCNC: 2.7 GM/DL
GLUCOSE BLDC GLUCOMTR-MCNC: 113 MG/DL (ref 70–99)
GLUCOSE BLDC GLUCOMTR-MCNC: 144 MG/DL (ref 70–99)
GLUCOSE BLDC GLUCOMTR-MCNC: 152 MG/DL (ref 70–99)
GLUCOSE BLDC GLUCOMTR-MCNC: 182 MG/DL (ref 70–99)
GLUCOSE SERPL-MCNC: 122 MG/DL (ref 65–99)
HCT VFR BLD AUTO: 34.9 % (ref 37.5–51)
HGB BLD-MCNC: 10.5 G/DL (ref 13–17.7)
IMM GRANULOCYTES # BLD AUTO: 0.63 10*3/MM3 (ref 0–0.05)
IMM GRANULOCYTES NFR BLD AUTO: 5.3 % (ref 0–0.5)
LYMPHOCYTES # BLD AUTO: 1.48 10*3/MM3 (ref 0.7–3.1)
LYMPHOCYTES NFR BLD AUTO: 12.5 % (ref 19.6–45.3)
MAGNESIUM SERPL-MCNC: 2 MG/DL (ref 1.6–2.4)
MCH RBC QN AUTO: 25.2 PG (ref 26.6–33)
MCHC RBC AUTO-ENTMCNC: 30.1 G/DL (ref 31.5–35.7)
MCV RBC AUTO: 83.9 FL (ref 79–97)
MONOCYTES # BLD AUTO: 1.45 10*3/MM3 (ref 0.1–0.9)
MONOCYTES NFR BLD AUTO: 12.3 % (ref 5–12)
MYCOBACTERIUM SPEC CULT: NORMAL
NEUTROPHILS NFR BLD AUTO: 66.8 % (ref 42.7–76)
NEUTROPHILS NFR BLD AUTO: 7.89 10*3/MM3 (ref 1.7–7)
NIGHT BLUE STAIN TISS: NORMAL
NRBC BLD AUTO-RTO: 0 /100 WBC (ref 0–0.2)
PHOSPHATE SERPL-MCNC: 2.9 MG/DL (ref 2.5–4.5)
PLATELET # BLD AUTO: 487 10*3/MM3 (ref 140–450)
PMV BLD AUTO: 9.1 FL (ref 6–12)
POTASSIUM SERPL-SCNC: 3.8 MMOL/L (ref 3.5–5.2)
PROT SERPL-MCNC: 5.4 G/DL (ref 6–8.5)
RBC # BLD AUTO: 4.16 10*6/MM3 (ref 4.14–5.8)
RBC MORPH BLD: NORMAL
SMALL PLATELETS BLD QL SMEAR: NORMAL
SODIUM SERPL-SCNC: 137 MMOL/L (ref 136–145)
WBC MORPH BLD: NORMAL
WBC NRBC COR # BLD AUTO: 11.82 10*3/MM3 (ref 3.4–10.8)

## 2024-10-17 PROCEDURE — 94664 DEMO&/EVAL PT USE INHALER: CPT

## 2024-10-17 PROCEDURE — 99233 SBSQ HOSP IP/OBS HIGH 50: CPT | Performed by: INTERNAL MEDICINE

## 2024-10-17 PROCEDURE — 83735 ASSAY OF MAGNESIUM: CPT | Performed by: INTERNAL MEDICINE

## 2024-10-17 PROCEDURE — 94799 UNLISTED PULMONARY SVC/PX: CPT

## 2024-10-17 PROCEDURE — 80053 COMPREHEN METABOLIC PANEL: CPT | Performed by: INTERNAL MEDICINE

## 2024-10-17 PROCEDURE — 82948 REAGENT STRIP/BLOOD GLUCOSE: CPT

## 2024-10-17 PROCEDURE — 94760 N-INVAS EAR/PLS OXIMETRY 1: CPT

## 2024-10-17 PROCEDURE — 84100 ASSAY OF PHOSPHORUS: CPT | Performed by: INTERNAL MEDICINE

## 2024-10-17 PROCEDURE — 82948 REAGENT STRIP/BLOOD GLUCOSE: CPT | Performed by: FAMILY MEDICINE

## 2024-10-17 PROCEDURE — 85025 COMPLETE CBC W/AUTO DIFF WBC: CPT | Performed by: INTERNAL MEDICINE

## 2024-10-17 PROCEDURE — 97530 THERAPEUTIC ACTIVITIES: CPT

## 2024-10-17 PROCEDURE — 97110 THERAPEUTIC EXERCISES: CPT

## 2024-10-17 PROCEDURE — 85007 BL SMEAR W/DIFF WBC COUNT: CPT | Performed by: INTERNAL MEDICINE

## 2024-10-17 PROCEDURE — 97116 GAIT TRAINING THERAPY: CPT

## 2024-10-17 PROCEDURE — 63710000001 INSULIN LISPRO (HUMAN) PER 5 UNITS: Performed by: FAMILY MEDICINE

## 2024-10-17 RX ORDER — LOSARTAN POTASSIUM 25 MG/1
25 TABLET ORAL ONCE
Status: COMPLETED | OUTPATIENT
Start: 2024-10-17 | End: 2024-10-17

## 2024-10-17 RX ADMIN — DILTIAZEM HYDROCHLORIDE 180 MG: 180 CAPSULE, COATED, EXTENDED RELEASE ORAL at 08:48

## 2024-10-17 RX ADMIN — PANTOPRAZOLE SODIUM 40 MG: 40 TABLET, DELAYED RELEASE ORAL at 17:51

## 2024-10-17 RX ADMIN — LOSARTAN POTASSIUM 25 MG: 25 TABLET, FILM COATED ORAL at 09:12

## 2024-10-17 RX ADMIN — Medication 10 ML: at 11:38

## 2024-10-17 RX ADMIN — FUROSEMIDE 20 MG: 20 TABLET ORAL at 08:48

## 2024-10-17 RX ADMIN — CARVEDILOL 25 MG: 25 TABLET, FILM COATED ORAL at 17:51

## 2024-10-17 RX ADMIN — BUDESONIDE AND FORMOTEROL FUMARATE DIHYDRATE 2 PUFF: 160; 4.5 AEROSOL RESPIRATORY (INHALATION) at 07:04

## 2024-10-17 RX ADMIN — Medication 1 TABLET: at 08:48

## 2024-10-17 RX ADMIN — MICONAZOLE NITRATE 1 APPLICATION: 2 POWDER TOPICAL at 21:30

## 2024-10-17 RX ADMIN — Medication 10 ML: at 21:31

## 2024-10-17 RX ADMIN — APIXABAN 5 MG: 5 TABLET, FILM COATED ORAL at 21:30

## 2024-10-17 RX ADMIN — ROSUVASTATIN 20 MG: 20 TABLET, FILM COATED ORAL at 21:30

## 2024-10-17 RX ADMIN — APIXABAN 5 MG: 5 TABLET, FILM COATED ORAL at 08:47

## 2024-10-17 RX ADMIN — BUDESONIDE AND FORMOTEROL FUMARATE DIHYDRATE 2 PUFF: 160; 4.5 AEROSOL RESPIRATORY (INHALATION) at 18:33

## 2024-10-17 RX ADMIN — INSULIN LISPRO 2 UNITS: 100 INJECTION, SOLUTION INTRAVENOUS; SUBCUTANEOUS at 21:30

## 2024-10-17 RX ADMIN — PANTOPRAZOLE SODIUM 40 MG: 40 TABLET, DELAYED RELEASE ORAL at 08:48

## 2024-10-17 RX ADMIN — CARVEDILOL 25 MG: 25 TABLET, FILM COATED ORAL at 08:48

## 2024-10-17 RX ADMIN — CLOPIDOGREL BISULFATE 75 MG: 75 TABLET ORAL at 08:47

## 2024-10-17 RX ADMIN — CALCIUM CARBONATE 2 TABLET: 500 TABLET, CHEWABLE ORAL at 11:38

## 2024-10-18 VITALS
TEMPERATURE: 98.1 F | BODY MASS INDEX: 23.12 KG/M2 | HEART RATE: 83 BPM | WEIGHT: 161.5 LBS | HEIGHT: 70 IN | SYSTOLIC BLOOD PRESSURE: 134 MMHG | RESPIRATION RATE: 16 BRPM | OXYGEN SATURATION: 95 % | DIASTOLIC BLOOD PRESSURE: 76 MMHG

## 2024-10-18 LAB
ALBUMIN SERPL-MCNC: 2.5 G/DL (ref 3.5–5.2)
ALBUMIN/GLOB SERPL: 1 G/DL
ALP SERPL-CCNC: 107 U/L (ref 39–117)
ALT SERPL W P-5'-P-CCNC: 8 U/L (ref 1–41)
ANION GAP SERPL CALCULATED.3IONS-SCNC: 7.9 MMOL/L (ref 5–15)
AST SERPL-CCNC: 14 U/L (ref 1–40)
BASOPHILS # BLD AUTO: 0.12 10*3/MM3 (ref 0–0.2)
BASOPHILS NFR BLD AUTO: 1.1 % (ref 0–1.5)
BILIRUB SERPL-MCNC: 0.3 MG/DL (ref 0–1.2)
BUN SERPL-MCNC: 13 MG/DL (ref 8–23)
BUN/CREAT SERPL: 17.8 (ref 7–25)
CALCIUM SPEC-SCNC: 8.5 MG/DL (ref 8.6–10.5)
CHLORIDE SERPL-SCNC: 105 MMOL/L (ref 98–107)
CO2 SERPL-SCNC: 25.1 MMOL/L (ref 22–29)
CREAT SERPL-MCNC: 0.73 MG/DL (ref 0.76–1.27)
DEPRECATED RDW RBC AUTO: 70.3 FL (ref 37–54)
EGFRCR SERPLBLD CKD-EPI 2021: 90.8 ML/MIN/1.73
EOSINOPHIL # BLD AUTO: 0.16 10*3/MM3 (ref 0–0.4)
EOSINOPHIL NFR BLD AUTO: 1.4 % (ref 0.3–6.2)
ERYTHROCYTE [DISTWIDTH] IN BLOOD BY AUTOMATED COUNT: 23.2 % (ref 12.3–15.4)
GLOBULIN UR ELPH-MCNC: 2.5 GM/DL
GLUCOSE BLDC GLUCOMTR-MCNC: 134 MG/DL (ref 70–99)
GLUCOSE BLDC GLUCOMTR-MCNC: 156 MG/DL (ref 70–99)
GLUCOSE SERPL-MCNC: 182 MG/DL (ref 65–99)
HCT VFR BLD AUTO: 32.4 % (ref 37.5–51)
HGB BLD-MCNC: 9.8 G/DL (ref 13–17.7)
IMM GRANULOCYTES # BLD AUTO: 0.54 10*3/MM3 (ref 0–0.05)
IMM GRANULOCYTES NFR BLD AUTO: 4.7 % (ref 0–0.5)
LYMPHOCYTES # BLD AUTO: 1.5 10*3/MM3 (ref 0.7–3.1)
LYMPHOCYTES NFR BLD AUTO: 13.2 % (ref 19.6–45.3)
MAGNESIUM SERPL-MCNC: 1.8 MG/DL (ref 1.6–2.4)
MCH RBC QN AUTO: 25.4 PG (ref 26.6–33)
MCHC RBC AUTO-ENTMCNC: 30.2 G/DL (ref 31.5–35.7)
MCV RBC AUTO: 83.9 FL (ref 79–97)
MONOCYTES # BLD AUTO: 1.36 10*3/MM3 (ref 0.1–0.9)
MONOCYTES NFR BLD AUTO: 12 % (ref 5–12)
NEUTROPHILS NFR BLD AUTO: 67.6 % (ref 42.7–76)
NEUTROPHILS NFR BLD AUTO: 7.7 10*3/MM3 (ref 1.7–7)
NRBC BLD AUTO-RTO: 0 /100 WBC (ref 0–0.2)
PHOSPHATE SERPL-MCNC: 3.2 MG/DL (ref 2.5–4.5)
PLATELET # BLD AUTO: 451 10*3/MM3 (ref 140–450)
PMV BLD AUTO: 9.1 FL (ref 6–12)
POTASSIUM SERPL-SCNC: 3.6 MMOL/L (ref 3.5–5.2)
PROT SERPL-MCNC: 5 G/DL (ref 6–8.5)
RBC # BLD AUTO: 3.86 10*6/MM3 (ref 4.14–5.8)
SODIUM SERPL-SCNC: 138 MMOL/L (ref 136–145)
WBC NRBC COR # BLD AUTO: 11.38 10*3/MM3 (ref 3.4–10.8)

## 2024-10-18 PROCEDURE — 80053 COMPREHEN METABOLIC PANEL: CPT | Performed by: INTERNAL MEDICINE

## 2024-10-18 PROCEDURE — 84100 ASSAY OF PHOSPHORUS: CPT | Performed by: INTERNAL MEDICINE

## 2024-10-18 PROCEDURE — 85025 COMPLETE CBC W/AUTO DIFF WBC: CPT | Performed by: INTERNAL MEDICINE

## 2024-10-18 PROCEDURE — 83735 ASSAY OF MAGNESIUM: CPT | Performed by: INTERNAL MEDICINE

## 2024-10-18 PROCEDURE — 99239 HOSP IP/OBS DSCHRG MGMT >30: CPT | Performed by: INTERNAL MEDICINE

## 2024-10-18 PROCEDURE — 94799 UNLISTED PULMONARY SVC/PX: CPT

## 2024-10-18 PROCEDURE — 82948 REAGENT STRIP/BLOOD GLUCOSE: CPT | Performed by: FAMILY MEDICINE

## 2024-10-18 PROCEDURE — 82948 REAGENT STRIP/BLOOD GLUCOSE: CPT

## 2024-10-18 RX ORDER — DILTIAZEM HYDROCHLORIDE 180 MG/1
180 CAPSULE, COATED, EXTENDED RELEASE ORAL
Start: 2024-10-19

## 2024-10-18 RX ORDER — ACETAMINOPHEN 325 MG/1
650 TABLET ORAL EVERY 6 HOURS PRN
Start: 2024-10-18 | End: 2024-11-17

## 2024-10-18 RX ORDER — CALCIUM CARBONATE/VITAMIN D3 600 MG-10
1 TABLET ORAL DAILY
Qty: 30 TABLET | Refills: 0 | Status: SHIPPED | OUTPATIENT
Start: 2024-10-19 | End: 2024-11-18

## 2024-10-18 RX ADMIN — FUROSEMIDE 20 MG: 20 TABLET ORAL at 10:11

## 2024-10-18 RX ADMIN — DILTIAZEM HYDROCHLORIDE 180 MG: 180 CAPSULE, COATED, EXTENDED RELEASE ORAL at 10:11

## 2024-10-18 RX ADMIN — MICONAZOLE NITRATE 1 APPLICATION: 2 POWDER TOPICAL at 10:15

## 2024-10-18 RX ADMIN — Medication 1 TABLET: at 10:11

## 2024-10-18 RX ADMIN — BUDESONIDE AND FORMOTEROL FUMARATE DIHYDRATE 2 PUFF: 160; 4.5 AEROSOL RESPIRATORY (INHALATION) at 07:02

## 2024-10-18 RX ADMIN — APIXABAN 5 MG: 5 TABLET, FILM COATED ORAL at 10:11

## 2024-10-18 RX ADMIN — Medication 10 ML: at 10:15

## 2024-10-18 RX ADMIN — CARVEDILOL 25 MG: 25 TABLET, FILM COATED ORAL at 10:11

## 2024-10-18 RX ADMIN — METFORMIN HYDROCHLORIDE 500 MG: 500 TABLET, FILM COATED ORAL at 10:11

## 2024-10-18 RX ADMIN — PANTOPRAZOLE SODIUM 40 MG: 40 TABLET, DELAYED RELEASE ORAL at 06:40

## 2024-10-18 RX ADMIN — CLOPIDOGREL BISULFATE 75 MG: 75 TABLET ORAL at 10:11

## 2024-10-24 LAB
FUNGUS WND CULT: NORMAL
MYCOBACTERIUM SPEC CULT: NORMAL
NIGHT BLUE STAIN TISS: NORMAL

## 2024-10-30 ENCOUNTER — TRANSCRIBE ORDERS (OUTPATIENT)
Dept: LAB | Facility: HOSPITAL | Age: 84
End: 2024-10-30
Payer: MEDICARE

## 2024-10-30 ENCOUNTER — LAB (OUTPATIENT)
Dept: LAB | Facility: HOSPITAL | Age: 84
End: 2024-10-30
Payer: MEDICARE

## 2024-10-30 DIAGNOSIS — D50.9 IRON DEFICIENCY ANEMIA, UNSPECIFIED IRON DEFICIENCY ANEMIA TYPE: ICD-10-CM

## 2024-10-30 DIAGNOSIS — E55.9 VITAMIN D DEFICIENCY, UNSPECIFIED: ICD-10-CM

## 2024-10-30 DIAGNOSIS — E11.65 INADEQUATELY CONTROLLED DIABETES MELLITUS: Primary | ICD-10-CM

## 2024-10-30 DIAGNOSIS — R53.83 OTHER FATIGUE: ICD-10-CM

## 2024-10-30 DIAGNOSIS — E11.65 INADEQUATELY CONTROLLED DIABETES MELLITUS: ICD-10-CM

## 2024-10-30 LAB
25(OH)D3 SERPL-MCNC: 26.9 NG/ML (ref 30–100)
ALBUMIN SERPL-MCNC: 3.4 G/DL (ref 3.5–5.2)
ALBUMIN/GLOB SERPL: 1.2 G/DL
ALP SERPL-CCNC: 94 U/L (ref 39–117)
ALT SERPL W P-5'-P-CCNC: 14 U/L (ref 1–41)
ANION GAP SERPL CALCULATED.3IONS-SCNC: 9 MMOL/L (ref 5–15)
AST SERPL-CCNC: 17 U/L (ref 1–40)
BASOPHILS # BLD AUTO: 0.07 10*3/MM3 (ref 0–0.2)
BASOPHILS NFR BLD AUTO: 0.6 % (ref 0–1.5)
BILIRUB SERPL-MCNC: 0.4 MG/DL (ref 0–1.2)
BILIRUB UR QL STRIP: NEGATIVE
BUN SERPL-MCNC: 15 MG/DL (ref 8–23)
BUN/CREAT SERPL: 19.7 (ref 7–25)
CALCIUM SPEC-SCNC: 9.4 MG/DL (ref 8.6–10.5)
CHLORIDE SERPL-SCNC: 101 MMOL/L (ref 98–107)
CHOLEST SERPL-MCNC: 109 MG/DL (ref 0–200)
CLARITY UR: CLEAR
CO2 SERPL-SCNC: 29 MMOL/L (ref 22–29)
COLOR UR: YELLOW
CREAT SERPL-MCNC: 0.76 MG/DL (ref 0.76–1.27)
DEPRECATED RDW RBC AUTO: 57.1 FL (ref 37–54)
EGFRCR SERPLBLD CKD-EPI 2021: 89.7 ML/MIN/1.73
EOSINOPHIL # BLD AUTO: 0.49 10*3/MM3 (ref 0–0.4)
EOSINOPHIL NFR BLD AUTO: 3.9 % (ref 0.3–6.2)
ERYTHROCYTE [DISTWIDTH] IN BLOOD BY AUTOMATED COUNT: 18.5 % (ref 12.3–15.4)
FOLATE SERPL-MCNC: 12 NG/ML (ref 4.78–24.2)
GLOBULIN UR ELPH-MCNC: 2.8 GM/DL
GLUCOSE SERPL-MCNC: 136 MG/DL (ref 65–99)
GLUCOSE UR STRIP-MCNC: ABNORMAL MG/DL
HBA1C MFR BLD: 7.1 % (ref 4.8–5.6)
HCT VFR BLD AUTO: 34.5 % (ref 37.5–51)
HDLC SERPL-MCNC: 34 MG/DL (ref 40–60)
HGB BLD-MCNC: 10.5 G/DL (ref 13–17.7)
HGB UR QL STRIP.AUTO: NEGATIVE
IMM GRANULOCYTES # BLD AUTO: 0.17 10*3/MM3 (ref 0–0.05)
IMM GRANULOCYTES NFR BLD AUTO: 1.4 % (ref 0–0.5)
IRON 24H UR-MRATE: 36 MCG/DL (ref 59–158)
IRON SATN MFR SERPL: 8 % (ref 20–50)
KETONES UR QL STRIP: NEGATIVE
LDLC SERPL CALC-MCNC: 57 MG/DL (ref 0–100)
LDLC/HDLC SERPL: 1.64 {RATIO}
LEUKOCYTE ESTERASE UR QL STRIP.AUTO: NEGATIVE
LYMPHOCYTES # BLD AUTO: 1.56 10*3/MM3 (ref 0.7–3.1)
LYMPHOCYTES NFR BLD AUTO: 12.5 % (ref 19.6–45.3)
MCH RBC QN AUTO: 25.8 PG (ref 26.6–33)
MCHC RBC AUTO-ENTMCNC: 30.4 G/DL (ref 31.5–35.7)
MCV RBC AUTO: 84.8 FL (ref 79–97)
MONOCYTES # BLD AUTO: 1.17 10*3/MM3 (ref 0.1–0.9)
MONOCYTES NFR BLD AUTO: 9.4 % (ref 5–12)
NEUTROPHILS NFR BLD AUTO: 72.2 % (ref 42.7–76)
NEUTROPHILS NFR BLD AUTO: 8.98 10*3/MM3 (ref 1.7–7)
NITRITE UR QL STRIP: NEGATIVE
NRBC BLD AUTO-RTO: 0 /100 WBC (ref 0–0.2)
PH UR STRIP.AUTO: 7 [PH] (ref 5–8)
PLATELET # BLD AUTO: 332 10*3/MM3 (ref 140–450)
PMV BLD AUTO: 9.3 FL (ref 6–12)
POTASSIUM SERPL-SCNC: 4.2 MMOL/L (ref 3.5–5.2)
PROT SERPL-MCNC: 6.2 G/DL (ref 6–8.5)
PROT UR QL STRIP: NEGATIVE
RBC # BLD AUTO: 4.07 10*6/MM3 (ref 4.14–5.8)
SODIUM SERPL-SCNC: 139 MMOL/L (ref 136–145)
SP GR UR STRIP: 1.01 (ref 1–1.03)
TIBC SERPL-MCNC: 447 MCG/DL (ref 298–536)
TRANSFERRIN SERPL-MCNC: 300 MG/DL (ref 200–360)
TRIGL SERPL-MCNC: 96 MG/DL (ref 0–150)
TSH SERPL DL<=0.05 MIU/L-ACNC: 0.83 UIU/ML (ref 0.27–4.2)
UROBILINOGEN UR QL STRIP: ABNORMAL
VIT B12 BLD-MCNC: 527 PG/ML (ref 211–946)
VLDLC SERPL-MCNC: 18 MG/DL (ref 5–40)
WBC NRBC COR # BLD AUTO: 12.44 10*3/MM3 (ref 3.4–10.8)

## 2024-10-30 PROCEDURE — 36415 COLL VENOUS BLD VENIPUNCTURE: CPT

## 2024-10-30 PROCEDURE — 80061 LIPID PANEL: CPT

## 2024-10-30 PROCEDURE — 83540 ASSAY OF IRON: CPT

## 2024-10-30 PROCEDURE — 82306 VITAMIN D 25 HYDROXY: CPT

## 2024-10-30 PROCEDURE — 82746 ASSAY OF FOLIC ACID SERUM: CPT

## 2024-10-30 PROCEDURE — 82043 UR ALBUMIN QUANTITATIVE: CPT

## 2024-10-30 PROCEDURE — 81003 URINALYSIS AUTO W/O SCOPE: CPT

## 2024-10-30 PROCEDURE — 85025 COMPLETE CBC W/AUTO DIFF WBC: CPT

## 2024-10-30 PROCEDURE — 84466 ASSAY OF TRANSFERRIN: CPT

## 2024-10-30 PROCEDURE — 84443 ASSAY THYROID STIM HORMONE: CPT

## 2024-10-30 PROCEDURE — 83036 HEMOGLOBIN GLYCOSYLATED A1C: CPT

## 2024-10-30 PROCEDURE — 80053 COMPREHEN METABOLIC PANEL: CPT

## 2024-10-30 PROCEDURE — 82607 VITAMIN B-12: CPT

## 2024-10-31 LAB
ALBUMIN UR-MCNC: 1.4 MG/DL
MYCOBACTERIUM SPEC CULT: NORMAL
NIGHT BLUE STAIN TISS: NORMAL

## 2024-11-05 ENCOUNTER — TRANSCRIBE ORDERS (OUTPATIENT)
Dept: ADMINISTRATIVE | Facility: HOSPITAL | Age: 84
End: 2024-11-05
Payer: MEDICARE

## 2024-11-05 DIAGNOSIS — R05.9 COUGH, UNSPECIFIED TYPE: Primary | ICD-10-CM

## 2024-11-05 DIAGNOSIS — D72.829 LEUKOCYTOSIS, UNSPECIFIED TYPE: ICD-10-CM

## 2024-11-07 LAB
MYCOBACTERIUM SPEC CULT: NORMAL
NIGHT BLUE STAIN TISS: NORMAL

## 2024-11-07 RX ORDER — SITAGLIPTIN 25 MG/1
25 TABLET, FILM COATED ORAL DAILY
Qty: 90 TABLET | Refills: 1 | OUTPATIENT
Start: 2024-11-07

## 2024-11-15 ENCOUNTER — HOSPITAL ENCOUNTER (OUTPATIENT)
Dept: CT IMAGING | Facility: HOSPITAL | Age: 84
Discharge: HOME | End: 2024-11-15
Payer: MEDICARE

## 2024-11-15 DIAGNOSIS — D72.829 LEUKOCYTOSIS, UNSPECIFIED TYPE: ICD-10-CM

## 2024-11-15 DIAGNOSIS — R05.9 COUGH, UNSPECIFIED TYPE: ICD-10-CM

## 2024-11-15 PROCEDURE — 71260 CT THORAX DX C+: CPT

## 2024-11-15 PROCEDURE — 25510000001 IOPAMIDOL PER 1 ML: Performed by: FAMILY MEDICINE

## 2024-11-15 RX ORDER — IOPAMIDOL 755 MG/ML
100 INJECTION, SOLUTION INTRAVASCULAR
Status: COMPLETED | OUTPATIENT
Start: 2024-11-15 | End: 2024-11-15

## 2024-11-15 RX ADMIN — IOPAMIDOL 100 ML: 755 INJECTION, SOLUTION INTRAVENOUS at 14:00

## 2024-11-18 NOTE — PROGRESS NOTES
Chief Complaint/Reason for Referral:  No chief complaint on file.    Adán Montes MD Watson, Andrea, MD    Records Obtained:  Records of the patients history including those obtained from [facility] EMR were reviewed and summarized in detail.    Subjective    History of Present Illness    Ion PROCTOR Case 84 y.o. presents to [facility] HEMATOLOGY & ONCOLOGY for Normocytic Anemia and Leukocytosis    Pleasant 84-year-old male presents to the hematology department for further evaluation of his leukocytosis and normocytic anemia.    Patient's most recent CBC reveals a white count of 12.44, hemoglobin of 10.5, hematocrit of 34.5, MCV of 84.8, platelet count 332.  There is an absolute neutrophilia, monocytosis and eosinophilia appreciated.    CMP reveals an elevated glucose at 136, normal kidney function liver function testing.  Normal calcium and protein.    Normal thyroid testing, normal B12 and folate level.  Iron level is decreased at 36, iron saturation is decreased at 8.  TIBC of 447.  No ferritin level appreciated.    Patient was admitted for one month in hospital - Januvia was triggering pancreatitis    Patient is taking PPI and carafate QID - gastritis - Dr. Sean HEALY  PT three times per week - [facility]    CT Chest - on Friday pending report    Cancer-related family history is negative for Colon cancer.     Oncology/Hematology History    No history exists.     Review of Systems    Current Outpatient Medications on File Prior to Visit   Medication Sig Dispense Refill    albuterol (ACCUNEB) 0.63 MG/3ML nebulizer solution Take 3 mL by nebulization Every 6 (Six) Hours As Needed for Wheezing or Shortness of Air. (Patient not taking: Reported on 11/20/2024) 90 mL 1    apixaban (ELIQUIS) 5 MG tablet tablet Take 1 tablet by mouth 2 (Two) Times a Day.      calcitonin, salmon, (Miacalcin) 200 UNIT/ACT nasal spray 1 spray by Alternating Nares route Daily for 164 days. (Patient not taking: Reported on  11/20/2024) 7.4 mL 1    clopidogrel (PLAVIX) 75 MG tablet Take 1 tablet by mouth Daily.      dilTIAZem CD (CARDIZEM CD) 180 MG 24 hr capsule Take 1 capsule by mouth Daily.      furosemide (LASIX) 20 MG tablet Take 1 tablet by mouth 3 (Three) Times a Week. Monday , Wednesday , and Friday      Jardiance 10 MG tablet tablet       metFORMIN ER (GLUCOPHAGE-XR) 500 MG 24 hr tablet       miconazole (MICOTIN) 2 % powder Apply 1 Application topically to the appropriate area as directed Every 12 (Twelve) Hours.      pantoprazole (PROTONIX) 40 MG EC tablet Take 1 tablet by mouth Daily.      pregabalin (Lyrica) 25 MG capsule Take 1 capsule twice daily, may increase to 2 capsules twice daily if no benefit in 3 days. (Patient not taking: Reported on 11/20/2024) 60 capsule 1    spironolactone (ALDACTONE) 25 MG tablet       budesonide-formoterol (Symbicort) 160-4.5 MCG/ACT inhaler Inhale 2 puffs 2 (Two) Times a Day for 30 days. (Patient not taking: Reported on 11/20/2024)      carvedilol (COREG) 25 MG tablet Take 1 tablet by mouth 2 (Two) Times a Day With Meals for 30 days. 60 tablet 0    HYDROcodone-acetaminophen (NORCO) 7.5-325 MG per tablet Take 2 tablets by mouth Daily. (Patient not taking: Reported on 11/20/2024)       No current facility-administered medications on file prior to visit.     Allergies   Allergen Reactions    Penicillins Swelling    Lisinopril Cough     Past Medical History:   Diagnosis Date    Arthritis     Cataract     Removed    Chronic heart failure with preserved ejection fraction (HFpEF) 06/02/2023    Colon polyp     Removed July 13    Coronary artery disease     Diabetes mellitus     Diverticulosis     Elevated cholesterol     Essential hypertension 10/04/2021    Facet arthropathy, lumbar 07/14/2020    GERD (gastroesophageal reflux disease)     HL (hearing loss)     Hyperlipidemia LDL goal <100 08/17/2021    Kidney stone     Lumbar herniated disc 07/14/2020    (R) L3-4    Lumbar spinal stenosis 08/04/2020     Paroxysmal atrial fibrillation 10/04/2021    Pneumonia     Trigger finger of right thumb 11/01/2017    all fingers both hands     Past Surgical History:   Procedure Laterality Date    BACK SURGERY      CARDIAC CATHETERIZATION N/A 08/23/2022    Procedure: LEFT HEART CATH with coronary artery angiography and right heart catheterization possible percutaneous intervention possible closure device;  Surgeon: Emili Cheng MD;  Location: [facility] CATH INVASIVE LOCATION;  Service: Cardiovascular;  Laterality: N/A;    CAROTID STENT      COLONOSCOPY  2017    Dr. Oneal    COLONOSCOPY N/A 10/06/2021    Procedure: COLONOSCOPY WITH BIOPSIES, POLYPECTOMY;  Surgeon: Vazquez Estrada MD;  Location: [facility] ENDOSCOPY;  Service: Gastroenterology;  Laterality: N/A;  COLON POLYPS, DIVERTICULOSIS, HEMORRHOIDS    COLONOSCOPY N/A 7/13/2023    Procedure: COLONOSCOPY with polypectomy with cold snare;  Surgeon: Vazquez Estrada MD;  Location: [facility] ENDOSCOPY;  Service: Gastroenterology;  Laterality: N/A;  colon polyps, diverticulosis, hemorrhoids    CORONARY ANGIOPLASTY      ENDOSCOPY N/A 10/06/2021    Procedure: ESOPHAGOGASTRODUODENOSCOPY WITH BIOPSIES;  Surgeon: Vazquez Estrada MD;  Location: [facility] ENDOSCOPY;  Service: Gastroenterology;  Laterality: N/A;  GASTRITIS    ENDOSCOPY N/A 7/13/2023    Procedure: ESOPHAGOGASTRODUODENOSCOPY with biopsies, with dilation with 15- 18 mm balloon;  Surgeon: Vazquez Estrada MD;  Location: [facility] ENDOSCOPY;  Service: Gastroenterology;  Laterality: N/A;  hiatal hernia, gastric polyp, schatzki's ring    ENDOSCOPY N/A 10/9/2024    Procedure: ESOPHAGOGASTRODUODENOSCOPY WITH BIOPSIES AND COLD SNARE POLYPECTOMY;  Surgeon: Vazquez Estrada MD;  Location: [facility] ENDOSCOPY;  Service: Gastroenterology;  Laterality: N/A;  GASTRITIS, GASTRIC POLYP AND HIATAL HERNIA    KIDNEY STONE SURGERY      Unspecified    LUMBAR DISC SURGERY Right 06/26/2020    L3-4  Minimally Invasive    TRIGGER FINGER  RELEASE Bilateral     UPPER GASTROINTESTINAL ENDOSCOPY  10/06/2021    Dr. Estrada     Social History     Socioeconomic History    Marital status:    Tobacco Use    Smoking status: Never     Passive exposure: Never    Smokeless tobacco: Never   Vaping Use    Vaping status: Never Used   Substance and Sexual Activity    Alcohol use: Never     Comment: Does not drink    Drug use: Never    Sexual activity: Not Currently     Partners: Female     Family History   Problem Relation Age of Onset    Heart disease Mother     Arthritis Mother     Heart disease Father     Heart attack Other         (Myocardial Infarction)    Colon cancer Neg Hx     Malig Hyperthermia Neg Hx      Immunization History   Administered Date(s) Administered    COVID-19 (MODERNA) 1st,2nd,3rd Dose Monovalent 01/26/2021, 02/19/2021, 10/25/2021    COVID-19 (PFIZER) 12YRS+ (COMIRNATY) 11/02/2023    COVID-19 (PFIZER) BIVALENT 12+YRS 10/25/2022    Fluad Quad 65+ 09/20/2021, 09/08/2023    Fluzone High-Dose 65+YRS 10/25/2021, 10/08/2024    Fluzone High-Dose 65+yrs 09/09/2022    Influenza, Unspecified 09/08/2015    PPD Test 05/24/2024, 05/31/2024, 10/02/2024, 10/09/2024    Pneumococcal Conjugate 13-Valent (PCV13) 10/17/2016    Pneumococcal Polysaccharide (PPSV23) 10/13/2014    Tdap 06/14/2021, 05/21/2024     Tobacco Use: Low Risk  (11/20/2024)    Patient History     Smoking Tobacco Use: Never     Smokeless Tobacco Use: Never     Passive Exposure: Never     Objective     There were no vitals filed for this visit.   Physical Exam  Vitals and nursing note reviewed.   Constitutional:       General: He is not in acute distress.     Appearance: Normal appearance. He is not ill-appearing.   HENT:      Head: Normocephalic.   Eyes:      Conjunctiva/sclera: Conjunctivae normal.   Cardiovascular:      Rate and Rhythm: Normal rate and regular rhythm. Rhythm irregular.      Heart sounds: Normal heart sounds.   Pulmonary:      Effort: Pulmonary effort is normal.       Breath sounds: Normal breath sounds.   Abdominal:      Palpations: There is no hepatomegaly or splenomegaly.   Lymphadenopathy:      Cervical: No cervical adenopathy.      Right cervical: No superficial cervical adenopathy.     Left cervical: No superficial cervical adenopathy.      Upper Body:      Right upper body: No axillary adenopathy.      Left upper body: No axillary adenopathy.   Skin:     Coloration: Skin is not jaundiced.   Neurological:      Mental Status: He is alert.   Psychiatric:         Mood and Affect: Mood normal.         Behavior: Behavior normal. Behavior is cooperative.         Thought Content: Thought content normal.         Judgment: Judgment normal.       Wt Readings from Last 3 Encounters:   11/20/24 72.3 kg (159 lb 6.4 oz)   10/18/24 73.3 kg (161 lb 8 oz)   10/11/24 72.9 kg (160 lb 11.5 oz)      There is no height or weight on file to calculate BMI.            ECOG: (1) Restricted in Physically Strenuous Activity, Ambulatory & Able to Do Work of Light Nature  Fall Risk Assessment was completed, and patient is at moderate risk for falls.  PHQ-9 Total Score:         The patient is  experiencing fatigue. Fatigue score: 3    PT/OT Functional Screening:   Speech Functional Screening:   Rehab to be ordered:     There were no vitals filed for this visit.        PHQ-2 Depression Screening  Little interest or pleasure in doing things?     Feeling down, depressed, or hopeless?     PHQ-2 Total Score       Result Review :  The following data was reviewed by: Franklyn Olivia PA-C on 11/19/2024:    RED BLOOD CELLs:  Lab Results   Component Value Date    RBC 4.64 11/19/2024    HGB 12.0 (L) 11/19/2024    HCT 38.7 11/19/2024    MCV 83.4 11/19/2024     11/19/2024      WHITE BLOOD CELLs:  Lab Results   Component Value Date    WBC 15.45 (H) 11/19/2024    NEUTROABS 12.28 (H) 11/19/2024    NEUTROABS 12.51 (H) 11/19/2024    LYMPHSABS 1.48 11/19/2024    MONOABS 1.70 (H) 11/19/2024    EOSABS 0.04 11/19/2024     BASOSABS 0.05 11/19/2024     RBC EVALUATION (MICROCYTOSIS/NORMOCYTOSIS):  Lab Results   Component Value Date    RETIC 0.0738 11/19/2024    MCV 83.4 11/19/2024    IRON 29 (L) 11/19/2024    FERRITIN 36.49 11/19/2024    LABIRON 6 (L) 11/19/2024    TIBC 513 11/19/2024    TRANSFERRIN 344 11/19/2024     HEMOLYSIS EVALUATION:  Lab Results   Component Value Date    MCV 83.4 11/19/2024     11/19/2024    HAPTOGLOBIN 220 (H) 11/19/2024    RETIC 0.0738 11/19/2024     Sed Rate   Date Value Ref Range Status   11/19/2024 11 0 - 20 mm/hr Final     C-Reactive Protein   Date Value Ref Range Status   09/25/2024 10.28 (H) 0.00 - 0.50 mg/dL Final     MACROCYTOSIS EVALUATION:  Lab Results   Component Value Date    RETIC 0.0738 11/19/2024    MCV 83.4 11/19/2024    ENJQKEAX36 527 10/30/2024    FOLATE 12.00 10/30/2024     RENAL FUNCTION TESTs:  Lab Results   Component Value Date    EGFR 72.5 11/19/2024    BUN 27 (H) 11/19/2024     LIVER FUNCTION TESTs:  Lab Results   Component Value Date    ALT 20 11/19/2024    AST 14 11/19/2024    BILITOT 0.4 11/19/2024    INDBILI 0.2 10/12/2024    BILIDIR 0.2 10/12/2024    ALKPHOS 105 11/19/2024    PROTEINTOT 7.1 11/19/2024    ALBUMIN 4.3 11/19/2024     GLUCOSE EVALUATION:  Lab Results   Component Value Date    GLUCOSE 165 (H) 11/19/2024    HGBA1C 7.10 (H) 10/30/2024     SERUM CHEMISTRIES:  Lab Results   Component Value Date     11/19/2024    K 4.1 11/19/2024     11/19/2024    CO2 25.2 11/19/2024    CALCIUM 9.9 11/19/2024     Lab Results   Component Value Date    COPPER 103 06/22/2023     URINALYSIS:  Lab Results   Component Value Date    RBCUR Negative 06/15/2021    LEUKOCYTESUR Negative 11/19/2024    BILIRUBINUR Negative 11/19/2024    PHUR 6.0 11/19/2024    SPECGRAVUR 1.010 11/19/2024     THYROID FUNCTION TESTs:  TSH   Date Value Ref Range Status   10/30/2024 0.832 0.270 - 4.200 uIU/mL Final     Free T4   Date Value Ref Range Status   10/11/2024 1.35 0.92 - 1.68 ng/dL Final     TUMOR  Applied MARKERS:  Lab Results   Component Value Date    PSA 1.690 01/11/2023    AFP 2.20 06/22/2023     HEMATOLOGY SPECIAL STUDIEs:  Lab Results   Component Value Date    FLOWSUM  09/26/2024     Pleural fluid:   No significant lymphoid immunophenotypic abnormalities detected.  See attached report.       US Abdomen Limited    Result Date: 10/13/2024  Impression: 1.The gallbladder is incompletely distended but no stones or sludge are identified. No surrounding pericholecystic fluid. No biliary dilatation. 2.Small amount of pleural fluid and a trace amount of fluid around the right kidney. Electronically Signed: Adán Chin DO  10/13/2024 8:27 AM EDT  Workstation ID: [id number]    CT Abdomen Pelvis Without Contrast    Result Date: 10/10/2024  1.Possible mild acute-to-subacute pancreatitis, especially involving the pancreatic tail. Please correlate with pertinent lab values. No definite pseudocyst formation is suspected. Lack of intravenous contrast agent administration precludes assessment for possible pancreatic necrosis. 2.Also, there is possible acute-to-subacute gastritis. 3.Renal cysts are seen. Some of these may be complex, such as in the upper pole of the left kidney. Consider imaging follow-up if clinically warranted. 4.Osteonecrosis involving the bilateral femoral heads cannot be excluded, especially on the left. Consider MRI examination of the bilateral hip joints/pelvis for further imaging assessment if clinically warranted and if not contraindicated. 5.Otherwise, no significant interval change is seen since the prior study from 9/25/2024, allowing for technique differences. 6.Please see above comments for further detail. Portions of this note were completed with a voice recognition program. Electronically Signed: Waldo Heller MD  10/10/2024 10:03 PM EDT  Workstation ID: [id number]    XR Abdomen KUB    Result Date: 10/8/2024  Impression: 1. No evidence of bowel obstruction. Electronically Signed: Reza Venegas MD   10/8/2024 10:40 AM EDT  Workstation ID: [id number]    XR Chest 1 View    Result Date: 9/26/2024  Impression: 1. No visible pneumothorax status post left thoracentesis. No significant residual left pleural fluid is identified. Mild left basilar atelectasis. Electronically Signed: Kristie Ramirez MD  9/26/2024 11:53 AM EDT  Workstation ID: [id number]    CT Chest With Contrast Diagnostic    Result Date: 9/25/2024  Impression: 1.Persistent left pleural effusion and trace right pleural effusion 2.Bibasilar densities greater on the left that in part relate to atelectasis. A left basilar pneumonia could not be excluded. 3.No definite pulmonary embolus. 4.Atherosclerotic vascular calcification including coronary artery calcification. 5.Unchanged small pericardial effusion. 6.Compression fracture deformity of T8 similar to the prior more recent exam. Electronically Signed: Fidencio Leija MD  9/25/2024 2:47 PM EDT  Workstation ID: [id number]    CT Abdomen Pelvis With Contrast    Result Date: 9/25/2024  Impression: 1.Left pleural effusion and trace right pleural effusion 2.Bibasilar densities greater on the left that could relate to atelectasis although a left basilar pneumonia not excluded. 3.Small pericardial effusion 4.Atherosclerotic vascular calcification including coronary artery calcification 5.Hepatic steatosis 6.Tiny lesions involving the liver that could reflect cysts. 7.Left renal cyst 8.Small cyst tail the pancreas that might relate to sidebranch IPMN 9.Colonic diverticulosis 10.Distended bladder 11.Subacute fractures involving the ischium and superior pubic ramus area on the right as well as the symphysis pubis area. Electronically Signed: Fidencio Leija MD  9/25/2024 11:55 AM EDT  Workstation ID: [id number]    XR Chest 1 View    Result Date: 9/25/2024  Impression: Small left pleural effusion with adjacent left basilar airspace opacity. No new focal airspace consolidation. Electronically Signed: Schuyler Pacheco MD  9/25/2024  10:41 AM EDT  Workstation ID: [id number]    MRI Thoracic Spine Without Contrast    Result Date: 9/23/2024  1. Recent appearing compression fracture of T8 with approximately 60% loss of height and retropulsion of the posterior cortex resulting in mild canal stenosis. 2. Mild degenerative changes of the thoracic spine. Electronically Signed: Tabitha Méndez MD  9/23/2024 11:04 AM EDT  Workstation ID: [id number]    CT Chest With Contrast Diagnostic    Result Date: 9/16/2024  Impression: 1.Infiltration of the patient's IV in the right antecubital fossa, as detailed above. 2.No evidence of pulmonary embolism. 3.Mild to moderate cardiomegaly. 4.Bibasilar airspace opacity could represent pulmonary edema or pneumonia. Correlate clinically. 5.Small left pleural effusion. 6.T8 burst fracture which appears worse since the recent 9/2/2024 exam. Electronically Signed: Han Mason MD  9/16/2024 6:25 PM EDT  Workstation ID: [id number]    CT Abdomen Pelvis With Contrast    Result Date: 9/16/2024  Small left pleural effusion with overlying atelectasis. Electronically Signed: Waldo Sykes MD  9/16/2024 6:18 PM EDT  Workstation ID: [id number]    XR Chest 1 View    Result Date: 9/7/2024  Decreased bilateral infiltrates are suspected. The findings may represent interval improvement in infectious multifocal pneumonia. Decreased pulmonary edema is also possible.    Portions of this note were completed with a voice recognition program.   Electronically Signed: Waldo Heller MD  9/7/2024 2:25 AM EDT  Workstation ID: [id number]    CT Chest With Contrast Diagnostic    Result Date: 9/2/2024  Impression: 1.No evidence of pulmonary emboli. 2.Scattered patchy groundglass opacities in both lungs, likely representing atelectasis and atypical infection. 3.Trace bilateral pleural effusions. 4.Cardiomegaly. 5.T8 superior endplate fracture with mild height loss, age-indeterminate but possibly acute to subacute. Electronically Signed: Lisette Godwin  9/2/2024  3:16 PM EDT  Workstation ID: [id number]    XR Chest 1 View    Result Date: 9/2/2024  Impression: Decreased, but not yet resolved patchy bilateral airspace opacities, again concerning for multifocal pneumonia. Electronically Signed: Lisette Tato  9/2/2024 12:10 PM EDT  Workstation ID: [id number]    XR Chest 1 View    Result Date: 8/27/2024  Increased or new bilateral infiltrates are seen. The findings may represent infectious multifocal pneumonia. Pulmonary edema cannot be excluded.    Portions of this note were completed with a voice recognition program.   Electronically Signed: Waldo Heller MD  8/27/2024 9:07 PM EDT  Workstation ID: [id number]    XR Chest 2 View    Result Date: 8/25/2024  1.Ill-defined multifocal airspace infiltrates bilaterally. The findings suggest developing atypical/viral infection or multifocal pneumonia. Recommend correlation for signs or symptoms of acute infection and follow-up to ensure improvement/resolution. Electronically Signed: Adonay Ramos MD  8/25/2024 9:55 AM EDT  Workstation ID: [id number]    US Gallbladder    Result Date: 7/31/2024  Impression: 1. Gallbladder wall thickness 3 mm, within normal limits, similar in appearance to the 12/14/2023 examination. Electronically Signed: Kristie Ramirez MD  7/31/2024 1:12 PM EDT  Workstation ID: [id number]       Assessment and Plan:  Diagnoses and all orders for this visit:    1. Leukocytosis, unspecified type (Primary)  -     Comprehensive Metabolic Panel  -     Urinalysis With Microscopic - Urine, Clean Catch  -     Occult Blood, Fecal By Immunoassay - Stool, Per Rectum  -     Reticulocytes  -     Lactate Dehydrogenase  -     Haptoglobin  -     Iron Profile  -     Ferritin  -     CBC & Differential  -     Peripheral Blood Smear  -     Sedimentation Rate  -     HIV-1 / O / 2 Ag / Antibody  -     Uric Acid  -     Flow Cytometry, Blood  -     Manual Differential  -     Pathology Consultation  -     Flow Cytometry    2. Normocytic anemia  -      Comprehensive Metabolic Panel  -     Urinalysis With Microscopic - Urine, Clean Catch  -     Occult Blood, Fecal By Immunoassay - Stool, Per Rectum  -     Reticulocytes  -     Lactate Dehydrogenase  -     Haptoglobin  -     Iron Profile  -     Ferritin  -     CBC & Differential  -     Peripheral Blood Smear  -     Sedimentation Rate  -     HIV-1 / O / 2 Ag / Antibody  -     Uric Acid  -     Flow Cytometry, Blood  -     Manual Differential  -     Pathology Consultation  -     ferrous gluconate (FERGON) 324 MG tablet; Take 1 tablet by mouth Every Other Day.  Dispense: 90 tablet; Refill: 1  -     Flow Cytometry  -     Ambulatory Referral to Gastroenterology    3. Monocytosis  -     Comprehensive Metabolic Panel  -     Urinalysis With Microscopic - Urine, Clean Catch  -     Occult Blood, Fecal By Immunoassay - Stool, Per Rectum  -     Reticulocytes  -     Lactate Dehydrogenase  -     Haptoglobin  -     Iron Profile  -     Ferritin  -     CBC & Differential  -     Peripheral Blood Smear  -     Sedimentation Rate  -     HIV-1 / O / 2 Ag / Antibody  -     Uric Acid  -     Flow Cytometry, Blood  -     Manual Differential  -     Pathology Consultation  -     Flow Cytometry    4. Eosinophilia, unspecified type  -     Comprehensive Metabolic Panel  -     Urinalysis With Microscopic - Urine, Clean Catch  -     Occult Blood, Fecal By Immunoassay - Stool, Per Rectum  -     Reticulocytes  -     Lactate Dehydrogenase  -     Haptoglobin  -     Iron Profile  -     Ferritin  -     CBC & Differential  -     Peripheral Blood Smear  -     Sedimentation Rate  -     HIV-1 / O / 2 Ag / Antibody  -     Uric Acid  -     Flow Cytometry, Blood  -     Manual Differential  -     Pathology Consultation  -     Flow Cytometry    5. Iron deficiency anemia, unspecified iron deficiency anemia type  -     ferrous gluconate (FERGON) 324 MG tablet; Take 1 tablet by mouth Every Other Day.  Dispense: 90 tablet; Refill: 1  -     Ambulatory Referral to  Gastroenterology    6. Weight loss  Comments:  During hospitalization    7. Fecal occult blood test positive  -     Ambulatory Referral to Gastroenterology      -Encouraged patient to remain up to date on all recommended preventative medicine services specific for their sex and age.  Some examples include:  Diabetes screening, Hypertensive screening, Immunizations, Lipid screenings (cholesterol), Depression screenings, Colorectal cancer screening, HIV screening, Cervical cancer screening, Breast cancer screening, Osteoporosis screening, Alcohol screening, Dental screening, Tobacco Use screening and Dietary screening  -Obtain labs including peripheral blood smear to assess cell morphology, flow cytometry to evaluate, identify, and quantify cells and their characteristics.      Patient Follow Up: 1-2 months with MD RIVERA spent 45 minutes caring for Ion on this date of service. This time includes time spent by me in the following activities:preparing for the visit, reviewing tests, obtaining and/or reviewing a separately obtained history, performing a medically appropriate examination and/or evaluation , counseling and educating the patient/family/caregiver, ordering medications, tests, or procedures, documenting information in the medical record, independently interpreting results and communicating that information with the patient/family/caregiver, and care coordination    Patient was given instructions and counseling regarding his condition or for health maintenance advice. Please see specific information pulled into the AVS if appropriate.

## 2024-11-19 ENCOUNTER — LAB (OUTPATIENT)
Dept: ONCOLOGY | Facility: HOSPITAL | Age: 84
End: 2024-11-19
Payer: MEDICARE

## 2024-11-19 ENCOUNTER — CONSULT (OUTPATIENT)
Dept: ONCOLOGY | Facility: HOSPITAL | Age: 84
End: 2024-11-19
Payer: MEDICARE

## 2024-11-19 DIAGNOSIS — D64.9 ANEMIA, UNSPECIFIED TYPE: Primary | ICD-10-CM

## 2024-11-19 DIAGNOSIS — D72.829 LEUKOCYTOSIS, UNSPECIFIED TYPE: Primary | ICD-10-CM

## 2024-11-19 DIAGNOSIS — D64.9 NORMOCYTIC ANEMIA: ICD-10-CM

## 2024-11-19 DIAGNOSIS — R19.5 FECAL OCCULT BLOOD TEST POSITIVE: ICD-10-CM

## 2024-11-19 DIAGNOSIS — R63.4 WEIGHT LOSS: ICD-10-CM

## 2024-11-19 DIAGNOSIS — D72.10 EOSINOPHILIA, UNSPECIFIED TYPE: ICD-10-CM

## 2024-11-19 DIAGNOSIS — D50.9 IRON DEFICIENCY ANEMIA, UNSPECIFIED IRON DEFICIENCY ANEMIA TYPE: ICD-10-CM

## 2024-11-19 DIAGNOSIS — D72.821 MONOCYTOSIS: ICD-10-CM

## 2024-11-19 LAB
ALBUMIN SERPL-MCNC: 4.3 G/DL (ref 3.5–5.2)
ALBUMIN/GLOB SERPL: 1.5 G/DL
ALP SERPL-CCNC: 105 U/L (ref 39–117)
ALT SERPL W P-5'-P-CCNC: 20 U/L (ref 1–41)
ANION GAP SERPL CALCULATED.3IONS-SCNC: 11.8 MMOL/L (ref 5–15)
ANISOCYTOSIS BLD QL: ABNORMAL
AST SERPL-CCNC: 14 U/L (ref 1–40)
BACTERIA UR QL AUTO: NORMAL /HPF
BASOPHILS # BLD AUTO: 0.05 10*3/MM3 (ref 0–0.2)
BASOPHILS NFR BLD AUTO: 0.3 % (ref 0–1.5)
BILIRUB SERPL-MCNC: 0.4 MG/DL (ref 0–1.2)
BILIRUB UR QL STRIP: NEGATIVE
BUN SERPL-MCNC: 27 MG/DL (ref 8–23)
BUN/CREAT SERPL: 26.2 (ref 7–25)
CALCIUM SPEC-SCNC: 9.9 MG/DL (ref 8.6–10.5)
CHLORIDE SERPL-SCNC: 102 MMOL/L (ref 98–107)
CLARITY UR: CLEAR
CO2 SERPL-SCNC: 25.2 MMOL/L (ref 22–29)
COLOR UR: YELLOW
CREAT SERPL-MCNC: 1.03 MG/DL (ref 0.76–1.27)
DEPRECATED RDW RBC AUTO: 51.7 FL (ref 37–54)
EGFRCR SERPLBLD CKD-EPI 2021: 72.5 ML/MIN/1.73
ELLIPTOCYTES BLD QL SMEAR: ABNORMAL
EOSINOPHIL # BLD AUTO: 0.04 10*3/MM3 (ref 0–0.4)
EOSINOPHIL NFR BLD AUTO: 0.3 % (ref 0.3–6.2)
ERYTHROCYTE [DISTWIDTH] IN BLOOD BY AUTOMATED COUNT: 17.1 % (ref 12.3–15.4)
ERYTHROCYTE [SEDIMENTATION RATE] IN BLOOD: 11 MM/HR (ref 0–20)
FERRITIN SERPL-MCNC: 36.49 NG/ML (ref 30–400)
GLOBULIN UR ELPH-MCNC: 2.8 GM/DL
GLUCOSE SERPL-MCNC: 165 MG/DL (ref 65–99)
GLUCOSE UR STRIP-MCNC: ABNORMAL MG/DL
HAPTOGLOB SERPL-MCNC: 220 MG/DL (ref 30–200)
HCT VFR BLD AUTO: 38.7 % (ref 37.5–51)
HGB BLD-MCNC: 12 G/DL (ref 13–17.7)
HGB UR QL STRIP.AUTO: NEGATIVE
HIV 1+2 AB+HIV1P24 AG SERPLBLD IA.RAPID: NORMAL
HIV 1+2 AB+HIV1P24 AG SERPLBLD IA.RAPID: NORMAL
HYALINE CASTS UR QL AUTO: NORMAL /LPF
IMM GRANULOCYTES # BLD AUTO: 0.36 10*3/MM3 (ref 0–0.05)
IMM GRANULOCYTES NFR BLD AUTO: 2.3 % (ref 0–0.5)
IRON 24H UR-MRATE: 29 MCG/DL (ref 59–158)
IRON SATN MFR SERPL: 6 % (ref 20–50)
KETONES UR QL STRIP: NEGATIVE
LDH SERPL-CCNC: 207 U/L (ref 135–225)
LEUKOCYTE ESTERASE UR QL STRIP.AUTO: NEGATIVE
LYMPHOCYTES # BLD AUTO: 1.48 10*3/MM3 (ref 0.7–3.1)
LYMPHOCYTES # BLD MANUAL: 1.24 10*3/MM3 (ref 0.7–3.1)
LYMPHOCYTES NFR BLD AUTO: 9.6 % (ref 19.6–45.3)
LYMPHOCYTES NFR BLD MANUAL: 11 % (ref 5–12)
MACROCYTES BLD QL SMEAR: ABNORMAL
MCH RBC QN AUTO: 25.9 PG (ref 26.6–33)
MCHC RBC AUTO-ENTMCNC: 31 G/DL (ref 31.5–35.7)
MCV RBC AUTO: 83.4 FL (ref 79–97)
MONOCYTES # BLD AUTO: 1.24 10*3/MM3 (ref 0.1–0.9)
MONOCYTES # BLD: 1.7 10*3/MM3 (ref 0.1–0.9)
MONOCYTES NFR BLD AUTO: 8 % (ref 5–12)
NEUTROPHILS # BLD AUTO: 12.51 10*3/MM3 (ref 1.7–7)
NEUTROPHILS NFR BLD AUTO: 12.28 10*3/MM3 (ref 1.7–7)
NEUTROPHILS NFR BLD AUTO: 79.5 % (ref 42.7–76)
NEUTROPHILS NFR BLD MANUAL: 81 % (ref 42.7–76)
NITRITE UR QL STRIP: NEGATIVE
NRBC BLD AUTO-RTO: 0.1 /100 WBC (ref 0–0.2)
PATHOLOGY REVIEW: YES
PH UR STRIP.AUTO: 6 [PH] (ref 5–8)
PLATELET # BLD AUTO: 383 10*3/MM3 (ref 140–450)
PMV BLD AUTO: 9.2 FL (ref 6–12)
POTASSIUM SERPL-SCNC: 4.1 MMOL/L (ref 3.5–5.2)
PROT SERPL-MCNC: 7.1 G/DL (ref 6–8.5)
PROT UR QL STRIP: NEGATIVE
RBC # BLD AUTO: 4.64 10*6/MM3 (ref 4.14–5.8)
RBC # UR STRIP: NORMAL /HPF
REF LAB TEST METHOD: NORMAL
RETICS # AUTO: 0.07 10*6/MM3 (ref 0.02–0.13)
RETICS/RBC NFR AUTO: 1.59 % (ref 0.7–1.9)
SMALL PLATELETS BLD QL SMEAR: ADEQUATE
SODIUM SERPL-SCNC: 139 MMOL/L (ref 136–145)
SP GR UR STRIP: 1.01 (ref 1–1.03)
SQUAMOUS #/AREA URNS HPF: NORMAL /HPF
TIBC SERPL-MCNC: 513 MCG/DL (ref 298–536)
TRANSFERRIN SERPL-MCNC: 344 MG/DL (ref 200–360)
URATE SERPL-MCNC: 5.6 MG/DL (ref 3.4–7)
UROBILINOGEN UR QL STRIP: ABNORMAL
VARIANT LYMPHS NFR BLD MANUAL: 8 % (ref 19.6–45.3)
WBC # UR STRIP: NORMAL /HPF
WBC MORPH BLD: NORMAL
WBC NRBC COR # BLD AUTO: 15.45 10*3/MM3 (ref 3.4–10.8)

## 2024-11-19 PROCEDURE — 85045 AUTOMATED RETICULOCYTE COUNT: CPT | Performed by: PHYSICIAN ASSISTANT

## 2024-11-19 PROCEDURE — 99204 OFFICE O/P NEW MOD 45 MIN: CPT | Performed by: PHYSICIAN ASSISTANT

## 2024-11-19 PROCEDURE — 1125F AMNT PAIN NOTED PAIN PRSNT: CPT | Performed by: PHYSICIAN ASSISTANT

## 2024-11-19 PROCEDURE — 83615 LACTATE (LD) (LDH) ENZYME: CPT | Performed by: PHYSICIAN ASSISTANT

## 2024-11-19 PROCEDURE — 82728 ASSAY OF FERRITIN: CPT | Performed by: PHYSICIAN ASSISTANT

## 2024-11-19 PROCEDURE — 80053 COMPREHEN METABOLIC PANEL: CPT | Performed by: PHYSICIAN ASSISTANT

## 2024-11-19 PROCEDURE — 81001 URINALYSIS AUTO W/SCOPE: CPT | Performed by: PHYSICIAN ASSISTANT

## 2024-11-19 PROCEDURE — 83540 ASSAY OF IRON: CPT | Performed by: PHYSICIAN ASSISTANT

## 2024-11-19 PROCEDURE — 83010 ASSAY OF HAPTOGLOBIN QUANT: CPT | Performed by: PHYSICIAN ASSISTANT

## 2024-11-19 PROCEDURE — 85652 RBC SED RATE AUTOMATED: CPT | Performed by: PHYSICIAN ASSISTANT

## 2024-11-19 PROCEDURE — 36415 COLL VENOUS BLD VENIPUNCTURE: CPT | Performed by: PHYSICIAN ASSISTANT

## 2024-11-19 PROCEDURE — G0475 HIV COMBINATION ASSAY: HCPCS | Performed by: PHYSICIAN ASSISTANT

## 2024-11-19 PROCEDURE — 85025 COMPLETE CBC W/AUTO DIFF WBC: CPT | Performed by: PHYSICIAN ASSISTANT

## 2024-11-19 PROCEDURE — G0463 HOSPITAL OUTPT CLINIC VISIT: HCPCS | Performed by: PHYSICIAN ASSISTANT

## 2024-11-19 PROCEDURE — 84550 ASSAY OF BLOOD/URIC ACID: CPT | Performed by: PHYSICIAN ASSISTANT

## 2024-11-19 PROCEDURE — 84466 ASSAY OF TRANSFERRIN: CPT | Performed by: PHYSICIAN ASSISTANT

## 2024-11-19 RX ORDER — METFORMIN HYDROCHLORIDE 500 MG/1
TABLET, EXTENDED RELEASE ORAL
COMMUNITY
Start: 2024-11-18

## 2024-11-19 RX ORDER — FERROUS GLUCONATE 324(38)MG
324 TABLET ORAL EVERY OTHER DAY
Qty: 90 TABLET | Refills: 1 | Status: SHIPPED | OUTPATIENT
Start: 2024-11-19

## 2024-11-19 RX ORDER — ROSUVASTATIN CALCIUM 20 MG/1
20 TABLET, COATED ORAL NIGHTLY
Qty: 90 TABLET | Refills: 0 | Status: SHIPPED | OUTPATIENT
Start: 2024-11-19

## 2024-11-19 RX ORDER — SPIRONOLACTONE 25 MG/1
TABLET ORAL
COMMUNITY
Start: 2024-11-05

## 2024-11-19 RX ORDER — EMPAGLIFLOZIN 10 MG/1
TABLET, FILM COATED ORAL
COMMUNITY
Start: 2024-11-05

## 2024-11-20 ENCOUNTER — OFFICE VISIT (OUTPATIENT)
Dept: OTOLARYNGOLOGY | Facility: CLINIC | Age: 84
End: 2024-11-20
Payer: MEDICARE

## 2024-11-20 ENCOUNTER — PROCEDURE VISIT (OUTPATIENT)
Dept: OTOLARYNGOLOGY | Facility: CLINIC | Age: 84
End: 2024-11-20
Payer: MEDICARE

## 2024-11-20 VITALS — HEIGHT: 70 IN | BODY MASS INDEX: 22.82 KG/M2 | TEMPERATURE: 97.6 F | WEIGHT: 159.4 LBS

## 2024-11-20 DIAGNOSIS — H90.A21 SENSORINEURAL HEARING LOSS (SNHL) OF RIGHT EAR WITH RESTRICTED HEARING OF LEFT EAR: Primary | ICD-10-CM

## 2024-11-20 DIAGNOSIS — H90.3 SENSORINEURAL HEARING LOSS, BILATERAL: ICD-10-CM

## 2024-11-20 DIAGNOSIS — H90.3 ASYMMETRICAL SENSORINEURAL HEARING LOSS: ICD-10-CM

## 2024-11-20 DIAGNOSIS — H61.23 HEARING LOSS DUE TO CERUMEN IMPACTION, BILATERAL: Primary | ICD-10-CM

## 2024-11-20 DIAGNOSIS — H90.A22 SENSORINEURAL HEARING LOSS (SNHL) OF LEFT EAR WITH RESTRICTED HEARING OF RIGHT EAR: ICD-10-CM

## 2024-11-20 DIAGNOSIS — H69.93 EUSTACHIAN TUBE DYSFUNCTION, BILATERAL: ICD-10-CM

## 2024-11-20 LAB
CYTO UR: NORMAL
LAB AP CASE REPORT: NORMAL
LAB AP CLINICAL INFORMATION: NORMAL
Lab: NORMAL
PATH REPORT.FINAL DX SPEC: NORMAL
PATH REPORT.GROSS SPEC: NORMAL

## 2024-11-20 PROCEDURE — 92567 TYMPANOMETRY: CPT | Performed by: AUDIOLOGIST

## 2024-11-20 PROCEDURE — 92557 COMPREHENSIVE HEARING TEST: CPT | Performed by: AUDIOLOGIST

## 2024-11-20 NOTE — PATIENT INSTRUCTIONS
1.  Patient has bilateral cerumen impaction which was cleaned under microscope.  2.  Patient's hearing aids are doing much better after cleaning of cerumen impaction.  3.  Audiogram obtained today shows that hearing has slightly decreased on the right side however the discrimination scores have improved on the left side little bit comparison to last audiogram.  4.  Will continue to monitor and see Mr. Cox back in 1 year with audiogram and also possibly cleaning the ear.

## 2024-11-20 NOTE — PROGRESS NOTES
AUDIOMETRIC EVALUATION      Name:  Ion Cox  :  1940  Age:  84 y.o.  Date of Evaluation:  2024       History:  Mr. Cox is seen today for a hearing evaluation due to hearing loss.    Audiologic Information:  Concerns for Hearing: Yes  PETs: No  Other otologic surgical history: None  Aural Pressure/Fullness: No  Otalgia: No  Otorrhea: No  Tinnitus: Periodically  Dizziness: No  Noise Exposure: Periodically  Family history of hearing loss: Unknown  Head trauma requiring hospital stay: No  Chemotherapy: No  Other significant history: Recent COVID and pancreatitis    EVALUATION:    See audiogram    RESULTS:    Otoscopic Evaluation:        NOTE: Testing completed after ears were examined by Dr. Fernandez    Tympanometry (226 Hz):  Right: Type C  Left: Type A    IMPRESSIONS:  Pure tone thresholds for the right ear shows a moderate to severe sensorineural hearing loss.  A profound loss at 8K hertz.  Pure tone thresholds for the left ear shows a moderate to severe sensorineural hearing loss.  A profound loss at 8K hertz  Patient was counseled with regard to the findings.    Currently wears binaural amplification    RECOMMENDATIONS/PLAN:  Follow-up recommendations of Dr. Fernandez.  Discussed results and recommendations with patient. Questions were addressed and the patient was encouraged to contact our department should concerns arise.          Ren Salinas M.S, [facility]  Licensed Audiologist

## 2024-11-20 NOTE — PROGRESS NOTES
Patient Name: Ion PROCTOR Case   Visit Date: 11/20/2024   Patient ID: [id number]  Provider: Vivek Fernandez MD    Sex: male  Location: [facility]   YOB: 1940  Location Address: [address] [place] [address], [address] [place] [address],?[place]?[id number]    Primary Care Provider Adán Montes MD  Location Phone: [phone]    Referring Provider: No ref. provider found        Chief Complaint  1 year with Audio results     History of Present Illness  Ion PROCTOR Case is a 84 y.o. male who presents to [facility] for 1 year with Audio results . Patient is here with history of bilateral chronic eustachian tube dysfunction and right middle ear effusion.  Patient underwent bilateral PE tube placements on 5/6/2022.  Audiogram with left sensorineural hearing loss still noted and on 12/6/2022 there is still little bit of asymmetry with left sensorineural hearing loss worse than the right.  Otherwise patient wears hearing aid in both ears.  Patient is here for bilateral ear tube check.  He has no other complaints.     Patient has bilateral eustachian tube dysfunction which have been circumvented with ventilation tube placements in the past.  On last visit, left ventilation tube has extruded before and examination shows right ventilation tube extruded and sitting in the ear canal which was removed.  Otherwise both tympanic membranes are unremarkable without perforation.  Due to patient's history of bilateral sensorineural hearing loss and asymmetrical loss, I recommend obtaining audiogram.  Audiogram drained on last visit shows essentially no significant change in hearing except for discrimination score has come down on especially left ear down to 53% from 80%.  Otherwise tympanograms are type A bilaterally.    Patient is here for annual follow-up with audiogram.    Audiogram obtained today shows SRT of 50 on the right and 60 on the left.  Discrimination scores 80% on the right and 67% on  the left.  Tympanograms type C on the right and type a on the left.  Pure tones show moderate to severe sensorineural hearing loss along with profound hearing loss in the high-frequency at 8000 Hz.  There may be slightly worsening of the right ear hearing but overall left ear is the worst year compared to the right ear.    Past Medical History:   Diagnosis Date    Arthritis     Cataract     Removed    Chronic heart failure with preserved ejection fraction (HFpEF) 06/02/2023    Colon polyp     Removed July 13    Coronary artery disease     Diabetes mellitus     Diverticulosis     Elevated cholesterol     Essential hypertension 10/04/2021    Facet arthropathy, lumbar 07/14/2020    GERD (gastroesophageal reflux disease)     HL (hearing loss)     Hyperlipidemia LDL goal <100 08/17/2021    Kidney stone     Lumbar herniated disc 07/14/2020    (R) L3-4    Lumbar spinal stenosis 08/04/2020    Paroxysmal atrial fibrillation 10/04/2021    Pneumonia     Trigger finger of right thumb 11/01/2017    all fingers both hands       Past Surgical History:   Procedure Laterality Date    BACK SURGERY      CARDIAC CATHETERIZATION N/A 08/23/2022    Procedure: LEFT HEART CATH with coronary artery angiography and right heart catheterization possible percutaneous intervention possible closure device;  Surgeon: Emili Cheng MD;  Location: [facility] CATH INVASIVE LOCATION;  Service: Cardiovascular;  Laterality: N/A;    CAROTID STENT      COLONOSCOPY  2017    Dr. Oneal    COLONOSCOPY N/A 10/06/2021    Procedure: COLONOSCOPY WITH BIOPSIES, POLYPECTOMY;  Surgeon: Vazquez Estrada MD;  Location: [facility] ENDOSCOPY;  Service: Gastroenterology;  Laterality: N/A;  COLON POLYPS, DIVERTICULOSIS, HEMORRHOIDS    COLONOSCOPY N/A 7/13/2023    Procedure: COLONOSCOPY with polypectomy with cold snare;  Surgeon: Vazquez Estrada MD;  Location: [facility] ENDOSCOPY;  Service: Gastroenterology;  Laterality: N/A;  colon polyps, diverticulosis, hemorrhoids     CORONARY ANGIOPLASTY      ENDOSCOPY N/A 10/06/2021    Procedure: ESOPHAGOGASTRODUODENOSCOPY WITH BIOPSIES;  Surgeon: Vazquez Estrada MD;  Location: [facility] ENDOSCOPY;  Service: Gastroenterology;  Laterality: N/A;  GASTRITIS    ENDOSCOPY N/A 7/13/2023    Procedure: ESOPHAGOGASTRODUODENOSCOPY with biopsies, with dilation with 15- 18 mm balloon;  Surgeon: Vazquez Estrada MD;  Location: [facility] ENDOSCOPY;  Service: Gastroenterology;  Laterality: N/A;  hiatal hernia, gastric polyp, schatzki's ring    ENDOSCOPY N/A 10/9/2024    Procedure: ESOPHAGOGASTRODUODENOSCOPY WITH BIOPSIES AND COLD SNARE POLYPECTOMY;  Surgeon: Vazquez Estrada MD;  Location: [facility] ENDOSCOPY;  Service: Gastroenterology;  Laterality: N/A;  GASTRITIS, GASTRIC POLYP AND HIATAL HERNIA    KIDNEY STONE SURGERY      Unspecified    LUMBAR DISC SURGERY Right 06/26/2020    L3-4  Minimally Invasive    TRIGGER FINGER RELEASE Bilateral     UPPER GASTROINTESTINAL ENDOSCOPY  10/06/2021    Dr. Estrada         Current Outpatient Medications:     apixaban (ELIQUIS) 5 MG tablet tablet, Take 1 tablet by mouth 2 (Two) Times a Day., Disp: , Rfl:     carvedilol (COREG) 25 MG tablet, Take 1 tablet by mouth 2 (Two) Times a Day With Meals for 30 days., Disp: 60 tablet, Rfl: 0    clopidogrel (PLAVIX) 75 MG tablet, Take 1 tablet by mouth Daily., Disp: , Rfl:     dilTIAZem CD (CARDIZEM CD) 180 MG 24 hr capsule, Take 1 capsule by mouth Daily., Disp: , Rfl:     ferrous gluconate (FERGON) 324 MG tablet, Take 1 tablet by mouth Every Other Day., Disp: 90 tablet, Rfl: 1    furosemide (LASIX) 20 MG tablet, Take 1 tablet by mouth 3 (Three) Times a Week. Monday , Wednesday , and Friday, Disp: , Rfl:     Jardiance 10 MG tablet tablet, , Disp: , Rfl:     metFORMIN ER (GLUCOPHAGE-XR) 500 MG 24 hr tablet, , Disp: , Rfl:     miconazole (MICOTIN) 2 % powder, Apply 1 Application topically to the appropriate area as directed Every 12 (Twelve) Hours., Disp: , Rfl:     pantoprazole  "(PROTONIX) 40 MG EC tablet, Take 1 tablet by mouth Daily., Disp: , Rfl:     rosuvastatin (CRESTOR) 20 MG tablet, TAKE ONE TABLET BY MOUTH ONCE NIGHTLY, Disp: 90 tablet, Rfl: 0    spironolactone (ALDACTONE) 25 MG tablet, , Disp: , Rfl:     albuterol (ACCUNEB) 0.63 MG/3ML nebulizer solution, Take 3 mL by nebulization Every 6 (Six) Hours As Needed for Wheezing or Shortness of Air. (Patient not taking: Reported on 11/20/2024), Disp: 90 mL, Rfl: 1    budesonide-formoterol (Symbicort) 160-4.5 MCG/ACT inhaler, Inhale 2 puffs 2 (Two) Times a Day for 30 days. (Patient not taking: Reported on 11/20/2024), Disp: , Rfl:     calcitonin, salmon, (Miacalcin) 200 UNIT/ACT nasal spray, 1 spray by Alternating Nares route Daily for 164 days. (Patient not taking: Reported on 11/20/2024), Disp: 7.4 mL, Rfl: 1    HYDROcodone-acetaminophen (NORCO) 7.5-325 MG per tablet, Take 2 tablets by mouth Daily. (Patient not taking: Reported on 11/20/2024), Disp: , Rfl:     pregabalin (Lyrica) 25 MG capsule, Take 1 capsule twice daily, may increase to 2 capsules twice daily if no benefit in 3 days. (Patient not taking: Reported on 11/20/2024), Disp: 60 capsule, Rfl: 1     Allergies   Allergen Reactions    Penicillins Swelling    Lisinopril Cough       Social History     Tobacco Use    Smoking status: Never     Passive exposure: Never    Smokeless tobacco: Never   Vaping Use    Vaping status: Never Used   Substance Use Topics    Alcohol use: Never     Comment: Does not drink    Drug use: Never        Objective     Vital Signs:   Vitals:    11/20/24 1448   Temp: 97.6 °F (36.4 °C)   TempSrc: Temporal   Weight: 72.3 kg (159 lb 6.4 oz)   Height: 177.8 cm (70\")       Tobacco Use: Low Risk  (11/20/2024)    Patient History     Smoking Tobacco Use: Never     Smokeless Tobacco Use: Never     Passive Exposure: Never         Physical Exam    Constitutional   Appearance  well developed, well-nourished, alert and in no acute distress, voice clear and " strong    Head   Inspection  no deformities or lesions      Face   Inspection  no facial lesions; House-Brackmann I/VI bilaterally   Palpation  no TMJ crepitus nor  muscle tenderness bilaterally     Eyes/Vision   Visual Fields  extraocular movements are intact, no spontaneous or gaze-induced nystagmus  Conjunctivae  clear   Sclerae  clear   Pupils and Irises  pupils equal, round, and reactive to light.   Nystagmus  not present     Ears, Nose, Mouth and Throat  Ears  External Ears  Wears hearing aids  Otoscopic Examination  Cerumen impaction bilateral   Hearing  intact to conversational voice both ears   Tunning fork testing    Rinne:  Negrete:    Nose  External Nose  appearance normal   Intranasal Exam  mucosa within normal limits, vestibules normal, no intranasal lesions present, septum midline, sinuses non tender to percussion   Modified Jeffrey Test:    Oral Cavity  Oral Mucosa  oral mucosa normal without pallor or cyanosis   Lips  lip appearance normal   Teeth  normal dentition for age   Gums  gums pink, non-swollen, no bleeding present   Tongue  tongue appearance normal; normal mobility   Palate  hard palate normal, soft palate appearance normal with symmetric mobility     Throat  Oropharynx  no inflammation or lesions present, tonsils within normal limits   Hypopharynx  appearance within normal limits   Larynx  voice normal     Neck  Inspection/Palpation  normal appearance, no masses or tenderness, trachea midline; thyroid size normal, nontender, no nodules or masses present on palpation     Lymphatic  Neck  no lymphadenopathy present   Supraclavicular Nodes  no lymphadenopathy present   Preauricular Nodes  no lymphadenopathy present     Respiratory  Respiratory Effort  breathing unlabored   Inspection of Chest  normal appearance, no retractions     Musculoskeletal   Cervical back: Normal range of motion and neck supple.      Skin and Subcutaneous Tissue  General Inspection  Regarding face and neck -  there are no rashes present, no lesions present, and no areas of discoloration     Neurologic  Cranial Nerves  cranial nerves II-XII are grossly intact bilaterally   Gait and Station  normal gait, able to stand without diffculty    Psychiatric  Judgement and Insight  judgment and insight intact   Mood and Affect  mood normal, affect appropriate       RESULTS REVIEWED    I have reviewed the following information:   [x]  Previous Internal Note  []  Previous External Note:   [x]  Ordered Tests & Results:      Pathology:   Lab Results   Component Value Date    Microalbumin, Urine 1.4 10/30/2024    Microcytes Slight/1+ 10/12/2024    Microscopic Description  11/19/2024     Microscopic examination performed.         TSH   Date Value Ref Range Status   10/30/2024 0.832 0.270 - 4.200 uIU/mL Final     Free T4   Date Value Ref Range Status   10/11/2024 1.35 0.92 - 1.68 ng/dL Final     Calcium   Date Value Ref Range Status   11/19/2024 9.9 8.6 - 10.5 mg/dL Final     25 Hydroxy, Vitamin D   Date Value Ref Range Status   10/30/2024 26.9 (L) 30.0 - 100.0 ng/ml Final       CT Chest With Contrast Diagnostic    Result Date: 11/19/2024  Impression: CT scan of the chest with IV contrast demonstrating subsegmental atelectasis of the lung bases, improved in comparison to 9/25/2024. Small left effusion appears smaller. Right pleural effusion has resolved. Coronary artery calcifications. Marked biconcave compression fracture of T8 appears slightly worsened in comparison to 9/25/2024. Electronically Signed: Nelson Delatorre MD  11/19/2024 2:18 PM EST  Workstation ID: [id number]    US Abdomen Limited    Result Date: 10/13/2024  Impression: 1.The gallbladder is incompletely distended but no stones or sludge are identified. No surrounding pericholecystic fluid. No biliary dilatation. 2.Small amount of pleural fluid and a trace amount of fluid around the right kidney. Electronically Signed: Adán Chin DO  10/13/2024 8:27 AM EDT  Workstation ID:  [id number]    CT Abdomen Pelvis Without Contrast    Result Date: 10/10/2024  1.Possible mild acute-to-subacute pancreatitis, especially involving the pancreatic tail. Please correlate with pertinent lab values. No definite pseudocyst formation is suspected. Lack of intravenous contrast agent administration precludes assessment for possible pancreatic necrosis. 2.Also, there is possible acute-to-subacute gastritis. 3.Renal cysts are seen. Some of these may be complex, such as in the upper pole of the left kidney. Consider imaging follow-up if clinically warranted. 4.Osteonecrosis involving the bilateral femoral heads cannot be excluded, especially on the left. Consider MRI examination of the bilateral hip joints/pelvis for further imaging assessment if clinically warranted and if not contraindicated. 5.Otherwise, no significant interval change is seen since the prior study from 9/25/2024, allowing for technique differences. 6.Please see above comments for further detail. Portions of this note were completed with a voice recognition program. Electronically Signed: Waldo Heller MD  10/10/2024 10:03 PM EDT  Workstation ID: [id number]    XR Abdomen KUB    Result Date: 10/8/2024  Impression: 1. No evidence of bowel obstruction. Electronically Signed: Reza Venegas MD  10/8/2024 10:40 AM EDT  Workstation ID: [id number]    XR Chest 1 View    Result Date: 9/26/2024  Impression: 1. No visible pneumothorax status post left thoracentesis. No significant residual left pleural fluid is identified. Mild left basilar atelectasis. Electronically Signed: Kristie Ramirez MD  9/26/2024 11:53 AM EDT  Workstation ID: [id number]    CT Chest With Contrast Diagnostic    Result Date: 9/25/2024  Impression: 1.Persistent left pleural effusion and trace right pleural effusion 2.Bibasilar densities greater on the left that in part relate to atelectasis. A left basilar pneumonia could not be excluded. 3.No definite pulmonary embolus. 4.Atherosclerotic  vascular calcification including coronary artery calcification. 5.Unchanged small pericardial effusion. 6.Compression fracture deformity of T8 similar to the prior more recent exam. Electronically Signed: Fidencio Leija MD  9/25/2024 2:47 PM EDT  Workstation ID: [id number]    CT Abdomen Pelvis With Contrast    Result Date: 9/25/2024  Impression: 1.Left pleural effusion and trace right pleural effusion 2.Bibasilar densities greater on the left that could relate to atelectasis although a left basilar pneumonia not excluded. 3.Small pericardial effusion 4.Atherosclerotic vascular calcification including coronary artery calcification 5.Hepatic steatosis 6.Tiny lesions involving the liver that could reflect cysts. 7.Left renal cyst 8.Small cyst tail the pancreas that might relate to sidebranch IPMN 9.Colonic diverticulosis 10.Distended bladder 11.Subacute fractures involving the ischium and superior pubic ramus area on the right as well as the symphysis pubis area. Electronically Signed: Fidencio Leija MD  9/25/2024 11:55 AM EDT  Workstation ID: [id number]    XR Chest 1 View    Result Date: 9/25/2024  Impression: Small left pleural effusion with adjacent left basilar airspace opacity. No new focal airspace consolidation. Electronically Signed: Schuyler Pacheco MD  9/25/2024 10:41 AM EDT  Workstation ID: [id number]    MRI Thoracic Spine Without Contrast    Result Date: 9/23/2024  1. Recent appearing compression fracture of T8 with approximately 60% loss of height and retropulsion of the posterior cortex resulting in mild canal stenosis. 2. Mild degenerative changes of the thoracic spine. Electronically Signed: Tabitha Méndez MD  9/23/2024 11:04 AM EDT  Workstation ID: [id number]    CT Chest With Contrast Diagnostic    Result Date: 9/16/2024  Impression: 1.Infiltration of the patient's IV in the right antecubital fossa, as detailed above. 2.No evidence of pulmonary embolism. 3.Mild to moderate cardiomegaly. 4.Bibasilar airspace opacity  could represent pulmonary edema or pneumonia. Correlate clinically. 5.Small left pleural effusion. 6.T8 burst fracture which appears worse since the recent 9/2/2024 exam. Electronically Signed: Han Mason MD  9/16/2024 6:25 PM EDT  Workstation ID: [id number]    CT Abdomen Pelvis With Contrast    Result Date: 9/16/2024  Small left pleural effusion with overlying atelectasis. Electronically Signed: Waldo Sykes MD  9/16/2024 6:18 PM EDT  Workstation ID: [id number]    XR Chest 1 View    Result Date: 9/7/2024  Decreased bilateral infiltrates are suspected. The findings may represent interval improvement in infectious multifocal pneumonia. Decreased pulmonary edema is also possible.    Portions of this note were completed with a voice recognition program.   Electronically Signed: Waldo Heller MD  9/7/2024 2:25 AM EDT  Workstation ID: [id number]    CT Chest With Contrast Diagnostic    Result Date: 9/2/2024  Impression: 1.No evidence of pulmonary emboli. 2.Scattered patchy groundglass opacities in both lungs, likely representing atelectasis and atypical infection. 3.Trace bilateral pleural effusions. 4.Cardiomegaly. 5.T8 superior endplate fracture with mild height loss, age-indeterminate but possibly acute to subacute. Electronically Signed: Lisette Godwin  9/2/2024 3:16 PM EDT  Workstation ID: [id number]    XR Chest 1 View    Result Date: 9/2/2024  Impression: Decreased, but not yet resolved patchy bilateral airspace opacities, again concerning for multifocal pneumonia. Electronically Signed: Lisette Godwin  9/2/2024 12:10 PM EDT  Workstation ID: [id number]    XR Chest 1 View    Result Date: 8/27/2024  Increased or new bilateral infiltrates are seen. The findings may represent infectious multifocal pneumonia. Pulmonary edema cannot be excluded.    Portions of this note were completed with a voice recognition program.   Electronically Signed: Waldo Heller MD  8/27/2024 9:07 PM EDT  Workstation ID: [id number]    XR Chest 2  View    Result Date: 8/25/2024  1.Ill-defined multifocal airspace infiltrates bilaterally. The findings suggest developing atypical/viral infection or multifocal pneumonia. Recommend correlation for signs or symptoms of acute infection and follow-up to ensure improvement/resolution. Electronically Signed: Adonay Ramos MD  8/25/2024 9:55 AM EDT  Workstation ID: [id number]    US Gallbladder    Result Date: 7/31/2024  Impression: 1. Gallbladder wall thickness 3 mm, within normal limits, similar in appearance to the 12/14/2023 examination. Electronically Signed: Kristie Ramirez MD  7/31/2024 1:12 PM EDT  Workstation ID: [id number]      I have discussed the interpretation of the above results with the patient.    Ear Cerumen Removal    Date/Time: 11/20/2024 3:07 PM    Performed by: Vivek Fernandez MD  Authorized by: Vivek Fernandez MD    Anesthesia:  Local Anesthetic: none  Location details: left ear and right ear  Patient tolerance: patient tolerated the procedure well with no immediate complications  Comments: Patient has extensive cerumen impaction which was cleaned under the microscope using alligator forceps and curette.  Both tympanic membranes are unremarkable otherwise.  With hearing aid placed and patient hearing much better  Procedure type: instrumentation, alligator forceps, curette   Sedation:  Patient sedated: no                  Assessment and Plan   Diagnoses and all orders for this visit:    1. Hearing loss due to cerumen impaction, bilateral (Primary)  -     Ear Cerumen Removal    2. Eustachian tube dysfunction, bilateral    3. Sensorineural hearing loss, bilateral  -     Comprehensive Hearing Test; Future    4. Asymmetrical sensorineural hearing loss  -     Comprehensive Hearing Test; Future        (H61.23) Hearing loss due to cerumen impaction, bilateral - Plan: Ear Cerumen Removal    (H69.93) Eustachian tube dysfunction, bilateral    (H90.3) Sensorineural hearing loss, bilateral - Plan: Comprehensive  Hearing Test    (H90.3) Asymmetrical sensorineural hearing loss - Plan: Comprehensive Hearing Test     Ion PROCTOR Case  reports that he has never smoked. He has never been exposed to tobacco smoke. He has never used smokeless tobacco.     Plan:  Patient Instructions   1.  Patient has bilateral cerumen impaction which was cleaned under microscope.  2.  Patient's hearing aids are doing much better after cleaning of cerumen impaction.  3.  Audiogram obtained today shows that hearing has slightly decreased on the right side however the discrimination scores have improved on the left side little bit comparison to last audiogram.  4.  Will continue to monitor and see Mr. Cox back in 1 year with audiogram and also possibly cleaning the ear.      Follow Up   Return in about 1 year (around 11/20/2025), or Follow-up 1 year with ear cleaning and audiogram.  Patient was given instructions and counseling regarding his condition or for health maintenance advice. Please see specific information pulled into the AVS if appropriate.

## 2024-11-22 ENCOUNTER — LAB (OUTPATIENT)
Facility: HOSPITAL | Age: 84
End: 2024-11-22
Payer: MEDICARE

## 2024-11-22 LAB — HEMOCCULT STL QL IA: POSITIVE

## 2024-11-22 PROCEDURE — 82274 ASSAY TEST FOR BLOOD FECAL: CPT | Performed by: PHYSICIAN ASSISTANT

## 2024-11-22 RX ORDER — DILTIAZEM HYDROCHLORIDE 120 MG/1
120 CAPSULE, EXTENDED RELEASE ORAL DAILY
Qty: 90 CAPSULE | Refills: 1 | OUTPATIENT
Start: 2024-11-22

## 2024-11-26 ENCOUNTER — OFFICE VISIT (OUTPATIENT)
Dept: GASTROENTEROLOGY | Facility: CLINIC | Age: 84
End: 2024-11-26
Payer: MEDICARE

## 2024-11-26 ENCOUNTER — TELEPHONE (OUTPATIENT)
Dept: GASTROENTEROLOGY | Facility: CLINIC | Age: 84
End: 2024-11-26

## 2024-11-26 VITALS
SYSTOLIC BLOOD PRESSURE: 120 MMHG | WEIGHT: 165.8 LBS | HEART RATE: 89 BPM | HEIGHT: 70 IN | DIASTOLIC BLOOD PRESSURE: 79 MMHG | BODY MASS INDEX: 23.74 KG/M2

## 2024-11-26 DIAGNOSIS — Z86.0100 HISTORY OF COLON POLYPS: ICD-10-CM

## 2024-11-26 DIAGNOSIS — K29.50 OTHER CHRONIC GASTRITIS WITHOUT HEMORRHAGE: ICD-10-CM

## 2024-11-26 DIAGNOSIS — R19.5 OCCULT BLOOD IN STOOLS: ICD-10-CM

## 2024-11-26 DIAGNOSIS — K85.90 ACUTE PANCREATITIS WITHOUT INFECTION OR NECROSIS, UNSPECIFIED PANCREATITIS TYPE: Primary | ICD-10-CM

## 2024-11-26 DIAGNOSIS — D50.9 IRON DEFICIENCY ANEMIA, UNSPECIFIED IRON DEFICIENCY ANEMIA TYPE: ICD-10-CM

## 2024-11-26 RX ORDER — CHOLECALCIFEROL (VITAMIN D3) 25 MCG
1000 TABLET ORAL DAILY
COMMUNITY

## 2024-11-26 NOTE — TELEPHONE ENCOUNTER
Procedure: Colonoscopy and/or EGD     Med Directive: Eliquis, Plavix     PMH: CAD prior stent, HFpEF, afib, HTN, HLD     Last Seen: 7/11/24

## 2024-11-26 NOTE — PROGRESS NOTES
Patient Name: Ion PROCTOR Case   Visit Date: 11/26/2024   Patient ID: [id number]  Provider: GMEA Hancock    Sex: male  Location:  Location Address:  Location Phone: [address] [place] [address] [phone]    YOB: 1940  Age: 84 y.o.   Primary Care Provider Adán Montes MD      Referring Provider: No ref. provider found        Chief Complaint  EGD (Follow up )    History of Present Illness  Patient has GI history significant for right side abdominal pain, diarrhea, and intermittent dysphagia.  He had improvement with Xifaxan in 2022.  Hx of EGD/Colonoscopy 10/06/2021 by Dr. Estrada - normal esophagus, erythematous mucosa in gastric body and antrum, normal duodenum. Stomach biopsies normal. 4 small polyps in ascending/cecum, 5 small polyps in descending/transverse, 1 polyp in sigmoid, diverticula in sigmoid colon, and internal hemorrhoids. Polyps - tubular adenoma and sessile serrated. Random colon biopsies normal.      LFTs were elevated, ultrasound of the abdomen 8/2/23 showed diffuse thickening of the wall of the gallbladder, no stones evident, liver is of normal size, patient did not want to discuss gallbladder findings with a general surgeon  Liver workup 6/22/2023 was negative.  Esophagram 6/26/2023: Moderate dysmotility and tablet lodged at the GE junction     EGD colonoscopy 7/13/2023:Small hiatal hernia, Schatzki's ring found with dilation performed from 15 to 18 mm, small polyp in the stomach body-benign gastric mucosa, stomach biopsy taken which was negative, normal duodenum; Patient had good bowel prep, there were 8 small polyps in the descending, transverse, ascending colon which were adenomatous; 3 small polyps in the sigmoid colon removed-villous adenomatous and sessile serrated lesion, diverticula found in the sigmoid, internal hemorrhoids     Patient last seen 2/27/2024 and reported dysphagia had resolved  Colonoscopy ordered per patient request--has not been done  d/t admissions  Pt states he fell in May fx pelvis - no surgery  Admitted in August w COVID/PNA  Patient admitted 9/25--10/ 2 with acute hypoxic respiratory failure, pneumonia, generalized weakness  Admitted 10/2--10/11/2024 with generalized weakness and hypoxia, patient required thoracentesis Dr. Estrada was consulted for epigastric pain and an EGD was performed  EGD 10/9/2024: Small hiatal hernia, normal esophagus, diffuse mild gastritis-biopsy is negative, small polyp in the stomach body was [id number]-[id number], normal duodenum  Carafate and Protonix were prescribed    Admitted 10/11-10/18/2024 with same diagnosis plus decreased p.o. intake and nausea, CT showed acute to subacute pancreatitis-radiologist states inflammatory change involving the tail of the pancreas which may represent age-indeterminate pancreatitis. States Januvia was started end of May, August he was having n/v also, Januvia was Dc'd and he improved.      Pt states he's doing well, no abd pain now, eating better. No n/v. Bowels moving normally, started Iron about 1 week ago, pt has had some anemia - most recently improved to 12. Pt has been seen by heme/onc for elev WBC and anemia,  stool + OB 11/22/24  Finishing Carafate today  Only c/o chronic back pain, he does have compression fx also.   Past Medical History:   Diagnosis Date    Arthritis     Cataract     Removed    Chronic heart failure with preserved ejection fraction (HFpEF) 06/02/2023    Colon polyp     Removed July 13    Coronary artery disease     Diabetes mellitus     Diverticulosis     Elevated cholesterol     Essential hypertension 10/04/2021    Facet arthropathy, lumbar 07/14/2020    GERD (gastroesophageal reflux disease)     HL (hearing loss)     Hyperlipidemia LDL goal <100 08/17/2021    Kidney stone     Lumbar herniated disc 07/14/2020    (R) L3-4    Lumbar spinal stenosis 08/04/2020    Paroxysmal atrial fibrillation 10/04/2021    Pneumonia     Trigger finger of right  thumb 11/01/2017    all fingers both hands       Past Surgical History:   Procedure Laterality Date    BACK SURGERY      CARDIAC CATHETERIZATION N/A 08/23/2022    Procedure: LEFT HEART CATH with coronary artery angiography and right heart catheterization possible percutaneous intervention possible closure device;  Surgeon: Emili Cheng MD;  Location: [facility] CATH INVASIVE LOCATION;  Service: Cardiovascular;  Laterality: N/A;    CAROTID STENT      COLONOSCOPY  2017    Dr. Oneal    COLONOSCOPY N/A 10/06/2021    Procedure: COLONOSCOPY WITH BIOPSIES, POLYPECTOMY;  Surgeon: Vazquez Estrada MD;  Location: [facility] ENDOSCOPY;  Service: Gastroenterology;  Laterality: N/A;  COLON POLYPS, DIVERTICULOSIS, HEMORRHOIDS    COLONOSCOPY N/A 7/13/2023    Procedure: COLONOSCOPY with polypectomy with cold snare;  Surgeon: Vazquez Estrada MD;  Location: [facility] ENDOSCOPY;  Service: Gastroenterology;  Laterality: N/A;  colon polyps, diverticulosis, hemorrhoids    CORONARY ANGIOPLASTY      ENDOSCOPY N/A 10/06/2021    Procedure: ESOPHAGOGASTRODUODENOSCOPY WITH BIOPSIES;  Surgeon: Vazquez Estrada MD;  Location: [facility] ENDOSCOPY;  Service: Gastroenterology;  Laterality: N/A;  GASTRITIS    ENDOSCOPY N/A 7/13/2023    Procedure: ESOPHAGOGASTRODUODENOSCOPY with biopsies, with dilation with 15- 18 mm balloon;  Surgeon: Vazquez Estrada MD;  Location: [facility] ENDOSCOPY;  Service: Gastroenterology;  Laterality: N/A;  hiatal hernia, gastric polyp, schatzki's ring    ENDOSCOPY N/A 10/9/2024    Procedure: ESOPHAGOGASTRODUODENOSCOPY WITH BIOPSIES AND COLD SNARE POLYPECTOMY;  Surgeon: Vazquez Estrada MD;  Location: [facility] ENDOSCOPY;  Service: Gastroenterology;  Laterality: N/A;  GASTRITIS, GASTRIC POLYP AND HIATAL HERNIA    KIDNEY STONE SURGERY      Unspecified    LUMBAR DISC SURGERY Right 06/26/2020    L3-4  Minimally Invasive    TRIGGER FINGER RELEASE Bilateral     UPPER GASTROINTESTINAL ENDOSCOPY  10/06/2021    Dr. Estrada  "      Allergies   Allergen Reactions    Penicillins Swelling    Lisinopril Cough       Family History   Problem Relation Age of Onset    Heart disease Mother     Arthritis Mother     Heart disease Father     Heart attack Other         (Myocardial Infarction)    Colon cancer Neg Hx     Malig Hyperthermia Neg Hx         Social History     Tobacco Use    Smoking status: Never     Passive exposure: Never    Smokeless tobacco: Never   Vaping Use    Vaping status: Never Used   Substance Use Topics    Alcohol use: Never     Comment: Does not drink    Drug use: Never       Objective     Vital Signs:   /79 (BP Location: Left arm, Patient Position: Sitting, Cuff Size: Adult)   Pulse 89   Ht 177.8 cm (70\")   Wt 75.2 kg (165 lb 12.8 oz)   BMI 23.79 kg/m²       Physical Exam  Constitutional:       General: The patient is not in acute distress.     Appearance: Normal appearance.   HENT:      Head: Normocephalic and atraumatic.      Nose: Nose normal.   Pulmonary:      Effort: Pulmonary effort is normal. No respiratory distress.   Abdominal:      General: Abdomen is flat.      Palpations: Abdomen is soft. There is no mass.      Tenderness: There is no abdominal tenderness. There is no guarding.   Musculoskeletal:      Cervical back: Neck supple.      Right lower leg: No edema.      Left lower leg: No edema.   Skin:     General: Skin is warm and dry.   Neurological:      General: No focal deficit present.      Mental Status: The patient is alert and oriented to person, place, and time.      Gait: Gait normal.   Psychiatric:         Mood and Affect: Mood normal.         Speech: Speech normal.         Behavior: Behavior normal.         Thought Content: Thought content normal.     Result Review :   The following data was reviewed by: GEMA Hancock on 11/26/2024:    CBC w/diff          10/18/2024    04:43 10/30/2024    14:40 11/19/2024    14:26   CBC w/Diff   WBC 11.38  12.44  15.45    RBC 3.86  4.07  4.64  "   Hemoglobin 9.8  10.5  12.0    Hematocrit 32.4  34.5  38.7    MCV 83.9  84.8  83.4    MCH 25.4  25.8  25.9    MCHC 30.2  30.4  31.0    RDW 23.2  18.5  17.1    Platelets 451  332  383    Neutrophil Rel % 67.6  72.2  79.5    Immature Granulocyte Rel % 4.7  1.4  2.3    Lymphocyte Rel % 13.2  12.5  9.6    Monocyte Rel % 12.0  9.4  8.0    Eosinophil Rel % 1.4  3.9  0.3    Basophil Rel % 1.1  0.6  0.3      CMP          10/18/2024    04:43 10/30/2024    14:40 11/19/2024    14:26   CMP   Glucose 182  136  165    BUN 13  15  27    Creatinine 0.73  0.76  1.03    EGFR 90.8  89.7  72.5    Sodium 138  139  139    Potassium 3.6  4.2  4.1    Chloride 105  101  102    Calcium 8.5  9.4  9.9    Total Protein 5.0  6.2  7.1    Albumin 2.5  3.4  4.3    Globulin 2.5  2.8  2.8    Total Bilirubin 0.3  0.4  0.4    Alkaline Phosphatase 107  94  105    AST (SGOT) 14  17  14    ALT (SGPT) 8  14  20    Albumin/Globulin Ratio 1.0  1.2  1.5    BUN/Creatinine Ratio 17.8  19.7  26.2    Anion Gap 7.9  9.0  11.8        Iron Profile   Iron   Date Value Ref Range Status   11/19/2024 29 (L) 59 - 158 mcg/dL Final     TIBC   Date Value Ref Range Status   11/19/2024 513 298 - 536 mcg/dL Final     Iron Saturation (TSAT)   Date Value Ref Range Status   11/19/2024 6 (L) 20 - 50 % Final     Transferrin   Date Value Ref Range Status   11/19/2024 344 200 - 360 mg/dL Final     Ferritin   Ferritin   Date Value Ref Range Status   11/19/2024 36.49 30.00 - 400.00 ng/mL Final               Assessment and Plan    Diagnoses and all orders for this visit:    1. Acute pancreatitis without infection or necrosis, unspecified pancreatitis type (Primary)  -     MRI abdomen w wo contrast mrcp; Future  -     Triglycerides; Future  -     IgG 4; Future  -     Calcium, Ionized; Future    2. Other chronic gastritis without hemorrhage    3. Iron deficiency anemia, unspecified iron deficiency anemia type  -     Case Request; Standing  -     Case Request    4. Occult blood in  stools  -     Case Request; Standing  -     Case Request    5. History of colon polyps    Other orders  -     Follow Anesthesia Guidelines / Protocol; Standing  -     Follow Anesthesia Guidelines / Protocol; Future  -     Verify NPO; Standing              Follow Up   Return if symptoms worsen or fail to improve.  Colonoscopy Surgical Risk and Benefits: Possible risks/complications, benefits, and alternatives to surgical or invasive procedure have been explained to patient and/or legal guardian; risks include bleeding, infection, and perforation. Patient has been evaluated and can tolerate anesthesia and/or sedation. Risks, benefits, and alternatives to anesthesia and sedation have been explained to patient and/or legal guardian.   Dr Varghese , Plavix /Eliquis   MRI abd / MRCP f/u pancreatitis   Labs for pancreatitis w/u    Patient was given instructions and counseling regarding his condition or for health maintenance advice. Please see specific information pulled into the AVS if appropriate.

## 2024-11-26 NOTE — TELEPHONE ENCOUNTER
I have updated ENDO Schedule and added patient to our clearance book.     Closing this encounter.

## 2024-11-26 NOTE — TELEPHONE ENCOUNTER
11/26/2024    Dear Dr Varghese,     Patient: Ion PROCTOR Case   YOB: 1940        This patient is waiting to have a Colonoscopy and/or Esophagogastroduodenoscopy which I will perform at [facility] on 12.13.24 .     Our records indicate this patient is currently taking plavix and Eliquis. This procedure requires the patient to suspend their anticoagulant medication prior to surgery.     Please respond to this request noting your recommendations. You may contact our office at [phone] Option 2 with any questions. I appreciate your prompt response in this matter.     Please return this form to our office no later than two weeks prior to the procedure date listed above. Please return form to [phone]. Please inform our office if the patient requires additional follow-up from your office prior to scheduled procedure date.     ____ I approve my patient to stop taking their Anticoagulant Therapy medication Plavix is 5 and Eliquis 2  days prior to the scheduled procedure.    ____ I do NOT approve my patient to stop taking their Anticoagulant Therapy medication at this time.    ____ I approve my patient from a Cardiac  standpoint    ____ I do NOT approve my patient from a Cardiac  standpoint at this time    Please specify clearance expiration date:_____________________________    Approving physician name (please print):     _____________________________________________    Approving physician signature:     ________________________________    Date:________________        Sincerely,  [facility]   Gastroenterology -

## 2024-11-27 NOTE — TELEPHONE ENCOUNTER
Blood Thinner CLEARANCE Rec'd  Procedure/Date: Colonoscopy 12.13.24    I will call patient closer to the procedure date to advise to HOLD Plavix.    Postponing Patient Call until 12.2.24

## 2024-12-04 ENCOUNTER — LAB (OUTPATIENT)
Dept: LAB | Facility: HOSPITAL | Age: 84
End: 2024-12-04
Payer: MEDICARE

## 2024-12-04 ENCOUNTER — APPOINTMENT (OUTPATIENT)
Dept: MRI IMAGING | Facility: HOSPITAL | Age: 84
End: 2024-12-04
Payer: MEDICARE

## 2024-12-04 ENCOUNTER — HOSPITAL ENCOUNTER (OUTPATIENT)
Dept: MRI IMAGING | Facility: HOSPITAL | Age: 84
Discharge: HOME | End: 2024-12-04
Payer: MEDICARE

## 2024-12-04 DIAGNOSIS — K85.90 ACUTE PANCREATITIS WITHOUT INFECTION OR NECROSIS, UNSPECIFIED PANCREATITIS TYPE: ICD-10-CM

## 2024-12-04 LAB
CA-I SERPL ISE-MCNC: 1.27 MMOL/L (ref 1.15–1.35)
TRIGL SERPL-MCNC: 95 MG/DL (ref 0–150)

## 2024-12-04 PROCEDURE — 82787 IGG 1 2 3 OR 4 EACH: CPT

## 2024-12-04 PROCEDURE — 25510000002 GADOBENATE DIMEGLUMINE 529 MG/ML SOLUTION: Performed by: NURSE PRACTITIONER

## 2024-12-04 PROCEDURE — 36415 COLL VENOUS BLD VENIPUNCTURE: CPT

## 2024-12-04 PROCEDURE — 74183 MRI ABD W/O CNTR FLWD CNTR: CPT

## 2024-12-04 PROCEDURE — A9577 INJ MULTIHANCE: HCPCS | Performed by: NURSE PRACTITIONER

## 2024-12-04 PROCEDURE — 84478 ASSAY OF TRIGLYCERIDES: CPT

## 2024-12-04 PROCEDURE — 82330 ASSAY OF CALCIUM: CPT

## 2024-12-04 RX ADMIN — GADOBENATE DIMEGLUMINE 15 ML: 529 INJECTION, SOLUTION INTRAVENOUS at 15:30

## 2024-12-05 LAB — IGG4 SER-MCNC: 4 MG/DL (ref 2–96)

## 2024-12-09 NOTE — PRE-PROCEDURE INSTRUCTIONS
"PAT call attempted.  No answer.  Detailed message with date and arrival time of 1230 given.  Instructed that arrival time is not procedure time but allows time to prepare for procedure. Come to entrance \"C\"; must have adult  for transportation home; may have two visitors; however, children under 12 must remain in waiting area; instructed on diet/clear liquids/NPO/bowel prep, if needed; may take normal meds two hours prior to arrival time except for blood thinners, antidiabetics, diuretics, and weight loss meds.  Instructed to return call to confirm receipt of instructions and for any questions.  Hold Eliquis for 2 days prior to procedure.  Hold Plavix for 5 days prior to procedure.  Cardiac and blood thinner clearances noted in chart.  "

## 2024-12-11 ENCOUNTER — LAB (OUTPATIENT)
Dept: LAB | Facility: HOSPITAL | Age: 84
End: 2024-12-11
Payer: MEDICARE

## 2024-12-11 LAB
CANCER AG19-9 SERPL-ACNC: 17.6 U/ML
CEA SERPL-MCNC: 2.33 NG/ML

## 2024-12-11 PROCEDURE — 82378 CARCINOEMBRYONIC ANTIGEN: CPT | Performed by: NURSE PRACTITIONER

## 2024-12-11 PROCEDURE — 86301 IMMUNOASSAY TUMOR CA 19-9: CPT | Performed by: NURSE PRACTITIONER

## 2024-12-12 ENCOUNTER — ANESTHESIA EVENT (OUTPATIENT)
Dept: GASTROENTEROLOGY | Facility: HOSPITAL | Age: 84
End: 2024-12-12
Payer: MEDICARE

## 2024-12-12 NOTE — ANESTHESIA PREPROCEDURE EVALUATION
Anesthesia Evaluation     Patient summary reviewed and Nursing notes reviewed   NPO Solid Status: > 8 hours  NPO Liquid Status: > 4 hours           Airway   Mallampati: I  TM distance: >3 FB  Neck ROM: full  No difficulty expected and Large neck circumference  Dental - normal exam     Pulmonary - normal exam    breath sounds clear to auscultation  (+) pneumonia resolved , pleural effusion,  Cardiovascular - normal exam  Exercise tolerance: good (4-7 METS)    PT is on anticoagulation therapy  Rhythm: regular  Rate: normal    (+) hypertension (did not take his bp meds the last 2 days) well controlled less than 2 medications, CAD, cardiac stents more than 12 months ago , dysrhythmias Atrial Fib, hyperlipidemia      Neuro/Psych  GI/Hepatic/Renal/Endo    (+) GERD well controlled, renal disease- CRI and stones, diabetes mellitus type 2 well controlled    Musculoskeletal     Abdominal    Substance History      OB/GYN          Other   arthritis,     ROS/Med Hx Other: HEART BNXY=490  bpm  RR Trlbybmn=943  ms  GA Interval=  ms  P Horizontal Axis=  deg  P Front Axis=  deg  QRSD Interval=76  ms  QT Fzpukurp=012  ms  ERdE=439  ms  QRS Axis=-71  deg  T Wave Axis=58  deg  - ABNORMAL ECG -  Atrial fibrillation with rapid V-rate  Left anterior fascicular block  Inferior  infarct, old  Probable  anteroseptal infarct, old  When compared with ECG of 25-Sep-2024 09:53:49,  Significant change in rhythm  Electronically Signed By: Earl Charles ([facility]) 2024-09-27 20:12:24  Date and Time of Study:2024-09-27 09:19:50    ECHO 9/28/2024  ·  Left ventricular ejection fraction appears to be 51 - 55%.  ·  Left ventricular wall thickness is consistent with mild to moderate concentric hypertrophy.  ·  Left ventricular diastolic function is consistent with (grade I) impaired relaxation.  ·  The right ventricular cavity is borderline dilated.  ·  Mild dilation of the aortic root is present.  ·  There is a trivial pericardial effusion.    Stress Test  1/2/2024  Stress Findings No ECG evidence of myocardial ischemia. Negative clinical evidence of myocardial ischemia. Findings consistent with a normal ECG stress test.    Cardiac Clearance per Dr. Varghese with acceptable cardiac risks.    Last dose Plavix? 12/08  Last dose Eliquis ? 12/10                Anesthesia Plan    ASA 3     general   total IV anesthesia  (Total IV Anesthesia    Patient understands anesthesia not responsible for dental damage.      Discussed risks with pt including aspiration, allergic reactions, apnea, advanced airway placement. Pt verbalized understanding. All questions answered.   )  intravenous induction     Anesthetic plan, risks, benefits, and alternatives have been provided, discussed and informed consent has been obtained with: patient and spouse/significant other.  Pre-procedure education provided  Plan discussed with CRNA.      CODE STATUS:

## 2024-12-13 ENCOUNTER — ANESTHESIA (OUTPATIENT)
Dept: GASTROENTEROLOGY | Facility: HOSPITAL | Age: 84
End: 2024-12-13
Payer: MEDICARE

## 2024-12-13 ENCOUNTER — HOSPITAL ENCOUNTER (OUTPATIENT)
Facility: HOSPITAL | Age: 84
Setting detail: HOSPITAL OUTPATIENT SURGERY
Discharge: HOME | End: 2024-12-13
Attending: INTERNAL MEDICINE | Admitting: INTERNAL MEDICINE
Payer: MEDICARE

## 2024-12-13 VITALS
OXYGEN SATURATION: 99 % | DIASTOLIC BLOOD PRESSURE: 93 MMHG | TEMPERATURE: 96.6 F | SYSTOLIC BLOOD PRESSURE: 138 MMHG | HEART RATE: 110 BPM | WEIGHT: 153 LBS | BODY MASS INDEX: 21.95 KG/M2 | RESPIRATION RATE: 19 BRPM

## 2024-12-13 DIAGNOSIS — Z86.0100 HISTORY OF COLON POLYPS: ICD-10-CM

## 2024-12-13 LAB — GLUCOSE BLDC GLUCOMTR-MCNC: 93 MG/DL (ref 70–99)

## 2024-12-13 PROCEDURE — 45385 COLONOSCOPY W/LESION REMOVAL: CPT | Performed by: INTERNAL MEDICINE

## 2024-12-13 PROCEDURE — 25010000002 LIDOCAINE PF 2% 2 % SOLUTION

## 2024-12-13 PROCEDURE — 88305 TISSUE EXAM BY PATHOLOGIST: CPT | Performed by: INTERNAL MEDICINE

## 2024-12-13 PROCEDURE — 82948 REAGENT STRIP/BLOOD GLUCOSE: CPT

## 2024-12-13 PROCEDURE — 25010000002 PROPOFOL 10 MG/ML EMULSION

## 2024-12-13 RX ORDER — SODIUM CHLORIDE, SODIUM LACTATE, POTASSIUM CHLORIDE, CALCIUM CHLORIDE 600; 310; 30; 20 MG/100ML; MG/100ML; MG/100ML; MG/100ML
30 INJECTION, SOLUTION INTRAVENOUS CONTINUOUS
Status: DISCONTINUED | OUTPATIENT
Start: 2024-12-13 | End: 2024-12-13 | Stop reason: HOSPADM

## 2024-12-13 RX ORDER — PROPOFOL 10 MG/ML
VIAL (ML) INTRAVENOUS AS NEEDED
Status: DISCONTINUED | OUTPATIENT
Start: 2024-12-13 | End: 2024-12-13 | Stop reason: SURG

## 2024-12-13 RX ORDER — LIDOCAINE HYDROCHLORIDE 20 MG/ML
INJECTION, SOLUTION EPIDURAL; INFILTRATION; INTRACAUDAL; PERINEURAL AS NEEDED
Status: DISCONTINUED | OUTPATIENT
Start: 2024-12-13 | End: 2024-12-13 | Stop reason: SURG

## 2024-12-13 RX ADMIN — PROPOFOL 125 MCG/KG/MIN: 10 INJECTION, EMULSION INTRAVENOUS at 14:12

## 2024-12-13 RX ADMIN — PROPOFOL 50 MG: 10 INJECTION, EMULSION INTRAVENOUS at 14:11

## 2024-12-13 RX ADMIN — LIDOCAINE HYDROCHLORIDE 40 MG: 20 INJECTION, SOLUTION EPIDURAL; INFILTRATION; INTRACAUDAL; PERINEURAL at 14:11

## 2024-12-13 NOTE — H&P
Pre Procedure History & Physical    Chief Complaint:   History of colon polyps history of colon polyps    Subjective     HPI:   Past history of colon polyps    Past Medical History:   Past Medical History:   Diagnosis Date    Arthritis     Cataract     Removed    Chronic heart failure with preserved ejection fraction (HFpEF) 06/02/2023    Colon polyp     Removed July 13    Coronary artery disease     Diabetes mellitus     Diverticulosis     Elevated cholesterol     Essential hypertension 10/04/2021    Facet arthropathy, lumbar 07/14/2020    GERD (gastroesophageal reflux disease)     HL (hearing loss)     Hyperlipidemia LDL goal <100 08/17/2021    Kidney stone     Lumbar herniated disc 07/14/2020    (R) L3-4    Lumbar spinal stenosis 08/04/2020    Paroxysmal atrial fibrillation 10/04/2021    Pneumonia     Trigger finger of right thumb 11/01/2017    all fingers both hands       Past Surgical History:  Past Surgical History:   Procedure Laterality Date    BACK SURGERY      CARDIAC CATHETERIZATION N/A 08/23/2022    Procedure: LEFT HEART CATH with coronary artery angiography and right heart catheterization possible percutaneous intervention possible closure device;  Surgeon: Emili Cheng MD;  Location: [facility] CATH INVASIVE LOCATION;  Service: Cardiovascular;  Laterality: N/A;    CAROTID STENT      COLONOSCOPY  2017    Dr. Oneal    COLONOSCOPY N/A 10/06/2021    Procedure: COLONOSCOPY WITH BIOPSIES, POLYPECTOMY;  Surgeon: Vazquez Estrada MD;  Location: [facility] ENDOSCOPY;  Service: Gastroenterology;  Laterality: N/A;  COLON POLYPS, DIVERTICULOSIS, HEMORRHOIDS    COLONOSCOPY N/A 7/13/2023    Procedure: COLONOSCOPY with polypectomy with cold snare;  Surgeon: Vazquez Estrada MD;  Location: [facility] ENDOSCOPY;  Service: Gastroenterology;  Laterality: N/A;  colon polyps, diverticulosis, hemorrhoids    CORONARY ANGIOPLASTY      ENDOSCOPY N/A 10/06/2021    Procedure: ESOPHAGOGASTRODUODENOSCOPY WITH BIOPSIES;  Surgeon:  Vazquez Estrada MD;  Location: [facility] ENDOSCOPY;  Service: Gastroenterology;  Laterality: N/A;  GASTRITIS    ENDOSCOPY N/A 7/13/2023    Procedure: ESOPHAGOGASTRODUODENOSCOPY with biopsies, with dilation with 15- 18 mm balloon;  Surgeon: Vazquez Estrada MD;  Location: [facility] ENDOSCOPY;  Service: Gastroenterology;  Laterality: N/A;  hiatal hernia, gastric polyp, schatzki's ring    ENDOSCOPY N/A 10/9/2024    Procedure: ESOPHAGOGASTRODUODENOSCOPY WITH BIOPSIES AND COLD SNARE POLYPECTOMY;  Surgeon: Vazquez Estrada MD;  Location: [facility] ENDOSCOPY;  Service: Gastroenterology;  Laterality: N/A;  GASTRITIS, GASTRIC POLYP AND HIATAL HERNIA    KIDNEY STONE SURGERY      Unspecified    LUMBAR DISC SURGERY Right 06/26/2020    L3-4  Minimally Invasive    TRIGGER FINGER RELEASE Bilateral     UPPER GASTROINTESTINAL ENDOSCOPY  10/06/2021    Dr. Estrada       Family History:  Family History   Problem Relation Age of Onset    Heart disease Mother     Arthritis Mother     Heart disease Father     Heart attack Other         (Myocardial Infarction)    Colon cancer Neg Hx     Malig Hyperthermia Neg Hx        Social History:   reports that he has never smoked. He has never been exposed to tobacco smoke. He has never used smokeless tobacco. He reports that he does not drink alcohol and does not use drugs.    Medications:   Medications Prior to Admission   Medication Sig Dispense Refill Last Dose/Taking    apixaban (ELIQUIS) 5 MG tablet tablet Take 1 tablet by mouth 2 (Two) Times a Day.   Past Week    carvedilol (COREG) 25 MG tablet Take 1 tablet by mouth 2 (Two) Times a Day With Meals for 30 days. 60 tablet 0 Past Week    Cholecalciferol 25 MCG (1000 UT) tablet Take 1 tablet by mouth Daily.   Past Week    clopidogrel (PLAVIX) 75 MG tablet Take 1 tablet by mouth Daily.   Past Week    dilTIAZem CD (CARDIZEM CD) 180 MG 24 hr capsule Take 1 capsule by mouth Daily.   Past Week    ferrous gluconate (FERGON) 324 MG tablet Take 1  tablet by mouth Every Other Day. 90 tablet 1 Past Week    furosemide (LASIX) 20 MG tablet Take 1 tablet by mouth 3 (Three) Times a Week. Monday , Wednesday , and Friday   Past Week    Jardiance 10 MG tablet tablet    Past Week    metFORMIN ER (GLUCOPHAGE-XR) 500 MG 24 hr tablet    Past Week    pantoprazole (PROTONIX) 40 MG EC tablet Take 1 tablet by mouth Daily.   Past Week    rosuvastatin (CRESTOR) 20 MG tablet TAKE ONE TABLET BY MOUTH ONCE NIGHTLY 90 tablet 0 Past Week    spironolactone (ALDACTONE) 25 MG tablet    Past Week    Zinc Acetate, Oral, (ZINC ACETATE PO) Take  by mouth.   Past Week       Allergies:  Penicillins and Lisinopril        Objective     Blood pressure (!) 149/102, pulse 101, temperature 97.9 °F (36.6 °C), temperature source Temporal, resp. rate 15, weight 69.4 kg (153 lb), SpO2 99%.    Physical Exam   Constitutional: Pt is oriented to person, place, and time and well-developed, well-nourished, and in no distress.   Mouth/Throat: Oropharynx is clear and moist.   Neck: Normal range of motion.   Cardiovascular: Normal rate, regular rhythm and normal heart sounds.    Pulmonary/Chest: Effort normal and breath sounds normal.   Abdominal: Soft. Nontender  Skin: Skin is warm and dry.   Psychiatric: Mood, memory, affect and judgment normal.     Assessment & Plan     Diagnosis:  History of colon polyps    Anticipated Surgical Procedure:  Colonoscopy    The risks, benefits, and alternatives of this procedure have been discussed with the patient or the responsible party- the patient understands and agrees to proceed.

## 2024-12-13 NOTE — ANESTHESIA POSTPROCEDURE EVALUATION
Patient: Ion PROCTOR Case    Procedure Summary       Date: 12/13/24 Room / Location: [facility] ENDOSCOPY 3 / [facility] ENDOSCOPY    Anesthesia Start: 1409 Anesthesia Stop: 1437    Procedure: COLONOSCOPY WITH POLYPECTOMY Diagnosis:       History of colon polyps      (History of colon polyps [Z86.010])    Surgeons: Vazquez Estrada MD Provider: Carol Thomas CRNA    Anesthesia Type: general ASA Status: 3            Anesthesia Type: general    Vitals  Vitals Value Taken Time   /93 12/13/24 1450   Temp 35.9 °C (96.6 °F) 12/13/24 1449   Pulse 114 12/13/24 1450   Resp 19 12/13/24 1449   SpO2 99 % 12/13/24 1450   Vitals shown include unfiled device data.        Post Anesthesia Care and Evaluation    Post-procedure mental status: acceptable.  Pain management: satisfactory to patient    Airway patency: patent  Anesthetic complications: No anesthetic complications    Cardiovascular status: acceptable  Respiratory status: acceptable    Comments: Per chart review

## 2024-12-17 LAB
CYTO UR: NORMAL
LAB AP CASE REPORT: NORMAL
LAB AP CLINICAL INFORMATION: NORMAL
PATH REPORT.FINAL DX SPEC: NORMAL
PATH REPORT.GROSS SPEC: NORMAL

## 2025-01-09 ENCOUNTER — TRANSCRIBE ORDERS (OUTPATIENT)
Dept: LAB | Facility: HOSPITAL | Age: 85
End: 2025-01-09
Payer: MEDICARE

## 2025-01-09 DIAGNOSIS — D50.9 IRON DEFICIENCY ANEMIA, UNSPECIFIED IRON DEFICIENCY ANEMIA TYPE: ICD-10-CM

## 2025-01-09 DIAGNOSIS — E11.65 TYPE 2 DIABETES MELLITUS WITH HYPERGLYCEMIA, UNSPECIFIED WHETHER LONG TERM INSULIN USE: Primary | ICD-10-CM

## 2025-01-09 DIAGNOSIS — R53.83 OTHER FATIGUE: ICD-10-CM

## 2025-01-09 DIAGNOSIS — E55.9 VITAMIN D DEFICIENCY, UNSPECIFIED: ICD-10-CM

## 2025-01-15 ENCOUNTER — OFFICE VISIT (OUTPATIENT)
Dept: GASTROENTEROLOGY | Facility: CLINIC | Age: 85
End: 2025-01-15
Payer: MEDICARE

## 2025-01-15 ENCOUNTER — OFFICE VISIT (OUTPATIENT)
Dept: CARDIOLOGY | Facility: CLINIC | Age: 85
End: 2025-01-15
Payer: MEDICARE

## 2025-01-15 VITALS
HEIGHT: 70 IN | SYSTOLIC BLOOD PRESSURE: 124 MMHG | BODY MASS INDEX: 21.47 KG/M2 | WEIGHT: 150 LBS | HEART RATE: 92 BPM | DIASTOLIC BLOOD PRESSURE: 84 MMHG

## 2025-01-15 VITALS
WEIGHT: 154.8 LBS | HEART RATE: 78 BPM | HEIGHT: 70 IN | BODY MASS INDEX: 22.16 KG/M2 | DIASTOLIC BLOOD PRESSURE: 69 MMHG | SYSTOLIC BLOOD PRESSURE: 120 MMHG

## 2025-01-15 DIAGNOSIS — Z98.61 CAD S/P PERCUTANEOUS CORONARY ANGIOPLASTY: ICD-10-CM

## 2025-01-15 DIAGNOSIS — Z86.0100 HISTORY OF COLON POLYPS: ICD-10-CM

## 2025-01-15 DIAGNOSIS — R93.5 ABNORMAL MRI OF ABDOMEN: Primary | ICD-10-CM

## 2025-01-15 DIAGNOSIS — I25.10 CAD S/P PERCUTANEOUS CORONARY ANGIOPLASTY: ICD-10-CM

## 2025-01-15 DIAGNOSIS — I50.32 CHRONIC HEART FAILURE WITH PRESERVED EJECTION FRACTION (HFPEF): ICD-10-CM

## 2025-01-15 DIAGNOSIS — E78.5 HYPERLIPIDEMIA LDL GOAL <70: ICD-10-CM

## 2025-01-15 DIAGNOSIS — K59.04 CHRONIC IDIOPATHIC CONSTIPATION: ICD-10-CM

## 2025-01-15 DIAGNOSIS — I48.19 ATRIAL FIBRILLATION, PERSISTENT: Primary | ICD-10-CM

## 2025-01-15 DIAGNOSIS — I10 ESSENTIAL HYPERTENSION: ICD-10-CM

## 2025-01-15 DIAGNOSIS — R11.0 NAUSEA: ICD-10-CM

## 2025-01-15 DIAGNOSIS — D50.9 IRON DEFICIENCY ANEMIA, UNSPECIFIED IRON DEFICIENCY ANEMIA TYPE: ICD-10-CM

## 2025-01-15 NOTE — PROGRESS NOTES
Chief Complaint  Follow-up, Atrial Fibrillation, Coronary Artery Disease, and Hypertension    Subjective    Patient has recovered from his pneumonia event back in October breathing has returned back to baseline he has had improvement in his edema with course of Lasix now on 3 days a week but he remained stable.  No symptomatic tachycardic problems  Past Medical History:   Diagnosis Date    Arthritis     Cataract     Removed    Chronic heart failure with preserved ejection fraction (HFpEF) 06/02/2023    Colon polyp     Removed July 13    Coronary artery disease     Diabetes mellitus     Diverticulosis     Elevated cholesterol     Essential hypertension 10/04/2021    Facet arthropathy, lumbar 07/14/2020    GERD (gastroesophageal reflux disease)     HL (hearing loss)     Hyperlipidemia LDL goal <100 08/17/2021    Kidney stone     Lumbar herniated disc 07/14/2020    (R) L3-4    Lumbar spinal stenosis 08/04/2020    Paroxysmal atrial fibrillation 10/04/2021    Pneumonia     Trigger finger of right thumb 11/01/2017    all fingers both hands         Current Outpatient Medications:     apixaban (ELIQUIS) 5 MG tablet tablet, Take 1 tablet by mouth 2 (Two) Times a Day., Disp: , Rfl:     carvedilol (COREG) 25 MG tablet, Take 1 tablet by mouth 2 (Two) Times a Day With Meals for 30 days., Disp: 60 tablet, Rfl: 0    Cholecalciferol 25 MCG (1000 UT) tablet, Take 1 tablet by mouth Daily., Disp: , Rfl:     clopidogrel (PLAVIX) 75 MG tablet, Take 1 tablet by mouth Daily., Disp: , Rfl:     dilTIAZem CD (CARDIZEM CD) 180 MG 24 hr capsule, Take 1 capsule by mouth Daily., Disp: , Rfl:     furosemide (LASIX) 20 MG tablet, Take 1 tablet by mouth 3 (Three) Times a Week. Monday , Wednesday , and Friday, Disp: , Rfl:     pantoprazole (PROTONIX) 40 MG EC tablet, Take 1 tablet by mouth Daily., Disp: , Rfl:     rosuvastatin (CRESTOR) 20 MG tablet, TAKE ONE TABLET BY MOUTH ONCE NIGHTLY, Disp: 90 tablet, Rfl: 0    spironolactone (ALDACTONE) 25 MG  "tablet, , Disp: , Rfl:     Zinc Acetate, Oral, (ZINC ACETATE PO), Take  by mouth., Disp: , Rfl:     ferrous gluconate (FERGON) 324 MG tablet, Take 1 tablet by mouth Every Other Day. (Patient not taking: Reported on 1/15/2025), Disp: 90 tablet, Rfl: 1    Medications Discontinued During This Encounter   Medication Reason    Jardiance 10 MG tablet tablet Discontinued by another clinician    metFORMIN ER (GLUCOPHAGE-XR) 500 MG 24 hr tablet Discontinued by another clinician     Allergies   Allergen Reactions    Penicillins Swelling    Lisinopril Cough        Social History     Tobacco Use    Smoking status: Never     Passive exposure: Never    Smokeless tobacco: Never   Vaping Use    Vaping status: Never Used   Substance Use Topics    Alcohol use: Never     Comment: Does not drink    Drug use: Never       Family History   Problem Relation Age of Onset    Heart disease Mother     Arthritis Mother     Heart disease Father     Heart attack Other         (Myocardial Infarction)    Colon cancer Neg Hx     Malig Hyperthermia Neg Hx         Objective     /84   Pulse 92   Ht 177.8 cm (70\")   Wt 68 kg (150 lb)   BMI 21.52 kg/m²       Physical Exam    General Appearance:   no acute distress  Alert and oriented x3  HENT:   lips not cyanotic  Atraumatic  Neck:  No jvd   supple  Respiratory:  no respiratory distress  normal breath sounds  no rales  Cardiovascular:  Irregularly irregular  no S3, no S4   no murmur  no rub  Extremities  No cyanosis  lower extremity edema: Mild  Skin:   warm, dry  No rashes      Result Review :     proBNP   Date Value Ref Range Status   10/06/2024 1,226.0 0.0 - 1,800.0 pg/mL Final     CMP          10/18/2024    04:43 10/30/2024    14:40 11/19/2024    14:26   CMP   Glucose 182  136  165    BUN 13  15  27    Creatinine 0.73  0.76  1.03    EGFR 90.8  89.7  72.5    Sodium 138  139  139    Potassium 3.6  4.2  4.1    Chloride 105  101  102    Calcium 8.5  9.4  9.9    Total Protein 5.0  6.2  7.1  " "  Albumin 2.5  3.4  4.3    Globulin 2.5  2.8  2.8    Total Bilirubin 0.3  0.4  0.4    Alkaline Phosphatase 107  94  105    AST (SGOT) 14  17  14    ALT (SGPT) 8  14  20    Albumin/Globulin Ratio 1.0  1.2  1.5    BUN/Creatinine Ratio 17.8  19.7  26.2    Anion Gap 7.9  9.0  11.8      CBC w/diff          10/18/2024    04:43 10/30/2024    14:40 11/19/2024    14:26   CBC w/Diff   WBC 11.38  12.44  15.45    RBC 3.86  4.07  4.64    Hemoglobin 9.8  10.5  12.0    Hematocrit 32.4  34.5  38.7    MCV 83.9  84.8  83.4    MCH 25.4  25.8  25.9    MCHC 30.2  30.4  31.0    RDW 23.2  18.5  17.1    Platelets 451  332  383    Neutrophil Rel % 67.6  72.2  79.5    Immature Granulocyte Rel % 4.7  1.4  2.3    Lymphocyte Rel % 13.2  12.5  9.6    Monocyte Rel % 12.0  9.4  8.0    Eosinophil Rel % 1.4  3.9  0.3    Basophil Rel % 1.1  0.6  0.3       Lab Results   Component Value Date    TSH 0.832 10/30/2024      Lab Results   Component Value Date    FREET4 1.35 10/11/2024      No results found for: \"DDIMERQUANT\"  Magnesium   Date Value Ref Range Status   10/18/2024 1.8 1.6 - 2.4 mg/dL Final      No results found for: \"DIGOXIN\"   Lab Results   Component Value Date    TROPONINT 95 (C) 09/25/2024           Lipid Panel          10/12/2024    05:16 10/30/2024    14:40 12/4/2024    12:15   Lipid Panel   Total Cholesterol 103  109     Triglycerides 123  96  95    HDL Cholesterol 28  34     VLDL Cholesterol 22  18     LDL Cholesterol  53  57     LDL/HDL Ratio 1.80  1.64       Lab Results   Component Value Date    POCTROP 0.01 08/21/2022     Results for orders placed during the hospital encounter of 01/02/24    Stress Test With Myocardial Perfusion One Day    Interpretation Summary    Myocardial perfusion imaging indicates a normal myocardial perfusion study with no evidence of ischemia. Impressions are consistent with a low risk study.    Left ventricular ejection fraction is normal (Calculated EF = 55%).    Findings consistent with a normal ECG stress " test.    Results for orders placed during the hospital encounter of 09/25/24    Adult Transthoracic Echo Complete W/ Cont if Necessary Per Protocol    Interpretation Summary    Left ventricular ejection fraction appears to be 51 - 55%.    Left ventricular wall thickness is consistent with mild to moderate concentric hypertrophy.    Left ventricular diastolic function is consistent with (grade I) impaired relaxation.    The right ventricular cavity is borderline dilated.    Mild dilation of the aortic root is present.    There is a trivial pericardial effusion.       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 Atrial fibrillation, persistent (Primary)  Assessment & Plan:  Patient in persistent atrial fibrillation rate remained stable controlled on diltiazem CD1 80 daily and carvedilol 25 twice daily dosing.  Patient is on Eliquis 5 twice daily for seizure prevention      2. CAD S/P percutaneous coronary angioplasty  Assessment & Plan:  Patient is doing well no ongoing angina continue with chronic  plavix 75 mg qd.         3. Chronic heart failure with preserved ejection fraction (HFpEF)  Assessment & Plan:  Patient stable from a volume standpoint on Lasix 2o mg 3 times a week continue to encourage daily weight log      4. Hyperlipidemia LDL goal <70    5. Essential hypertension            Follow Up     Return in about 6 months (around 7/15/2025) for EKG with F/U.          Patient was given instructions and counseling regarding his condition or for health maintenance advice. Please see specific information pulled into the AVS if appropriate.

## 2025-01-15 NOTE — ASSESSMENT & PLAN NOTE
Patient in persistent atrial fibrillation rate remained stable controlled on diltiazem CD1 80 daily and carvedilol 25 twice daily dosing.  Patient is on Eliquis 5 twice daily for seizure prevention

## 2025-01-15 NOTE — ASSESSMENT & PLAN NOTE
Patient stable from a volume standpoint on Lasix 2o mg 3 times a week continue to encourage daily weight log

## 2025-01-15 NOTE — PROGRESS NOTES
Patient Name: Ion PROCTOR Case   Visit Date: 01/15/2025   Patient ID: [id number]  Provider: GEMA Hancock    Sex: male  Location:  Location Address:  Location Phone: [phone] [place] [address] [phone]    YOB: 1940  Age: 84 y.o.   Primary Care Provider Adán Montes MD      Referring Provider: No ref. provider found        Chief Complaint  EGD (Follow up ), Nausea, and Constipation (For the last 2 weeks,  had a very small BM daily, took laxative and stool softener on 1.14.25 and miralax with prune juice today)    History of Present Illness  Patient has GI history significant for right side abdominal pain, diarrhea, and intermittent dysphagia.  He had improvement with Xifaxan in 2022.  Hx of EGD/Colonoscopy 10/06/2021 by Dr. Estrada - normal esophagus, erythematous mucosa in gastric body and antrum, normal duodenum. Stomach biopsies normal. 4 small polyps in ascending/cecum, 5 small polyps in descending/transverse, 1 polyp in sigmoid, diverticula in sigmoid colon, and internal hemorrhoids. Polyps - tubular adenoma and sessile serrated. Random colon biopsies normal.     EGD colonoscopy 7/13/2023:Small hiatal hernia, Schatzki's ring found with dilation performed from 15 to 18 mm, small polyp in the stomach body-benign gastric mucosa, stomach biopsy taken which was negative, normal duodenum; Patient had good bowel prep, there were 8 small polyps in the descending, transverse, ascending colon which were adenomatous; 3 small polyps in the sigmoid colon removed-villous adenomatous and sessile serrated lesion, diverticula found in the sigmoid, internal hemorrhoids     Admitted in August 2024 w COVID/PNA  Patient admitted 9/25--10/ 2 with acute hypoxic respiratory failure, pneumonia, generalized weakness  Admitted 10/2--10/11/2024 with generalized weakness and hypoxia, patient required thoracentesis Dr. Estrada was consulted for epigastric pain and an EGD was performed  EGD 10/9/2024:  Small hiatal hernia, normal esophagus, diffuse mild gastritis-biopsy is negative, small polyp in the stomach body was [id number], normal duodenum  Carafate and Protonix were prescribed    Admitted 10/11-10/18/2024 with same diagnosis plus decreased p.o. intake and nausea, CT showed acute to subacute pancreatitis-radiologist states inflammatory change involving the tail of the pancreas which may represent age-indeterminate pancreatitis. States Januvia was started end of May, August he was having n/v also, Januvia was Dc'd and he improved.      Patient was last seen 11/26/2024 and he was doing well with resolution of GI symptoms, anemia had improved to 12, he was seeing hematology for this and for elevated white count, stool was positive for occult blood 11/22/2024 so colonoscopy ordered    Follow-up MRI [facility] 12/4/2024 showed normal liver, slight bulbous dilation of the pancreatic duct near the ampulla, no evidence of obstructing mass or stone, cystic lesion within the pancreatic tail-7 mm  CEA and CA 19-9 were normal 12/10/2024, Dr. Estrada recommended MRI in 3 to 4 months-this is scheduled for 3/3/2025    Colonoscopy 12/13/2024: Good bowel prep, 3 small polyps in the descending colon removed-adenomatous, diverticula found in the sigmoid, grade 1 internal hemorrhoids     Pt states for the last 2-3 weeks he has been nauseated, no vomiting, also some constipation. Collier #1-2, + straining. No abd pain. He started Miralax yesterday.   Pt states he is stopping Metformin and Jardiance -  has d/w PCP - starting Insulin tomorrow instead  He is still following with heme   Past Medical History:   Diagnosis Date    Arthritis     Cataract     Removed    Chronic heart failure with preserved ejection fraction (HFpEF) 06/02/2023    Colon polyp     Removed July 13    Coronary artery disease     Diabetes mellitus     Diverticulosis     Elevated cholesterol     Essential hypertension 10/04/2021    Facet  arthropathy, lumbar 07/14/2020    GERD (gastroesophageal reflux disease)     HL (hearing loss)     Hyperlipidemia LDL goal <100 08/17/2021    Kidney stone     Lumbar herniated disc 07/14/2020    (R) L3-4    Lumbar spinal stenosis 08/04/2020    Paroxysmal atrial fibrillation 10/04/2021    Pneumonia     Trigger finger of right thumb 11/01/2017    all fingers both hands       Past Surgical History:   Procedure Laterality Date    BACK SURGERY      CARDIAC CATHETERIZATION N/A 08/23/2022    Procedure: LEFT HEART CATH with coronary artery angiography and right heart catheterization possible percutaneous intervention possible closure device;  Surgeon: Emili Cheng MD;  Location: [facility] CATH INVASIVE LOCATION;  Service: Cardiovascular;  Laterality: N/A;    CAROTID STENT      COLONOSCOPY  2017    Dr. Oneal    COLONOSCOPY N/A 10/06/2021    Procedure: COLONOSCOPY WITH BIOPSIES, POLYPECTOMY;  Surgeon: Vazquez Estrada MD;  Location: [facility] ENDOSCOPY;  Service: Gastroenterology;  Laterality: N/A;  COLON POLYPS, DIVERTICULOSIS, HEMORRHOIDS    COLONOSCOPY N/A 7/13/2023    Procedure: COLONOSCOPY with polypectomy with cold snare;  Surgeon: Vazquez Estrada MD;  Location: [facility] ENDOSCOPY;  Service: Gastroenterology;  Laterality: N/A;  colon polyps, diverticulosis, hemorrhoids    COLONOSCOPY N/A 12/13/2024    Procedure: COLONOSCOPY WITH POLYPECTOMY;  Surgeon: Vazquez Estrada MD;  Location: [facility] ENDOSCOPY;  Service: Gastroenterology;  Laterality: N/A;  DIVERTICULOSIS, COLON POLYPS, HEMORRHOIDS    CORONARY ANGIOPLASTY      ENDOSCOPY N/A 10/06/2021    Procedure: ESOPHAGOGASTRODUODENOSCOPY WITH BIOPSIES;  Surgeon: Vazquez Estrada MD;  Location: [facility] ENDOSCOPY;  Service: Gastroenterology;  Laterality: N/A;  GASTRITIS    ENDOSCOPY N/A 7/13/2023    Procedure: ESOPHAGOGASTRODUODENOSCOPY with biopsies, with dilation with 15- 18 mm balloon;  Surgeon: Vazquez Estrada MD;  Location: [facility] ENDOSCOPY;  Service:  "Gastroenterology;  Laterality: N/A;  hiatal hernia, gastric polyp, schatzki's ring    ENDOSCOPY N/A 10/9/2024    Procedure: ESOPHAGOGASTRODUODENOSCOPY WITH BIOPSIES AND COLD SNARE POLYPECTOMY;  Surgeon: Vazquez Estrada MD;  Location: [facility] ENDOSCOPY;  Service: Gastroenterology;  Laterality: N/A;  GASTRITIS, GASTRIC POLYP AND HIATAL HERNIA    KIDNEY STONE SURGERY      Unspecified    LUMBAR DISC SURGERY Right 06/26/2020    L3-4  Minimally Invasive    TRIGGER FINGER RELEASE Bilateral     UPPER GASTROINTESTINAL ENDOSCOPY  10/06/2021    Dr. Estrada       Allergies   Allergen Reactions    Penicillins Swelling    Lisinopril Cough       Family History   Problem Relation Age of Onset    Heart disease Mother     Arthritis Mother     Heart disease Father     Heart attack Other         (Myocardial Infarction)    Colon cancer Neg Hx     Malig Hyperthermia Neg Hx         Social History     Tobacco Use    Smoking status: Never     Passive exposure: Never    Smokeless tobacco: Never   Vaping Use    Vaping status: Never Used   Substance Use Topics    Alcohol use: Never     Comment: Does not drink    Drug use: Never       Objective     Vital Signs:   /69 (BP Location: Right arm, Patient Position: Sitting, Cuff Size: Adult)   Pulse 78   Ht 177.8 cm (70\")   Wt 70.2 kg (154 lb 12.8 oz)   BMI 22.21 kg/m²       Physical Exam  Constitutional:       General: The patient is not in acute distress.     Appearance: Normal appearance.   HENT:      Head: Normocephalic and atraumatic.      Nose: Nose normal.   Pulmonary:      Effort: Pulmonary effort is normal. No respiratory distress.   Abdominal:      General: Abdomen is flat.      Palpations: Abdomen is soft. There is no mass.      Tenderness: There is no abdominal tenderness. There is no guarding.   Musculoskeletal:      Cervical back: Neck supple.      Right lower leg: No edema.      Left lower leg: No edema.   Skin:     General: Skin is warm and dry.   Neurological:      " General: No focal deficit present.      Mental Status: The patient is alert and oriented to person, place, and time.      Gait: Gait normal.   Psychiatric:         Mood and Affect: Mood normal.         Speech: Speech normal.         Behavior: Behavior normal.         Thought Content: Thought content normal.     Result Review :   The following data was reviewed by: GEMA Hancock on 01/15/2025:    CBC w/diff          10/18/2024    04:43 10/30/2024    14:40 11/19/2024    14:26   CBC w/Diff   WBC 11.38  12.44  15.45    RBC 3.86  4.07  4.64    Hemoglobin 9.8  10.5  12.0    Hematocrit 32.4  34.5  38.7    MCV 83.9  84.8  83.4    MCH 25.4  25.8  25.9    MCHC 30.2  30.4  31.0    RDW 23.2  18.5  17.1    Platelets 451  332  383    Neutrophil Rel % 67.6  72.2  79.5    Immature Granulocyte Rel % 4.7  1.4  2.3    Lymphocyte Rel % 13.2  12.5  9.6    Monocyte Rel % 12.0  9.4  8.0    Eosinophil Rel % 1.4  3.9  0.3    Basophil Rel % 1.1  0.6  0.3      CMP          10/18/2024    04:43 10/30/2024    14:40 11/19/2024    14:26   CMP   Glucose 182  136  165    BUN 13  15  27    Creatinine 0.73  0.76  1.03    EGFR 90.8  89.7  72.5    Sodium 138  139  139    Potassium 3.6  4.2  4.1    Chloride 105  101  102    Calcium 8.5  9.4  9.9    Total Protein 5.0  6.2  7.1    Albumin 2.5  3.4  4.3    Globulin 2.5  2.8  2.8    Total Bilirubin 0.3  0.4  0.4    Alkaline Phosphatase 107  94  105    AST (SGOT) 14  17  14    ALT (SGPT) 8  14  20    Albumin/Globulin Ratio 1.0  1.2  1.5    BUN/Creatinine Ratio 17.8  19.7  26.2    Anion Gap 7.9  9.0  11.8                    Assessment and Plan    Diagnoses and all orders for this visit:    1. Abnormal MRI of abdomen (Primary)    2. Iron deficiency anemia, unspecified iron deficiency anemia type  Comments:  improved    3. Nausea    4. Chronic idiopathic constipation    5. History of colon polyps              Follow Up   Return for f/u after testing.  Recommended Colace daily - BID  Pt to call if  this is not effective   Call if nausea does not improve off of Metformin/Jardiance  MRI set up 3/3 - will see pt afterwards     Patient was given instructions and counseling regarding his condition or for health maintenance advice. Please see specific information pulled into the AVS if appropriate.

## 2025-01-16 ENCOUNTER — TRANSCRIBE ORDERS (OUTPATIENT)
Dept: ADMINISTRATIVE | Facility: HOSPITAL | Age: 85
End: 2025-01-16
Payer: MEDICARE

## 2025-01-16 DIAGNOSIS — M81.0 AGE-RELATED OSTEOPOROSIS WITHOUT CURRENT PATHOLOGICAL FRACTURE: Primary | ICD-10-CM

## 2025-01-16 DIAGNOSIS — M54.50 LUMBAR PAIN: ICD-10-CM

## 2025-01-16 DIAGNOSIS — M47.816 FACET DEGENERATION OF LUMBAR REGION: ICD-10-CM

## 2025-01-16 DIAGNOSIS — M80.08XA AGE-RELATED OSTEOPOROSIS WITH CURRENT PATHOLOGICAL FRACTURE OF VERTEBRA, INITIAL ENCOUNTER: ICD-10-CM

## 2025-01-16 DIAGNOSIS — M48.54XA COLLAPSED VERTEBRA, NOT ELSEWHERE CLASSIFIED, THORACIC REGION, INITIAL ENCOUNTER FOR FRACTURE: ICD-10-CM

## 2025-01-20 ENCOUNTER — TELEPHONE (OUTPATIENT)
Dept: ONCOLOGY | Facility: HOSPITAL | Age: 85
End: 2025-01-20

## 2025-01-20 NOTE — TELEPHONE ENCOUNTER
Caller: Ion Cox    Relationship to patient: Self    Best call back number: [phone]     Chief complaint: PATIENT TO RESCHEDULE 1/23/25 APPT    Type of visit: FU    Requested date: NEXT AVAILABLE

## 2025-01-23 ENCOUNTER — HOSPITAL ENCOUNTER (OUTPATIENT)
Dept: MRI IMAGING | Facility: HOSPITAL | Age: 85
Discharge: HOME | End: 2025-01-23
Admitting: ANESTHESIOLOGY
Payer: MEDICARE

## 2025-01-23 ENCOUNTER — APPOINTMENT (OUTPATIENT)
Dept: ONCOLOGY | Facility: HOSPITAL | Age: 85
End: 2025-01-23
Payer: MEDICARE

## 2025-01-23 DIAGNOSIS — M47.816 FACET DEGENERATION OF LUMBAR REGION: ICD-10-CM

## 2025-01-23 DIAGNOSIS — M48.54XA COLLAPSED VERTEBRA, NOT ELSEWHERE CLASSIFIED, THORACIC REGION, INITIAL ENCOUNTER FOR FRACTURE: ICD-10-CM

## 2025-01-23 DIAGNOSIS — M54.50 LUMBAR PAIN: ICD-10-CM

## 2025-01-23 DIAGNOSIS — M80.08XA AGE-RELATED OSTEOPOROSIS WITH CURRENT PATHOLOGICAL FRACTURE OF VERTEBRA, INITIAL ENCOUNTER: ICD-10-CM

## 2025-01-23 DIAGNOSIS — M81.0 AGE-RELATED OSTEOPOROSIS WITHOUT CURRENT PATHOLOGICAL FRACTURE: ICD-10-CM

## 2025-01-23 PROCEDURE — 72148 MRI LUMBAR SPINE W/O DYE: CPT

## 2025-01-31 ENCOUNTER — LAB (OUTPATIENT)
Dept: LAB | Facility: HOSPITAL | Age: 85
End: 2025-01-31
Payer: MEDICARE

## 2025-01-31 DIAGNOSIS — R53.83 OTHER FATIGUE: ICD-10-CM

## 2025-01-31 DIAGNOSIS — E11.65 TYPE 2 DIABETES MELLITUS WITH HYPERGLYCEMIA, UNSPECIFIED WHETHER LONG TERM INSULIN USE: ICD-10-CM

## 2025-01-31 DIAGNOSIS — E55.9 VITAMIN D DEFICIENCY, UNSPECIFIED: ICD-10-CM

## 2025-01-31 DIAGNOSIS — D50.9 IRON DEFICIENCY ANEMIA, UNSPECIFIED IRON DEFICIENCY ANEMIA TYPE: ICD-10-CM

## 2025-01-31 LAB
25(OH)D3 SERPL-MCNC: 29.3 NG/ML (ref 30–100)
ALBUMIN SERPL-MCNC: 3.5 G/DL (ref 3.5–5.2)
ALBUMIN/GLOB SERPL: 1.2 G/DL
ALP SERPL-CCNC: 70 U/L (ref 39–117)
ALT SERPL W P-5'-P-CCNC: 17 U/L (ref 1–41)
ANION GAP SERPL CALCULATED.3IONS-SCNC: 7.9 MMOL/L (ref 5–15)
AST SERPL-CCNC: 20 U/L (ref 1–40)
BASOPHILS # BLD AUTO: 0.05 10*3/MM3 (ref 0–0.2)
BASOPHILS NFR BLD AUTO: 0.4 % (ref 0–1.5)
BILIRUB SERPL-MCNC: 0.4 MG/DL (ref 0–1.2)
BILIRUB UR QL STRIP: NEGATIVE
BUN SERPL-MCNC: 20 MG/DL (ref 8–23)
BUN/CREAT SERPL: 19.8 (ref 7–25)
CALCIUM SPEC-SCNC: 9.7 MG/DL (ref 8.6–10.5)
CHLORIDE SERPL-SCNC: 105 MMOL/L (ref 98–107)
CHOLEST SERPL-MCNC: 110 MG/DL (ref 0–200)
CLARITY UR: CLEAR
CO2 SERPL-SCNC: 25.1 MMOL/L (ref 22–29)
COLOR UR: YELLOW
CREAT SERPL-MCNC: 1.01 MG/DL (ref 0.76–1.27)
DEPRECATED RDW RBC AUTO: 48.1 FL (ref 37–54)
EGFRCR SERPLBLD CKD-EPI 2021: 74.3 ML/MIN/1.73
EOSINOPHIL # BLD AUTO: 0.11 10*3/MM3 (ref 0–0.4)
EOSINOPHIL NFR BLD AUTO: 0.9 % (ref 0.3–6.2)
ERYTHROCYTE [DISTWIDTH] IN BLOOD BY AUTOMATED COUNT: 16.4 % (ref 12.3–15.4)
FOLATE SERPL-MCNC: 13.4 NG/ML (ref 4.78–24.2)
GLOBULIN UR ELPH-MCNC: 2.9 GM/DL
GLUCOSE SERPL-MCNC: 132 MG/DL (ref 65–99)
GLUCOSE UR STRIP-MCNC: NEGATIVE MG/DL
HBA1C MFR BLD: 8.2 % (ref 4.8–5.6)
HCT VFR BLD AUTO: 37.7 % (ref 37.5–51)
HDLC SERPL-MCNC: 37 MG/DL (ref 40–60)
HGB BLD-MCNC: 11.3 G/DL (ref 13–17.7)
HGB UR QL STRIP.AUTO: NEGATIVE
IMM GRANULOCYTES # BLD AUTO: 0.23 10*3/MM3 (ref 0–0.05)
IMM GRANULOCYTES NFR BLD AUTO: 2 % (ref 0–0.5)
IRON 24H UR-MRATE: 38 MCG/DL (ref 59–158)
IRON SATN MFR SERPL: 8 % (ref 20–50)
KETONES UR QL STRIP: NEGATIVE
LDLC SERPL CALC-MCNC: 57 MG/DL (ref 0–100)
LDLC/HDLC SERPL: 1.54 {RATIO}
LEUKOCYTE ESTERASE UR QL STRIP.AUTO: NEGATIVE
LYMPHOCYTES # BLD AUTO: 1.66 10*3/MM3 (ref 0.7–3.1)
LYMPHOCYTES NFR BLD AUTO: 14.2 % (ref 19.6–45.3)
MCH RBC QN AUTO: 24.6 PG (ref 26.6–33)
MCHC RBC AUTO-ENTMCNC: 30 G/DL (ref 31.5–35.7)
MCV RBC AUTO: 82 FL (ref 79–97)
MONOCYTES # BLD AUTO: 1.11 10*3/MM3 (ref 0.1–0.9)
MONOCYTES NFR BLD AUTO: 9.5 % (ref 5–12)
NEUTROPHILS NFR BLD AUTO: 73 % (ref 42.7–76)
NEUTROPHILS NFR BLD AUTO: 8.56 10*3/MM3 (ref 1.7–7)
NITRITE UR QL STRIP: NEGATIVE
NRBC BLD AUTO-RTO: 0 /100 WBC (ref 0–0.2)
PH UR STRIP.AUTO: 6.5 [PH] (ref 5–8)
PLATELET # BLD AUTO: 358 10*3/MM3 (ref 140–450)
PMV BLD AUTO: 9.5 FL (ref 6–12)
POTASSIUM SERPL-SCNC: 4.8 MMOL/L (ref 3.5–5.2)
PROT SERPL-MCNC: 6.4 G/DL (ref 6–8.5)
PROT UR QL STRIP: NEGATIVE
RBC # BLD AUTO: 4.6 10*6/MM3 (ref 4.14–5.8)
SODIUM SERPL-SCNC: 138 MMOL/L (ref 136–145)
SP GR UR STRIP: 1.02 (ref 1–1.03)
TIBC SERPL-MCNC: 496 MCG/DL (ref 298–536)
TRANSFERRIN SERPL-MCNC: 333 MG/DL (ref 200–360)
TRIGL SERPL-MCNC: 80 MG/DL (ref 0–150)
TSH SERPL DL<=0.05 MIU/L-ACNC: 0.92 UIU/ML (ref 0.27–4.2)
UROBILINOGEN UR QL STRIP: NORMAL
VIT B12 BLD-MCNC: 311 PG/ML (ref 211–946)
VLDLC SERPL-MCNC: 16 MG/DL (ref 5–40)
WBC NRBC COR # BLD AUTO: 11.72 10*3/MM3 (ref 3.4–10.8)

## 2025-01-31 PROCEDURE — 36415 COLL VENOUS BLD VENIPUNCTURE: CPT

## 2025-01-31 PROCEDURE — 83036 HEMOGLOBIN GLYCOSYLATED A1C: CPT

## 2025-01-31 PROCEDURE — 84443 ASSAY THYROID STIM HORMONE: CPT

## 2025-01-31 PROCEDURE — 80053 COMPREHEN METABOLIC PANEL: CPT

## 2025-01-31 PROCEDURE — 82306 VITAMIN D 25 HYDROXY: CPT

## 2025-01-31 PROCEDURE — 82607 VITAMIN B-12: CPT

## 2025-01-31 PROCEDURE — 82746 ASSAY OF FOLIC ACID SERUM: CPT

## 2025-01-31 PROCEDURE — 81003 URINALYSIS AUTO W/O SCOPE: CPT

## 2025-01-31 PROCEDURE — 85025 COMPLETE CBC W/AUTO DIFF WBC: CPT

## 2025-01-31 PROCEDURE — 84466 ASSAY OF TRANSFERRIN: CPT

## 2025-01-31 PROCEDURE — 83540 ASSAY OF IRON: CPT

## 2025-01-31 PROCEDURE — 80061 LIPID PANEL: CPT

## 2025-02-03 ENCOUNTER — HOSPITAL ENCOUNTER (OUTPATIENT)
Dept: MRI IMAGING | Facility: HOSPITAL | Age: 85
Discharge: HOME | End: 2025-02-03
Payer: MEDICARE

## 2025-02-11 NOTE — PROGRESS NOTES
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 their characteristics.      Patient Follow Up: 2 months labs and office visit with PA/GEMA    I spent 30 minutes caring for Ion on this date of service. This time includes time spent by me in the following activities:preparing for the visit, reviewing tests, obtaining and/or reviewing a separately obtained history, performing a medically appropriate examination and/or evaluation , counseling and educating the patient/family/caregiver, ordering medications, tests, or procedures, documenting information in the medical record, independently interpreting results and communicating that information with the patient/family/caregiver, and care coordination    Patient was given instructions and counseling regarding his condition or for health maintenance advice. Please see specific information pulled into the AVS if appropriate.     Patient or patient representative verbalized consent for the use of Ambient Listening during the visit with  Franklyn Olivia PA-C for chart documentation. 2/12/2025  11:00 EST

## 2025-02-12 ENCOUNTER — OFFICE VISIT (OUTPATIENT)
Dept: ONCOLOGY | Facility: HOSPITAL | Age: 85
End: 2025-02-12
Payer: MEDICARE

## 2025-02-12 VITALS
DIASTOLIC BLOOD PRESSURE: 76 MMHG | WEIGHT: 155.4 LBS | HEIGHT: 70 IN | HEART RATE: 82 BPM | TEMPERATURE: 97.4 F | OXYGEN SATURATION: 97 % | SYSTOLIC BLOOD PRESSURE: 126 MMHG | RESPIRATION RATE: 20 BRPM | BODY MASS INDEX: 22.25 KG/M2

## 2025-02-12 DIAGNOSIS — R19.5 FECAL OCCULT BLOOD TEST POSITIVE: ICD-10-CM

## 2025-02-12 DIAGNOSIS — E53.8 VITAMIN B 12 DEFICIENCY: ICD-10-CM

## 2025-02-12 DIAGNOSIS — D64.9 ANEMIA, UNSPECIFIED TYPE: Primary | ICD-10-CM

## 2025-02-12 DIAGNOSIS — D50.9 IRON DEFICIENCY ANEMIA, UNSPECIFIED IRON DEFICIENCY ANEMIA TYPE: ICD-10-CM

## 2025-02-12 DIAGNOSIS — D72.829 LEUKOCYTOSIS, UNSPECIFIED TYPE: ICD-10-CM

## 2025-02-12 DIAGNOSIS — D64.9 NORMOCYTIC ANEMIA: ICD-10-CM

## 2025-02-12 DIAGNOSIS — D72.10 EOSINOPHILIA, UNSPECIFIED TYPE: ICD-10-CM

## 2025-02-12 DIAGNOSIS — R63.4 WEIGHT LOSS: ICD-10-CM

## 2025-02-12 DIAGNOSIS — D72.821 MONOCYTOSIS: ICD-10-CM

## 2025-02-12 PROBLEM — T45.4X5S IRON ADVERSE REACTION, SEQUELA: Status: ACTIVE | Noted: 2025-02-12

## 2025-02-12 PROCEDURE — G0463 HOSPITAL OUTPT CLINIC VISIT: HCPCS | Performed by: PHYSICIAN ASSISTANT

## 2025-02-12 RX ORDER — INSULIN GLARGINE 100 [IU]/ML
INJECTION, SOLUTION SUBCUTANEOUS
COMMUNITY
Start: 2025-01-15

## 2025-02-12 RX ORDER — AMLODIPINE BESYLATE 10 MG/1
10 TABLET ORAL
COMMUNITY

## 2025-02-12 RX ORDER — POTASSIUM CHLORIDE 750 MG/1
10 TABLET, EXTENDED RELEASE ORAL
COMMUNITY

## 2025-02-12 RX ORDER — LANOLIN ALCOHOL/MO/W.PET/CERES
1000 CREAM (GRAM) TOPICAL DAILY
Qty: 90 TABLET | Refills: 1 | Status: SHIPPED | OUTPATIENT
Start: 2025-02-12

## 2025-02-12 RX ORDER — FAMOTIDINE 20 MG/1
TABLET, FILM COATED ORAL
COMMUNITY
Start: 2025-02-11

## 2025-02-12 RX ORDER — METOPROLOL TARTRATE 100 MG/1
100 TABLET ORAL
COMMUNITY

## 2025-02-18 ENCOUNTER — HOSPITAL ENCOUNTER (OUTPATIENT)
Dept: ONCOLOGY | Facility: HOSPITAL | Age: 85
Discharge: HOME | End: 2025-02-18
Admitting: PHYSICIAN ASSISTANT
Payer: MEDICARE

## 2025-02-18 VITALS
DIASTOLIC BLOOD PRESSURE: 74 MMHG | TEMPERATURE: 97.3 F | HEART RATE: 79 BPM | SYSTOLIC BLOOD PRESSURE: 120 MMHG | RESPIRATION RATE: 18 BRPM

## 2025-02-18 DIAGNOSIS — D50.0 IRON DEFICIENCY ANEMIA DUE TO CHRONIC BLOOD LOSS: ICD-10-CM

## 2025-02-18 DIAGNOSIS — T45.4X5S IRON ADVERSE REACTION, SEQUELA: Primary | ICD-10-CM

## 2025-02-18 PROCEDURE — 25010000002 FERUMOXYTOL 510 MG/17ML SOLUTION: Performed by: PHYSICIAN ASSISTANT

## 2025-02-18 PROCEDURE — 96365 THER/PROPH/DIAG IV INF INIT: CPT

## 2025-02-18 PROCEDURE — 96374 THER/PROPH/DIAG INJ IV PUSH: CPT

## 2025-02-18 PROCEDURE — 25810000003 SODIUM CHLORIDE 0.9 % SOLUTION: Performed by: PHYSICIAN ASSISTANT

## 2025-02-18 RX ORDER — DIPHENHYDRAMINE HYDROCHLORIDE 50 MG/ML
50 INJECTION INTRAMUSCULAR; INTRAVENOUS AS NEEDED
Status: DISCONTINUED | OUTPATIENT
Start: 2025-02-18 | End: 2025-02-19 | Stop reason: HOSPADM

## 2025-02-18 RX ORDER — SODIUM CHLORIDE 9 MG/ML
20 INJECTION, SOLUTION INTRAVENOUS ONCE
Status: COMPLETED | OUTPATIENT
Start: 2025-02-18 | End: 2025-02-18

## 2025-02-18 RX ORDER — FAMOTIDINE 10 MG/ML
20 INJECTION, SOLUTION INTRAVENOUS AS NEEDED
Status: DISCONTINUED | OUTPATIENT
Start: 2025-02-18 | End: 2025-02-19 | Stop reason: HOSPADM

## 2025-02-18 RX ORDER — HYDROCORTISONE SODIUM SUCCINATE 100 MG/2ML
100 INJECTION INTRAMUSCULAR; INTRAVENOUS AS NEEDED
Status: DISCONTINUED | OUTPATIENT
Start: 2025-02-18 | End: 2025-02-19 | Stop reason: HOSPADM

## 2025-02-18 RX ADMIN — SODIUM CHLORIDE 20 ML/HR: 9 INJECTION, SOLUTION INTRAVENOUS at 14:05

## 2025-02-18 RX ADMIN — FERUMOXYTOL 1020 MG: 510 INJECTION INTRAVENOUS at 14:06

## 2025-02-25 RX ORDER — FUROSEMIDE 20 MG/1
TABLET ORAL
Qty: 135 TABLET | Refills: 1 | Status: SHIPPED | OUTPATIENT
Start: 2025-02-25

## 2025-03-03 ENCOUNTER — HOSPITAL ENCOUNTER (OUTPATIENT)
Dept: MRI IMAGING | Facility: HOSPITAL | Age: 85
Discharge: HOME | End: 2025-03-03
Admitting: NURSE PRACTITIONER
Payer: MEDICARE

## 2025-03-03 DIAGNOSIS — R93.5 ABNORMAL MRI OF ABDOMEN: ICD-10-CM

## 2025-03-03 DIAGNOSIS — K85.90 ACUTE PANCREATITIS WITHOUT INFECTION OR NECROSIS, UNSPECIFIED PANCREATITIS TYPE: ICD-10-CM

## 2025-03-03 PROCEDURE — 74183 MRI ABD W/O CNTR FLWD CNTR: CPT

## 2025-03-03 PROCEDURE — A9577 INJ MULTIHANCE: HCPCS | Performed by: NURSE PRACTITIONER

## 2025-03-03 PROCEDURE — 25510000002 GADOBENATE DIMEGLUMINE 529 MG/ML SOLUTION: Performed by: NURSE PRACTITIONER

## 2025-03-03 RX ADMIN — GADOBENATE DIMEGLUMINE 15 ML: 529 INJECTION, SOLUTION INTRAVENOUS at 11:34

## 2025-03-12 ENCOUNTER — RESULTS FOLLOW-UP (OUTPATIENT)
Dept: MRI IMAGING | Facility: HOSPITAL | Age: 85
End: 2025-03-12
Payer: MEDICARE

## 2025-03-12 DIAGNOSIS — K85.90 ACUTE PANCREATITIS WITHOUT INFECTION OR NECROSIS, UNSPECIFIED PANCREATITIS TYPE: ICD-10-CM

## 2025-03-12 DIAGNOSIS — R93.5 ABNORMAL MRI OF ABDOMEN: Primary | ICD-10-CM

## 2025-03-13 NOTE — TELEPHONE ENCOUNTER
Spoke to patient and informed of GEMA Frances result note and recommendations.  Verified patient understanding.     Educated on reason for EUS recommendation with Dr. Monge.  Pateint is agreeable to referral.  Advised to expect a call from that office in a few weeks, and to reach out to our office with any questions.  Patient verbalized understanding.     Confirmed follow up with GEMA Frances on Monday, 04.28.25 at 0945.    Referral placed.

## 2025-03-14 ENCOUNTER — TELEPHONE (OUTPATIENT)
Dept: ONCOLOGY | Facility: HOSPITAL | Age: 85
End: 2025-03-14
Payer: MEDICARE

## 2025-03-24 RX ORDER — ROSUVASTATIN CALCIUM 20 MG/1
20 TABLET, COATED ORAL NIGHTLY
Qty: 90 TABLET | Refills: 0 | Status: SHIPPED | OUTPATIENT
Start: 2025-03-24

## 2025-03-27 ENCOUNTER — TRANSCRIBE ORDERS (OUTPATIENT)
Dept: LAB | Facility: HOSPITAL | Age: 85
End: 2025-03-27
Payer: MEDICARE

## 2025-03-27 DIAGNOSIS — E55.9 VITAMIN D DEFICIENCY: Primary | ICD-10-CM

## 2025-03-27 DIAGNOSIS — R53.83 OTHER FATIGUE: ICD-10-CM

## 2025-03-27 DIAGNOSIS — D50.9 IRON DEFICIENCY ANEMIA, UNSPECIFIED IRON DEFICIENCY ANEMIA TYPE: ICD-10-CM

## 2025-03-27 DIAGNOSIS — E11.65 INADEQUATELY CONTROLLED DIABETES MELLITUS: ICD-10-CM

## 2025-03-27 DIAGNOSIS — Z79.4 CURRENT USE OF INSULIN: ICD-10-CM

## 2025-03-31 RX ORDER — CLOPIDOGREL BISULFATE 75 MG/1
75 TABLET ORAL DAILY
Qty: 90 TABLET | Refills: 0 | Status: SHIPPED | OUTPATIENT
Start: 2025-03-31

## 2025-04-14 ENCOUNTER — LAB (OUTPATIENT)
Dept: LAB | Facility: HOSPITAL | Age: 85
End: 2025-04-14
Payer: MEDICARE

## 2025-04-14 DIAGNOSIS — E53.8 VITAMIN B 12 DEFICIENCY: ICD-10-CM

## 2025-04-14 DIAGNOSIS — D50.9 IRON DEFICIENCY ANEMIA, UNSPECIFIED IRON DEFICIENCY ANEMIA TYPE: ICD-10-CM

## 2025-04-14 DIAGNOSIS — R53.83 OTHER FATIGUE: ICD-10-CM

## 2025-04-14 DIAGNOSIS — E55.9 VITAMIN D DEFICIENCY: ICD-10-CM

## 2025-04-14 DIAGNOSIS — E11.65 INADEQUATELY CONTROLLED DIABETES MELLITUS: ICD-10-CM

## 2025-04-14 DIAGNOSIS — D64.9 ANEMIA, UNSPECIFIED TYPE: ICD-10-CM

## 2025-04-14 DIAGNOSIS — Z79.4 CURRENT USE OF INSULIN: ICD-10-CM

## 2025-04-14 LAB
25(OH)D3 SERPL-MCNC: 25.6 NG/ML (ref 30–100)
ACANTHOCYTES BLD QL SMEAR: ABNORMAL
ALBUMIN SERPL-MCNC: 3.8 G/DL (ref 3.5–5.2)
ALBUMIN/GLOB SERPL: 1.7 G/DL
ALP SERPL-CCNC: 113 U/L (ref 39–117)
ALT SERPL W P-5'-P-CCNC: 162 U/L (ref 1–41)
ANION GAP SERPL CALCULATED.3IONS-SCNC: 15.3 MMOL/L (ref 5–15)
ANISOCYTOSIS BLD QL: ABNORMAL
AST SERPL-CCNC: 43 U/L (ref 1–40)
BACTERIA UR QL AUTO: NORMAL /HPF
BASOPHILS # BLD AUTO: 0.03 10*3/MM3 (ref 0–0.2)
BASOPHILS NFR BLD AUTO: 0.2 % (ref 0–1.5)
BILIRUB SERPL-MCNC: 0.6 MG/DL (ref 0–1.2)
BILIRUB UR QL STRIP: NEGATIVE
BUN SERPL-MCNC: 48 MG/DL (ref 8–23)
BUN/CREAT SERPL: 40.3 (ref 7–25)
BURR CELLS BLD QL SMEAR: ABNORMAL
CALCIUM SPEC-SCNC: 9.2 MG/DL (ref 8.6–10.5)
CHLORIDE SERPL-SCNC: 97 MMOL/L (ref 98–107)
CHOLEST SERPL-MCNC: 94 MG/DL (ref 0–200)
CLARITY UR: CLEAR
CO2 SERPL-SCNC: 23.7 MMOL/L (ref 22–29)
COLOR UR: YELLOW
CREAT SERPL-MCNC: 1.19 MG/DL (ref 0.76–1.27)
DEPRECATED RDW RBC AUTO: 71.3 FL (ref 37–54)
EGFRCR SERPLBLD CKD-EPI 2021: 61 ML/MIN/1.73
EOSINOPHIL # BLD AUTO: 0 10*3/MM3 (ref 0–0.4)
EOSINOPHIL NFR BLD AUTO: 0 % (ref 0.3–6.2)
ERYTHROCYTE [DISTWIDTH] IN BLOOD BY AUTOMATED COUNT: 22.6 % (ref 12.3–15.4)
FERRITIN SERPL-MCNC: 654.3 NG/ML (ref 30–400)
FOLATE SERPL-MCNC: 14 NG/ML (ref 4.78–24.2)
GLOBULIN UR ELPH-MCNC: 2.2 GM/DL
GLUCOSE SERPL-MCNC: 247 MG/DL (ref 65–99)
GLUCOSE UR STRIP-MCNC: ABNORMAL MG/DL
HBA1C MFR BLD: 8.3 % (ref 4.8–5.6)
HCT VFR BLD AUTO: 43.1 % (ref 37.5–51)
HDLC SERPL-MCNC: 41 MG/DL (ref 40–60)
HGB BLD-MCNC: 14.2 G/DL (ref 13–17.7)
HGB UR QL STRIP.AUTO: NEGATIVE
HYALINE CASTS UR QL AUTO: NORMAL /LPF
IMM GRANULOCYTES # BLD AUTO: 2.13 10*3/MM3 (ref 0–0.05)
IMM GRANULOCYTES NFR BLD AUTO: 12 % (ref 0–0.5)
IRON 24H UR-MRATE: 133 MCG/DL (ref 59–158)
IRON SATN MFR SERPL: 35 % (ref 20–50)
KETONES UR QL STRIP: NEGATIVE
LDLC SERPL CALC-MCNC: 37 MG/DL (ref 0–100)
LDLC/HDLC SERPL: 0.92 {RATIO}
LEUKOCYTE ESTERASE UR QL STRIP.AUTO: NEGATIVE
LYMPHOCYTES # BLD AUTO: 0.81 10*3/MM3 (ref 0.7–3.1)
LYMPHOCYTES # BLD MANUAL: 0.35 10*3/MM3 (ref 0.7–3.1)
LYMPHOCYTES NFR BLD AUTO: 4.6 % (ref 19.6–45.3)
LYMPHOCYTES NFR BLD MANUAL: 7 % (ref 5–12)
MCH RBC QN AUTO: 29 PG (ref 26.6–33)
MCHC RBC AUTO-ENTMCNC: 32.9 G/DL (ref 31.5–35.7)
MCV RBC AUTO: 88.1 FL (ref 79–97)
METAMYELOCYTES NFR BLD MANUAL: 5 % (ref 0–0)
MONOCYTES # BLD AUTO: 1.25 10*3/MM3 (ref 0.1–0.9)
MONOCYTES # BLD: 1.24 10*3/MM3 (ref 0.1–0.9)
MONOCYTES NFR BLD AUTO: 7.1 % (ref 5–12)
NEUTROPHILS # BLD AUTO: 15.22 10*3/MM3 (ref 1.7–7)
NEUTROPHILS NFR BLD AUTO: 13.48 10*3/MM3 (ref 1.7–7)
NEUTROPHILS NFR BLD AUTO: 76.1 % (ref 42.7–76)
NEUTROPHILS NFR BLD MANUAL: 85 % (ref 42.7–76)
NEUTS BAND NFR BLD MANUAL: 1 % (ref 0–5)
NITRITE UR QL STRIP: NEGATIVE
NRBC BLD AUTO-RTO: 0.1 /100 WBC (ref 0–0.2)
PH UR STRIP.AUTO: 6.5 [PH] (ref 5–8)
PLAT MORPH BLD: NORMAL
PLATELET # BLD AUTO: 274 10*3/MM3 (ref 140–450)
PMV BLD AUTO: 9.4 FL (ref 6–12)
POIKILOCYTOSIS BLD QL SMEAR: ABNORMAL
POTASSIUM SERPL-SCNC: 5.6 MMOL/L (ref 3.5–5.2)
PROT SERPL-MCNC: 6 G/DL (ref 6–8.5)
PROT UR QL STRIP: ABNORMAL
RBC # BLD AUTO: 4.89 10*6/MM3 (ref 4.14–5.8)
RBC # UR STRIP: NORMAL /HPF
REF LAB TEST METHOD: NORMAL
SODIUM SERPL-SCNC: 136 MMOL/L (ref 136–145)
SP GR UR STRIP: 1.02 (ref 1–1.03)
SQUAMOUS #/AREA URNS HPF: NORMAL /HPF
TIBC SERPL-MCNC: 377 MCG/DL (ref 298–536)
TRANSFERRIN SERPL-MCNC: 253 MG/DL (ref 200–360)
TRIGL SERPL-MCNC: 76 MG/DL (ref 0–150)
TSH SERPL DL<=0.05 MIU/L-ACNC: 0.32 UIU/ML (ref 0.27–4.2)
UROBILINOGEN UR QL STRIP: ABNORMAL
VARIANT LYMPHS NFR BLD MANUAL: 2 % (ref 19.6–45.3)
VIT B12 BLD-MCNC: 1470 PG/ML (ref 211–946)
VLDLC SERPL-MCNC: 16 MG/DL (ref 5–40)
WBC # UR STRIP: NORMAL /HPF
WBC MORPH BLD: NORMAL
WBC NRBC COR # BLD AUTO: 17.7 10*3/MM3 (ref 3.4–10.8)

## 2025-04-14 PROCEDURE — 85007 BL SMEAR W/DIFF WBC COUNT: CPT

## 2025-04-14 PROCEDURE — 80061 LIPID PANEL: CPT

## 2025-04-14 PROCEDURE — 82306 VITAMIN D 25 HYDROXY: CPT

## 2025-04-14 PROCEDURE — 83036 HEMOGLOBIN GLYCOSYLATED A1C: CPT

## 2025-04-14 PROCEDURE — 36415 COLL VENOUS BLD VENIPUNCTURE: CPT

## 2025-04-14 PROCEDURE — 84466 ASSAY OF TRANSFERRIN: CPT

## 2025-04-14 PROCEDURE — 82746 ASSAY OF FOLIC ACID SERUM: CPT

## 2025-04-14 PROCEDURE — 84443 ASSAY THYROID STIM HORMONE: CPT

## 2025-04-14 PROCEDURE — 81001 URINALYSIS AUTO W/SCOPE: CPT

## 2025-04-14 PROCEDURE — 82728 ASSAY OF FERRITIN: CPT | Performed by: PHYSICIAN ASSISTANT

## 2025-04-14 PROCEDURE — 85025 COMPLETE CBC W/AUTO DIFF WBC: CPT

## 2025-04-14 PROCEDURE — 80053 COMPREHEN METABOLIC PANEL: CPT

## 2025-04-14 PROCEDURE — 83540 ASSAY OF IRON: CPT

## 2025-04-14 PROCEDURE — 82607 VITAMIN B-12: CPT

## 2025-04-15 ENCOUNTER — TELEPHONE (OUTPATIENT)
Dept: CARDIOLOGY | Facility: CLINIC | Age: 85
End: 2025-04-15
Payer: MEDICARE

## 2025-04-15 NOTE — TELEPHONE ENCOUNTER
Procedure: Colonoscopy and/or EGD     Med Directive: Eliquis , Plavix     PMH: HFpEF, CAD with stent 2022, afib, HTN, HLD     Last Seen: 1/15/2025

## 2025-04-17 PROCEDURE — 99282 EMERGENCY DEPT VISIT SF MDM: CPT

## 2025-04-18 ENCOUNTER — HOSPITAL ENCOUNTER (EMERGENCY)
Facility: HOSPITAL | Age: 85
Discharge: HOME | End: 2025-04-18
Attending: EMERGENCY MEDICINE
Payer: MEDICARE

## 2025-04-18 VITALS
RESPIRATION RATE: 20 BRPM | DIASTOLIC BLOOD PRESSURE: 72 MMHG | HEART RATE: 80 BPM | BODY MASS INDEX: 22.47 KG/M2 | OXYGEN SATURATION: 97 % | TEMPERATURE: 97.8 F | SYSTOLIC BLOOD PRESSURE: 140 MMHG | WEIGHT: 156.97 LBS | HEIGHT: 70 IN

## 2025-04-18 DIAGNOSIS — S81.812A SKIN TEAR OF LEFT LOWER LEG WITHOUT COMPLICATION, INITIAL ENCOUNTER: Primary | ICD-10-CM

## 2025-04-18 NOTE — DISCHARGE INSTRUCTIONS
Leave the dressing in place until you follow-up with Dr. Montes Wednesday, next week.  Call her office today advise them of your skin tear and that you were seen in the emergency department.  Should you have any bleeding that soaks through that dressing I am sending home some supplies with you to reapply it more on top of what is there.  Do not remove what is already been place.  Elevate the extremity is much as possible and stay off of it is much as you can between now and next week.  This will help with the healing and to help prevent more bleeding.  This is a skin tear that I do not believe can be sutured and will have to heal over time.  Return to the emergency department immediately for any continued bleeding that soaks through your dressing for more than a half an hour, any significant increase in pain or swelling, any fevers of 101 or greater or any new or worse concerns.

## 2025-04-18 NOTE — ED PROVIDER NOTES
Time: 11:07 PM EDT  Date of encounter:  4/17/2025  Independent Historian/Clinical History and Information was obtained by:   Patient    History is limited by: N/A    Chief Complaint   Patient presents with    Laceration     Pt states he ran into a chair and has a laceration to left calf. Pt is on plavix and elquis.         History of Present Illness:  Patient is a 84 y.o. year old male who presents to the emergency department for evaluation of laceration to the left lower leg.  Patient reports he bumped into a chair, reports he scraped it with his leg.  Patient is on Plavix and Eliquis.  Patient denies pain.  He reports his Tdap is up-to-date.  (GEMA Renae, provider in triage)     On exam the patient does have a large approximate six 5 to 6 cm skin tear that is linear in nature.  It is fairly well-approximated and has a clot present.  It is still oozing a small bit at the base of the cut.  He is neurovascular intact.  There is a contusion noted to the more lateral side of the skin tear.  I do believe that the skin is too thin to be closed and we will dress the wound having follow-up with his primary care.    Patient Care Team  Primary Care Provider: Adán Montes MD    Past Medical History:     Allergies   Allergen Reactions    Penicillins Swelling    Lisinopril Cough     Past Medical History:   Diagnosis Date    Arthritis     Cataract     Removed    Chronic heart failure with preserved ejection fraction (HFpEF) 06/02/2023    Colon polyp     Removed July 13    Coronary artery disease     Diabetes mellitus     Diverticulosis     Elevated cholesterol     Essential hypertension 10/04/2021    Facet arthropathy, lumbar 07/14/2020    GERD (gastroesophageal reflux disease)     HL (hearing loss)     Hyperlipidemia LDL goal <100 08/17/2021    Kidney stone     Lumbar herniated disc 07/14/2020    (R) L3-4    Lumbar spinal stenosis 08/04/2020    Paroxysmal atrial fibrillation 10/04/2021    Pneumonia     Trigger  finger of right thumb 11/01/2017    all fingers both hands     Past Surgical History:   Procedure Laterality Date    BACK SURGERY      CARDIAC CATHETERIZATION N/A 08/23/2022    Procedure: LEFT HEART CATH with coronary artery angiography and right heart catheterization possible percutaneous intervention possible closure device;  Surgeon: Emili Cheng MD;  Location: [facility] CATH INVASIVE LOCATION;  Service: Cardiovascular;  Laterality: N/A;    CAROTID STENT      COLONOSCOPY  2017    Dr. Oneal    COLONOSCOPY N/A 10/06/2021    Procedure: COLONOSCOPY WITH BIOPSIES, POLYPECTOMY;  Surgeon: Vazquez Estrada MD;  Location: [facility] ENDOSCOPY;  Service: Gastroenterology;  Laterality: N/A;  COLON POLYPS, DIVERTICULOSIS, HEMORRHOIDS    COLONOSCOPY N/A 7/13/2023    Procedure: COLONOSCOPY with polypectomy with cold snare;  Surgeon: Vazquez Estrada MD;  Location: [facility] ENDOSCOPY;  Service: Gastroenterology;  Laterality: N/A;  colon polyps, diverticulosis, hemorrhoids    COLONOSCOPY N/A 12/13/2024    Procedure: COLONOSCOPY WITH POLYPECTOMY;  Surgeon: Vazquez Estrada MD;  Location: [facility] ENDOSCOPY;  Service: Gastroenterology;  Laterality: N/A;  DIVERTICULOSIS, COLON POLYPS, HEMORRHOIDS    CORONARY ANGIOPLASTY      ENDOSCOPY N/A 10/06/2021    Procedure: ESOPHAGOGASTRODUODENOSCOPY WITH BIOPSIES;  Surgeon: Vazquez Estrada MD;  Location: [facility] ENDOSCOPY;  Service: Gastroenterology;  Laterality: N/A;  GASTRITIS    ENDOSCOPY N/A 7/13/2023    Procedure: ESOPHAGOGASTRODUODENOSCOPY with biopsies, with dilation with 15- 18 mm balloon;  Surgeon: Vazquez Estrada MD;  Location: [facility] ENDOSCOPY;  Service: Gastroenterology;  Laterality: N/A;  hiatal hernia, gastric polyp, schatzki's ring    ENDOSCOPY N/A 10/9/2024    Procedure: ESOPHAGOGASTRODUODENOSCOPY WITH BIOPSIES AND COLD SNARE POLYPECTOMY;  Surgeon: Vazquez Estrada MD;  Location: [facility] ENDOSCOPY;  Service: Gastroenterology;  Laterality: N/A;  GASTRITIS,  GASTRIC POLYP AND HIATAL HERNIA    KIDNEY STONE SURGERY      Unspecified    LUMBAR DISC SURGERY Right 06/26/2020    L3-4  Minimally Invasive    TRIGGER FINGER RELEASE Bilateral     UPPER GASTROINTESTINAL ENDOSCOPY  10/06/2021    Dr. Estrada     Family History   Problem Relation Age of Onset    Heart disease Mother     Arthritis Mother     Heart disease Father     Heart attack Other         (Myocardial Infarction)    Colon cancer Neg Hx     Malig Hyperthermia Neg Hx        Home Medications:  Prior to Admission medications    Medication Sig Start Date End Date Taking? Authorizing Provider   amLODIPine (NORVASC) 10 MG tablet Take 1 tablet by mouth.    Mei Mathias MD   apixaban (ELIQUIS) 5 MG tablet tablet Take 1 tablet by mouth 2 (Two) Times a Day.    Mei Mathias MD   carvedilol (COREG) 25 MG tablet Take 1 tablet by mouth 2 (Two) Times a Day With Meals for 30 days. 6/4/24 1/15/25  Sanjuana Zeng MD   Cholecalciferol 25 MCG (1000 UT) tablet Take 1 tablet by mouth Daily.    Mei Mathias MD   clopidogrel (PLAVIX) 75 MG tablet TAKE 1 TABLET BY MOUTH DAILY 3/31/25   Maurice Mixon MD   dilTIAZem CD (CARDIZEM CD) 180 MG 24 hr capsule Take 1 capsule by mouth Daily. 10/19/24   Naresh George MD   famotidine (PEPCID) 20 MG tablet  2/11/25   Mei Mathias MD   furosemide (LASIX) 20 MG tablet TAKE ONE TABLET BY MOUTH EVERY OTHER DAY ALTERNATING WITH TWO TABLETS BY MOUTH EVERY OTHER DAY. 2/25/25   Maurice Mixon MD   Lantus SoloStar 100 UNIT/ML injection pen  1/15/25   Mei Mathias MD   metoprolol tartrate (LOPRESSOR) 100 MG tablet Take 1 tablet by mouth.    Mei Mathias MD   pantoprazole (PROTONIX) 40 MG EC tablet Take 1 tablet by mouth Daily.    Mei Mathias MD   potassium chloride 10 MEQ CR tablet Take 1 tablet by mouth.    Mei Mathias MD   rosuvastatin (CRESTOR) 20 MG tablet TAKE ONE TABLET BY MOUTH ONCE NIGHTLY 3/24/25   Maurice Mixon MD  "  spironolactone (ALDACTONE) 25 MG tablet  11/5/24   Provider, MD Mei   vitamin B-12 (CYANOCOBALAMIN) 1000 MCG tablet Take 1 tablet by mouth Daily. 2/12/25   Franklyn Olivia PA-C   Zinc Acetate, Oral, (ZINC ACETATE PO) Take  by mouth.    Provider, MD Mei        Social History:   Social History     Tobacco Use    Smoking status: Never     Passive exposure: Never    Smokeless tobacco: Never   Vaping Use    Vaping status: Never Used   Substance Use Topics    Alcohol use: Never     Comment: Does not drink    Drug use: Never         Review of Systems:  Review of Systems   Constitutional:  Negative for chills and fever.   HENT:  Negative for congestion, ear pain and sore throat.    Eyes:  Negative for pain.   Respiratory:  Negative for cough, chest tightness and shortness of breath.    Cardiovascular:  Negative for chest pain.   Gastrointestinal:  Negative for abdominal pain, diarrhea, nausea and vomiting.   Genitourinary:  Negative for flank pain and hematuria.   Musculoskeletal:  Negative for back pain, joint swelling, neck pain and neck stiffness.   Skin:  Positive for color change and wound. Negative for pallor.   Neurological:  Negative for seizures and headaches.   All other systems reviewed and are negative.       Physical Exam:  /72   Pulse 80   Temp 97.8 °F (36.6 °C) (Oral)   Resp 20   Ht 177.8 cm (70\")   Wt 71.2 kg (156 lb 15.5 oz)   SpO2 97%   BMI 22.52 kg/m²         Physical Exam  Vitals and nursing note reviewed.   Constitutional:       General: He is not in acute distress.     Appearance: Normal appearance. He is not ill-appearing or toxic-appearing.   HENT:      Head: Normocephalic and atraumatic.   Eyes:      General: No scleral icterus.     Conjunctiva/sclera: Conjunctivae normal.      Pupils: Pupils are equal, round, and reactive to light.   Cardiovascular:      Rate and Rhythm: Normal rate and regular rhythm.      Pulses: Normal pulses.   Pulmonary:      Effort: Pulmonary " effort is normal. No respiratory distress.   Musculoskeletal:         General: Swelling, tenderness and signs of injury present. No deformity. Normal range of motion.      Cervical back: Normal range of motion.   Skin:     General: Skin is warm and dry.      Capillary Refill: Capillary refill takes less than 2 seconds.      Findings: Bruising and lesion present.   Neurological:      General: No focal deficit present.      Mental Status: He is alert and oriented to person, place, and time. Mental status is at baseline.   Psychiatric:         Mood and Affect: Mood normal.         Behavior: Behavior normal.          Medical Decision Making:      Comorbidities that affect care:    Right thumb trigger finger, hypertension, coronary artery disease, colon polyps, kidney stones, diabetes, herniated lumbar disc, hyperlipidemia, chronic heart failure, GERD, diverticulosis, pneumonia, lumbar spinal stenosis, paroxysmal atrial fibs, elevated cholesterol, cataracts, hearing loss, arthritis    External Notes reviewed:    Previous Clinic Note: Was seen in the GI office for abnormal MRI of the abdomen with iron deficiency anemia on 1/15/2025.      The following orders were placed and all results were independently analyzed by me:  No orders of the defined types were placed in this encounter.      Medications Given in the Emergency Department:  Medications - No data to display     ED Course:    The patient was initially evaluated in the triage area where orders were placed. The patient was later dispositioned by GEMA Julien.      The patient was advised to stay for completion of workup which includes but is not limited to communication of labs and radiological results, reassessment and plan. The patient was advised that leaving prior to disposition by a provider could result in critical findings that are not communicated to the patient.          Labs:    Lab Results (last 24 hours)       ** No results found for the last 24  hours. **             Imaging:    No Radiology Exams Resulted Within Past 24 Hours      Differential Diagnosis and Discussion:      Laceration: Laceration was evaluated for arterial injury, ligamentous damage, and other neurovascular injury.    PROCEDURES:      No orders to display        Procedures    MDM  Number of Diagnoses or Management Options  Skin tear of left lower leg without complication, initial encounter: new and requires workup  Risk of Complications, Morbidity, and/or Mortality  Presenting problems: low  Diagnostic procedures: low  Management options: low    Patient Progress  Patient progress: stable     Patient Care Considerations:    ANTIBIOTICS: I considered prescribing antibiotics as an outpatient however no bacterial focus of infection was found.      Consultants/Shared Management Plan:    None    Social Determinants of Health:    Patient has presented with family members who are responsible, reliable and will ensure follow up care.      Disposition and Care Coordination:    Discharged: The patient is suitable and stable for discharge with no need for consideration of admission.    I have explained the patient´s condition, diagnoses and treatment plan based on the information available to me at this time. I have answered questions and addressed any concerns. The patient has a good  understanding of the patient´s diagnosis, condition, and treatment plan as can be expected at this point. The vital signs have been stable. The patient´s condition is stable and appropriate for discharge from the emergency department.      The patient will pursue further outpatient evaluation with the primary care physician or other designated or consulting physician as outlined in the discharge instructions. They are agreeable to this plan of care and follow-up instructions have been explained in detail. The patient has received these instructions in written format and has expressed an understanding of the discharge  instructions. The patient is aware that any significant change in condition or worsening of symptoms should prompt an immediate return to this or the closest emergency department or call to [facility].  I have explained discharge medications and the need for follow up with the patient/caretakers. This was also printed in the discharge instructions. Patient was discharged with the following medications and follow up:      Medication List        Changed      spironolactone 25 MG tablet  Commonly known as: ALDACTONE  What changed: See the new instructions.           Adán Montes MD  [address] [place] [address] [id number] [phone]    Call today  FOR FOLLOW UP       Final diagnoses:   Skin tear of left lower leg without complication, initial encounter        ED Disposition       ED Disposition   Discharge    Condition   Stable    Comment   --               This medical record created using voice recognition software.             Nina Carroll, GEMA  04/18/25 [id number]

## 2025-04-20 NOTE — PROGRESS NOTES
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 limits, similar in appearance to the 12/14/2023 examination. Electronically Signed: Kristie Ramirez MD  7/31/2024 1:12 PM EDT  Workstation ID: [id number]    Results  Laboratory Studies  Hemoglobin A1c was 8.2. Total cholesterol was 110, triglycerides were 80, HDL was 37, LDL was 57. Blood sugar was 132. BUN and creatinine were normal. Sodium, potassium, calcium, protein were normal. Liver enzymes (ALT, AST, alkaline phosphatase, bilirubin) were normal. EGFR was 74.3. Thyroid testing was normal. White blood cell count was 11.72. Hemoglobin was 11.3. Platelet count was 358. Vitamin D level was 29.3. Vitamin B12 level was 311. Folate level was 13.4. Iron level was 38. Iron saturation was 8%. Urine was normal with no protein, sugar, or blood.    Imaging  Colonoscopy was normal.      Labs   - White blood cell count: 17,000   - Hemoglobin: 14.2 g/dL   - Iron level: 133 mcg/dL   - Iron saturation: 35%   - B12 level: High       Assessment and Plan:  Diagnoses and all orders for this visit:    1. Iron deficiency anemia due to chronic blood loss (Primary)  -     Iron Profile; Future  -     CBC & Differential; Future  -     Ferritin; Future  -     Vitamin B12 & Folate; Future    2. Leukocytosis, unspecified type    3.  Vitamin B 12 deficiency  Comments:  Vitamin B12 1000mcg daily  Orders:  -     vitamin B-12 (CYANOCOBALAMIN) 1000 MCG tablet; Take 1 tablet by mouth Daily.  Dispense: 90 tablet; Refill: 1  -     Iron Profile; Future  -     CBC & Differential; Future  -     Ferritin; Future  -     Vitamin B12 & Folate; Future    4. Fecal occult blood test positive    5. Vitamin B 12 deficiency  Comments:  Begin Vitamin B12 1000 mcg daily  Orders:  -     vitamin B-12 (CYANOCOBALAMIN) 1000 MCG tablet; Take 1 tablet by mouth Daily.  Dispense: 90 tablet; Refill: 1  -     Iron Profile; Future  -     CBC & Differential; Future  -     Ferritin; Future  -     Vitamin B12 & Folate; Future        Assessment & Plan  Anemia  - Hemoglobin levels decreased from 12 to 11.3 (previous readings: 10.5 and 9.8)  - Decline may be due to recent bleeding episode  - Iron levels improved from 29 to 38 but remain suboptimal  - Iron saturation increased from 6% to 8%, target is 20%  - Prescribe oral vitamin B12 supplements  - Intravenous iron therapy due to gastrointestinal side effects from oral iron supplement    Leukocytosis:  -Discussed normal flow cytometry  -Possible Bone marrow aspiration, but wish to hold.    Diabetes Mellitus  - Hemoglobin A1c was 8.2 on 01/31/2025, above normal range  - Currently on 9 units of insulin, blood sugar levels between 123 and 140  - Follow up with Dr. Montes in 3 months for another A1c test    Hyperlipidemia  - Lipid panel within normal limits: total cholesterol 110, triglycerides 80, HDL 37, LDL 57  - No changes to current management plan    Vitamin D Deficiency  - Vitamin D level was 29.3, just below normal range of 30  - Prescribe oral vitamin D supplements    Vitamin B12 Deficiency   -Begin Vitamin B12 1000 mcg daily     Follow-up  - Patient to follow up in 2 months labs and office visit with MD    PROCEDURE  The patient underwent a colonoscopy, which yielded normal results.    Leg injury  - Persistent swelling in foot  likely due to tight bandage impeding fluid return  - Bandage must remain snug to prevent hemorrhaging  - Wound will [facility] tomorrow    Diabetes mellitus  - Blood glucose levels significantly elevated, A1c of 8.3  - Currently on Lantus and Humalog but not adhering to prescribed regimen correctly  - Starting tomorrow, increase Lantus dosage to 10 units in the morning and 8 units at night  - Continue Humalog with each meal, adjust dosage based on blood sugar readings  - Discussed potential benefits of insulin pump (different basal rates, automatic cessation if blood sugar drops too low)  - Advised to take insulin immediately before meals and carry food when traveling  - Recommended monitoring blood sugar levels after consuming fruit and using insulin to manage spikes    Elevated white blood cell count  - Increased to 17,000, noted increase in neutrophils and immature granulocytes  - Possible response to recent injections or elevated blood sugar levels  - No immediate intervention required given overall stable condition  - Consider bone marrow aspiration if white blood cell count continues to rise    Anemia  - Significant improvement, hemoglobin levels increased from 10.5 to 14.2  - Iron profile normalized, iron levels increased from 29 to 133, iron saturation levels jason from 6% to 35%  - Continue B12 supplements, advised against discontinuation without consulting healthcare provider  - Prescription for B12 supplements provided  - Iron levels to be checked again in 2 months    Follow-up  - Follow-up scheduled in 2 months    -Encouraged patient to remain up to date on all recommended preventative medicine services specific for their sex and age.  Some examples include:  Diabetes screening, Hypertensive screening, Immunizations, Lipid screenings (cholesterol), Depression screenings, Colorectal cancer screening, HIV screening, Cervical cancer screening, Breast cancer screening, Osteoporosis screening,  Alcohol screening, Dental screening, Tobacco Use screening and Dietary screening  -Obtain labs including peripheral blood smear to assess cell morphology, flow cytometry to evaluate, identify, and quantify cells and their characteristics.    Patient Follow Up: 2 months labs and office visit    I spent 30 minutes caring for Ion on this date of service. This time includes time spent by me in the following activities:preparing for the visit, reviewing tests, obtaining and/or reviewing a separately obtained history, performing a medically appropriate examination and/or evaluation , counseling and educating the patient/family/caregiver, ordering medications, tests, or procedures, documenting information in the medical record, independently interpreting results and communicating that information with the patient/family/caregiver, and care coordination    Patient was given instructions and counseling regarding his condition or for health maintenance advice. Please see specific information pulled into the AVS if appropriate.

## 2025-04-21 ENCOUNTER — OFFICE VISIT (OUTPATIENT)
Dept: ONCOLOGY | Facility: HOSPITAL | Age: 85
End: 2025-04-21
Payer: MEDICARE

## 2025-04-21 VITALS
HEIGHT: 70 IN | TEMPERATURE: 97.5 F | SYSTOLIC BLOOD PRESSURE: 113 MMHG | DIASTOLIC BLOOD PRESSURE: 84 MMHG | OXYGEN SATURATION: 100 % | WEIGHT: 154.6 LBS | BODY MASS INDEX: 22.13 KG/M2 | HEART RATE: 79 BPM | RESPIRATION RATE: 18 BRPM

## 2025-04-21 DIAGNOSIS — E53.8 VITAMIN B 12 DEFICIENCY: ICD-10-CM

## 2025-04-21 DIAGNOSIS — D72.829 LEUKOCYTOSIS, UNSPECIFIED TYPE: ICD-10-CM

## 2025-04-21 DIAGNOSIS — D50.0 IRON DEFICIENCY ANEMIA DUE TO CHRONIC BLOOD LOSS: Primary | ICD-10-CM

## 2025-04-21 DIAGNOSIS — R19.5 FECAL OCCULT BLOOD TEST POSITIVE: ICD-10-CM

## 2025-04-21 PROCEDURE — 3079F DIAST BP 80-89 MM HG: CPT | Performed by: PHYSICIAN ASSISTANT

## 2025-04-21 PROCEDURE — 1126F AMNT PAIN NOTED NONE PRSNT: CPT | Performed by: PHYSICIAN ASSISTANT

## 2025-04-21 PROCEDURE — G0463 HOSPITAL OUTPT CLINIC VISIT: HCPCS | Performed by: PHYSICIAN ASSISTANT

## 2025-04-21 PROCEDURE — 99214 OFFICE O/P EST MOD 30 MIN: CPT | Performed by: PHYSICIAN ASSISTANT

## 2025-04-21 PROCEDURE — 1159F MED LIST DOCD IN RCRD: CPT | Performed by: PHYSICIAN ASSISTANT

## 2025-04-21 PROCEDURE — 1160F RVW MEDS BY RX/DR IN RCRD: CPT | Performed by: PHYSICIAN ASSISTANT

## 2025-04-21 PROCEDURE — 3074F SYST BP LT 130 MM HG: CPT | Performed by: PHYSICIAN ASSISTANT

## 2025-04-21 RX ORDER — EZETIMIBE 10 MG/1
10 TABLET ORAL DAILY
COMMUNITY
Start: 2025-02-15

## 2025-04-21 RX ORDER — GLIMEPIRIDE 2 MG/1
2 TABLET ORAL
COMMUNITY
Start: 2025-03-29

## 2025-04-21 RX ORDER — LANOLIN ALCOHOL/MO/W.PET/CERES
1000 CREAM (GRAM) TOPICAL DAILY
Qty: 90 TABLET | Refills: 1 | Status: SHIPPED | OUTPATIENT
Start: 2025-04-21

## 2025-04-21 RX ORDER — INSULIN LISPRO 100 [IU]/ML
INJECTION, SOLUTION INTRAVENOUS; SUBCUTANEOUS DAILY
COMMUNITY
Start: 2025-03-08

## 2025-04-28 ENCOUNTER — OFFICE VISIT (OUTPATIENT)
Dept: GASTROENTEROLOGY | Facility: CLINIC | Age: 85
End: 2025-04-28
Payer: MEDICARE

## 2025-04-28 VITALS
HEART RATE: 84 BPM | HEIGHT: 70 IN | SYSTOLIC BLOOD PRESSURE: 112 MMHG | DIASTOLIC BLOOD PRESSURE: 66 MMHG | WEIGHT: 154.6 LBS | BODY MASS INDEX: 22.13 KG/M2

## 2025-04-28 DIAGNOSIS — R94.5 ABNORMAL RESULTS OF LIVER FUNCTION STUDIES: ICD-10-CM

## 2025-04-28 DIAGNOSIS — R93.5 ABNORMAL MRI OF ABDOMEN: ICD-10-CM

## 2025-04-28 DIAGNOSIS — R74.8 ELEVATED LIVER ENZYMES: ICD-10-CM

## 2025-04-28 DIAGNOSIS — R74.01 ELEVATED ALT MEASUREMENT: Primary | ICD-10-CM

## 2025-04-28 NOTE — PROGRESS NOTES
Patient Name: Ion PROCTOR Case   Visit Date: 04/28/2025   Patient ID: [id number]  Provider: GEMA Hancock    Sex: male  Location:  Location Address:  Location Phone: [phone] [place] [address] [phone]    YOB: 1940  Age: 84 y.o.   Primary Care Provider Adán Montes MD      Referring Provider: No ref. provider found        Chief Complaint  MRI ABD (Follow up )    History of Present Illness  Patient has GI history significant for right side abdominal pain, diarrhea, and intermittent dysphagia.  He had improvement with Xifaxan in 2022.  Hx of EGD/Colonoscopy 10/06/2021 by Dr. Estrada   Hx EGD colonoscopy 7/13/2023:Small hiatal hernia, Schatzki's ring found with dilation performed from 15 to 18 mm, small polyp in the stomach body-benign gastric mucosa, stomach biopsy taken which was negative, normal duodenum; Patient had good bowel prep, there were 8 small polyps in the descending, transverse, ascending colon which were adenomatous; 3 small polyps in the sigmoid colon removed-villous adenomatous and sessile serrated lesion, diverticula found in the sigmoid, internal hemorrhoids      Admitted in August 2024 w COVID/PNA  Patient admitted 9/25--10/ 2 with acute hypoxic respiratory failure, pneumonia, generalized weakness  Admitted 10/2--10/11/2024 with generalized weakness and hypoxia, patient required thoracentesis Dr. Estrada was consulted for epigastric pain and an EGD was performed  EGD 10/9/2024: Small hiatal hernia, normal esophagus, diffuse mild gastritis-biopsy is negative, small polyp in the stomach body was [id number]-[id number], normal duodenum  Carafate and Protonix were prescribed     Admitted 10/11-10/18/2024 with same diagnosis plus decreased p.o. intake and nausea, CT showed acute to subacute pancreatitis-radiologist states inflammatory change involving the tail of the pancreas which may represent age-indeterminate pancreatitis. States Januvia was started end  of May, August he was having n/v also, Ian was Dc'd and he improved.       11/26/2024: doing well with resolution of GI symptoms, anemia had improved to 12, he was seeing hematology for this and for elevated white count, stool was positive for occult blood 11/22/2024 so colonoscopy ordered -->  Colonoscopy 12/13/2024: Good bowel prep, 3 small polyps in the descending colon removed-adenomatous, diverticula found in the sigmoid, grade 1 internal hemorrhoids     Patient last seen 1/15/2025 he was having some nausea and constipation, started MiraLAX the day before, reported stopping metformin and Jardiance and starting insulin instead    Repeat MRI/MRCP 3/3/2025: Stable seven 8 mm cystic lesion in the pancreatic tail, unchanged bulbous dilation of pancreatic duct 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 Estrada recommended EUS    History of Present Illness  The patient presents for f/u MRI results, noted elevated WBC count and elevated liver enzymes.    Pancreatitis  - History of pancreatitis noted; scheduled for EUS next week.  - No abdominal pain, bloating, or diarrhea.  - Bowel movements twice daily, not consistently postprandial, none at night.  - Hiccups during meals; concerned about pancreatic function.    Elevated Blood Glucose  - Managed with increased insulin injections by Dr. Spain  - Recent readings: 103, 111, 105.  - Previously in the 120s, increased to 200s after first pain clinic injections, then to 300s after second set.  - Recently underwent final ablation.    Elevated WBC Count  - Pt is asymptomatic  - No SOB, cough, fever, or chills.    Elevated Liver Enzymes  - No ETOH    Potassium Supplements  - Discontinued due to elevated levels.    Vitamin K2  - Taken without advice, now discontinued.    Iron Infusion  - Significantly improved energy and bowel movements.  - pt following with heme, recent normal hemoglobin        Past Medical  History:   Diagnosis Date    Arthritis     Cataract     Removed    Chronic heart failure with preserved ejection fraction (HFpEF) 06/02/2023    Colon polyp     Removed July 13    Coronary artery disease     Diabetes mellitus     Diverticulosis     Elevated cholesterol     Essential hypertension 10/04/2021    Facet arthropathy, lumbar 07/14/2020    GERD (gastroesophageal reflux disease)     HL (hearing loss)     Hyperlipidemia LDL goal <100 08/17/2021    Kidney stone     Lumbar herniated disc 07/14/2020    (R) L3-4    Lumbar spinal stenosis 08/04/2020    Paroxysmal atrial fibrillation 10/04/2021    Pneumonia     Tinnitus     Trigger finger of right thumb 11/01/2017    all fingers both hands       Past Surgical History:   Procedure Laterality Date    BACK SURGERY      CARDIAC CATHETERIZATION N/A 08/23/2022    Procedure: LEFT HEART CATH with coronary artery angiography and right heart catheterization possible percutaneous intervention possible closure device;  Surgeon: Emili Cheng MD;  Location: [facility] CATH INVASIVE LOCATION;  Service: Cardiovascular;  Laterality: N/A;    CAROTID STENT      COLONOSCOPY  2017    Dr. Oneal    COLONOSCOPY N/A 10/06/2021    Procedure: COLONOSCOPY WITH BIOPSIES, POLYPECTOMY;  Surgeon: Vazquez Estrada MD;  Location: [facility] ENDOSCOPY;  Service: Gastroenterology;  Laterality: N/A;  COLON POLYPS, DIVERTICULOSIS, HEMORRHOIDS    COLONOSCOPY N/A 7/13/2023    Procedure: COLONOSCOPY with polypectomy with cold snare;  Surgeon: Vazquez Estrada MD;  Location: [facility] ENDOSCOPY;  Service: Gastroenterology;  Laterality: N/A;  colon polyps, diverticulosis, hemorrhoids    COLONOSCOPY N/A 12/13/2024    Procedure: COLONOSCOPY WITH POLYPECTOMY;  Surgeon: Vazquez Estrada MD;  Location: [facility] ENDOSCOPY;  Service: Gastroenterology;  Laterality: N/A;  DIVERTICULOSIS, COLON POLYPS, HEMORRHOIDS    CORONARY ANGIOPLASTY      ENDOSCOPY N/A 10/06/2021    Procedure: ESOPHAGOGASTRODUODENOSCOPY WITH  "BIOPSIES;  Surgeon: Vazquez Estrada MD;  Location: [facility] ENDOSCOPY;  Service: Gastroenterology;  Laterality: N/A;  GASTRITIS    ENDOSCOPY N/A 7/13/2023    Procedure: ESOPHAGOGASTRODUODENOSCOPY with biopsies, with dilation with 15- 18 mm balloon;  Surgeon: Vazquez Estrada MD;  Location: [facility] ENDOSCOPY;  Service: Gastroenterology;  Laterality: N/A;  hiatal hernia, gastric polyp, schatzki's ring    ENDOSCOPY N/A 10/9/2024    Procedure: ESOPHAGOGASTRODUODENOSCOPY WITH BIOPSIES AND COLD SNARE POLYPECTOMY;  Surgeon: Vazquez Estrada MD;  Location: [facility] ENDOSCOPY;  Service: Gastroenterology;  Laterality: N/A;  GASTRITIS, GASTRIC POLYP AND HIATAL HERNIA    KIDNEY STONE SURGERY      Unspecified    LUMBAR DISC SURGERY Right 06/26/2020    L3-4  Minimally Invasive    TRIGGER FINGER RELEASE Bilateral     UPPER GASTROINTESTINAL ENDOSCOPY  10/06/2021    Dr. Estrada       Allergies   Allergen Reactions    Penicillins Swelling    Formaldehyde Itching     patch test    Lisinopril Cough       Family History   Problem Relation Age of Onset    Heart disease Mother     Arthritis Mother     Heart failure Mother     Heart disease Father     Heart failure Father     Heart attack Other         (Myocardial Infarction)    Colon cancer Neg Hx     Malig Hyperthermia Neg Hx         Social History     Tobacco Use    Smoking status: Never     Passive exposure: Never    Smokeless tobacco: Never   Vaping Use    Vaping status: Never Used   Substance Use Topics    Alcohol use: Never     Comment: Does not drink    Drug use: Never       Objective     Vital Signs:   /66 (BP Location: Right arm, Patient Position: Sitting, Cuff Size: Adult)   Pulse 84   Ht 177.8 cm (70\")   Wt 70.1 kg (154 lb 9.6 oz)   BMI 22.18 kg/m²       Physical Exam  Constitutional:       General: The patient is not in acute distress.     Appearance: Normal appearance.   HENT:      Head: Normocephalic and atraumatic.      Nose: Nose normal.   Pulmonary:      " Effort: Pulmonary effort is normal. No respiratory distress.   Abdominal:      General: Abdomen is flat.      Palpations: Abdomen is soft. There is no mass.      Tenderness: There is no abdominal tenderness. There is no guarding.   Musculoskeletal:      Cervical back: Neck supple.      Right lower leg: No edema.      Left lower leg: No edema.   Skin:     General: Skin is warm and dry.   Neurological:      General: No focal deficit present.      Mental Status: The patient is alert and oriented to person, place, and time.      Gait: uses cane  Psychiatric:         Mood and Affect: Mood normal.         Speech: Speech normal.         Behavior: Behavior normal.         Thought Content: Thought content normal.     Result Review :   The following data was reviewed by: GEMA Hancock on 04/28/2025:    CBC w/diff          11/19/2024    14:26 1/31/2025    09:39 4/14/2025    09:27   CBC w/Diff   WBC 15.45  11.72  17.70    RBC 4.64  4.60  4.89    Hemoglobin 12.0  11.3  14.2    Hematocrit 38.7  37.7  43.1    MCV 83.4  82.0  88.1    MCH 25.9  24.6  29.0    MCHC 31.0  30.0  32.9    RDW 17.1  16.4  22.6    Platelets 383  358  274    Neutrophil Rel % 79.5  73.0  76.1    Immature Granulocyte Rel % 2.3  2.0  12.0    Lymphocyte Rel % 9.6  14.2  4.6    Monocyte Rel % 8.0  9.5  7.1    Eosinophil Rel % 0.3  0.9  0.0    Basophil Rel % 0.3  0.4  0.2      CMP          11/19/2024    14:26 1/31/2025    09:39 4/14/2025    09:27   CMP   Glucose 165  132  247    BUN 27  20  48    Creatinine 1.03  1.01  1.19    EGFR 72.5  74.3  61.0    Sodium 139  138  136    Potassium 4.1  4.8  5.6    Chloride 102  105  97    Calcium 9.9  9.7  9.2    Total Protein 7.1  6.4  6.0    Albumin 4.3  3.5  3.8    Globulin 2.8  2.9  2.2    Total Bilirubin 0.4  0.4  0.6    Alkaline Phosphatase 105  70  113    AST (SGOT) 14  20  43    ALT (SGPT) 20  17  162    Albumin/Globulin Ratio 1.5  1.2  1.7    BUN/Creatinine Ratio 26.2  19.8  40.3    Anion Gap 11.8  7.9   15.3        PT/INR   Protime   Date Value Ref Range Status   06/22/2023 16.8 (H) 11.8 - 14.9 Seconds Final     INR   Date Value Ref Range Status   06/22/2023 1.36 (H) 0.86 - 1.15 Final               Assessment and Plan    Diagnoses and all orders for this visit:    1. Elevated ALT measurement (Primary)  -     Hepatic Function Panel; Future  -     Protime-INR; Future    2. Elevated liver enzymes  -     Hepatic Function Panel; Future  -     Protime-INR; Future    3. Abnormal results of liver function studies  -     Protime-INR; Future    4. Abnormal MRI of abdomen          Assessment & Plan  1. Chronic Pancreatitis:  - EUS scheduled next week.  - If EUS reveals chronic pancreatitis, prescribe digestive enzymes.  - If EUS shows no abnormalities, consider stool study.    2. Elevated Blood Glucose:  - Continue monitoring and follow up with endocrinologist.    3. Elevated Liver Enzymes:  - Repeat test week of 05/12/2025.  - If still elevated, order comprehensive liver workup.    4. Elevated WBC Count:  - Follow-up with heme and PCP    5. Medication Management:  - Stop potassium supplements and vitamin K2.  - Take medications as directed.    Follow-up:  - 6 months.            Follow Up   Return in about 6 months (around 10/28/2025), or if symptoms worsen or fail to improve.    Patient or patient representative verbalized consent for the use of Ambient Listening during the visit with  GEMA Hancock for chart documentation. 4/29/2025  10:11 EDT    Patient was given instructions and counseling regarding his condition or for health maintenance advice. Please see specific information pulled into the AVS if appropriate.

## 2025-05-12 ENCOUNTER — LAB (OUTPATIENT)
Dept: LAB | Facility: HOSPITAL | Age: 85
End: 2025-05-12
Payer: MEDICARE

## 2025-05-12 ENCOUNTER — TRANSCRIBE ORDERS (OUTPATIENT)
Dept: LAB | Facility: HOSPITAL | Age: 85
End: 2025-05-12
Payer: MEDICARE

## 2025-05-12 DIAGNOSIS — E55.9 VITAMIN D DEFICIENCY, UNSPECIFIED: ICD-10-CM

## 2025-05-12 DIAGNOSIS — R94.5 ABNORMAL RESULTS OF LIVER FUNCTION STUDIES: ICD-10-CM

## 2025-05-12 DIAGNOSIS — E11.65 HYPERGLYCEMIA DUE TO DIABETES MELLITUS: ICD-10-CM

## 2025-05-12 DIAGNOSIS — R74.8 ELEVATED LIVER ENZYMES: ICD-10-CM

## 2025-05-12 DIAGNOSIS — Z79.4 INSULIN LONG-TERM USE: ICD-10-CM

## 2025-05-12 DIAGNOSIS — D50.9 IRON DEFICIENCY ANEMIA, UNSPECIFIED IRON DEFICIENCY ANEMIA TYPE: ICD-10-CM

## 2025-05-12 DIAGNOSIS — R53.83 OTHER FATIGUE: ICD-10-CM

## 2025-05-12 DIAGNOSIS — E55.9 VITAMIN D DEFICIENCY, UNSPECIFIED: Primary | ICD-10-CM

## 2025-05-12 DIAGNOSIS — R74.01 ELEVATED ALT MEASUREMENT: ICD-10-CM

## 2025-05-12 LAB
25(OH)D3 SERPL-MCNC: 37.7 NG/ML (ref 30–100)
ALBUMIN SERPL-MCNC: 3.4 G/DL (ref 3.5–5.2)
ALP SERPL-CCNC: 84 U/L (ref 39–117)
ALT SERPL W P-5'-P-CCNC: 127 U/L (ref 1–41)
AST SERPL-CCNC: 55 U/L (ref 1–40)
BILIRUB CONJ SERPL-MCNC: 0.2 MG/DL (ref 0–0.3)
BILIRUB INDIRECT SERPL-MCNC: 0.2 MG/DL
BILIRUB SERPL-MCNC: 0.4 MG/DL (ref 0–1.2)
BILIRUB UR QL STRIP: NEGATIVE
CHOLEST SERPL-MCNC: 89 MG/DL (ref 0–200)
CLARITY UR: CLEAR
COLOR UR: YELLOW
FOLATE SERPL-MCNC: 17.9 NG/ML (ref 4.78–24.2)
GLUCOSE UR STRIP-MCNC: NEGATIVE MG/DL
HBA1C MFR BLD: 8 % (ref 4.8–5.6)
HDLC SERPL-MCNC: 34 MG/DL (ref 40–60)
HGB UR QL STRIP.AUTO: NEGATIVE
INR PPP: 1.58 (ref 0.86–1.15)
IRON 24H UR-MRATE: 96 MCG/DL (ref 59–158)
IRON SATN MFR SERPL: 27 % (ref 20–50)
KETONES UR QL STRIP: NEGATIVE
LDLC SERPL CALC-MCNC: 41 MG/DL (ref 0–100)
LDLC/HDLC SERPL: 1.25 {RATIO}
LEUKOCYTE ESTERASE UR QL STRIP.AUTO: NEGATIVE
NITRITE UR QL STRIP: NEGATIVE
PH UR STRIP.AUTO: 6 [PH] (ref 5–8)
PROT SERPL-MCNC: 6 G/DL (ref 6–8.5)
PROT UR QL STRIP: ABNORMAL
PROTHROMBIN TIME: 19.6 SECONDS (ref 11.8–14.9)
SP GR UR STRIP: 1.02 (ref 1–1.03)
TIBC SERPL-MCNC: 353 MCG/DL (ref 298–536)
TRANSFERRIN SERPL-MCNC: 237 MG/DL (ref 200–360)
TRIGL SERPL-MCNC: 62 MG/DL (ref 0–150)
TSH SERPL DL<=0.05 MIU/L-ACNC: 1.25 UIU/ML (ref 0.27–4.2)
UROBILINOGEN UR QL STRIP: ABNORMAL
VIT B12 BLD-MCNC: >2000 PG/ML (ref 211–946)
VLDLC SERPL-MCNC: 14 MG/DL (ref 5–40)

## 2025-05-12 PROCEDURE — 80076 HEPATIC FUNCTION PANEL: CPT

## 2025-05-12 PROCEDURE — 36415 COLL VENOUS BLD VENIPUNCTURE: CPT

## 2025-05-12 PROCEDURE — 82306 VITAMIN D 25 HYDROXY: CPT

## 2025-05-12 PROCEDURE — 82607 VITAMIN B-12: CPT

## 2025-05-12 PROCEDURE — 83036 HEMOGLOBIN GLYCOSYLATED A1C: CPT

## 2025-05-12 PROCEDURE — 82746 ASSAY OF FOLIC ACID SERUM: CPT

## 2025-05-12 PROCEDURE — 81003 URINALYSIS AUTO W/O SCOPE: CPT

## 2025-05-12 PROCEDURE — 80061 LIPID PANEL: CPT

## 2025-05-12 PROCEDURE — 85610 PROTHROMBIN TIME: CPT

## 2025-05-12 PROCEDURE — 84443 ASSAY THYROID STIM HORMONE: CPT

## 2025-05-12 PROCEDURE — 83540 ASSAY OF IRON: CPT

## 2025-05-12 PROCEDURE — 84466 ASSAY OF TRANSFERRIN: CPT

## 2025-05-14 RX ORDER — EZETIMIBE 10 MG/1
10 TABLET ORAL DAILY
Qty: 90 TABLET | Refills: 3 | Status: SHIPPED | OUTPATIENT
Start: 2025-05-14

## 2025-05-14 NOTE — TELEPHONE ENCOUNTER
Rx Refill Note  Requested Prescriptions     Pending Prescriptions Disp Refills    ezetimibe (ZETIA) 10 MG tablet [Pharmacy Med Name: EZETIMIBE 10 MG TABLET] 90 tablet      Sig: TAKE 1 TABLET BY MOUTH DAILY        LAST OFFICE VISIT:  1/15/2025     NEXT OFFICE VISIT:  07/17/2025     Does the medication requests match the last office note:    [x] Yes   [] No    Does this refill request meet protocol details for MA to approve:     [x] Yes   [] No   [] No Protocols Provided

## 2025-05-19 ENCOUNTER — TRANSCRIBE ORDERS (OUTPATIENT)
Dept: ADMINISTRATIVE | Facility: HOSPITAL | Age: 85
End: 2025-05-19
Payer: MEDICARE

## 2025-05-19 DIAGNOSIS — E78.2 MIXED HYPERLIPIDEMIA: Primary | ICD-10-CM

## 2025-05-27 ENCOUNTER — HOSPITAL ENCOUNTER (OUTPATIENT)
Dept: GENERAL RADIOLOGY | Facility: HOSPITAL | Age: 85
Discharge: HOME | End: 2025-05-27
Admitting: FAMILY MEDICINE
Payer: MEDICARE

## 2025-05-27 ENCOUNTER — TRANSCRIBE ORDERS (OUTPATIENT)
Dept: GENERAL RADIOLOGY | Facility: HOSPITAL | Age: 85
End: 2025-05-27
Payer: MEDICARE

## 2025-05-27 DIAGNOSIS — R05.9 COUGH, UNSPECIFIED TYPE: Primary | ICD-10-CM

## 2025-05-27 DIAGNOSIS — R05.9 COUGH, UNSPECIFIED TYPE: ICD-10-CM

## 2025-05-27 PROCEDURE — 71046 X-RAY EXAM CHEST 2 VIEWS: CPT

## 2025-06-10 ENCOUNTER — TRANSCRIBE ORDERS (OUTPATIENT)
Dept: GENERAL RADIOLOGY | Facility: HOSPITAL | Age: 85
End: 2025-06-10
Payer: MEDICARE

## 2025-06-10 ENCOUNTER — HOSPITAL ENCOUNTER (OUTPATIENT)
Dept: GENERAL RADIOLOGY | Facility: HOSPITAL | Age: 85
Discharge: HOME | End: 2025-06-10
Admitting: FAMILY MEDICINE
Payer: MEDICARE

## 2025-06-10 DIAGNOSIS — J18.9 PNEUMONIA DUE TO INFECTIOUS ORGANISM, UNSPECIFIED LATERALITY, UNSPECIFIED PART OF LUNG: ICD-10-CM

## 2025-06-10 DIAGNOSIS — J18.9 PNEUMONIA DUE TO INFECTIOUS ORGANISM, UNSPECIFIED LATERALITY, UNSPECIFIED PART OF LUNG: Primary | ICD-10-CM

## 2025-06-10 PROCEDURE — 71046 X-RAY EXAM CHEST 2 VIEWS: CPT

## 2025-06-11 ENCOUNTER — TRANSCRIBE ORDERS (OUTPATIENT)
Dept: ADMINISTRATIVE | Facility: HOSPITAL | Age: 85
End: 2025-06-11
Payer: MEDICARE

## 2025-06-17 ENCOUNTER — LAB (OUTPATIENT)
Dept: LAB | Facility: HOSPITAL | Age: 85
End: 2025-06-17
Payer: MEDICARE

## 2025-06-17 DIAGNOSIS — R74.01 ELEVATED ALT MEASUREMENT: ICD-10-CM

## 2025-06-17 DIAGNOSIS — E53.8 VITAMIN B 12 DEFICIENCY: ICD-10-CM

## 2025-06-17 DIAGNOSIS — E78.2 MIXED HYPERLIPIDEMIA: ICD-10-CM

## 2025-06-17 DIAGNOSIS — D50.0 IRON DEFICIENCY ANEMIA DUE TO CHRONIC BLOOD LOSS: ICD-10-CM

## 2025-06-17 DIAGNOSIS — R94.5 ABNORMAL RESULTS OF LIVER FUNCTION STUDIES: ICD-10-CM

## 2025-06-17 LAB
ALBUMIN SERPL-MCNC: 3.6 G/DL (ref 3.5–5.2)
ALBUMIN/GLOB SERPL: 1.3 G/DL
ALP SERPL-CCNC: 59 U/L (ref 39–117)
ALT SERPL W P-5'-P-CCNC: 18 U/L (ref 1–41)
ANION GAP SERPL CALCULATED.3IONS-SCNC: 12.2 MMOL/L (ref 5–15)
AST SERPL-CCNC: 18 U/L (ref 1–40)
BASOPHILS # BLD AUTO: 0.1 10*3/MM3 (ref 0–0.2)
BASOPHILS NFR BLD AUTO: 0.7 % (ref 0–1.5)
BILIRUB CONJ SERPL-MCNC: 0.2 MG/DL (ref 0–0.3)
BILIRUB SERPL-MCNC: 0.5 MG/DL (ref 0–1.2)
BUN SERPL-MCNC: 31 MG/DL (ref 8–23)
BUN/CREAT SERPL: 21.1 (ref 7–25)
CALCIUM SPEC-SCNC: 9.4 MG/DL (ref 8.6–10.5)
CHLORIDE SERPL-SCNC: 102 MMOL/L (ref 98–107)
CO2 SERPL-SCNC: 23.8 MMOL/L (ref 22–29)
CREAT SERPL-MCNC: 1.47 MG/DL (ref 0.76–1.27)
DEPRECATED RDW RBC AUTO: 57.2 FL (ref 37–54)
EGFRCR SERPLBLD CKD-EPI 2021: 47.3 ML/MIN/1.73
EOSINOPHIL # BLD AUTO: 0.01 10*3/MM3 (ref 0–0.4)
EOSINOPHIL NFR BLD AUTO: 0.1 % (ref 0.3–6.2)
ERYTHROCYTE [DISTWIDTH] IN BLOOD BY AUTOMATED COUNT: 15.6 % (ref 12.3–15.4)
FERRITIN SERPL-MCNC: 271.5 NG/ML (ref 30–400)
FOLATE SERPL-MCNC: >20 NG/ML (ref 4.78–24.2)
GLOBULIN UR ELPH-MCNC: 2.7 GM/DL
GLUCOSE SERPL-MCNC: 208 MG/DL (ref 65–99)
HCT VFR BLD AUTO: 43.2 % (ref 37.5–51)
HGB BLD-MCNC: 13.6 G/DL (ref 13–17.7)
IMM GRANULOCYTES # BLD AUTO: 0.49 10*3/MM3 (ref 0–0.05)
IMM GRANULOCYTES NFR BLD AUTO: 3.6 % (ref 0–0.5)
INR PPP: 1.84 (ref 0.86–1.15)
IRON 24H UR-MRATE: 51 MCG/DL (ref 59–158)
IRON SATN MFR SERPL: 13 % (ref 20–50)
LYMPHOCYTES # BLD AUTO: 0.72 10*3/MM3 (ref 0.7–3.1)
LYMPHOCYTES NFR BLD AUTO: 5.2 % (ref 19.6–45.3)
MCH RBC QN AUTO: 31.4 PG (ref 26.6–33)
MCHC RBC AUTO-ENTMCNC: 31.5 G/DL (ref 31.5–35.7)
MCV RBC AUTO: 99.8 FL (ref 79–97)
MONOCYTES # BLD AUTO: 1.09 10*3/MM3 (ref 0.1–0.9)
MONOCYTES NFR BLD AUTO: 7.9 % (ref 5–12)
NEUTROPHILS NFR BLD AUTO: 11.38 10*3/MM3 (ref 1.7–7)
NEUTROPHILS NFR BLD AUTO: 82.5 % (ref 42.7–76)
NRBC BLD AUTO-RTO: 0 /100 WBC (ref 0–0.2)
PLATELET # BLD AUTO: 216 10*3/MM3 (ref 140–450)
PMV BLD AUTO: 10.2 FL (ref 6–12)
POTASSIUM SERPL-SCNC: 4.9 MMOL/L (ref 3.5–5.2)
PROT SERPL-MCNC: 6.3 G/DL (ref 6–8.5)
PROTHROMBIN TIME: 22.1 SECONDS (ref 11.8–14.9)
RBC # BLD AUTO: 4.33 10*6/MM3 (ref 4.14–5.8)
SODIUM SERPL-SCNC: 138 MMOL/L (ref 136–145)
TIBC SERPL-MCNC: 399 MCG/DL (ref 298–536)
TRANSFERRIN SERPL-MCNC: 268 MG/DL (ref 200–360)
VIT B12 BLD-MCNC: >2000 PG/ML (ref 211–946)
WBC NRBC COR # BLD AUTO: 13.79 10*3/MM3 (ref 3.4–10.8)

## 2025-06-17 PROCEDURE — 85610 PROTHROMBIN TIME: CPT

## 2025-06-17 PROCEDURE — 82728 ASSAY OF FERRITIN: CPT

## 2025-06-17 PROCEDURE — 84466 ASSAY OF TRANSFERRIN: CPT

## 2025-06-17 PROCEDURE — 83540 ASSAY OF IRON: CPT

## 2025-06-17 PROCEDURE — 82248 BILIRUBIN DIRECT: CPT

## 2025-06-17 PROCEDURE — 36415 COLL VENOUS BLD VENIPUNCTURE: CPT

## 2025-06-17 PROCEDURE — 85025 COMPLETE CBC W/AUTO DIFF WBC: CPT

## 2025-06-17 PROCEDURE — 82746 ASSAY OF FOLIC ACID SERUM: CPT

## 2025-06-17 PROCEDURE — 80053 COMPREHEN METABOLIC PANEL: CPT

## 2025-06-17 PROCEDURE — 82607 VITAMIN B-12: CPT

## 2025-06-17 RX ORDER — ROSUVASTATIN CALCIUM 20 MG/1
20 TABLET, COATED ORAL NIGHTLY
Qty: 90 TABLET | Refills: 0 | OUTPATIENT
Start: 2025-06-17

## 2025-06-18 ENCOUNTER — TRANSCRIBE ORDERS (OUTPATIENT)
Dept: GENERAL RADIOLOGY | Facility: HOSPITAL | Age: 85
End: 2025-06-18
Payer: MEDICARE

## 2025-06-18 DIAGNOSIS — M54.6 THORACIC SPINE PAIN: Primary | ICD-10-CM

## 2025-06-18 DIAGNOSIS — M54.50 LUMBAR SPINE PAIN: ICD-10-CM

## 2025-06-19 ENCOUNTER — HOSPITAL ENCOUNTER (OUTPATIENT)
Dept: GENERAL RADIOLOGY | Facility: HOSPITAL | Age: 85
Discharge: HOME | End: 2025-06-19
Payer: MEDICARE

## 2025-06-19 DIAGNOSIS — M54.50 LUMBAR SPINE PAIN: ICD-10-CM

## 2025-06-19 DIAGNOSIS — M54.6 THORACIC SPINE PAIN: ICD-10-CM

## 2025-06-19 PROCEDURE — 72070 X-RAY EXAM THORAC SPINE 2VWS: CPT

## 2025-06-19 PROCEDURE — 72100 X-RAY EXAM L-S SPINE 2/3 VWS: CPT

## 2025-06-19 RX ORDER — ROSUVASTATIN CALCIUM 20 MG/1
20 TABLET, COATED ORAL NIGHTLY
Qty: 90 TABLET | Refills: 0 | OUTPATIENT
Start: 2025-06-19

## 2025-06-20 ENCOUNTER — TELEPHONE (OUTPATIENT)
Dept: CARDIOLOGY | Facility: CLINIC | Age: 85
End: 2025-06-20

## 2025-06-20 NOTE — TELEPHONE ENCOUNTER
Caller: Ion Cox    Relationship: Self    Best call back number: [phone]     What was the call regarding: PATIENT MISSED A CALL ON 06.20.25. UNABLE TO LOCATE A NOTE IN THE CHART.

## 2025-06-23 NOTE — PROGRESS NOTES
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 with MD    PROCEDURE  The patient underwent a colonoscopy, which yielded normal results.    Leg injury  - Persistent swelling in foot likely due to tight bandage impeding fluid return  - Bandage must remain snug to prevent hemorrhaging  - Wound will be rebandaged at [facility] tomorrow    Diabetes mellitus  - Blood glucose levels significantly elevated, A1c of 8.3  - Currently on Lantus and Humalog but not adhering to prescribed regimen correctly  - Starting tomorrow, increase Lantus dosage to 10 units in the morning and 8 units at night  - Continue Humalog with each meal, adjust dosage based on blood sugar readings  - Discussed potential benefits of insulin pump (different basal rates, automatic cessation if blood sugar drops too low)  - Advised to take insulin immediately before meals and carry food when traveling  - Recommended monitoring blood sugar levels after consuming fruit and using insulin to manage spikes    Elevated white blood cell count  - Increased to 17,000, noted increase in neutrophils and immature granulocytes  - Possible response to recent injections or elevated blood sugar levels  - No immediate intervention required given overall stable condition  - Consider bone marrow aspiration if white blood cell count continues  to rise    Anemia  - Significant improvement, hemoglobin levels increased from 10.5 to 14.2  - Iron profile normalized, iron levels increased from 29 to 133, iron saturation levels jason from 6% to 35%  - Continue B12 supplements, advised against discontinuation without consulting healthcare provider  - Prescription for B12 supplements provided  - Iron levels to be checked again in 2 months    Follow-up  - Follow-up scheduled in 2 months      Iron deficiency anemia:  - Hemoglobin levels stable despite decrease in iron levels  - Ferritin level decreased from 654 to 271  - Iron saturation dropped from 27% to 13%  - Re-evaluation in 1 to 2 months to determine need for another iron infusion    Back pain:  - Persistent back pain started after breathing treatment with albuterol and another medication 2 weeks ago  - Pain described as muscular and wraps around to the sides  - Potentially related to kidney or spleen issues  - Urine test to be conducted today to check for abnormalities  - Further investigation if urine test shows increased specific gravity, blood, or leukocytes    Herniated disc at L2-L3:  - Known herniated disc at L2-L3 impinging on the left side  - Typically causes low back pain and pain radiating down into the leg and hip  - Not believed to be causing current symptoms  - Thoracic spine x-ray to rule out compression fractures contributing to pain    Diabetes mellitus:  - A1c improved to 8.0  - Managed with short-term insulin only  - Blood sugar levels range from  in the morning and around 150-160 in the middle of the day    -Encouraged patient to remain up to date on all recommended preventative medicine services specific for their sex and age.  Some examples include:  Diabetes screening, Hypertensive screening, Immunizations, Lipid screenings (cholesterol), Depression screenings, Colorectal cancer screening, HIV screening, Cervical cancer screening, Breast cancer screening, Osteoporosis screening,  Alcohol screening, Dental screening, Tobacco Use screening and Dietary screening  -Obtain labs including peripheral blood smear to assess cell morphology, flow cytometry to evaluate, identify, and quantify cells and their characteristics.    Patient Follow Up: 2 months labs and office visit with MD RIVERA spent 30 minutes caring for Ion on this date of service. This time includes time spent by me in the following activities:preparing for the visit, reviewing tests, obtaining and/or reviewing a separately obtained history, performing a medically appropriate examination and/or evaluation , counseling and educating the patient/family/caregiver, ordering medications, tests, or procedures, documenting information in the medical record, independently interpreting results and communicating that information with the patient/family/caregiver, and care coordination    Patient was given instructions and counseling regarding his condition or for health maintenance advice. Please see specific information pulled into the AVS if appropriate.

## 2025-06-24 ENCOUNTER — OFFICE VISIT (OUTPATIENT)
Dept: ONCOLOGY | Facility: HOSPITAL | Age: 85
End: 2025-06-24
Payer: MEDICARE

## 2025-06-24 ENCOUNTER — LAB (OUTPATIENT)
Facility: HOSPITAL | Age: 85
End: 2025-06-24
Payer: MEDICARE

## 2025-06-24 VITALS
SYSTOLIC BLOOD PRESSURE: 144 MMHG | RESPIRATION RATE: 18 BRPM | DIASTOLIC BLOOD PRESSURE: 91 MMHG | HEIGHT: 70 IN | WEIGHT: 154.6 LBS | BODY MASS INDEX: 22.13 KG/M2 | HEART RATE: 84 BPM | TEMPERATURE: 97.5 F | OXYGEN SATURATION: 97 %

## 2025-06-24 DIAGNOSIS — D50.0 IRON DEFICIENCY ANEMIA DUE TO CHRONIC BLOOD LOSS: ICD-10-CM

## 2025-06-24 DIAGNOSIS — D50.0 IRON DEFICIENCY ANEMIA DUE TO CHRONIC BLOOD LOSS: Primary | ICD-10-CM

## 2025-06-24 DIAGNOSIS — E53.8 VITAMIN B 12 DEFICIENCY: ICD-10-CM

## 2025-06-24 DIAGNOSIS — R19.5 FECAL OCCULT BLOOD TEST POSITIVE: ICD-10-CM

## 2025-06-24 DIAGNOSIS — N28.9 RENAL INSUFFICIENCY: ICD-10-CM

## 2025-06-24 LAB
BACTERIA UR QL AUTO: NORMAL /HPF
BILIRUB UR QL STRIP: NEGATIVE
CLARITY UR: CLEAR
COLOR UR: YELLOW
FOLATE SERPL-MCNC: >20 NG/ML (ref 4.78–24.2)
GLUCOSE UR STRIP-MCNC: NEGATIVE MG/DL
HGB UR QL STRIP.AUTO: NEGATIVE
HYALINE CASTS UR QL AUTO: NORMAL /LPF
KETONES UR QL STRIP: ABNORMAL
LEUKOCYTE ESTERASE UR QL STRIP.AUTO: NEGATIVE
NITRITE UR QL STRIP: NEGATIVE
PH UR STRIP.AUTO: 5.5 [PH] (ref 5–8)
PROT UR QL STRIP: NEGATIVE
RBC # UR STRIP: NORMAL /HPF
REF LAB TEST METHOD: NORMAL
SP GR UR STRIP: 1.02 (ref 1–1.03)
SQUAMOUS #/AREA URNS HPF: NORMAL /HPF
UROBILINOGEN UR QL STRIP: ABNORMAL
VIT B12 BLD-MCNC: >2000 PG/ML (ref 211–946)
WBC # UR STRIP: NORMAL /HPF

## 2025-06-24 PROCEDURE — 3080F DIAST BP >= 90 MM HG: CPT | Performed by: PHYSICIAN ASSISTANT

## 2025-06-24 PROCEDURE — G0463 HOSPITAL OUTPT CLINIC VISIT: HCPCS | Performed by: PHYSICIAN ASSISTANT

## 2025-06-24 PROCEDURE — 36415 COLL VENOUS BLD VENIPUNCTURE: CPT

## 2025-06-24 PROCEDURE — 99214 OFFICE O/P EST MOD 30 MIN: CPT | Performed by: PHYSICIAN ASSISTANT

## 2025-06-24 PROCEDURE — 1125F AMNT PAIN NOTED PAIN PRSNT: CPT | Performed by: PHYSICIAN ASSISTANT

## 2025-06-24 PROCEDURE — 81001 URINALYSIS AUTO W/SCOPE: CPT | Performed by: PHYSICIAN ASSISTANT

## 2025-06-24 PROCEDURE — 1160F RVW MEDS BY RX/DR IN RCRD: CPT | Performed by: PHYSICIAN ASSISTANT

## 2025-06-24 PROCEDURE — 82607 VITAMIN B-12: CPT

## 2025-06-24 PROCEDURE — 3077F SYST BP >= 140 MM HG: CPT | Performed by: PHYSICIAN ASSISTANT

## 2025-06-24 PROCEDURE — 82746 ASSAY OF FOLIC ACID SERUM: CPT

## 2025-06-24 PROCEDURE — 1159F MED LIST DOCD IN RCRD: CPT | Performed by: PHYSICIAN ASSISTANT

## 2025-06-24 RX ORDER — ALBUTEROL SULFATE 90 UG/1
AEROSOL, METERED RESPIRATORY (INHALATION)
COMMUNITY
Start: 2025-05-30

## 2025-06-24 RX ORDER — DOXYCYCLINE 100 MG/1
CAPSULE ORAL
COMMUNITY
Start: 2025-05-27

## 2025-06-24 RX ORDER — LEVOFLOXACIN 500 MG/1
TABLET, FILM COATED ORAL
COMMUNITY
Start: 2025-05-30

## 2025-06-24 RX ORDER — PREDNISONE 20 MG/1
TABLET ORAL
COMMUNITY
Start: 2025-06-03

## 2025-06-26 ENCOUNTER — TRANSCRIBE ORDERS (OUTPATIENT)
Dept: ADMINISTRATIVE | Facility: HOSPITAL | Age: 85
End: 2025-06-26
Payer: MEDICARE

## 2025-06-26 ENCOUNTER — HOSPITAL ENCOUNTER (OUTPATIENT)
Dept: CT IMAGING | Facility: HOSPITAL | Age: 85
Discharge: HOME | End: 2025-06-26
Admitting: FAMILY MEDICINE
Payer: MEDICARE

## 2025-06-26 DIAGNOSIS — R10.9 ABDOMINAL PAIN, UNSPECIFIED ABDOMINAL LOCATION: ICD-10-CM

## 2025-06-26 DIAGNOSIS — R10.9 ABDOMINAL PAIN, UNSPECIFIED ABDOMINAL LOCATION: Primary | ICD-10-CM

## 2025-06-26 PROCEDURE — 74177 CT ABD & PELVIS W/CONTRAST: CPT

## 2025-06-26 PROCEDURE — 25510000001 IOPAMIDOL PER 1 ML: Performed by: FAMILY MEDICINE

## 2025-06-26 RX ORDER — IOPAMIDOL 755 MG/ML
100 INJECTION, SOLUTION INTRAVASCULAR
Status: COMPLETED | OUTPATIENT
Start: 2025-06-26 | End: 2025-06-26

## 2025-06-26 RX ADMIN — IOPAMIDOL 100 ML: 755 INJECTION, SOLUTION INTRAVENOUS at 15:48

## 2025-06-27 ENCOUNTER — TRANSCRIBE ORDERS (OUTPATIENT)
Dept: ADMINISTRATIVE | Facility: HOSPITAL | Age: 85
End: 2025-06-27
Payer: MEDICARE

## 2025-06-27 DIAGNOSIS — M54.9 DORSALGIA: Primary | ICD-10-CM

## 2025-06-27 RX ORDER — CLOPIDOGREL BISULFATE 75 MG/1
75 TABLET ORAL DAILY
Qty: 90 TABLET | Refills: 3 | Status: SHIPPED | OUTPATIENT
Start: 2025-06-27

## 2025-07-01 ENCOUNTER — TELEPHONE (OUTPATIENT)
Dept: GASTROENTEROLOGY | Facility: CLINIC | Age: 85
End: 2025-07-01
Payer: MEDICARE
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
## 2025-07-02 ENCOUNTER — LAB (OUTPATIENT)
Dept: LAB | Facility: HOSPITAL | Age: 85
End: 2025-07-02
Payer: MEDICARE

## 2025-07-02 DIAGNOSIS — M54.9 DORSALGIA: ICD-10-CM

## 2025-07-02 LAB
ALBUMIN SERPL-MCNC: 3.6 G/DL (ref 3.5–5.2)
ALBUMIN/GLOB SERPL: 1.4 G/DL
ALP SERPL-CCNC: 102 U/L (ref 39–117)
ALT SERPL W P-5'-P-CCNC: 29 U/L (ref 1–41)
ANION GAP SERPL CALCULATED.3IONS-SCNC: 12 MMOL/L (ref 5–15)
AST SERPL-CCNC: 20 U/L (ref 1–40)
BASOPHILS # BLD MANUAL: 0 10*3/MM3 (ref 0–0.2)
BASOPHILS NFR BLD MANUAL: 0 % (ref 0–1.5)
BILIRUB SERPL-MCNC: 0.4 MG/DL (ref 0–1.2)
BILIRUB UR QL STRIP: NEGATIVE
BUN SERPL-MCNC: 27 MG/DL (ref 8–23)
BUN/CREAT SERPL: 23.7 (ref 7–25)
CALCIUM SPEC-SCNC: 9.5 MG/DL (ref 8.6–10.5)
CHLORIDE SERPL-SCNC: 100 MMOL/L (ref 98–107)
CLARITY UR: CLEAR
CO2 SERPL-SCNC: 24 MMOL/L (ref 22–29)
COLOR UR: YELLOW
CREAT SERPL-MCNC: 1.14 MG/DL (ref 0.76–1.27)
DEPRECATED RDW RBC AUTO: 45.7 FL (ref 37–54)
EGFRCR SERPLBLD CKD-EPI 2021: 64.2 ML/MIN/1.73
EOSINOPHIL # BLD MANUAL: 0.14 10*3/MM3 (ref 0–0.4)
EOSINOPHIL NFR BLD MANUAL: 1 % (ref 0.3–6.2)
ERYTHROCYTE [DISTWIDTH] IN BLOOD BY AUTOMATED COUNT: 12.9 % (ref 12.3–15.4)
GLOBULIN UR ELPH-MCNC: 2.6 GM/DL
GLUCOSE SERPL-MCNC: 184 MG/DL (ref 65–99)
GLUCOSE UR STRIP-MCNC: NEGATIVE MG/DL
HCT VFR BLD AUTO: 42.2 % (ref 37.5–51)
HGB BLD-MCNC: 13.8 G/DL (ref 13–17.7)
HGB UR QL STRIP.AUTO: NEGATIVE
KETONES UR QL STRIP: NEGATIVE
LEUKOCYTE ESTERASE UR QL STRIP.AUTO: NEGATIVE
LYMPHOCYTES # BLD MANUAL: 1.1 10*3/MM3 (ref 0.7–3.1)
LYMPHOCYTES NFR BLD MANUAL: 13 % (ref 5–12)
MCH RBC QN AUTO: 32 PG (ref 26.6–33)
MCHC RBC AUTO-ENTMCNC: 32.7 G/DL (ref 31.5–35.7)
MCV RBC AUTO: 97.9 FL (ref 79–97)
METAMYELOCYTES NFR BLD MANUAL: 3 % (ref 0–0)
MONOCYTES # BLD: 1.78 10*3/MM3 (ref 0.1–0.9)
MYELOCYTES NFR BLD MANUAL: 4 % (ref 0–0)
NEUTROPHILS # BLD AUTO: 9.75 10*3/MM3 (ref 1.7–7)
NEUTROPHILS NFR BLD MANUAL: 71 % (ref 42.7–76)
NITRITE UR QL STRIP: NEGATIVE
NRBC BLD AUTO-RTO: 0 /100 WBC (ref 0–0.2)
PH UR STRIP.AUTO: 6 [PH] (ref 5–8)
PLAT MORPH BLD: NORMAL
PLATELET # BLD AUTO: 306 10*3/MM3 (ref 140–450)
PMV BLD AUTO: 9.5 FL (ref 6–12)
POTASSIUM SERPL-SCNC: 4.8 MMOL/L (ref 3.5–5.2)
PROT SERPL-MCNC: 6.2 G/DL (ref 6–8.5)
PROT UR QL STRIP: ABNORMAL
RBC # BLD AUTO: 4.31 10*6/MM3 (ref 4.14–5.8)
RBC MORPH BLD: NORMAL
SODIUM SERPL-SCNC: 136 MMOL/L (ref 136–145)
SP GR UR STRIP: 1.02 (ref 1–1.03)
UROBILINOGEN UR QL STRIP: ABNORMAL
VARIANT LYMPHS NFR BLD MANUAL: 8 % (ref 19.6–45.3)
WBC MORPH BLD: NORMAL
WBC NRBC COR # BLD AUTO: 13.73 10*3/MM3 (ref 3.4–10.8)

## 2025-07-02 PROCEDURE — 81003 URINALYSIS AUTO W/O SCOPE: CPT

## 2025-07-02 PROCEDURE — 36415 COLL VENOUS BLD VENIPUNCTURE: CPT

## 2025-07-02 PROCEDURE — 85007 BL SMEAR W/DIFF WBC COUNT: CPT

## 2025-07-02 PROCEDURE — 85025 COMPLETE CBC W/AUTO DIFF WBC: CPT

## 2025-07-02 PROCEDURE — 80053 COMPREHEN METABOLIC PANEL: CPT

## 2025-07-08 ENCOUNTER — TRANSCRIBE ORDERS (OUTPATIENT)
Dept: ADMINISTRATIVE | Facility: HOSPITAL | Age: 85
End: 2025-07-08
Payer: MEDICARE

## 2025-07-08 DIAGNOSIS — R05.9 COUGH, UNSPECIFIED TYPE: Primary | ICD-10-CM

## 2025-07-16 ENCOUNTER — OFFICE VISIT (OUTPATIENT)
Dept: GASTROENTEROLOGY | Facility: CLINIC | Age: 85
End: 2025-07-16
Payer: MEDICARE

## 2025-07-16 VITALS
HEART RATE: 74 BPM | SYSTOLIC BLOOD PRESSURE: 130 MMHG | WEIGHT: 152 LBS | BODY MASS INDEX: 21.76 KG/M2 | DIASTOLIC BLOOD PRESSURE: 73 MMHG | HEIGHT: 70 IN

## 2025-07-16 DIAGNOSIS — K59.04 CHRONIC IDIOPATHIC CONSTIPATION: ICD-10-CM

## 2025-07-16 DIAGNOSIS — R10.11 RUQ PAIN: ICD-10-CM

## 2025-07-16 DIAGNOSIS — K86.2 PANCREATIC CYST: Primary | ICD-10-CM

## 2025-07-16 RX ORDER — HYDROCODONE BITARTRATE AND ACETAMINOPHEN 7.5; 325 MG/1; MG/1
TABLET ORAL
COMMUNITY
Start: 2025-06-26

## 2025-07-16 RX ORDER — SUCRALFATE ORAL 1 G/10ML
SUSPENSION ORAL
COMMUNITY
Start: 2025-07-03
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
## 2025-07-17 ENCOUNTER — OFFICE VISIT (OUTPATIENT)
Dept: CARDIOLOGY | Facility: CLINIC | Age: 85
End: 2025-07-17
Payer: MEDICARE

## 2025-07-17 VITALS
HEART RATE: 90 BPM | WEIGHT: 150.8 LBS | DIASTOLIC BLOOD PRESSURE: 80 MMHG | BODY MASS INDEX: 21.59 KG/M2 | SYSTOLIC BLOOD PRESSURE: 126 MMHG | HEIGHT: 70 IN

## 2025-07-17 DIAGNOSIS — Z98.61 CAD S/P PERCUTANEOUS CORONARY ANGIOPLASTY: ICD-10-CM

## 2025-07-17 DIAGNOSIS — I10 ESSENTIAL HYPERTENSION: ICD-10-CM

## 2025-07-17 DIAGNOSIS — I50.32 CHRONIC HEART FAILURE WITH PRESERVED EJECTION FRACTION (HFPEF): ICD-10-CM

## 2025-07-17 DIAGNOSIS — E78.5 HYPERLIPIDEMIA LDL GOAL <70: ICD-10-CM

## 2025-07-17 DIAGNOSIS — I48.19 ATRIAL FIBRILLATION, PERSISTENT: Primary | ICD-10-CM

## 2025-07-17 DIAGNOSIS — I25.10 CAD S/P PERCUTANEOUS CORONARY ANGIOPLASTY: ICD-10-CM

## 2025-07-17 RX ORDER — TRAMADOL HYDROCHLORIDE 50 MG/1
TABLET ORAL
COMMUNITY
Start: 2025-07-16

## 2025-07-17 NOTE — ASSESSMENT & PLAN NOTE
Patient with chronic rate controlled atrial fibrillation continue with his carvedilol 25 twice daily and diltiazem 180 daily.  Patient is also on Eliquis 5 twice daily for CVA

## 2025-07-17 NOTE — ASSESSMENT & PLAN NOTE
Patient yadira clinically stable from a volume standpoint as well as by symptoms continue with his Lasix 20 mg every other day dosing as well as spironolactone 25 every other.  Since patient has been stable symptomatically we will hold off on addition of any SGLT inhibitors

## 2025-07-22 ENCOUNTER — APPOINTMENT (OUTPATIENT)
Dept: MRI IMAGING | Facility: HOSPITAL | Age: 85
End: 2025-07-22
Payer: MEDICARE

## 2025-07-22 ENCOUNTER — HOSPITAL ENCOUNTER (OUTPATIENT)
Facility: HOSPITAL | Age: 85
Setting detail: OBSERVATION
Discharge: HOME HEALTH | End: 2025-07-23
Attending: EMERGENCY MEDICINE | Admitting: HOSPITALIST
Payer: MEDICARE

## 2025-07-22 ENCOUNTER — APPOINTMENT (OUTPATIENT)
Dept: CT IMAGING | Facility: HOSPITAL | Age: 85
End: 2025-07-22
Payer: MEDICARE

## 2025-07-22 DIAGNOSIS — R10.9 ABDOMINAL PAIN, UNSPECIFIED ABDOMINAL LOCATION: Primary | ICD-10-CM

## 2025-07-22 DIAGNOSIS — K86.1 CHRONIC PANCREATITIS, UNSPECIFIED PANCREATITIS TYPE: ICD-10-CM

## 2025-07-22 DIAGNOSIS — R26.2 DIFFICULTY WALKING: ICD-10-CM

## 2025-07-22 DIAGNOSIS — Z78.9 DECREASED ACTIVITIES OF DAILY LIVING (ADL): ICD-10-CM

## 2025-07-22 LAB
ALBUMIN SERPL-MCNC: 3.8 G/DL (ref 3.5–5.2)
ALBUMIN/GLOB SERPL: 1.5 G/DL
ALP SERPL-CCNC: 115 U/L (ref 39–117)
ALT SERPL W P-5'-P-CCNC: 16 U/L (ref 1–41)
ANION GAP SERPL CALCULATED.3IONS-SCNC: 14.9 MMOL/L (ref 5–15)
AST SERPL-CCNC: 16 U/L (ref 1–40)
BASOPHILS # BLD AUTO: 0.12 10*3/MM3 (ref 0–0.2)
BASOPHILS NFR BLD AUTO: 0.9 % (ref 0–1.5)
BILIRUB SERPL-MCNC: 0.5 MG/DL (ref 0–1.2)
BILIRUB UR QL STRIP: NEGATIVE
BUN SERPL-MCNC: 19.3 MG/DL (ref 8–23)
BUN/CREAT SERPL: 16.6 (ref 7–25)
BURR CELLS BLD QL SMEAR: NORMAL
CALCIUM SPEC-SCNC: 9.6 MG/DL (ref 8.6–10.5)
CHLORIDE SERPL-SCNC: 99 MMOL/L (ref 98–107)
CLARITY UR: CLEAR
CO2 SERPL-SCNC: 25.1 MMOL/L (ref 22–29)
COLOR UR: YELLOW
CREAT SERPL-MCNC: 1.16 MG/DL (ref 0.76–1.27)
D-LACTATE SERPL-SCNC: 1.9 MMOL/L (ref 0.5–2)
DEPRECATED RDW RBC AUTO: 50.4 FL (ref 37–54)
EGFRCR SERPLBLD CKD-EPI 2021: 62.9 ML/MIN/1.73
EOSINOPHIL # BLD AUTO: 0.33 10*3/MM3 (ref 0–0.4)
EOSINOPHIL NFR BLD AUTO: 2.6 % (ref 0.3–6.2)
ERYTHROCYTE [DISTWIDTH] IN BLOOD BY AUTOMATED COUNT: 14 % (ref 12.3–15.4)
GLOBULIN UR ELPH-MCNC: 2.6 GM/DL
GLUCOSE BLDC GLUCOMTR-MCNC: 135 MG/DL (ref 70–99)
GLUCOSE BLDC GLUCOMTR-MCNC: 80 MG/DL (ref 70–99)
GLUCOSE SERPL-MCNC: 179 MG/DL (ref 65–99)
GLUCOSE UR STRIP-MCNC: NEGATIVE MG/DL
HCT VFR BLD AUTO: 42.5 % (ref 37.5–51)
HGB BLD-MCNC: 14.1 G/DL (ref 13–17.7)
HGB UR QL STRIP.AUTO: NEGATIVE
HOLD SPECIMEN: NORMAL
HOLD SPECIMEN: NORMAL
IMM GRANULOCYTES # BLD AUTO: 0.56 10*3/MM3 (ref 0–0.05)
IMM GRANULOCYTES NFR BLD AUTO: 4.3 % (ref 0–0.5)
KETONES UR QL STRIP: ABNORMAL
LEUKOCYTE ESTERASE UR QL STRIP.AUTO: NEGATIVE
LIPASE SERPL-CCNC: 15 U/L (ref 13–60)
LYMPHOCYTES # BLD AUTO: 1.28 10*3/MM3 (ref 0.7–3.1)
LYMPHOCYTES NFR BLD AUTO: 9.9 % (ref 19.6–45.3)
MAGNESIUM SERPL-MCNC: 1.6 MG/DL (ref 1.6–2.4)
MCH RBC QN AUTO: 32.2 PG (ref 26.6–33)
MCHC RBC AUTO-ENTMCNC: 33.2 G/DL (ref 31.5–35.7)
MCV RBC AUTO: 97 FL (ref 79–97)
MONOCYTES # BLD AUTO: 1.29 10*3/MM3 (ref 0.1–0.9)
MONOCYTES NFR BLD AUTO: 10 % (ref 5–12)
NEUTROPHILS NFR BLD AUTO: 72.3 % (ref 42.7–76)
NEUTROPHILS NFR BLD AUTO: 9.3 10*3/MM3 (ref 1.7–7)
NITRITE UR QL STRIP: NEGATIVE
NRBC BLD AUTO-RTO: 0 /100 WBC (ref 0–0.2)
PH UR STRIP.AUTO: 5.5 [PH] (ref 5–8)
PHOSPHATE SERPL-MCNC: 4.2 MG/DL (ref 2.5–4.5)
PLATELET # BLD AUTO: 251 10*3/MM3 (ref 140–450)
PMV BLD AUTO: 9.4 FL (ref 6–12)
POIKILOCYTOSIS BLD QL SMEAR: NORMAL
POTASSIUM SERPL-SCNC: 4.7 MMOL/L (ref 3.5–5.2)
PROT SERPL-MCNC: 6.4 G/DL (ref 6–8.5)
PROT UR QL STRIP: ABNORMAL
RBC # BLD AUTO: 4.38 10*6/MM3 (ref 4.14–5.8)
SMALL PLATELETS BLD QL SMEAR: ADEQUATE
SODIUM SERPL-SCNC: 139 MMOL/L (ref 136–145)
SP GR UR STRIP: 1.02 (ref 1–1.03)
UROBILINOGEN UR QL STRIP: ABNORMAL
WBC MORPH BLD: NORMAL
WBC NRBC COR # BLD AUTO: 12.88 10*3/MM3 (ref 3.4–10.8)
WHOLE BLOOD HOLD COAG: NORMAL
WHOLE BLOOD HOLD SPECIMEN: NORMAL

## 2025-07-22 PROCEDURE — 25010000002 ONDANSETRON PER 1 MG: Performed by: EMERGENCY MEDICINE

## 2025-07-22 PROCEDURE — 99222 1ST HOSP IP/OBS MODERATE 55: CPT | Performed by: HOSPITALIST

## 2025-07-22 PROCEDURE — 83735 ASSAY OF MAGNESIUM: CPT | Performed by: HOSPITALIST

## 2025-07-22 PROCEDURE — 99285 EMERGENCY DEPT VISIT HI MDM: CPT

## 2025-07-22 PROCEDURE — 25010000002 HYDROMORPHONE 1 MG/ML SOLUTION: Performed by: EMERGENCY MEDICINE

## 2025-07-22 PROCEDURE — G0378 HOSPITAL OBSERVATION PER HR: HCPCS

## 2025-07-22 PROCEDURE — 85007 BL SMEAR W/DIFF WBC COUNT: CPT

## 2025-07-22 PROCEDURE — 25810000003 SODIUM CHLORIDE 0.9 % SOLUTION: Performed by: HOSPITALIST

## 2025-07-22 PROCEDURE — 74181 MRI ABDOMEN W/O CONTRAST: CPT

## 2025-07-22 PROCEDURE — 83690 ASSAY OF LIPASE: CPT

## 2025-07-22 PROCEDURE — 82948 REAGENT STRIP/BLOOD GLUCOSE: CPT

## 2025-07-22 PROCEDURE — 80053 COMPREHEN METABOLIC PANEL: CPT

## 2025-07-22 PROCEDURE — 84100 ASSAY OF PHOSPHORUS: CPT | Performed by: HOSPITALIST

## 2025-07-22 PROCEDURE — 96374 THER/PROPH/DIAG INJ IV PUSH: CPT

## 2025-07-22 PROCEDURE — 96375 TX/PRO/DX INJ NEW DRUG ADDON: CPT

## 2025-07-22 PROCEDURE — 25810000003 SODIUM CHLORIDE 0.9 % SOLUTION: Performed by: EMERGENCY MEDICINE

## 2025-07-22 PROCEDURE — 83605 ASSAY OF LACTIC ACID: CPT

## 2025-07-22 PROCEDURE — 99214 OFFICE O/P EST MOD 30 MIN: CPT | Performed by: INTERNAL MEDICINE

## 2025-07-22 PROCEDURE — 85025 COMPLETE CBC W/AUTO DIFF WBC: CPT

## 2025-07-22 PROCEDURE — 81003 URINALYSIS AUTO W/O SCOPE: CPT

## 2025-07-22 RX ORDER — INSULIN LISPRO 100 [IU]/ML
2-9 INJECTION, SOLUTION INTRAVENOUS; SUBCUTANEOUS
Status: DISCONTINUED | OUTPATIENT
Start: 2025-07-22 | End: 2025-07-23 | Stop reason: HOSPADM

## 2025-07-22 RX ORDER — SODIUM CHLORIDE 0.9 % (FLUSH) 0.9 %
10 SYRINGE (ML) INJECTION AS NEEDED
Status: DISCONTINUED | OUTPATIENT
Start: 2025-07-22 | End: 2025-07-22

## 2025-07-22 RX ORDER — AMOXICILLIN 250 MG
2 CAPSULE ORAL 2 TIMES DAILY PRN
Status: DISCONTINUED | OUTPATIENT
Start: 2025-07-22 | End: 2025-07-23 | Stop reason: HOSPADM

## 2025-07-22 RX ORDER — SODIUM CHLORIDE 9 MG/ML
40 INJECTION, SOLUTION INTRAVENOUS AS NEEDED
Status: DISCONTINUED | OUTPATIENT
Start: 2025-07-22 | End: 2025-07-23 | Stop reason: HOSPADM

## 2025-07-22 RX ORDER — IBUPROFEN 600 MG/1
1 TABLET ORAL
Status: DISCONTINUED | OUTPATIENT
Start: 2025-07-22 | End: 2025-07-23 | Stop reason: HOSPADM

## 2025-07-22 RX ORDER — SODIUM CHLORIDE 9 MG/ML
75 INJECTION, SOLUTION INTRAVENOUS CONTINUOUS
Status: DISCONTINUED | OUTPATIENT
Start: 2025-07-22 | End: 2025-07-23 | Stop reason: HOSPADM

## 2025-07-22 RX ORDER — SODIUM CHLORIDE 0.9 % (FLUSH) 0.9 %
10 SYRINGE (ML) INJECTION EVERY 12 HOURS SCHEDULED
Status: DISCONTINUED | OUTPATIENT
Start: 2025-07-22 | End: 2025-07-23 | Stop reason: HOSPADM

## 2025-07-22 RX ORDER — HYDROXYZINE HYDROCHLORIDE 25 MG/1
25 TABLET, FILM COATED ORAL 3 TIMES DAILY PRN
COMMUNITY

## 2025-07-22 RX ORDER — BISACODYL 10 MG
10 SUPPOSITORY, RECTAL RECTAL DAILY PRN
Status: DISCONTINUED | OUTPATIENT
Start: 2025-07-22 | End: 2025-07-23 | Stop reason: HOSPADM

## 2025-07-22 RX ORDER — BISACODYL 5 MG/1
5 TABLET, DELAYED RELEASE ORAL DAILY PRN
Status: DISCONTINUED | OUTPATIENT
Start: 2025-07-22 | End: 2025-07-23 | Stop reason: HOSPADM

## 2025-07-22 RX ORDER — DEXTROSE MONOHYDRATE 25 G/50ML
25 INJECTION, SOLUTION INTRAVENOUS
Status: DISCONTINUED | OUTPATIENT
Start: 2025-07-22 | End: 2025-07-23 | Stop reason: HOSPADM

## 2025-07-22 RX ORDER — DILTIAZEM HYDROCHLORIDE 180 MG/1
180 CAPSULE, COATED, EXTENDED RELEASE ORAL
Status: DISCONTINUED | OUTPATIENT
Start: 2025-07-22 | End: 2025-07-23 | Stop reason: HOSPADM

## 2025-07-22 RX ORDER — ONDANSETRON 2 MG/ML
4 INJECTION INTRAMUSCULAR; INTRAVENOUS ONCE
Status: COMPLETED | OUTPATIENT
Start: 2025-07-22 | End: 2025-07-22

## 2025-07-22 RX ORDER — ROSUVASTATIN CALCIUM 20 MG/1
20 TABLET, COATED ORAL NIGHTLY
Status: DISCONTINUED | OUTPATIENT
Start: 2025-07-22 | End: 2025-07-22

## 2025-07-22 RX ORDER — CLOPIDOGREL BISULFATE 75 MG/1
75 TABLET ORAL DAILY
Status: DISCONTINUED | OUTPATIENT
Start: 2025-07-22 | End: 2025-07-23 | Stop reason: HOSPADM

## 2025-07-22 RX ORDER — ONDANSETRON 2 MG/ML
4 INJECTION INTRAMUSCULAR; INTRAVENOUS EVERY 6 HOURS PRN
Status: DISCONTINUED | OUTPATIENT
Start: 2025-07-22 | End: 2025-07-23 | Stop reason: HOSPADM

## 2025-07-22 RX ORDER — ACETAMINOPHEN 325 MG/1
650 TABLET ORAL EVERY 8 HOURS
Status: DISCONTINUED | OUTPATIENT
Start: 2025-07-22 | End: 2025-07-23 | Stop reason: HOSPADM

## 2025-07-22 RX ORDER — ACETAMINOPHEN 650 MG/1
650 SUPPOSITORY RECTAL EVERY 4 HOURS PRN
Status: DISCONTINUED | OUTPATIENT
Start: 2025-07-22 | End: 2025-07-22

## 2025-07-22 RX ORDER — ACETAMINOPHEN 160 MG/5ML
650 SOLUTION ORAL EVERY 4 HOURS PRN
Status: DISCONTINUED | OUTPATIENT
Start: 2025-07-22 | End: 2025-07-22

## 2025-07-22 RX ORDER — PANTOPRAZOLE SODIUM 40 MG/1
40 TABLET, DELAYED RELEASE ORAL
Status: DISCONTINUED | OUTPATIENT
Start: 2025-07-22 | End: 2025-07-23 | Stop reason: HOSPADM

## 2025-07-22 RX ORDER — MULTIVITAMIN WITH IRON
1000 TABLET ORAL DAILY
Status: DISCONTINUED | OUTPATIENT
Start: 2025-07-22 | End: 2025-07-23 | Stop reason: HOSPADM

## 2025-07-22 RX ORDER — CARVEDILOL 25 MG/1
25 TABLET ORAL 2 TIMES DAILY WITH MEALS
Status: DISCONTINUED | OUTPATIENT
Start: 2025-07-22 | End: 2025-07-23 | Stop reason: HOSPADM

## 2025-07-22 RX ORDER — ONDANSETRON 4 MG/1
4 TABLET, ORALLY DISINTEGRATING ORAL EVERY 6 HOURS PRN
Status: DISCONTINUED | OUTPATIENT
Start: 2025-07-22 | End: 2025-07-23 | Stop reason: HOSPADM

## 2025-07-22 RX ORDER — NICOTINE POLACRILEX 4 MG
15 LOZENGE BUCCAL
Status: DISCONTINUED | OUTPATIENT
Start: 2025-07-22 | End: 2025-07-23 | Stop reason: HOSPADM

## 2025-07-22 RX ORDER — OXYCODONE AND ACETAMINOPHEN 5; 325 MG/1; MG/1
1 TABLET ORAL EVERY 8 HOURS PRN
Refills: 0 | Status: DISCONTINUED | OUTPATIENT
Start: 2025-07-22 | End: 2025-07-23 | Stop reason: HOSPADM

## 2025-07-22 RX ORDER — ACETAMINOPHEN 650 MG/1
650 SUPPOSITORY RECTAL EVERY 8 HOURS
Status: DISCONTINUED | OUTPATIENT
Start: 2025-07-22 | End: 2025-07-23 | Stop reason: HOSPADM

## 2025-07-22 RX ORDER — SODIUM CHLORIDE 0.9 % (FLUSH) 0.9 %
10 SYRINGE (ML) INJECTION AS NEEDED
Status: DISCONTINUED | OUTPATIENT
Start: 2025-07-22 | End: 2025-07-23 | Stop reason: HOSPADM

## 2025-07-22 RX ORDER — POLYETHYLENE GLYCOL 3350 17 G/17G
17 POWDER, FOR SOLUTION ORAL DAILY
Status: DISCONTINUED | OUTPATIENT
Start: 2025-07-23 | End: 2025-07-23 | Stop reason: HOSPADM

## 2025-07-22 RX ORDER — POLYETHYLENE GLYCOL 3350 17 G/17G
17 POWDER, FOR SOLUTION ORAL DAILY PRN
Status: DISCONTINUED | OUTPATIENT
Start: 2025-07-22 | End: 2025-07-23 | Stop reason: HOSPADM

## 2025-07-22 RX ORDER — FUROSEMIDE 40 MG/1
40 TABLET ORAL EVERY OTHER DAY
COMMUNITY

## 2025-07-22 RX ORDER — PREGABALIN 25 MG/1
50 CAPSULE ORAL EVERY 8 HOURS SCHEDULED
Status: DISCONTINUED | OUTPATIENT
Start: 2025-07-22 | End: 2025-07-23 | Stop reason: HOSPADM

## 2025-07-22 RX ORDER — ACETAMINOPHEN 325 MG/1
650 TABLET ORAL EVERY 4 HOURS PRN
Status: DISCONTINUED | OUTPATIENT
Start: 2025-07-22 | End: 2025-07-22

## 2025-07-22 RX ADMIN — PREGABALIN 50 MG: 25 CAPSULE ORAL at 21:02

## 2025-07-22 RX ADMIN — CARVEDILOL 25 MG: 25 TABLET, FILM COATED ORAL at 18:32

## 2025-07-22 RX ADMIN — CYANOCOBALAMIN TAB 500 MCG 1000 MCG: 500 TAB at 21:00

## 2025-07-22 RX ADMIN — APIXABAN 5 MG: 5 TABLET, FILM COATED ORAL at 21:00

## 2025-07-22 RX ADMIN — SODIUM CHLORIDE 75 ML/HR: 9 INJECTION, SOLUTION INTRAVENOUS at 18:03

## 2025-07-22 RX ADMIN — ONDANSETRON 4 MG: 2 INJECTION, SOLUTION INTRAMUSCULAR; INTRAVENOUS at 13:23

## 2025-07-22 RX ADMIN — CLOPIDOGREL BISULFATE 75 MG: 75 TABLET, FILM COATED ORAL at 18:32

## 2025-07-22 RX ADMIN — Medication 10 ML: at 21:02

## 2025-07-22 RX ADMIN — SODIUM CHLORIDE 1000 ML: 9 INJECTION, SOLUTION INTRAVENOUS at 13:20

## 2025-07-22 RX ADMIN — PANCRELIPASE 12000 UNITS OF LIPASE: 60000; 12000; 38000 CAPSULE, DELAYED RELEASE PELLETS ORAL at 18:32

## 2025-07-22 RX ADMIN — HYDROMORPHONE HYDROCHLORIDE 1 MG: 1 INJECTION, SOLUTION INTRAMUSCULAR; INTRAVENOUS; SUBCUTANEOUS at 13:26

## 2025-07-22 RX ADMIN — DILTIAZEM HYDROCHLORIDE 180 MG: 180 CAPSULE, EXTENDED RELEASE ORAL at 18:32

## 2025-07-22 RX ADMIN — Medication 5 MG: at 21:01

## 2025-07-22 RX ADMIN — ACETAMINOPHEN 650 MG: 325 TABLET ORAL at 21:00

## 2025-07-22 RX ADMIN — PANTOPRAZOLE SODIUM 40 MG: 40 TABLET, DELAYED RELEASE ORAL at 21:02

## 2025-07-22 NOTE — NURSING NOTE
Completed admission skin assessment with SIRENA Christian. Noted large bruising to right elbow and upper arm, small scab to left elbow, blanchable redness to posterior scrotum, scattered bruising to arms and legs, and healing bruising around right eye. Patient reported bruising to right eye was from a fall in March 2025. No open areas noted.

## 2025-07-22 NOTE — CONSULTS
[facility]  Initial Inpatient Consult Note    Referring Provider: Hospitalist    Reason for Consultation: chronic abd pain    Subjective     History of present illness:    84 y.o. male with history of CAD, DM, HTN, HLD, paroxysmal A-fib, and GERD who presented with complaint of abdominal pain.  Pt reports abd pain has been present since September 2024.  He reports pain with movement.  Sometimes it is on the left side of the abdomen and other times is on the right side of the abdomen.  He denies any association with food.  Occasionally does radiate to the left flank.  Mild pain sometimes improves after bowel movement.  Pain occurs daily and usually occurs at rest.  Patient will also have nausea, vomiting.  No benefit with Carafate.  Pt reports constipation controlled with Miralax and Colace.  Patient recently evaluated by GI as outpatient.  Colonoscopy 12/13/2024 with Dr. Estrada with small polyps removed, sigmoid diverticulosis.  Recent EGD/EUS with Dr. Romero at [facility] 5/8/2025 with torturous esophagus, multiple gastric polyps, normal duodenum, stable 8 mm pancreatic lesion consistent with cyst in the tail of pancreas likely IPMN.  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    Past Medical History:  Past Medical History:   Diagnosis Date    Arthritis     Cataract     Removed    Chronic heart failure with preserved ejection fraction (HFpEF) 06/02/2023    Colon polyp     Removed July 13    Coronary artery disease     Diabetes mellitus     Diverticulosis     Elevated cholesterol     Essential hypertension 10/04/2021    Facet arthropathy, lumbar 07/14/2020    GERD (gastroesophageal reflux disease)     HL (hearing loss)     Hyperlipidemia LDL goal <100 08/17/2021    Kidney stone     Lumbar herniated disc 07/14/2020    (R) L3-4    Lumbar spinal stenosis 08/04/2020    Paroxysmal atrial fibrillation  10/04/2021    Pneumonia     Tinnitus     Trigger finger of right thumb 11/01/2017    all fingers both hands     Past Surgical History:  Past Surgical History:   Procedure Laterality Date    BACK SURGERY      CARDIAC CATHETERIZATION N/A 08/23/2022    Procedure: LEFT HEART CATH with coronary artery angiography and right heart catheterization possible percutaneous intervention possible closure device;  Surgeon: Emili Cheng MD;  Location: [facility] CATH INVASIVE LOCATION;  Service: Cardiovascular;  Laterality: N/A;    CAROTID STENT      COLONOSCOPY  2017    Dr. Oneal    COLONOSCOPY N/A 10/06/2021    Procedure: COLONOSCOPY WITH BIOPSIES, POLYPECTOMY;  Surgeon: Vazquez Estrada MD;  Location: [facility] ENDOSCOPY;  Service: Gastroenterology;  Laterality: N/A;  COLON POLYPS, DIVERTICULOSIS, HEMORRHOIDS    COLONOSCOPY N/A 7/13/2023    Procedure: COLONOSCOPY with polypectomy with cold snare;  Surgeon: Vazquez Estrada MD;  Location: [facility] ENDOSCOPY;  Service: Gastroenterology;  Laterality: N/A;  colon polyps, diverticulosis, hemorrhoids    COLONOSCOPY N/A 12/13/2024    Procedure: COLONOSCOPY WITH POLYPECTOMY;  Surgeon: Vazquez Estrada MD;  Location: [facility] ENDOSCOPY;  Service: Gastroenterology;  Laterality: N/A;  DIVERTICULOSIS, COLON POLYPS, HEMORRHOIDS    CORONARY ANGIOPLASTY      ENDOSCOPY N/A 10/06/2021    Procedure: ESOPHAGOGASTRODUODENOSCOPY WITH BIOPSIES;  Surgeon: Vazquez Estrada MD;  Location: [facility] ENDOSCOPY;  Service: Gastroenterology;  Laterality: N/A;  GASTRITIS    ENDOSCOPY N/A 7/13/2023    Procedure: ESOPHAGOGASTRODUODENOSCOPY with biopsies, with dilation with 15- 18 mm balloon;  Surgeon: Vazquez Estrada MD;  Location: [facility] ENDOSCOPY;  Service: Gastroenterology;  Laterality: N/A;  hiatal hernia, gastric polyp, schatzki's ring    ENDOSCOPY N/A 10/9/2024    Procedure: ESOPHAGOGASTRODUODENOSCOPY WITH BIOPSIES AND COLD SNARE POLYPECTOMY;  Surgeon: Vazquez Estrada MD;  Location:   [facility] ENDOSCOPY;  Service: Gastroenterology;  Laterality: N/A;  GASTRITIS, GASTRIC POLYP AND HIATAL HERNIA    KIDNEY STONE SURGERY      Unspecified    LUMBAR DISC SURGERY Right 06/26/2020    L3-4  Minimally Invasive    TRIGGER FINGER RELEASE Bilateral     UPPER GASTROINTESTINAL ENDOSCOPY  10/06/2021    Dr. Estrada      Social History:   Social History     Tobacco Use    Smoking status: Never     Passive exposure: Never    Smokeless tobacco: Never   Substance Use Topics    Alcohol use: Never     Comment: Does not drink      Family History:  Family History   Problem Relation Age of Onset    Heart disease Mother     Arthritis Mother     Heart failure Mother     Heart disease Father     Heart failure Father     Heart attack Other         (Myocardial Infarction)    Colon cancer Neg Hx     Malig Hyperthermia Neg Hx        Home Meds:  Medications Prior to Admission   Medication Sig Dispense Refill Last Dose/Taking    apixaban (ELIQUIS) 5 MG tablet tablet Take 1 tablet by mouth 2 (Two) Times a Day.       carvedilol (COREG) 25 MG tablet Take 1 tablet by mouth 2 (Two) Times a Day With Meals for 30 days. 60 tablet 0     cholecalciferol (VITAMIN D3) 1.25 MG (81359 UT) capsule Take 1 capsule by mouth Every 7 (Seven) Days.       Cholecalciferol 25 MCG (1000 UT) tablet Take 1 tablet by mouth Daily.       clopidogrel (PLAVIX) 75 MG tablet TAKE 1 TABLET BY MOUTH DAILY 90 tablet 3     dilTIAZem CD (CARDIZEM CD) 180 MG 24 hr capsule Take 1 capsule by mouth Daily.       ezetimibe (ZETIA) 10 MG tablet TAKE 1 TABLET BY MOUTH DAILY 90 tablet 3     famotidine (PEPCID) 20 MG tablet        furosemide (LASIX) 20 MG tablet TAKE ONE TABLET BY MOUTH EVERY OTHER DAY ALTERNATING WITH TWO TABLETS BY MOUTH EVERY OTHER DAY. 135 tablet 1     glimepiride (AMARYL) 2 MG tablet Take 1 tablet by mouth Every Morning Before Breakfast.       HYDROcodone-acetaminophen (NORCO) 7.5-325 MG per tablet        Insulin Lispro, 1 Unit Dial, (HUMALOG) 100 UNIT/ML solution  pen-injector Inject  as directed Daily. (Patient not taking: Reported on 7/17/2025)       Lantus SoloStar 100 UNIT/ML injection pen Inject  under the skin into the appropriate area as directed 2 (Two) Times a Day.       levoFLOXacin (LEVAQUIN) 500 MG tablet  (Patient not taking: Reported on 7/17/2025)       Menaquinone-7 (VITAMIN K2 PO) Take  by mouth. (Patient not taking: Reported on 7/17/2025)       metoprolol tartrate (LOPRESSOR) 100 MG tablet Take 1 tablet by mouth. (Patient not taking: Reported on 7/17/2025)       NON FORMULARY Pain management injections       pantoprazole (PROTONIX) 40 MG EC tablet Take 1 tablet by mouth Daily.       potassium chloride 10 MEQ CR tablet Take 1 tablet by mouth. (Patient not taking: Reported on 7/17/2025)       predniSONE (DELTASONE) 20 MG tablet  (Patient not taking: Reported on 7/17/2025)       rosuvastatin (CRESTOR) 20 MG tablet TAKE ONE TABLET BY MOUTH ONCE NIGHTLY 90 tablet 0     spironolactone (ALDACTONE) 25 MG tablet  (Patient taking differently: Take 1 tablet by mouth Every Other Day.)       sucralfate (CARAFATE) 1 GM/10ML suspension        traMADol (ULTRAM) 50 MG tablet  (Patient not taking: Reported on 7/17/2025)       Ventolin  (90 Base) MCG/ACT inhaler  (Patient not taking: Reported on 7/17/2025)       vitamin B-12 (CYANOCOBALAMIN) 1000 MCG tablet Take 1 tablet by mouth Daily. 90 tablet 1     Zinc Acetate, Oral, (ZINC ACETATE PO) Take  by mouth.        Current Meds:   apixaban, 5 mg, Oral, BID  carvedilol, 25 mg, Oral, BID With Meals  clopidogrel, 75 mg, Oral, Daily  dilTIAZem CD, 180 mg, Oral, Q24H  insulin lispro, 2-9 Units, Subcutaneous, 4x Daily AC & at Bedtime  melatonin, 5 mg, Oral, Nightly  Pancrelipase (Lip-Prot-Amyl), 12,000 units of lipase, Oral, TID With Meals  pantoprazole, 40 mg, Oral, Q AM  pregabalin, 50 mg, Oral, Q8H  sodium chloride, 10 mL, Intravenous, Q12H  vitamin B-12, 1,000 mcg, Oral, Daily      Allergies:  Allergies   Allergen Reactions     Formaldehyde Itching     patch test    Lisinopril Cough     Review of Systems  Pertinent items are noted in HPI, all other systems reviewed and negative     Objective     Vital Signs  Temp:  [97.8 °F (36.6 °C)-98.4 °F (36.9 °C)] 98.4 °F (36.9 °C)  Heart Rate:  [84-96] 94  Resp:  [18-19] 19  BP: (115-156)/() 140/99  Physical Exam:  General Appearance:    Alert, cooperative, in no acute distress   Head:    Normocephalic, without obvious abnormality, atraumatic   Eyes:          conjunctivae and sclerae normal, no icterus   Throat:   no thrush, oral mucosa moist       Lungs:     Breathing unlabored    Heart:    No chest tenderness   Chest Wall:    No abnormalities observed   Abdomen:     Soft, nondistended, mild mid left abd TTP   Extremities:   no edema, no redness   Skin:   No bruising or rash   Psychiatric:  normal mood and insight     Results Review:   I reviewed the patient's new clinical results.    Results from last 7 days   Lab Units 07/22/25  1147   WBC 10*3/mm3 12.88*   HEMOGLOBIN g/dL 14.1   HEMATOCRIT % 42.5   PLATELETS 10*3/mm3 251     Results from last 7 days   Lab Units 07/22/25  1147   SODIUM mmol/L 139   POTASSIUM mmol/L 4.7   CHLORIDE mmol/L 99   CO2 mmol/L 25.1   BUN mg/dL 19.3   CREATININE mg/dL 1.16   CALCIUM mg/dL 9.6   BILIRUBIN mg/dL 0.5   ALK PHOS U/L 115   ALT (SGPT) U/L 16   AST (SGOT) U/L 16   GLUCOSE mg/dL 179*         Lab Results   Lab Value Date/Time    LIPASE 15 07/22/2025 1147    LIPASE 34 10/12/2024 0516    LIPASE 36 10/11/2024 0512    LIPASE 34 10/10/2024 0506    LIPASE 13 09/07/2024 0202    LIPASE 41 08/21/2022 0152    LIPASE 33 07/31/2022 1326    LIPASE 30 03/22/2022 0806    LIPASE 36 07/12/2021 1226         Assessment & Plan     Abdominal pain       Assessment:  Chronic generalized abd pain  Abnormal MRCP; chronic pancreatitis  Pancreatic IPMN  Constipation    Plan:  MRI today suggestive of chronic pancreatitis.  Pt denies any symptoms of malabsorption/diarrhea.  Primary to  start Creon.  Pain control per primary.  Diet as tolerated  Pt on multiple potential culprits of acute pancreatitis such as: Zetia, Lasix, Crestor, Lyrica, tramadol.  However, today no signs of acute pancreatitis noted on imaging; lipase also normal.  Will start Miralax daily for constipation  Continue to f/u with GI as outpatient      I discussed the patients findings and my recommendations with patient, family, and primary care team.    Lisette Bradshaw MD

## 2025-07-22 NOTE — ED PROVIDER NOTES
Time: 2:05 PM EDT  Date of encounter:  7/22/2025  Independent Historian/Clinical History and Information was obtained by:   Patient, family    History is limited by: N/A    Chief Complaint: Abdominal pain      History of Present Illness:  Patient is a 84 y.o. year old male who presents to the emergency department for evaluation of abdominal pain that is gotten worse over the past several days.  Patient reports nausea with no vomiting.  Patient denies diarrhea.  Patient has no leg pain or swelling.  Patient has seen his PCP and was sent to the emergency department for an MRI of the abdomen.      Patient Care Team  Primary Care Provider: Adán Montes MD    Past Medical History:     Allergies   Allergen Reactions    Formaldehyde Itching     patch test    Lisinopril Cough     Past Medical History:   Diagnosis Date    Arthritis     Cataract     Removed    Chronic heart failure with preserved ejection fraction (HFpEF) 06/02/2023    Colon polyp     Removed July 13    Coronary artery disease     Diabetes mellitus     Diverticulosis     Elevated cholesterol     Essential hypertension 10/04/2021    Facet arthropathy, lumbar 07/14/2020    GERD (gastroesophageal reflux disease)     HL (hearing loss)     Hyperlipidemia LDL goal <100 08/17/2021    Kidney stone     Lumbar herniated disc 07/14/2020    (R) L3-4    Lumbar spinal stenosis 08/04/2020    Paroxysmal atrial fibrillation 10/04/2021    Pneumonia     Tinnitus     Trigger finger of right thumb 11/01/2017    all fingers both hands     Past Surgical History:   Procedure Laterality Date    BACK SURGERY      CARDIAC CATHETERIZATION N/A 08/23/2022    Procedure: LEFT HEART CATH with coronary artery angiography and right heart catheterization possible percutaneous intervention possible closure device;  Surgeon: Emlii Cheng MD;  Location: [facility] CATH INVASIVE LOCATION;  Service: Cardiovascular;  Laterality: N/A;    CAROTID STENT      COLONOSCOPY  2017    Dr. Oneal     COLONOSCOPY N/A 10/06/2021    Procedure: COLONOSCOPY WITH BIOPSIES, POLYPECTOMY;  Surgeon: Vazquez Estrada MD;  Location: [facility] ENDOSCOPY;  Service: Gastroenterology;  Laterality: N/A;  COLON POLYPS, DIVERTICULOSIS, HEMORRHOIDS    COLONOSCOPY N/A 7/13/2023    Procedure: COLONOSCOPY with polypectomy with cold snare;  Surgeon: Vazquez Estrada MD;  Location: [facility] ENDOSCOPY;  Service: Gastroenterology;  Laterality: N/A;  colon polyps, diverticulosis, hemorrhoids    COLONOSCOPY N/A 12/13/2024    Procedure: COLONOSCOPY WITH POLYPECTOMY;  Surgeon: Vazquez Estrada MD;  Location: [facility] ENDOSCOPY;  Service: Gastroenterology;  Laterality: N/A;  DIVERTICULOSIS, COLON POLYPS, HEMORRHOIDS    CORONARY ANGIOPLASTY      ENDOSCOPY N/A 10/06/2021    Procedure: ESOPHAGOGASTRODUODENOSCOPY WITH BIOPSIES;  Surgeon: Vazquez Estrada MD;  Location: [facility] ENDOSCOPY;  Service: Gastroenterology;  Laterality: N/A;  GASTRITIS    ENDOSCOPY N/A 7/13/2023    Procedure: ESOPHAGOGASTRODUODENOSCOPY with biopsies, with dilation with 15- 18 mm balloon;  Surgeon: Vazquez Estrada MD;  Location: [facility] ENDOSCOPY;  Service: Gastroenterology;  Laterality: N/A;  hiatal hernia, gastric polyp, schatzki's ring    ENDOSCOPY N/A 10/9/2024    Procedure: ESOPHAGOGASTRODUODENOSCOPY WITH BIOPSIES AND COLD SNARE POLYPECTOMY;  Surgeon: Vazquez Estrada MD;  Location: [facility] ENDOSCOPY;  Service: Gastroenterology;  Laterality: N/A;  GASTRITIS, GASTRIC POLYP AND HIATAL HERNIA    KIDNEY STONE SURGERY      Unspecified    LUMBAR DISC SURGERY Right 06/26/2020    L3-4  Minimally Invasive    TRIGGER FINGER RELEASE Bilateral     UPPER GASTROINTESTINAL ENDOSCOPY  10/06/2021    Dr. Estrada     Family History   Problem Relation Age of Onset    Heart disease Mother     Arthritis Mother     Heart failure Mother     Heart disease Father     Heart failure Father     Heart attack Other         (Myocardial Infarction)    Colon cancer Neg Hx     Malig  Hyperthermia Neg Hx        Home Medications:  Prior to Admission medications    Medication Sig Start Date End Date Taking? Authorizing Provider   apixaban (ELIQUIS) 5 MG tablet tablet Take 1 tablet by mouth 2 (Two) Times a Day.    Mei Mathias MD   carvedilol (COREG) 25 MG tablet Take 1 tablet by mouth 2 (Two) Times a Day With Meals for 30 days. 6/4/24 7/17/25  Sanjuana Zeng MD   cholecalciferol (VITAMIN D3) 1.25 MG (06912 UT) capsule Take 1 capsule by mouth Every 7 (Seven) Days.    Mei Mathias MD   Cholecalciferol 25 MCG (1000 UT) tablet Take 1 tablet by mouth Daily.    Mei Mathias MD   clopidogrel (PLAVIX) 75 MG tablet TAKE 1 TABLET BY MOUTH DAILY 6/27/25   Maurice Mixon MD   dilTIAZem CD (CARDIZEM CD) 180 MG 24 hr capsule Take 1 capsule by mouth Daily. 10/19/24   Naresh George MD   ezetimibe (ZETIA) 10 MG tablet TAKE 1 TABLET BY MOUTH DAILY 5/14/25   Elly Dwyer APRN   famotidine (PEPCID) 20 MG tablet  2/11/25   Mei Mathias MD   furosemide (LASIX) 20 MG tablet TAKE ONE TABLET BY MOUTH EVERY OTHER DAY ALTERNATING WITH TWO TABLETS BY MOUTH EVERY OTHER DAY. 2/25/25   Maurice Mixon MD   glimepiride (AMARYL) 2 MG tablet Take 1 tablet by mouth Every Morning Before Breakfast. 3/29/25   Mei Mathias MD   HYDROcodone-acetaminophen (NORCO) 7.5-325 MG per tablet  6/26/25   Mei Mathias MD   Insulin Lispro, 1 Unit Dial, (HUMALOG) 100 UNIT/ML solution pen-injector Inject  as directed Daily.  Patient not taking: Reported on 7/17/2025 3/8/25   Mei Mathias MD   Lantus SoloStar 100 UNIT/ML injection pen Inject  under the skin into the appropriate area as directed 2 (Two) Times a Day. 1/15/25   Mei Mathias MD   levoFLOXacin (LEVAQUIN) 500 MG tablet  5/30/25   Mei Mathias MD   Menaquinone-7 (VITAMIN K2 PO) Take  by mouth.  Patient not taking: Reported on 7/17/2025    Mei Mathias MD   metoprolol tartrate  (LOPRESSOR) 100 MG tablet Take 1 tablet by mouth.  Patient not taking: Reported on 7/17/2025    Mei Mathias MD   NON FORMULARY Pain management injections    Mei Mathias MD   pantoprazole (PROTONIX) 40 MG EC tablet Take 1 tablet by mouth Daily.    Mei Mathias MD   potassium chloride 10 MEQ CR tablet Take 1 tablet by mouth.  Patient not taking: Reported on 7/17/2025    Mei Mathias MD   predniSONE (DELTASONE) 20 MG tablet  6/3/25   Mei Mathias MD   rosuvastatin (CRESTOR) 20 MG tablet TAKE ONE TABLET BY MOUTH ONCE NIGHTLY 3/24/25   Maurice Mixon MD   spironolactone (ALDACTONE) 25 MG tablet  11/5/24   Mei Mathias MD   sucralfate (CARAFATE) 1 GM/10ML suspension  7/3/25   Mei Mathias MD   traMADol (ULTRAM) 50 MG tablet  7/16/25   Mei Mathias MD   Ventolin  (90 Base) MCG/ACT inhaler  5/30/25   Mei Mathias MD   vitamin B-12 (CYANOCOBALAMIN) 1000 MCG tablet Take 1 tablet by mouth Daily. 4/21/25   Franklyn Olivia PA-C   Zinc Acetate, Oral, (ZINC ACETATE PO) Take  by mouth.    Mei Mathias MD        Social History:   Social History     Tobacco Use    Smoking status: Never     Passive exposure: Never    Smokeless tobacco: Never   Vaping Use    Vaping status: Never Used   Substance Use Topics    Alcohol use: Never     Comment: Does not drink    Drug use: Never         Review of Systems:  Review of Systems   Constitutional:  Negative for chills and fever.   HENT:  Negative for congestion, rhinorrhea and sore throat.    Eyes:  Negative for pain and visual disturbance.   Respiratory:  Negative for apnea, cough, chest tightness and shortness of breath.    Cardiovascular:  Negative for chest pain and palpitations.   Gastrointestinal:  Positive for abdominal pain. Negative for diarrhea, nausea and vomiting.   Genitourinary:  Negative for difficulty urinating and dysuria.   Musculoskeletal:  Negative for joint swelling and myalgias.  "  Skin:  Negative for color change.   Neurological:  Negative for seizures and headaches.   Psychiatric/Behavioral: Negative.     All other systems reviewed and are negative.       Physical Exam:  /98   Pulse 90   Temp 97.8 °F (36.6 °C) (Oral)   Resp 18   Ht 177.8 cm (70\")   Wt 67.5 kg (148 lb 13 oz)   SpO2 97%   BMI 21.35 kg/m²     Physical Exam  Vitals and nursing note reviewed.   Constitutional:       General: He is not in acute distress.     Appearance: Normal appearance. He is not toxic-appearing.   HENT:      Head: Normocephalic and atraumatic.      Jaw: There is normal jaw occlusion.   Eyes:      General: Lids are normal.      Extraocular Movements: Extraocular movements intact.      Conjunctiva/sclera: Conjunctivae normal.      Pupils: Pupils are equal, round, and reactive to light.   Cardiovascular:      Rate and Rhythm: Normal rate and regular rhythm.      Pulses: Normal pulses.      Heart sounds: Normal heart sounds.   Pulmonary:      Effort: Pulmonary effort is normal. No respiratory distress.      Breath sounds: Normal breath sounds. No wheezing or rhonchi.   Abdominal:      General: Abdomen is flat.      Palpations: Abdomen is soft.      Tenderness: There is abdominal tenderness. There is no guarding or rebound.   Musculoskeletal:         General: Normal range of motion.      Cervical back: Normal range of motion and neck supple.      Right lower leg: No edema.      Left lower leg: No edema.   Skin:     General: Skin is warm and dry.   Neurological:      Mental Status: He is alert and oriented to person, place, and time. Mental status is at baseline.   Psychiatric:         Mood and Affect: Mood normal.                    Medical Decision Making:      Comorbidities that affect care:    Pancreatitis    External Notes reviewed:    Hospital Discharge Summary: Patient was recently admitted for pancreatitis      The following orders were placed and all results were independently analyzed by " me:  Orders Placed This Encounter   Procedures    MRI abdomen wo contrast mrcp    [place] Draw    Comprehensive Metabolic Panel    Lipase    Urinalysis With Microscopic If Indicated (No Culture) - Urine, Clean Catch    Lactic Acid, Plasma    CBC Auto Differential    Scan Slide    NPO Diet NPO Type: Strict NPO    Undress & Gown    Inpatient Hospitalist Consult    Inpatient Gastroenterology Consult    Insert Peripheral IV    Initiate Observation Status    CBC & Differential    Green Top (Gel)    Lavender Top    Gold Top - SST    Light Blue Top       Medications Given in the Emergency Department:  Medications   sodium chloride 0.9 % flush 10 mL (has no administration in time range)   sodium chloride 0.9 % bolus 1,000 mL (0 mL Intravenous Stopped 7/22/25 1530)   HYDROmorphone (DILAUDID) injection 1 mg (1 mg Intravenous Given 7/22/25 1326)   ondansetron (ZOFRAN) injection 4 mg (4 mg Intravenous Given 7/22/25 1323)        ED Course:         Labs:    Lab Results (last 24 hours)       Procedure Component Value Units Date/Time    CBC & Differential [[id number]]  (Abnormal) Collected: 07/22/25 1147    Specimen: Blood Updated: 07/22/25 1223    Narrative:      The following orders were created for panel order CBC & Differential.  Procedure                               Abnormality         Status                     ---------                               -----------         ------                     CBC Auto Differential[[id number]]        Abnormal            Final result               Scan Slide[[id number]]                                       Final result                 Please view results for these tests on the individual orders.    Comprehensive Metabolic Panel [[id number]]  (Abnormal) Collected: 07/22/25 1147    Specimen: Blood Updated: 07/22/25 1217     Glucose 179 mg/dL      BUN 19.3 mg/dL      Creatinine 1.16 mg/dL      Sodium 139 mmol/L      Potassium 4.7 mmol/L      Comment: Slight hemolysis detected by analyzer.  Result may be falsely elevated.        Chloride 99 mmol/L      CO2 25.1 mmol/L      Calcium 9.6 mg/dL      Total Protein 6.4 g/dL      Albumin 3.8 g/dL      ALT (SGPT) 16 U/L      AST (SGOT) 16 U/L      Alkaline Phosphatase 115 U/L      Total Bilirubin 0.5 mg/dL      Globulin 2.6 gm/dL      A/G Ratio 1.5 g/dL      BUN/Creatinine Ratio 16.6     Anion Gap 14.9 mmol/L      eGFR 62.9 mL/min/1.73     Narrative:      GFR Categories in Chronic Kidney Disease (CKD)              GFR Category          GFR (mL/min/1.73)    Interpretation  G1                    90 or greater        Normal or high (1)  G2                    60-89                Mild decrease (1)  G3a                   45-59                Mild to moderate decrease  G3b                   30-44                Moderate to severe decrease  G4                    15-29                Severe decrease  G5                    14 or less           Kidney failure    (1)In the absence of evidence of kidney disease, neither GFR category G1 or G2 fulfill the criteria for CKD.    eGFR calculation 2021 CKD-EPI creatinine equation, which does not include race as a factor    Lipase [[id number]]  (Normal) Collected: 07/22/25 1147    Specimen: Blood Updated: 07/22/25 1217     Lipase 15 U/L     Lactic Acid, Plasma [[id number]]  (Normal) Collected: 07/22/25 1147    Specimen: Blood Updated: 07/22/25 1219     Lactate 1.9 mmol/L     CBC Auto Differential [[id number]]  (Abnormal) Collected: 07/22/25 1147    Specimen: Blood Updated: 07/22/25 1223     WBC 12.88 10*3/mm3      RBC 4.38 10*6/mm3      Hemoglobin 14.1 g/dL      Hematocrit 42.5 %      MCV 97.0 fL      MCH 32.2 pg      MCHC 33.2 g/dL      RDW 14.0 %      RDW-SD 50.4 fl      MPV 9.4 fL      Platelets 251 10*3/mm3      Neutrophil % 72.3 %      Lymphocyte % 9.9 %      Monocyte % 10.0 %      Eosinophil % 2.6 %      Basophil % 0.9 %      Immature Grans % 4.3 %      Neutrophils, Absolute 9.30 10*3/mm3      Lymphocytes, Absolute 1.28  10*3/mm3      Monocytes, Absolute 1.29 10*3/mm3      Eosinophils, Absolute 0.33 10*3/mm3      Basophils, Absolute 0.12 10*3/mm3      Immature Grans, Absolute 0.56 10*3/mm3      nRBC 0.0 /100 WBC     Scan Slide [[id number]] Collected: 07/22/25 1147    Specimen: Blood Updated: 07/22/25 1223     Crenated RBC's Slight/1+     Poikilocytes Slight/1+     WBC Morphology Normal     Platelet Estimate Adequate    Urinalysis With Microscopic If Indicated (No Culture) - Urine, Clean Catch [[id number]]  (Abnormal) Collected: 07/22/25 1233    Specimen: Urine, Clean Catch Updated: 07/22/25 1247     Color, UA Yellow     Appearance, UA Clear     pH, UA 5.5     Specific Gravity, UA 1.016     Glucose, UA Negative     Ketones, UA Trace     Bilirubin, UA Negative     Blood, UA Negative     Protein, UA Trace     Leuk Esterase, UA Negative     Nitrite, UA Negative     Urobilinogen, UA 0.2 E.U./dL    Narrative:      Urine microscopic not indicated.             Imaging:    MRI abdomen wo contrast mrcp  Result Date: 7/22/2025  MRI ABDOMEN WO CONTRAST MRCP Date of Exam: 7/22/2025 2:50 PM EDT Indication: abdominal pain.  Comparison: MR abdomen 3/3/2025, CT abdomen pelvis 6/27/2025, MRI abdomen 12/4/2024 Technique:  Routine multiplanar/multisequence images of the abdomen were obtained with MRCP sequences without contrast administration. Findings: Normal caliber biliary tree smoothly tapering towards ampulla without suspicious mass or filling defect. Common bile duct diameter measures up to 6-7 mm normal for age. Right posterior duct arises off common hepatic duct, anatomic variant. No visualized gallstones, gallbladder distention, wall thickening or pericholecystic fluid. Bulbous dilation of the main pancreatic duct up to 1.1 cm near ampulla MRCP image 41 series 14 consistent with Wirsungocele.. Mild prominence of the main pancreatic duct measuring up to 4 mm nonspecific for age. Few ectatic sidebranches. Discrete cystic lesion at the pancreatic  genu measuring 1.3 cm probably communicating with the main duct and in the pancreatic tail measuring 1.1 cm probably communicating with the main duct. Lesion in the pancreatic genu appears increased from previous MRI comparisons previously measuring approximately 8 mm. No evidence of active inflammation or drainable collection. Pancreas is moderately atrophic for age with otherwise maintained intrinsic T1 signal. Cardiomegaly. Trace pericardial and left pleural effusions. Gynecomastia. Normal size and contour liver and spleen. Decreased T2 signal and signal loss on in phase imaging in the liver consistent with mineral deposition. Multiple small hepatobiliary cysts similar to prior. No suspicious adrenal nodule. Symmetric renal size and contour. Multiple small T2 hyperintense simple/benign appearing cysts. There is a small markedly T1 hyperintense lesion in the right inferior renal pole suggesting hemorrhagic/proteinaceous cyst. Largest simple cyst is exophytic off the left inferior renal pole measuring 9 cm. No hydronephrosis. Expected configuration stomach and duodenum. Colonic diverticulosis. No evidence of bowel obstruction. No ascites or drainable fluid collection. Negative for abdominal aortic aneurysm. No suspicious adenopathy. There is a T11 compression fracture with edema suggesting recent (acute/subacute) fracture. Moderate to severe height loss anteriorly. This finding has progressed since 6/26/2025 and is new from 3/3/2025. Mild superior endplate height loss of L2 without appreciable edema likely chronic. No suspicious bone lesion.     Impression: 1. No acute intra-abdominal findings to explain patient's reported pain. 2. Unremarkable cholangiogram for age. 3. Possible sequelae of chronic pancreatitis including glandular atrophy and ectatic sidebranches. Mild prominence of the main duct (4 mm). 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 4. 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 Given lesion size and patient age, 1 year follow-up MRI/MRCP recommended to ensure stability. 5. Subacute T11 compression fracture progressed from 6/25/2025 and new from 3/3/2025. 6. Metal deposition in the liver. 7. Cardiomegaly with trace pericardial and left pleural effusions. 8. Other ancillary findings as above. Electronically Signed: Cooper Waters MD  7/22/2025 3:51 PM EDT  Workstation ID: [id number]        Differential Diagnosis and Discussion:    Abdominal Pain: Based on the patient's signs and symptoms, I considered abdominal aortic aneurysm, small bowel obstruction, pancreatitis, acute cholecystitis, acute appendecitis, peptic ulcer disease, gastritis, colitis, endocrine disorders, irritable bowel syndrome and other differential diagnosis an etiology of the patient's abdominal pain.    PROCEDURES:    Labs were collected in the emergency department and all labs were reviewed and interpreted by me.  MRI was performed in the emergency department and the MRI impression was interpreted by me.     No orders to display       Procedures    MDM     The patient´s CBC that was reviewed and interpreted by me shows no abnormalities of critical concern. Of note, there is no anemia requiring a blood transfusion and the platelet count is acceptable.  The patient´s CMP that was reviewed and interpretted by me shows no abnormalities of critical concern. Of note, the patient´s sodium and potassium are acceptable. The patient´s liver enzymes are unremarkable. The patient´s renal function (creatinine) is preserved. The patient has a normal anion gap.  MRI of the abdomen is negative for acute intra-abdominal pathology.                  Patient Care Considerations:    I considered starting antibiotics, however no bacterial focus of infection was  found.      Consultants/Shared Management Plan:    Case was discussed with Dr. Montes who requests that the patient be admitted.  Case was discussed with Dr. Butcher who agrees to admit the patient.    Social Determinants of Health:    Patient is independent, reliable, and has access to care.       Disposition and Care Coordination:    Admit:   Through independent evaluation of the patient's history, physical, and imperical data, the patient meets criteria for inpatient admission to the hospital.        Final diagnoses:   Abdominal pain, unspecified abdominal location        ED Disposition       ED Disposition   Decision to Admit    Condition   --    Comment   Level of Care: Telemetry [5]   Diagnosis: Abdominal pain [520096]   Admitting Physician: EVELIN BUTCHER [[id number]]   Attending Physician: EVELIN BUTCHER [[id number]]                 This medical record created using voice recognition software.             Crys Wesley MD  07/22/25 4709
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
## 2025-07-23 ENCOUNTER — TELEPHONE (OUTPATIENT)
Dept: GASTROENTEROLOGY | Facility: CLINIC | Age: 85
End: 2025-07-23
Payer: MEDICARE

## 2025-07-23 VITALS
HEIGHT: 70 IN | TEMPERATURE: 97.3 F | OXYGEN SATURATION: 97 % | DIASTOLIC BLOOD PRESSURE: 74 MMHG | RESPIRATION RATE: 18 BRPM | SYSTOLIC BLOOD PRESSURE: 106 MMHG | BODY MASS INDEX: 21.15 KG/M2 | WEIGHT: 147.71 LBS | HEART RATE: 74 BPM

## 2025-07-23 LAB
25(OH)D3 SERPL-MCNC: 34 NG/ML (ref 30–100)
ANION GAP SERPL CALCULATED.3IONS-SCNC: 9 MMOL/L (ref 5–15)
BASOPHILS # BLD AUTO: 0.12 10*3/MM3 (ref 0–0.2)
BASOPHILS NFR BLD AUTO: 1.2 % (ref 0–1.5)
BUN SERPL-MCNC: 18.4 MG/DL (ref 8–23)
BUN/CREAT SERPL: 24.5 (ref 7–25)
CALCIUM SPEC-SCNC: 8.9 MG/DL (ref 8.6–10.5)
CHLORIDE SERPL-SCNC: 106 MMOL/L (ref 98–107)
CO2 SERPL-SCNC: 25 MMOL/L (ref 22–29)
CREAT SERPL-MCNC: 0.75 MG/DL (ref 0.76–1.27)
DEPRECATED RDW RBC AUTO: 50.2 FL (ref 37–54)
EGFRCR SERPLBLD CKD-EPI 2021: 90.1 ML/MIN/1.73
EOSINOPHIL # BLD AUTO: 0.41 10*3/MM3 (ref 0–0.4)
EOSINOPHIL NFR BLD AUTO: 4.1 % (ref 0.3–6.2)
ERYTHROCYTE [DISTWIDTH] IN BLOOD BY AUTOMATED COUNT: 14 % (ref 12.3–15.4)
GLUCOSE BLDC GLUCOMTR-MCNC: 167 MG/DL (ref 70–99)
GLUCOSE BLDC GLUCOMTR-MCNC: 60 MG/DL (ref 70–99)
GLUCOSE BLDC GLUCOMTR-MCNC: 64 MG/DL (ref 70–99)
GLUCOSE BLDC GLUCOMTR-MCNC: 69 MG/DL (ref 70–99)
GLUCOSE BLDC GLUCOMTR-MCNC: 94 MG/DL (ref 70–99)
GLUCOSE SERPL-MCNC: 53 MG/DL (ref 65–99)
HCT VFR BLD AUTO: 35.5 % (ref 37.5–51)
HGB BLD-MCNC: 11.6 G/DL (ref 13–17.7)
IMM GRANULOCYTES # BLD AUTO: 0.48 10*3/MM3 (ref 0–0.05)
IMM GRANULOCYTES NFR BLD AUTO: 4.8 % (ref 0–0.5)
LYMPHOCYTES # BLD AUTO: 1.39 10*3/MM3 (ref 0.7–3.1)
LYMPHOCYTES NFR BLD AUTO: 14 % (ref 19.6–45.3)
MAGNESIUM SERPL-MCNC: 1.6 MG/DL (ref 1.6–2.4)
MCH RBC QN AUTO: 31.7 PG (ref 26.6–33)
MCHC RBC AUTO-ENTMCNC: 32.7 G/DL (ref 31.5–35.7)
MCV RBC AUTO: 97 FL (ref 79–97)
MONOCYTES # BLD AUTO: 1.37 10*3/MM3 (ref 0.1–0.9)
MONOCYTES NFR BLD AUTO: 13.8 % (ref 5–12)
NEUTROPHILS NFR BLD AUTO: 6.18 10*3/MM3 (ref 1.7–7)
NEUTROPHILS NFR BLD AUTO: 62.1 % (ref 42.7–76)
NRBC BLD AUTO-RTO: 0 /100 WBC (ref 0–0.2)
PHOSPHATE SERPL-MCNC: 3.6 MG/DL (ref 2.5–4.5)
PLATELET # BLD AUTO: 212 10*3/MM3 (ref 140–450)
PMV BLD AUTO: 9.1 FL (ref 6–12)
POTASSIUM SERPL-SCNC: 3.6 MMOL/L (ref 3.5–5.2)
RBC # BLD AUTO: 3.66 10*6/MM3 (ref 4.14–5.8)
SODIUM SERPL-SCNC: 140 MMOL/L (ref 136–145)
WBC NRBC COR # BLD AUTO: 9.95 10*3/MM3 (ref 3.4–10.8)

## 2025-07-23 PROCEDURE — 36415 COLL VENOUS BLD VENIPUNCTURE: CPT | Performed by: HOSPITALIST

## 2025-07-23 PROCEDURE — 97165 OT EVAL LOW COMPLEX 30 MIN: CPT

## 2025-07-23 PROCEDURE — 82948 REAGENT STRIP/BLOOD GLUCOSE: CPT | Performed by: HOSPITALIST

## 2025-07-23 PROCEDURE — 84100 ASSAY OF PHOSPHORUS: CPT | Performed by: HOSPITALIST

## 2025-07-23 PROCEDURE — 99238 HOSP IP/OBS DSCHRG MGMT 30/<: CPT | Performed by: STUDENT IN AN ORGANIZED HEALTH CARE EDUCATION/TRAINING PROGRAM

## 2025-07-23 PROCEDURE — 80048 BASIC METABOLIC PNL TOTAL CA: CPT | Performed by: HOSPITALIST

## 2025-07-23 PROCEDURE — 83735 ASSAY OF MAGNESIUM: CPT | Performed by: HOSPITALIST

## 2025-07-23 PROCEDURE — 63710000001 INSULIN LISPRO (HUMAN) PER 5 UNITS: Performed by: HOSPITALIST

## 2025-07-23 PROCEDURE — 82948 REAGENT STRIP/BLOOD GLUCOSE: CPT

## 2025-07-23 PROCEDURE — G0378 HOSPITAL OBSERVATION PER HR: HCPCS

## 2025-07-23 PROCEDURE — 82306 VITAMIN D 25 HYDROXY: CPT | Performed by: HOSPITALIST

## 2025-07-23 PROCEDURE — 25810000003 SODIUM CHLORIDE 0.9 % SOLUTION: Performed by: HOSPITALIST

## 2025-07-23 PROCEDURE — 97161 PT EVAL LOW COMPLEX 20 MIN: CPT

## 2025-07-23 PROCEDURE — 85025 COMPLETE CBC W/AUTO DIFF WBC: CPT | Performed by: HOSPITALIST

## 2025-07-23 RX ADMIN — INSULIN LISPRO 2 UNITS: 100 INJECTION, SOLUTION INTRAVENOUS; SUBCUTANEOUS at 12:02

## 2025-07-23 RX ADMIN — APIXABAN 5 MG: 5 TABLET, FILM COATED ORAL at 08:57

## 2025-07-23 RX ADMIN — PANCRELIPASE 12000 UNITS OF LIPASE: 60000; 12000; 38000 CAPSULE, DELAYED RELEASE PELLETS ORAL at 12:02

## 2025-07-23 RX ADMIN — POLYETHYLENE GLYCOL 3350 17 G: 17 POWDER, FOR SOLUTION ORAL at 08:58

## 2025-07-23 RX ADMIN — Medication 10 ML: at 08:58

## 2025-07-23 RX ADMIN — ACETAMINOPHEN 650 MG: 325 TABLET ORAL at 05:08

## 2025-07-23 RX ADMIN — PREGABALIN 50 MG: 25 CAPSULE ORAL at 05:08

## 2025-07-23 RX ADMIN — PANCRELIPASE 12000 UNITS OF LIPASE: 60000; 12000; 38000 CAPSULE, DELAYED RELEASE PELLETS ORAL at 08:56

## 2025-07-23 RX ADMIN — SODIUM CHLORIDE 75 ML/HR: 9 INJECTION, SOLUTION INTRAVENOUS at 09:08

## 2025-07-23 RX ADMIN — PANTOPRAZOLE SODIUM 40 MG: 40 TABLET, DELAYED RELEASE ORAL at 05:09

## 2025-07-23 RX ADMIN — PREGABALIN 50 MG: 25 CAPSULE ORAL at 13:29

## 2025-07-23 RX ADMIN — CYANOCOBALAMIN TAB 500 MCG 1000 MCG: 500 TAB at 08:56

## 2025-07-23 RX ADMIN — CLOPIDOGREL BISULFATE 75 MG: 75 TABLET, FILM COATED ORAL at 08:57

## 2025-07-23 RX ADMIN — ACETAMINOPHEN 650 MG: 325 TABLET ORAL at 13:29

## 2025-07-23 RX ADMIN — DILTIAZEM HYDROCHLORIDE 180 MG: 180 CAPSULE, EXTENDED RELEASE ORAL at 08:57

## 2025-07-23 RX ADMIN — CARVEDILOL 25 MG: 25 TABLET, FILM COATED ORAL at 08:56

## 2025-07-23 NOTE — THERAPY EVALUATION
Acute Care - Physical Therapy Initial Evaluation  AMELIE Estes     Patient Name: Ion PROCTOR Case  : [id number]  MRN: [id number]  Today's Date: 2025      Visit Dx:     ICD-10-CM ICD-9-CM   1. Abdominal pain, unspecified abdominal location  R10.9 789.00   2. Decreased activities of daily living (ADL)  Z78.9 V49.89   3. Difficulty walking  R26.2 719.7     Patient Active Problem List   Diagnosis    Hyperlipidemia LDL goal <70    Vitamin D deficiency    Atrial fibrillation, persistent    Essential hypertension    Chronic superficial gastritis without bleeding    Chronic right flank pain    Adenomatous polyp of colon    Left-sided chest wall pain    Type 2 diabetes mellitus without complication, without long-term current use of insulin    CAD S/P percutaneous coronary angioplasty    Weight loss    Medicare annual wellness visit, subsequent    Chronic heart failure with preserved ejection fraction (HFpEF)    Prerenal azotemia    Elevated liver enzymes    Cellulitis of left lower extremity    Stage 3a chronic kidney disease    Absent peripheral pulse    History of colon polyps    Venous insufficiency of both lower extremities    Pelvic fracture    Multifocal pneumonia    Left upper quadrant pain    Closed wedge compression fracture of T8 vertebra with delayed healing    Pleural effusion    Debility    Epigastric pain    Pancreatitis    Iron deficiency anemia    Occult blood in stools    Iron deficiency anemia    Iron adverse reaction, sequela    Abdominal pain     Past Medical History:   Diagnosis Date    Arthritis     Cataract     Removed    Chronic heart failure with preserved ejection fraction (HFpEF) 2023    Colon polyp     Removed     Coronary artery disease     Diabetes mellitus     Diverticulosis     Elevated cholesterol     Essential hypertension 10/04/2021    Facet arthropathy, lumbar 2020    GERD (gastroesophageal reflux disease)     HL (hearing loss)     Hyperlipidemia LDL goal <100 2021     Kidney stone     Lumbar herniated disc 07/14/2020    (R) L3-4    Lumbar spinal stenosis 08/04/2020    Paroxysmal atrial fibrillation 10/04/2021    Pneumonia     Tinnitus     Trigger finger of right thumb 11/01/2017    all fingers both hands     Past Surgical History:   Procedure Laterality Date    BACK SURGERY      CARDIAC CATHETERIZATION N/A 08/23/2022    Procedure: LEFT HEART CATH with coronary artery angiography and right heart catheterization possible percutaneous intervention possible closure device;  Surgeon: Emili Cheng MD;  Location: [facility] CATH INVASIVE LOCATION;  Service: Cardiovascular;  Laterality: N/A;    CAROTID STENT      COLONOSCOPY  2017    Dr. Oneal    COLONOSCOPY N/A 10/06/2021    Procedure: COLONOSCOPY WITH BIOPSIES, POLYPECTOMY;  Surgeon: Vazquez Estrada MD;  Location: [facility] ENDOSCOPY;  Service: Gastroenterology;  Laterality: N/A;  COLON POLYPS, DIVERTICULOSIS, HEMORRHOIDS    COLONOSCOPY N/A 7/13/2023    Procedure: COLONOSCOPY with polypectomy with cold snare;  Surgeon: Vazquez Estrada MD;  Location: [facility] ENDOSCOPY;  Service: Gastroenterology;  Laterality: N/A;  colon polyps, diverticulosis, hemorrhoids    COLONOSCOPY N/A 12/13/2024    Procedure: COLONOSCOPY WITH POLYPECTOMY;  Surgeon: Vazquez Estrada MD;  Location: [facility] ENDOSCOPY;  Service: Gastroenterology;  Laterality: N/A;  DIVERTICULOSIS, COLON POLYPS, HEMORRHOIDS    CORONARY ANGIOPLASTY      ENDOSCOPY N/A 10/06/2021    Procedure: ESOPHAGOGASTRODUODENOSCOPY WITH BIOPSIES;  Surgeon: Vazquez Estrada MD;  Location: [facility] ENDOSCOPY;  Service: Gastroenterology;  Laterality: N/A;  GASTRITIS    ENDOSCOPY N/A 7/13/2023    Procedure: ESOPHAGOGASTRODUODENOSCOPY with biopsies, with dilation with 15- 18 mm balloon;  Surgeon: Vazquez Estrada MD;  Location: [facility] ENDOSCOPY;  Service: Gastroenterology;  Laterality: N/A;  hiatal hernia, gastric polyp, schatzki's ring    ENDOSCOPY N/A 10/9/2024    Procedure:  ESOPHAGOGASTRODUODENOSCOPY WITH BIOPSIES AND COLD SNARE POLYPECTOMY;  Surgeon: Vazquez Estrada MD;  Location: [facility] ENDOSCOPY;  Service: Gastroenterology;  Laterality: N/A;  GASTRITIS, GASTRIC POLYP AND HIATAL HERNIA    KIDNEY STONE SURGERY      Unspecified    LUMBAR DISC SURGERY Right 06/26/2020    L3-4  Minimally Invasive    TRIGGER FINGER RELEASE Bilateral     UPPER GASTROINTESTINAL ENDOSCOPY  10/06/2021    Dr. Estrada     PT Assessment (Last 12 Hours)       PT Evaluation and Treatment       Row Name 07/23/25 1324          Physical Therapy Time and Intention    Document Type evaluation  -AV     Mode of Treatment individual therapy;physical therapy  -AV       Row Name 07/23/25 1324          General Information    Patient Profile Reviewed yes  -AV     Prior Level of Function independent:;all household mobility;gait;transfer;ADL's  Ambulated with RW. No home O2. Recent compression fxs with TLSO at home. Patient does not like to wear TLSO at home. Daughter at bedside, aware  -AV     Existing Precautions/Restrictions fall;TLSO;spinal  -AV       Row Name 07/23/25 1324          Living Environment    Current Living Arrangements home  -AV     Home Accessibility stairs within home;stairs to enter home  -AV     People in Home spouse  -AV       Row Name 07/23/25 1324          Home Main Entrance    Number of Stairs, Main Entrance one  -AV       Row Name 07/23/25 1324          Stairs Within Home, Primary    Stairs, Within Home, Primary Flight to second level bedroom where stairlift is located  -AV       Row Name 07/23/25 1335          Range of Motion (ROM)    Range of Motion bilateral lower extremities;ROM is WFL  -AV       Row Name 07/23/25 1335          Strength (Manual Muscle Testing)    Strength (Manual Muscle Testing) bilateral lower extremities  4/5  -AV       Row Name 07/23/25 1335          Bed Mobility    Comment, (Bed Mobility) Patient had just returned to bed after completing transfers with OT. Reports SBA for  bed mobility. Patient also educated on log roll technique to decrease pain and maintain spinal alignment with supine<>sit transfer  -AV       Row Name 07/23/25 1335          Transfers    Comment, (Transfers) Just completed prior to PT arrival. CGA per report  -AV       Row Name 07/23/25 1335          Safety Issues/Impairments Affecting Functional Mobility    Impairments Affecting Function (Mobility) balance;endurance/activity tolerance;pain  -AV       Row Name 07/23/25 1335          Balance    Comment, Balance CGA per report  -AV       Row Name             [REMOVED] Wound 07/22/25 1844 Right posterior elbow    Wound - Properties Group Placement Date: 07/22/25  -RP Placement Time: 1844  -RP Side: Right  -RP Orientation: posterior  -RP Location: elbow  -RP Additional Comments: not a wound.  -DANY Removal Date: 07/23/25  -DANY Removal Time: 0557  -DANY    Retired Wound - Properties Group Placement Date: 07/22/25  -RP Placement Time: 1844  -RP Side: Right  -RP Orientation: posterior  -RP Location: elbow  -RP Additional Comments: not a wound.  -DANY Removal Date: 07/23/25  -DANY Removal Time: 0557  -DANY    Retired Wound - Properties Group Placement Date: 07/22/25  -RP Placement Time: 1844  -RP Side: Right  -RP Orientation: posterior  -RP Location: elbow  -RP Additional Comments: not a wound.  -DANY Removal Date: 07/23/25  -DANY Removal Time: 0557  -DANY    Retired Wound - Properties Group Date first assessed: 07/22/25  -RP Time first assessed: 1844  -RP Side: Right  -RP Location: elbow  -RP Additional Comments: not a wound.  -DANY Resolution Date: 07/23/25  -DANY Resolution Time: 0557  -DANY      Row Name 07/23/25 1335          Plan of Care Review    Plan of Care Reviewed With patient;daughter  -AV     Progress no change  -AV     Outcome Evaluation Patient presents with deficits in balance, endurance, transfers, and ambulation. Patient will benefit from skilled PT services to address these mobility deficits and decrease risk of falls.  -AV        Row Name 07/23/25 1337          Vital Signs    O2 Delivery Pre Treatment room air  -AV     O2 Delivery Intra Treatment room air  -AV     O2 Delivery Post Treatment room air  -AV       Row Name 07/23/25 8370          Positioning and Restraints    Pre-Treatment Position in bed  -AV     Post Treatment Position bed  -AV     In Bed fowlers;call light within reach;encouraged to call for assist;exit alarm on;with family/caregiver  -AV       Row Name 07/23/25 1309          Therapy Assessment/Plan (PT)    Rehab Potential (PT) good  -AV     Criteria for Skilled Interventions Met (PT) yes;meets criteria  -AV     Therapy Frequency (PT) daily  -AV     Predicted Duration of Therapy Intervention (PT) 10 days  -AV     Problem List (PT) problems related to;balance;mobility;strength;pain  -AV     Activity Limitations Related to Problem List (PT) unable to transfer safely;unable to ambulate safely  -AV       Row Name 07/23/25 1842          PT Evaluation Complexity    History, PT Evaluation Complexity 1-2 personal factors and/or comorbidities  -AV     Examination of Body Systems (PT Eval Complexity) total of 4 or more elements  -AV     Clinical Presentation (PT Evaluation Complexity) stable  -AV     Clinical Decision Making (PT Evaluation Complexity) low complexity  -AV     Overall Complexity (PT Evaluation Complexity) low complexity  -AV       Row Name 07/23/25 2757          Therapy Plan Review/Discharge Plan (PT)    Therapy Plan Review (PT) evaluation/treatment results reviewed;patient;daughter  -AV       Row Name 07/23/25 9659          Physical Therapy Goals    Transfer Goal Selection (PT) transfer, PT goal 1  -AV     Gait Training Goal Selection (PT) gait training, PT goal 1  -AV       Row Name 07/23/25 8135          Transfer Goal 1 (PT)    Activity/Assistive Device (Transfer Goal 1, PT) sit-to-stand/stand-to-sit;bed-to-chair/chair-to-bed;walker, rolling  -AV     Boron Level/Cues Needed (Transfer Goal 1, PT) modified  independence  -AV     Time Frame (Transfer Goal 1, PT) 10 days  -AV       Row Name 07/23/25 1335          Gait Training Goal 1 (PT)    Activity/Assistive Device (Gait Training Goal 1, PT) gait (walking locomotion);assistive device use;walker, rolling  -AV     Harmony Level (Gait Training Goal 1, PT) modified independence  -AV     Distance (Gait Training Goal 1, PT) 200  -AV     Time Frame (Gait Training Goal 1, PT) 10 days  -AV               User Key  (r) = Recorded By, (t) = Taken By, (c) = Cosigned By      Initials Name Provider Type    Rena Melara, RN Registered Nurse    Luke Martin, PT Physical Therapist    Deb Riggs RN Registered Nurse                    Physical Therapy Education       Title: PT OT SLP Therapies (In Progress)       Topic: Physical Therapy (In Progress)       Point: Mobility training (Done)       Learning Progress Summary            Patient Acceptance, E,TB, VU by AV at 7/23/2025 1342                      Point: Home exercise program (Not Started)       Learner Progress:  Not documented in this visit.              Point: Body mechanics (Done)       Learning Progress Summary            Patient Acceptance, E,TB, VU by AV at 7/23/2025 1342                      Point: Precautions (Done)       Learning Progress Summary            Patient Acceptance, E,TB, VU by AV at 7/23/2025 1342                                      User Key       Initials Effective Dates Name Provider Type Discipline    AV 06/11/21 -  Luke Limon, PT Physical Therapist PT                  PT Recommendation and Plan  Anticipated Discharge Disposition (PT): home with outpatient therapy services  Planned Therapy Interventions (PT): balance training, bed mobility training, gait training, home exercise program, neuromuscular re-education, strengthening, transfer training  Therapy Frequency (PT): daily  Plan of Care Reviewed With: patient, daughter  Progress: no change  Outcome Evaluation:  Patient presents with deficits in balance, endurance, transfers, and ambulation. Patient will benefit from skilled PT services to address these mobility deficits and decrease risk of falls.   Outcome Measures       Row Name 07/23/25 1341             How much help from another person do you currently need...    Turning from your back to your side while in flat bed without using bedrails? 4  -AV      Moving from lying on back to sitting on the side of a flat bed without bedrails? 3  -AV      Moving to and from a bed to a chair (including a wheelchair)? 3  -AV      Standing up from a chair using your arms (e.g., wheelchair, bedside chair)? 3  -AV      Climbing 3-5 steps with a railing? 2  -AV      To walk in hospital room? 3  -AV      AM-PAC 6 Clicks Score (PT) 18  -AV         Functional Assessment    Outcome Measure Options AM-PAC 6 Clicks Basic Mobility (PT)  -AV                User Key  (r) = Recorded By, (t) = Taken By, (c) = Cosigned By      Initials Name Provider Type    AV Luke Limon, PT Physical Therapist                     Time Calculation:    PT Charges       Row Name 07/23/25 1341             Time Calculation    PT Received On 07/23/25  -AV      PT Goal Re-Cert Due Date 08/01/25  -AV         Untimed Charges    PT Eval/Re-eval Minutes 25  -AV         Total Minutes    Untimed Charges Total Minutes 25  -AV       Total Minutes 25  -AV                User Key  (r) = Recorded By, (t) = Taken By, (c) = Cosigned By      Initials Name Provider Type    AV Luke Limon, PT Physical Therapist                  Therapy Charges for Today       Code Description Service Date Service Provider Modifiers Qty    [id number] HC PT EVAL LOW COMPLEXITY 2 7/23/2025 Luke Limon, PT GP 1            PT G-Codes  Outcome Measure Options: AM-PAC 6 Clicks Basic Mobility (PT)  AM-PAC 6 Clicks Score (PT): 18  AM-PAC 6 Clicks Score (OT): 18    Luke Limon PT  7/23/2025

## 2025-07-23 NOTE — PLAN OF CARE
Goal Outcome Evaluation:  Plan of Care Reviewed With: patient        Progress: improving  Outcome Evaluation: Education complete. Patient discharging home.

## 2025-07-23 NOTE — PLAN OF CARE
Goal Outcome Evaluation:  Plan of Care Reviewed With: patient        Progress: improving  Outcome Evaluation: A/Ox4, able to make needs known. Scheduled Tylenol and Lyrica ordered for chronic pain, effective per patient. Weight assistance provided with q2h turns and encouragement. Up x1 assist. IVF. VSS
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
## 2025-07-23 NOTE — THERAPY EVALUATION
Patient Name: Ion PROCTOR Case  : [id number]    MRN: [id number]                              Today's Date: 2025       Admit Date: 2025    Visit Dx:     ICD-10-CM ICD-9-CM   1. Abdominal pain, unspecified abdominal location  R10.9 789.00   2. Decreased activities of daily living (ADL)  Z78.9 V49.89     Patient Active Problem List   Diagnosis    Hyperlipidemia LDL goal <70    Vitamin D deficiency    Atrial fibrillation, persistent    Essential hypertension    Chronic superficial gastritis without bleeding    Chronic right flank pain    Adenomatous polyp of colon    Left-sided chest wall pain    Type 2 diabetes mellitus without complication, without long-term current use of insulin    CAD S/P percutaneous coronary angioplasty    Weight loss    Medicare annual wellness visit, subsequent    Chronic heart failure with preserved ejection fraction (HFpEF)    Prerenal azotemia    Elevated liver enzymes    Cellulitis of left lower extremity    Stage 3a chronic kidney disease    Absent peripheral pulse    History of colon polyps    Venous insufficiency of both lower extremities    Pelvic fracture    Multifocal pneumonia    Left upper quadrant pain    Closed wedge compression fracture of T8 vertebra with delayed healing    Pleural effusion    Debility    Epigastric pain    Pancreatitis    Iron deficiency anemia    Occult blood in stools    Iron deficiency anemia    Iron adverse reaction, sequela    Abdominal pain     Past Medical History:   Diagnosis Date    Arthritis     Cataract     Removed    Chronic heart failure with preserved ejection fraction (HFpEF) 2023    Colon polyp     Removed     Coronary artery disease     Diabetes mellitus     Diverticulosis     Elevated cholesterol     Essential hypertension 10/04/2021    Facet arthropathy, lumbar 2020    GERD (gastroesophageal reflux disease)     HL (hearing loss)     Hyperlipidemia LDL goal <100 2021    Kidney stone     Lumbar herniated disc  07/14/2020    (R) L3-4    Lumbar spinal stenosis 08/04/2020    Paroxysmal atrial fibrillation 10/04/2021    Pneumonia     Tinnitus     Trigger finger of right thumb 11/01/2017    all fingers both hands     Past Surgical History:   Procedure Laterality Date    BACK SURGERY      CARDIAC CATHETERIZATION N/A 08/23/2022    Procedure: LEFT HEART CATH with coronary artery angiography and right heart catheterization possible percutaneous intervention possible closure device;  Surgeon: Emili Cheng MD;  Location: [facility] CATH INVASIVE LOCATION;  Service: Cardiovascular;  Laterality: N/A;    CAROTID STENT      COLONOSCOPY  2017    Dr. Oneal    COLONOSCOPY N/A 10/06/2021    Procedure: COLONOSCOPY WITH BIOPSIES, POLYPECTOMY;  Surgeon: Vazquez Estrada MD;  Location: [facility] ENDOSCOPY;  Service: Gastroenterology;  Laterality: N/A;  COLON POLYPS, DIVERTICULOSIS, HEMORRHOIDS    COLONOSCOPY N/A 7/13/2023    Procedure: COLONOSCOPY with polypectomy with cold snare;  Surgeon: Vazquez Estrada MD;  Location: [facility] ENDOSCOPY;  Service: Gastroenterology;  Laterality: N/A;  colon polyps, diverticulosis, hemorrhoids    COLONOSCOPY N/A 12/13/2024    Procedure: COLONOSCOPY WITH POLYPECTOMY;  Surgeon: Vazquez Estrada MD;  Location: [facility] ENDOSCOPY;  Service: Gastroenterology;  Laterality: N/A;  DIVERTICULOSIS, COLON POLYPS, HEMORRHOIDS    CORONARY ANGIOPLASTY      ENDOSCOPY N/A 10/06/2021    Procedure: ESOPHAGOGASTRODUODENOSCOPY WITH BIOPSIES;  Surgeon: Vazquez Estrada MD;  Location: [facility] ENDOSCOPY;  Service: Gastroenterology;  Laterality: N/A;  GASTRITIS    ENDOSCOPY N/A 7/13/2023    Procedure: ESOPHAGOGASTRODUODENOSCOPY with biopsies, with dilation with 15- 18 mm balloon;  Surgeon: Vazquez Estrada MD;  Location: [facility] ENDOSCOPY;  Service: Gastroenterology;  Laterality: N/A;  hiatal hernia, gastric polyp, schatzki's ring    ENDOSCOPY N/A 10/9/2024    Procedure: ESOPHAGOGASTRODUODENOSCOPY WITH BIOPSIES AND COLD  SNARE POLYPECTOMY;  Surgeon: Vazquez Estrada MD;  Location: [facility] ENDOSCOPY;  Service: Gastroenterology;  Laterality: N/A;  GASTRITIS, GASTRIC POLYP AND HIATAL HERNIA    KIDNEY STONE SURGERY      Unspecified    LUMBAR DISC SURGERY Right 06/26/2020    L3-4  Minimally Invasive    TRIGGER FINGER RELEASE Bilateral     UPPER GASTROINTESTINAL ENDOSCOPY  10/06/2021    Dr. Estrada      General Information       Row Name 07/23/25 1319          OT Time and Intention    Document Type evaluation  -     Mode of Treatment individual therapy;occupational therapy  -       Row Name 07/23/25 1319          General Information    Patient Profile Reviewed yes  -     Prior Level of Function --  (I) with ADLs, ambulated w/o a device, has a walk-in shower with shower chair/grab bars, elevated commode, stands to groom, and no home O2. Recent compression fxs with pt reporting TLSO PRN at home.  -     Existing Precautions/Restrictions spinal;TLSO;fall  -     Barriers to Rehab none identified  -       Row Name 07/23/25 1319          Occupational Profile    Reason for Services/Referral (Occupational Profile) Patient is an 84 year old male admitted to [facility] for abdominal pain on July 22nd, 2025. Occupational therapy consulted due to recent decline in ADLs/functional transfers. No previous occupational therapy services for current condition.  -       Row Name 07/23/25 1319          Living Environment    Current Living Arrangements home  -     People in Home spouse  -       Row Name 07/23/25 1319          Home Main Entrance    Number of Stairs, Main Entrance one  -     Stair Railings, Main Entrance railings safe and in good condition  -       Row Name 07/23/25 1319          Stairs Within Home, Primary    Stairs, Within Home, Primary Stair lift to upper level where bedroom is located  -       Row Name 07/23/25 1319          Cognition    Orientation Status (Cognition) oriented x 4  -       Row Name  07/23/25 1319          Safety Issues/Impairments Affecting Functional Mobility    Impairments Affecting Function (Mobility) balance;endurance/activity tolerance;pain  -LF               User Key  (r) = Recorded By, (t) = Taken By, (c) = Cosigned By      Initials Name Provider Type    LF Elsa Curtis OT Occupational Therapist                     Mobility/ADL's       Row Name 07/23/25 1322          Bed Mobility    Bed Mobility supine-sit;sit-supine  -LF     Supine-Sit [place] (Bed Mobility) standby assist  -LF     Sit-Supine [place] (Bed Mobility) standby assist  -LF     Assistive Device (Bed Mobility) bed rails  -LF     Comment, (Bed Mobility) Educated patient on logroll transfer technique to maintain alignment of spine, adhere to spinal precautions, and decrease pain.  -       Row Name 07/23/25 1322          Transfers    Transfers sit-stand transfer;stand-sit transfer  -       Row Name 07/23/25 1322          Sit-Stand Transfer    Sit-Stand [place] (Transfers) contact guard  -     Assistive Device (Sit-Stand Transfers) walker, front-wheeled  -       Row Name 07/23/25 1322          Stand-Sit Transfer    Stand-Sit [place] (Transfers) contact guard  -LF     Assistive Device (Stand-Sit Transfers) walker, front-wheeled  -LF       Row Name 07/23/25 1322          Activities of Daily Living    BADL Assessment/Intervention bathing;upper body dressing;lower body dressing;grooming;feeding;toileting  -       Row Name 07/23/25 1322          Bathing Assessment/Intervention    [place] Level (Bathing) bathing skills;upper body;standby assist;lower body;maximum assist (25% patient effort)  -       Row Name 07/23/25 1322          Upper Body Dressing Assessment/Training    [place] Level (Upper Body Dressing) upper body dressing skills;standby assist  -       Row Name 07/23/25 1322          Lower Body Dressing Assessment/Training    [place] Level (Lower Body Dressing) lower body  dressing skills;maximum assist (25% patient effort)  -       Row Name 07/23/25 1322          Grooming Assessment/Training    [place] Level (Grooming) grooming skills;set up  -       Row Name 07/23/25 1322          Self-Feeding Assessment/Training    [place] Level (Feeding) feeding skills;set up  -       Row Name 07/23/25 1322          Toileting Assessment/Training    [place] Level (Toileting) toileting skills;minimum assist (75% patient effort)  -               User Key  (r) = Recorded By, (t) = Taken By, (c) = Cosigned By      Initials Name Provider Type     Elsa Curtis OT Occupational Therapist                   Obj/Interventions       Row Name 07/23/25 1324          Sensory Assessment (Somatosensory)    Sensory Assessment (Somatosensory) UE sensation intact  -[facility] Name 07/23/25 1324          Vision Assessment/Intervention    Visual Impairment/Limitations WFL  -[facility] Name 07/23/25 1324          Range of Motion Comprehensive    General Range of Motion bilateral upper extremity ROM WFL  -LF       Row Name 07/23/25 1324          Strength Comprehensive (MMT)    Comment, General Manual Muscle Testing (MMT) Assessment 4/5 BUEs  -       Row Name 07/23/25 1324          Motor Skills    Motor Skills coordination;functional endurance  -LF     Coordination bilateral;upper extremity;WFL  -     Functional Endurance Fair  -       Row Name 07/23/25 1324          Balance    Balance Assessment sitting dynamic balance;standing dynamic balance  -LF     Dynamic Sitting Balance supervision  -LF     Position, Sitting Balance unsupported;sitting edge of bed  -LF     Dynamic Standing Balance contact guard  -LF     Position/Device Used, Standing Balance supported;walker, front-wheeled  -               User Key  (r) = Recorded By, (t) = Taken By, (c) = Cosigned By      Initials Name Provider Type     Elsa Curtis OT Occupational Therapist                   Goals/Plan       [facility]  Name 07/23/25 1328          Bed Mobility Goal 1 (OT)    Activity/Assistive Device (Bed Mobility Goal 1, OT) bed mobility activities, all  -LF     [place] Level/Cues Needed (Bed Mobility Goal 1, OT) modified independence  -LF     Time Frame (Bed Mobility Goal 1, OT) long term goal (LTG);10 days  -LF       Row Name 07/23/25 1328          Transfer Goal 1 (OT)    Activity/Assistive Device (Transfer Goal 1, OT) transfers, all  -LF     [place] Level/Cues Needed (Transfer Goal 1, OT) modified independence  -LF     Time Frame (Transfer Goal 1, OT) long term goal (LTG);10 days  -LF       Row Name 07/23/25 1328          Bathing Goal 1 (OT)    Activity/Device (Bathing Goal 1, OT) bathing skills, all  -LF     [place] Level/Cues Needed (Bathing Goal 1, OT) modified independence  -LF     Time Frame (Bathing Goal 1, OT) long term goal (LTG);10 days  -LF       Row Name 07/23/25 1328          Dressing Goal 1 (OT)    Activity/Device (Dressing Goal 1, OT) dressing skills, all  -LF     [place]/Cues Needed (Dressing Goal 1, OT) modified independence  -LF     Time Frame (Dressing Goal 1, OT) long term goal (LTG);10 days  -LF       Row Name 07/23/25 1328          Toileting Goal 1 (OT)    Activity/Device (Toileting Goal 1, OT) toileting skills, all  -LF     [place] Level/Cues Needed (Toileting Goal 1, OT) modified independence  -LF     Time Frame (Toileting Goal 1, OT) long term goal (LTG);10 days  -LF       Row Name 07/23/25 1328          Problem Specific Goal 1 (OT)    Problem Specific Goal 1 (OT) Patient will demonstrate good endurance to support ADLs/functional transfers.  -LF     Time Frame (Problem Specific Goal 1, OT) long term goal (LTG);10 days  -LF       Row Name 07/23/25 1328          Therapy Assessment/Plan (OT)    Planned Therapy Interventions (OT) activity tolerance training;BADL retraining;functional balance retraining;occupation/activity based interventions;patient/caregiver  education/training;strengthening exercise;transfer/mobility retraining  -               User Key  (r) = Recorded By, (t) = Taken By, (c) = Cosigned By      Initials Name Provider Type     Elsa Curtis, ROLANDO Occupational Therapist                   Clinical Impression       Row Name 07/23/25 1329          Pain Assessment    Additional Documentation Pain Scale: FACES Pre/Post-Treatment (Group)  -       Row Name 07/23/25 1324          Pain Scale: FACES Pre/Post-Treatment    Pain: FACES Scale, Pretreatment 2-->hurts little bit  -     Posttreatment Pain Rating 2-->hurts little bit  -       Row Name 07/23/25 1324          Plan of Care Review    Plan of Care Reviewed With patient;daughter  -     Progress no change  -     Outcome Evaluation Patient presents with limitations in self-care, functional transfers, balance, and endurance. He would benefit from continued skilled occupational therapy services to maximize independence with ADLs/functional transfers. Encouraged pt to wear TLSO when upright d/t new/worsening compression fxs.  -       Row Name 07/23/25 1328          Therapy Assessment/Plan (OT)    Patient/Family Therapy Goal Statement (OT) To maximize independence.  -     Rehab Potential (OT) good  -     Criteria for Skilled Therapeutic Interventions Met (OT) yes;meets criteria;skilled treatment is necessary  -     Therapy Frequency (OT) 5 times/wk  -       Row Name 07/23/25 1328          Therapy Plan Review/Discharge Plan (OT)    Anticipated Discharge Disposition (OT) home with home health;home with assist  -       Row Name 07/23/25 1324          Vital Signs    O2 Delivery Pre Treatment room air  -     O2 Delivery Intra Treatment room air  -     O2 Delivery Post Treatment room air  -       Row Name 07/23/25 1327          Positioning and Restraints    Pre-Treatment Position in bed  -LF     Post Treatment Position bed  -LF     In Bed fowlers;call light within reach;encouraged to call  for assist;exit alarm on;with family/caregiver;with PT  -LF               User Key  (r) = Recorded By, (t) = Taken By, (c) = Cosigned By      Initials Name Provider Type    LF Elsa Curtis OT Occupational Therapist                   Outcome Measures       Row Name 07/23/25 1329          How much help from another is currently needed...    Putting on and taking off regular lower body clothing? 2  -LF     Bathing (including washing, rinsing, and drying) 2  -LF     Toileting (which includes using toilet bed pan or urinal) 3  -LF     Putting on and taking off regular upper body clothing 3  -LF     Taking care of personal grooming (such as brushing teeth) 4  -LF     Eating meals 4  -LF     AM-PAC 6 Clicks Score (OT) 18  -LF       Row Name 07/23/25 0900          How much help from another person do you currently need...    Turning from your back to your side while in flat bed without using bedrails? 3  -TS     Moving from lying on back to sitting on the side of a flat bed without bedrails? 3  -TS     Moving to and from a bed to a chair (including a wheelchair)? 3  -TS     Standing up from a chair using your arms (e.g., wheelchair, bedside chair)? 3  -TS     Climbing 3-5 steps with a railing? 2  -TS     To walk in hospital room? 3  -TS     AM-PAC 6 Clicks Score (PT) 17  -TS       Row Name 07/23/25 1329          Functional Assessment    Outcome Measure Options AM-PAC 6 Clicks Daily Activity (OT);Optimal Instrument  -LF       Row Name 07/23/25 1329          Optimal Instrument    Optimal Instrument Optimal - 3  -LF     Bending/Stooping 5  -LF     Standing 2  -LF     Reaching 1  -LF     From the list, choose the 3 activities you would most like to be able to do without any difficulty Bending/stooping;Standing;Reaching  -LF     Total Score Optimal - 3 8  -LF               User Key  (r) = Recorded By, (t) = Taken By, (c) = Cosigned By      Initials Name Provider Type    Elsa Hull OT Occupational Therapist    TS  Anahi Eckert, RN Registered Nurse                    Occupational Therapy Education       Title: PT OT SLP Therapies (In Progress)       Topic: Occupational Therapy (In Progress)       Point: ADL training (In Progress)       Learning Progress Summary            Patient Acceptance, E,TB, NR by  at 7/23/2025 1329                      Point: Precautions (In Progress)       Learning Progress Summary            Patient Acceptance, E,TB, NR by  at 7/23/2025 1329                      Point: Body mechanics (In Progress)       Learning Progress Summary            Patient Acceptance, E,TB, NR by  at 7/23/2025 1329                                      User Key       Initials Effective Dates Name Provider Type Discipline     06/16/21 -  Elsa Curtis OT Occupational Therapist OT                  OT Recommendation and Plan  Planned Therapy Interventions (OT): activity tolerance training, BADL retraining, functional balance retraining, occupation/activity based interventions, patient/caregiver education/training, strengthening exercise, transfer/mobility retraining  Therapy Frequency (OT): 5 times/wk  Plan of Care Review  Plan of Care Reviewed With: patient, daughter  Progress: no change  Outcome Evaluation: Patient presents with limitations in self-care, functional transfers, balance, and endurance. He would benefit from continued skilled occupational therapy services to maximize independence with ADLs/functional transfers. Encouraged pt to wear TLSO when upright d/t new/worsening compression fxs.     Time Calculation:   Evaluation Complexity (OT)  Review Occupational Profile/Medical/Therapy History Complexity: brief/low complexity  Assessment, Occupational Performance/Identification of Deficit Complexity: 3-5 performance deficits  Clinical Decision Making Complexity (OT): problem focused assessment/low complexity  Overall Complexity of Evaluation (OT): low complexity     Time Calculation- OT       Row Name  07/23/25 1330             Time Calculation- OT    OT Received On 07/23/25  -LF      OT Goal Re-Cert Due Date 08/01/25  -LF         Untimed Charges    OT Eval/Re-eval Minutes 35  -LF         Total Minutes    Untimed Charges Total Minutes 35  -LF       Total Minutes 35  -LF                User Key  (r) = Recorded By, (t) = Taken By, (c) = Cosigned By      Initials Name Provider Type     Elsa Curtis OT Occupational Therapist                  Therapy Charges for Today       Code Description Service Date Service Provider Modifiers Qty    [id number] HC OT EVAL LOW COMPLEXITY 3 7/23/2025 Elsa Curtis OT GO 1                 Elsa Curtis OT  7/23/2025

## 2025-07-23 NOTE — TELEPHONE ENCOUNTER
Patient is currently inpatient but he will need a follow-up with Dr. Estrada once discharged per Dr. Bradshaw

## 2025-07-23 NOTE — CONSULTS
"Nutrition Services    Patient Name: Ion PROCTOR Case  YOB: 1940  MRN: [id number]  Admission date: 7/22/2025      CLINICAL NUTRITION ASSESSMENT      Reason for Assessment  Chronic Poor Intake     H&P:  Past Medical History:   Diagnosis Date    Arthritis     Cataract     Removed    Chronic heart failure with preserved ejection fraction (HFpEF) 06/02/2023    Colon polyp     Removed July 13    Coronary artery disease     Diabetes mellitus     Diverticulosis     Elevated cholesterol     Essential hypertension 10/04/2021    Facet arthropathy, lumbar 07/14/2020    GERD (gastroesophageal reflux disease)     HL (hearing loss)     Hyperlipidemia LDL goal <100 08/17/2021    Kidney stone     Lumbar herniated disc 07/14/2020    (R) L3-4    Lumbar spinal stenosis 08/04/2020    Paroxysmal atrial fibrillation 10/04/2021    Pneumonia     Tinnitus     Trigger finger of right thumb 11/01/2017    all fingers both hands        Current Problems:   Active Hospital Problems    Diagnosis     **Abdominal pain         Nutrition/Diet History         Narrative   Patient is an 84-year-old man who presents to the emergency room for evaluation of abdominal pain that is progressively gotten worse over the last several days.  He reports no nausea or vomiting and denies any diarrhea.  His primary care physician, Dr. Montes, advised the patient to come to the ER for an MRI of the abdomen     Patient is Aa0x4 upon RD visit, NAD. Pt's wife present in the room. Patient is pleasant, conversant during encounter. Pt reports 75% intake of morning meal tray.  Pt denies any NVDC.  Last BM 07/23  Pt reports 8 pound weight loss in 3 weeks, significant.      Anthropometrics        Current Height, Weight Height: 177.8 cm (70\")  Weight: 67 kg (147 lb 11.3 oz)   Current BMI Body mass index is 21.19 kg/m².   BMI Classification Underweight   % IBW    Adjusted Body Weight (ABW)    Weight Hx  Wt Readings from Last 30 Encounters:   07/23/25 0522 67 kg (147 lb " 11.3 oz)   07/22/25 1135 67.5 kg (148 lb 13 oz)   07/17/25 1130 68.4 kg (150 lb 12.8 oz)   07/16/25 1009 68.9 kg (152 lb)   06/24/25 1058 70.1 kg (154 lb 9.6 oz)   04/28/25 0941 70.1 kg (154 lb 9.6 oz)   04/21/25 1039 70.1 kg (154 lb 9.6 oz)   04/18/25 0115 71.2 kg (156 lb 15.5 oz)   02/12/25 1043 70.5 kg (155 lb 6.4 oz)   01/15/25 1358 70.2 kg (154 lb 12.8 oz)   01/15/25 1045 68 kg (150 lb)   12/13/24 1111 69.4 kg (153 lb)   11/26/24 1405 75.2 kg (165 lb 12.8 oz)   11/20/24 1448 72.3 kg (159 lb 6.4 oz)   10/18/24 0449 73.3 kg (161 lb 8 oz)   10/17/24 0425 74 kg (163 lb 2.3 oz)   10/16/24 0500 74.3 kg (163 lb 12.8 oz)   10/15/24 0321 74.7 kg (164 lb 10.9 oz)   10/14/24 0604 73.7 kg (162 lb 7.7 oz)   10/13/24 0350 73 kg (161 lb)   10/12/24 0523 74.2 kg (163 lb 9.3 oz)   10/11/24 0500 72.9 kg (160 lb 11.5 oz)   10/10/24 0500 72.2 kg (159 lb 2.8 oz)   10/09/24 0500 72.6 kg (160 lb 0.9 oz)   10/08/24 0500 73.8 kg (162 lb 11.2 oz)   10/07/24 0538 74.2 kg (163 lb 9.3 oz)   10/06/24 0600 75.3 kg (166 lb 0.1 oz)   10/05/24 0500 76.5 kg (168 lb 10.4 oz)   10/04/24 0500 76.2 kg (167 lb 15.9 oz)   10/02/24 1645 79.5 kg (175 lb 4.3 oz)   09/25/24 0948 76.8 kg (169 lb 5 oz)   09/23/24 0858 80.3 kg (177 lb)   09/16/24 1106 80.6 kg (177 lb 12.8 oz)   09/04/24 1333 81.2 kg (179 lb)   09/02/24 1134 81.2 kg (179 lb 0.2 oz)   08/28/24 0508 83.2 kg (183 lb 6.8 oz)   08/28/24 0104 83.2 kg (183 lb 6.8 oz)   08/27/24 1950 83.1 kg (183 lb 3.2 oz)   07/12/24 0841 83 kg (183 lb)   07/11/24 1344 83.3 kg (183 lb 9.6 oz)   06/26/24 1219 77.6 kg (171 lb)   06/04/24 0500 78.1 kg (172 lb 2.9 oz)   06/03/24 0500 78 kg (171 lb 15.3 oz)   06/02/24 0500 78.3 kg (172 lb 9.9 oz)   06/01/24 0500 79.1 kg (174 lb 6.1 oz)   05/31/24 0500 78.7 kg (173 lb 8 oz)   05/28/24 0943 80.1 kg (176 lb 9.4 oz)   05/27/24 1549 80.1 kg (176 lb 9.4 oz)   05/24/24 1356 84.6 kg (186 lb 8.2 oz)   05/21/24 0055 82.5 kg (181 lb 14.1 oz)   05/20/24 1819 83 kg (182 lb 15.7 oz)    05/13/24 1112 82.2 kg (181 lb 3.2 oz)   04/04/24 1419 81.6 kg (179 lb 12.8 oz)   03/12/24 1014 78.6 kg (173 lb 3.2 oz)   02/27/24 0902 81.1 kg (178 lb 12.8 oz)          Wt Change Observation Pt reports 8 pound weight loss in 3 weeks, significant.      Estimated/Assessed Needs  Estimated Needs based on: Current Body Weight       Energy Requirements 25-30 kcal/kg   EST Needs (kcal/day) 1675-2010 kcals/d       Protein Requirements 1.0-1.2 g/kg   EST Daily Needs (g/day) 67-80 g/d       Fluid Requirements 1 ml/kcal    Estimated Needs (mL/day) 1675-2010      Labs/Medications         Pertinent Labs Reviewed.   Results from last 7 days   Lab Units 07/23/25  0311 07/22/25  1147   SODIUM mmol/L 140 139   POTASSIUM mmol/L 3.6 4.7   CHLORIDE mmol/L 106 99   CO2 mmol/L 25.0 25.1   BUN mg/dL 18.4 19.3   CREATININE mg/dL 0.75* 1.16   CALCIUM mg/dL 8.9 9.6   BILIRUBIN mg/dL  --  0.5   ALK PHOS U/L  --  115   ALT (SGPT) U/L  --  16   AST (SGOT) U/L  --  16   GLUCOSE mg/dL 53* 179*     Results from last 7 days   Lab Units 07/23/25  0311 07/22/25  1147   MAGNESIUM mg/dL 1.6 1.6   PHOSPHORUS mg/dL 3.6 4.2   HEMOGLOBIN g/dL 11.6* 14.1   HEMATOCRIT % 35.5* 42.5     COVID19   Date Value Ref Range Status   10/10/2024 Not Detected Not Detected - Ref. Range Final     Lab Results   Component Value Date    HGBA1C 8.00 (H) 05/12/2025         Pertinent Medications Reviewed.     Malnutrition Severity Assessment              Nutrition Diagnosis         Nutrition Dx Problem 1 Unintentional weight loss related to inadequate oral Intake as evidenced by decreased appetite., patient report., and family report.     Nutrition Intervention           Current Nutrition Orders & Evaluation of Intake       Current PO Diet Diet: Regular/House, Diabetic; Consistent Carbohydrate; Fluid Consistency: Thin (IDDSI 0)   Supplement Orders Placed This Encounter      Dietary Nutrition Supplements Boost Glucose Control (Glucerna Shake)           Nutrition  Intervention/Prescription        Continue Diet Rx  Continue Boost Plus ONS, 3x/day        Medical Nutrition Therapy/Nutrition Education          Learner     Readiness N/A  N/A     Method     Response N/A  N/A     Monitor/Evaluation        Monitor PO intake, Supplement intake     Nutrition Discharge Plan         To be determined     Electronically signed by:  Tay Reeder RD  07/23/25 16:08 EDT

## 2025-07-23 NOTE — PLAN OF CARE
Goal Outcome Evaluation:  Plan of Care Reviewed With: patient, daughter        Progress: no change  Outcome Evaluation: Patient presents with deficits in balance, endurance, transfers, and ambulation. Patient will benefit from skilled PT services to address these mobility deficits and decrease risk of falls.    Anticipated Discharge Disposition (PT): home with outpatient therapy services

## 2025-07-23 NOTE — PLAN OF CARE
Goal Outcome Evaluation:  Plan of Care Reviewed With: patient, daughter        Progress: no change  Outcome Evaluation: Patient presents with limitations in self-care, functional transfers, balance, and endurance. He would benefit from continued skilled occupational therapy services to maximize independence with ADLs/functional transfers. Encouraged pt to wear TLSO when upright d/t new/worsening compression fxs.    Anticipated Discharge Disposition (OT): home with home health, home with assist

## 2025-07-23 NOTE — NURSING NOTE
Patient had several low blood glucose readings this morning. Treated it with orange juice and breakfast. Came back up to 94.

## 2025-07-23 NOTE — PROGRESS NOTES
[facility]  Inpatient Progress Note    Reason for Follow Up:  Chronic abdominal pain    Subjective     Interval History:   Patient not having any abdominal pain, nausea, or vomiting.  Patient is eager to go home.  Patient's blood sugar has been running low.  Patient eating and drinking good and had a BM this AM and it was normal per patient.    Current Facility-Administered Medications:     acetaminophen (TYLENOL) tablet 650 mg, 650 mg, Oral, Q8H, 650 mg at 07/23/25 0508 **OR** acetaminophen (TYLENOL) suppository 650 mg, 650 mg, Rectal, Q8H, Archibald, Dimpi, DO    apixaban (ELIQUIS) tablet 5 mg, 5 mg, Oral, BID, Archibald, Dimpi, DO, 5 mg at 07/23/25 0857    sennosides-docusate (PERICOLACE) 8.6-50 MG per tablet 2 tablet, 2 tablet, Oral, BID PRN **AND** polyethylene glycol (MIRALAX) packet 17 g, 17 g, Oral, Daily PRN **AND** bisacodyl (DULCOLAX) EC tablet 5 mg, 5 mg, Oral, Daily PRN **AND** bisacodyl (DULCOLAX) suppository 10 mg, 10 mg, Rectal, Daily PRN, Archibald, Dimpi, DO    carvedilol (COREG) tablet 25 mg, 25 mg, Oral, BID With Meals, Archibald, Dimpi, DO, 25 mg at 07/23/25 0856    clopidogrel (PLAVIX) tablet 75 mg, 75 mg, Oral, Daily, Archibald, Dimpi, DO, 75 mg at 07/23/25 0857    dextrose (D50W) (25 g/50 mL) IV injection 25 g, 25 g, Intravenous, Q15 Min PRN, Archibald, Dimpi, DO    dextrose (GLUTOSE) oral gel 15 g, 15 g, Oral, Q15 Min PRN, Archibald, Dimpi, DO    dilTIAZem CD (CARDIZEM CD) 24 hr capsule 180 mg, 180 mg, Oral, Q24H, Archibald, Dimpi, DO, 180 mg at 07/23/25 0857    glucagon (GLUCAGEN) injection 1 mg, 1 mg, Intramuscular, Q15 Min PRN, Archibald, Dimpi, DO    Insulin Lispro (humaLOG) injection 2-9 Units, 2-9 Units, Subcutaneous, 4x Daily AC & at Bedtime, Archibald, Dimpi, DO    melatonin tablet 5 mg, 5 mg, Oral, Nightly, Archibald, Dimpi, DO, 5 mg at 07/22/25 2101    ondansetron ODT (ZOFRAN-ODT) disintegrating tablet 4 mg, 4 mg, Oral, Q6H PRN **OR** ondansetron (ZOFRAN) injection 4 mg, 4 mg, Intravenous, Q6H PRN,  Archibald, Dimpi, DO    oxyCODONE-acetaminophen (PERCOCET) 5-325 MG per tablet 1 tablet, 1 tablet, Oral, Q8H PRN, Archibald, Dimpi, DO    pancrelipase (Lip-Prot-Amyl) (CREON) capsule 12,000 units of lipase, 12,000 units of lipase, Oral, TID With Meals, Archibald, Dimpi, DO, 12,000 units of lipase at 07/23/25 0856    pantoprazole (PROTONIX) EC tablet 40 mg, 40 mg, Oral, Q AM, Archibald, Dimpi, DO, 40 mg at 07/23/25 0509    polyethylene glycol (MIRALAX) packet 17 g, 17 g, Oral, Daily, Lisette Bradshaw MD, 17 g at 07/23/25 0858    pregabalin (LYRICA) capsule 50 mg, 50 mg, Oral, Q8H, Archibald, Dimpi, DO, 50 mg at 07/23/25 0508    sodium chloride 0.9 % flush 10 mL, 10 mL, Intravenous, Q12H, Archibald, Dimpi, DO, 10 mL at 07/23/25 0858    sodium chloride 0.9 % flush 10 mL, 10 mL, Intravenous, PRN, Archibald, Dimpi, DO    sodium chloride 0.9 % infusion 40 mL, 40 mL, Intravenous, PRN, Archibald, Dimpi, DO    sodium chloride 0.9 % infusion, 75 mL/hr, Intravenous, Continuous, Archibald, Dimpi, DO, Last Rate: 75 mL/hr at 07/23/25 0908, 75 mL/hr at 07/23/25 0908    tiZANidine (ZANAFLEX) tablet 4 mg, 4 mg, Oral, Q12H PRN, Archibald, Dimpi, DO    vitamin B-12 (CYANOCOBALAMIN) tablet 1,000 mcg, 1,000 mcg, Oral, Daily, Archibald, Dimpi, DO, 1,000 mcg at 07/23/25 0856  Review of Systems:    Pertinent items are noted in HPI    Objective     Vital Signs  Temp:  [97.2 °F (36.2 °C)-98.4 °F (36.9 °C)] 97.2 °F (36.2 °C)  Heart Rate:  [] 83  Resp:  [16-19] 18  BP: (112-156)/() 125/77  Body mass index is 21.19 kg/m².    Intake/Output Summary (Last 24 hours) at 7/23/2025 1048  Last data filed at 7/23/2025 0847  Gross per 24 hour   Intake 1720 ml   Output 75 ml   Net 1645 ml     I/O this shift:  In: 720 [P.O.:720]  Out: -      Physical Exam:   General: awake, alert and in no acute distress   Eyes: eyes move symmetrical in all directions, no scleral icterus   Neck: supple, trachea is midline   Skin: warm and dry, not jaundiced   Cardiovascular: regular rhythm and  rate   Pulm: breathing unlabored   Abdomen: soft, non tender, non distended   Rectal: deferred   Extremities: no rash or edema   Psychiatric: mental status within normal limits, alert and oriented      Results Review:     I reviewed the patient's new clinical results.    Results from last 7 days   Lab Units 07/23/25  0311 07/22/25  1147   WBC 10*3/mm3 9.95 12.88*   HEMOGLOBIN g/dL 11.6* 14.1   HEMATOCRIT % 35.5* 42.5   PLATELETS 10*3/mm3 212 251     Results from last 7 days   Lab Units 07/23/25  0311 07/22/25  1147   SODIUM mmol/L 140 139   POTASSIUM mmol/L 3.6 4.7   CHLORIDE mmol/L 106 99   CO2 mmol/L 25.0 25.1   BUN mg/dL 18.4 19.3   CREATININE mg/dL 0.75* 1.16   CALCIUM mg/dL 8.9 9.6   BILIRUBIN mg/dL  --  0.5   ALK PHOS U/L  --  115   ALT (SGPT) U/L  --  16   AST (SGOT) U/L  --  16   GLUCOSE mg/dL 53* 179*         Lab Results   Lab Value Date/Time    LIPASE 15 07/22/2025 1147    LIPASE 34 10/12/2024 0516    LIPASE 36 10/11/2024 0512    LIPASE 34 10/10/2024 0506    LIPASE 13 09/07/2024 0202    LIPASE 41 08/21/2022 0152    LIPASE 33 07/31/2022 1326    LIPASE 30 03/22/2022 0806    LIPASE 36 07/12/2021 1226       Radiology:    MRI abdomen wo contrast mrcp  Result Date: 7/22/2025  Impression: 1. No acute intra-abdominal findings to explain patient's reported pain. 2. Unremarkable cholangiogram for age. 3. Possible sequelae of chronic pancreatitis including glandular atrophy and ectatic sidebranches. Mild prominence of the main duct (4 mm). 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 4. 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 Given lesion size and patient age, 1 year follow-up MRI/MRCP recommended to ensure stability. 5. Subacute T11 compression fracture progressed from 6/25/2025 and new from 3/3/2025. 6. Metal deposition in the  liver. 7. Cardiomegaly with trace pericardial and left pleural effusions. 8. Other ancillary findings as above. Electronically Signed: Cooper Waters MD  7/22/2025 3:51 PM EDT  Workstation ID: [id number]        Assessment & Plan   Assessment:   Chronic abdominal pain  Abnormal MRCP-chronic pancreatitis  Pancreatic IPMN  Constipation      Plan:   Patient is feeling much improved and eager to go home  Patient will follow-up with Dr. Estrada outpatient once discharged  Creon by PCP  Patient understands and agrees to the plan    I discussed the patients findings and my recommendations with patient and family.      Electronically signed by GEMA Gil, 7/23/2025, 10:48 EDT.

## 2025-07-23 NOTE — CONSULTS
MT-BC provided initial MT assessment with Pt at bedside. Per Pt, Pt is feeling much better and denies pain. Pt stated he is just waiting on his discharge paperwork to be brought in. Despite discharge plans, MT-BC conducted MT assessment with Pt at bedside. 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 Pt was the  at [employer] for ~40 years, beginning in the 70s. Pt was involved in the switch from traditional choral music in [employer] to contemporary [employer] music and discussed his experience with making this switch and the difficulties/learning opportunities that came along with the programming changes. 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 Pt reports his wife is also a pianist and is a local  within the [facility]. Pt educated on music therapy and the role music therapy plays within the medical treatment plan for inpatients at [facility]. Pt expressed understanding and appreciation that this service is now being offered to patients. Pt engaged in various other life review with MT-BC throughout MT session. Pt appears to be coping appropriately at this time with no unmet needs reported and denies pain. Primary RN arrived to Pt's room during MT session and reported she was working on discharge paperwork and disconnected Pt's IV so that he could get his clothes on whenever he was ready. Pt's daughter arrived to Pt's room as MT-BC was leaving the room. Pt expressed gratitude for MT session. No MT follow up is needed as Pt is preparing for discharge at this time.

## 2025-07-24 ENCOUNTER — READMISSION MANAGEMENT (OUTPATIENT)
Dept: CALL CENTER | Facility: HOSPITAL | Age: 85
End: 2025-07-24
Payer: MEDICARE

## 2025-07-24 NOTE — OUTREACH NOTE
Prep Survey      Flowsheet Row Responses   [facility] facility patient discharged from? [facility]   Is LACE score < 7 ? No   Eligibility Readm Mgmt   Discharge diagnosis Abdominal pain   Does the patient have one of the following disease processes/diagnoses(primary or secondary)? Other   Does the patient have Home health ordered? No   Is there a DME ordered? No   Prep survey completed? Yes            Jammie WOLFF - Registered Nurse

## 2025-07-31 ENCOUNTER — READMISSION MANAGEMENT (OUTPATIENT)
Dept: CALL CENTER | Facility: HOSPITAL | Age: 85
End: 2025-07-31
Payer: MEDICARE

## 2025-07-31 NOTE — OUTREACH NOTE
Medical Week 1 Survey      Flowsheet Row Responses   [facility] patient discharged from? [facility]   Does the patient have one of the following disease processes/diagnoses(primary or secondary)? Other   Week 1 attempt successful? Yes   Call start time 1705   Call end time 1708   Discharge diagnosis Abdominal pain   Person spoke with today (if not patient) and relationship Patient   Medication alerts for this patient Miralax, Creon   Meds reviewed with patient/caregiver? Yes   Is the patient having any side effects they believe may be caused by any medication additions or changes? No   Does the patient have all medications ordered at discharge? Yes   Prescription comments No questions or concerns with medications.   Is the patient taking all medications as directed (includes completed medication regime)? Yes   Comments regarding appointments GI, Dr. Estrada's office called patient, he has to call back to schedule appt. Patient is supposed to have MRI on his spine tomorrow at [facility]. MRI machine is down and patient states it will be rescheduled.   Does the patient have a primary care provider?  Yes   Does the patient have an appointment with their PCP within 7 days of discharge? Yes   Comments regarding PCP PCP--Dr. Adán Montes--patient has already seen PCP and has another appt tomorrow with Dr. Montes.   Has the patient kept scheduled appointments due by today? Yes   Has home health visited the patient within 72 hours of discharge? N/A   Psychosocial issues? No   Comments Patient reports having intermittent abdominal pain, good days and bad days. He remains in the TLSO brace for T11 fracture.   Did the patient receive a copy of their discharge instructions? Yes   Nursing interventions Reviewed instructions with patient   What is the patient's perception of their health status since discharge? Improving   Is the patient/caregiver able to teach back signs and symptoms related to disease process for when  to call PCP? Yes   Is the patient/caregiver able to teach back signs and symptoms related to disease process for when to call 911? Yes   Is the patient/caregiver able to teach back the hierarchy of who to call/visit for symptoms/problems? PCP, Specialist, Home health nurse, Urgent Care, ED, 911 Yes   If the patient is a current smoker, are they able to teach back resources for cessation? Not a smoker   Week 1 call completed? Yes   Graduated Yes   Would this patient benefit from a Referral to Amb Social Work? No   Is the patient interested in additional calls from an ambulatory ? No   Graduated/Revoked comments Patient denies any needs, concerns or questions.   Call end time 1708            Tabitha FOWLER - Registered Nurse

## 2025-08-14 ENCOUNTER — TELEPHONE (OUTPATIENT)
Dept: CARDIOLOGY | Facility: CLINIC | Age: 85
End: 2025-08-14
Payer: MEDICARE

## 2025-08-21 ENCOUNTER — OFFICE VISIT (OUTPATIENT)
Dept: ORTHOPEDIC SURGERY | Facility: CLINIC | Age: 85
End: 2025-08-21
Payer: MEDICARE

## 2025-08-21 VITALS — SYSTOLIC BLOOD PRESSURE: 123 MMHG | OXYGEN SATURATION: 96 % | DIASTOLIC BLOOD PRESSURE: 82 MMHG | HEART RATE: 86 BPM

## 2025-08-21 DIAGNOSIS — M77.01 MEDIAL EPICONDYLITIS OF RIGHT ELBOW: Primary | ICD-10-CM

## 2025-08-21 RX ADMIN — TRIAMCINOLONE ACETONIDE 40 MG: 40 INJECTION, SUSPENSION INTRA-ARTICULAR; INTRAMUSCULAR at 13:59

## 2025-08-21 RX ADMIN — LIDOCAINE HYDROCHLORIDE 1 ML: 10 INJECTION, SOLUTION INFILTRATION; PERINEURAL at 13:59

## 2025-08-22 RX ORDER — LIDOCAINE HYDROCHLORIDE 10 MG/ML
1 INJECTION, SOLUTION INFILTRATION; PERINEURAL
Status: COMPLETED | OUTPATIENT
Start: 2025-08-21 | End: 2025-08-21

## 2025-08-22 RX ORDER — TRIAMCINOLONE ACETONIDE 40 MG/ML
40 INJECTION, SUSPENSION INTRA-ARTICULAR; INTRAMUSCULAR
Status: COMPLETED | OUTPATIENT
Start: 2025-08-21 | End: 2025-08-21

## 2025-08-26 ENCOUNTER — LAB (OUTPATIENT)
Dept: LAB | Facility: HOSPITAL | Age: 85
End: 2025-08-26
Payer: MEDICARE

## 2025-08-26 ENCOUNTER — TRANSCRIBE ORDERS (OUTPATIENT)
Dept: ADMINISTRATIVE | Facility: HOSPITAL | Age: 85
End: 2025-08-26
Payer: MEDICARE

## 2025-08-26 DIAGNOSIS — Z79.4 TYPE 2 DIABETES MELLITUS WITH HYPERGLYCEMIA, WITH LONG-TERM CURRENT USE OF INSULIN: ICD-10-CM

## 2025-08-26 DIAGNOSIS — E53.8 VITAMIN B 12 DEFICIENCY: ICD-10-CM

## 2025-08-26 DIAGNOSIS — E11.65 TYPE 2 DIABETES MELLITUS WITH HYPERGLYCEMIA, WITH LONG-TERM CURRENT USE OF INSULIN: ICD-10-CM

## 2025-08-26 DIAGNOSIS — D50.0 IRON DEFICIENCY ANEMIA DUE TO CHRONIC BLOOD LOSS: ICD-10-CM

## 2025-08-26 DIAGNOSIS — Z79.4 TYPE 2 DIABETES MELLITUS WITH HYPERGLYCEMIA, WITH LONG-TERM CURRENT USE OF INSULIN: Primary | ICD-10-CM

## 2025-08-26 DIAGNOSIS — E11.65 TYPE 2 DIABETES MELLITUS WITH HYPERGLYCEMIA, WITH LONG-TERM CURRENT USE OF INSULIN: Primary | ICD-10-CM

## 2025-08-26 LAB
ALBUMIN SERPL-MCNC: 3.8 G/DL (ref 3.5–5.2)
ALBUMIN/GLOB SERPL: 1.7 G/DL
ALP SERPL-CCNC: 97 U/L (ref 39–117)
ALT SERPL W P-5'-P-CCNC: 32 U/L (ref 1–41)
ANION GAP SERPL CALCULATED.3IONS-SCNC: 12.3 MMOL/L (ref 5–15)
AST SERPL-CCNC: 25 U/L (ref 1–40)
BILIRUB SERPL-MCNC: 0.4 MG/DL (ref 0–1.2)
BUN SERPL-MCNC: 48.9 MG/DL (ref 8–23)
BUN/CREAT SERPL: 42.5 (ref 7–25)
CALCIUM SPEC-SCNC: 8.9 MG/DL (ref 8.6–10.5)
CHLORIDE SERPL-SCNC: 99 MMOL/L (ref 98–107)
CO2 SERPL-SCNC: 19.7 MMOL/L (ref 22–29)
CREAT SERPL-MCNC: 1.15 MG/DL (ref 0.76–1.27)
EGFRCR SERPLBLD CKD-EPI 2021: 63.5 ML/MIN/1.73
FERRITIN SERPL-MCNC: 110.2 NG/ML (ref 30–400)
GLOBULIN UR ELPH-MCNC: 2.3 GM/DL
GLUCOSE SERPL-MCNC: 145 MG/DL (ref 65–99)
IRON 24H UR-MRATE: 96 MCG/DL (ref 59–158)
IRON SATN MFR SERPL: 22 % (ref 20–50)
LDH SERPL-CCNC: 262 U/L (ref 135–225)
POTASSIUM SERPL-SCNC: 5.3 MMOL/L (ref 3.5–5.2)
PROT SERPL-MCNC: 6.1 G/DL (ref 6–8.5)
SODIUM SERPL-SCNC: 131 MMOL/L (ref 136–145)
TIBC SERPL-MCNC: 429 MCG/DL (ref 298–536)
TRANSFERRIN SERPL-MCNC: 288 MG/DL (ref 200–360)

## 2025-08-26 PROCEDURE — 86334 IMMUNOFIX E-PHORESIS SERUM: CPT | Performed by: PHYSICIAN ASSISTANT

## 2025-08-26 PROCEDURE — 80053 COMPREHEN METABOLIC PANEL: CPT

## 2025-08-26 PROCEDURE — 83521 IG LIGHT CHAINS FREE EACH: CPT | Performed by: PHYSICIAN ASSISTANT

## 2025-08-26 PROCEDURE — 85025 COMPLETE CBC W/AUTO DIFF WBC: CPT

## 2025-08-26 PROCEDURE — 84166 PROTEIN E-PHORESIS/URINE/CSF: CPT | Performed by: PHYSICIAN ASSISTANT

## 2025-08-26 PROCEDURE — 82784 ASSAY IGA/IGD/IGG/IGM EACH: CPT | Performed by: PHYSICIAN ASSISTANT

## 2025-08-26 PROCEDURE — 84165 PROTEIN E-PHORESIS SERUM: CPT | Performed by: PHYSICIAN ASSISTANT

## 2025-08-26 PROCEDURE — 84443 ASSAY THYROID STIM HORMONE: CPT

## 2025-08-26 PROCEDURE — 83540 ASSAY OF IRON: CPT

## 2025-08-26 PROCEDURE — 86335 IMMUNFIX E-PHORSIS/URINE/CSF: CPT | Performed by: PHYSICIAN ASSISTANT

## 2025-08-26 PROCEDURE — 84466 ASSAY OF TRANSFERRIN: CPT

## 2025-08-26 PROCEDURE — 85007 BL SMEAR W/DIFF WBC COUNT: CPT

## 2025-08-26 PROCEDURE — 82728 ASSAY OF FERRITIN: CPT

## 2025-08-26 PROCEDURE — 84156 ASSAY OF PROTEIN URINE: CPT | Performed by: PHYSICIAN ASSISTANT

## 2025-08-27 LAB
ANISOCYTOSIS BLD QL: ABNORMAL
BASOPHILS # BLD MANUAL: 0 10*3/MM3 (ref 0–0.2)
BASOPHILS NFR BLD MANUAL: 0 % (ref 0–1.5)
DEPRECATED RDW RBC AUTO: 43.3 FL (ref 37–54)
EOSINOPHIL # BLD MANUAL: 0.23 10*3/MM3 (ref 0–0.4)
EOSINOPHIL NFR BLD MANUAL: 1 % (ref 0.3–6.2)
ERYTHROCYTE [DISTWIDTH] IN BLOOD BY AUTOMATED COUNT: 12.7 % (ref 12.3–15.4)
HCT VFR BLD AUTO: 43.3 % (ref 37.5–51)
HGB BLD-MCNC: 14.3 G/DL (ref 13–17.7)
LYMPHOCYTES # BLD MANUAL: 1.46 10*3/MM3 (ref 0.7–3.1)
LYMPHOCYTES NFR BLD MANUAL: 9.4 % (ref 5–12)
MCH RBC QN AUTO: 31.3 PG (ref 26.6–33)
MCHC RBC AUTO-ENTMCNC: 33 G/DL (ref 31.5–35.7)
MCV RBC AUTO: 94.7 FL (ref 79–97)
METAMYELOCYTES NFR BLD MANUAL: 2.1 % (ref 0–0)
MONOCYTES # BLD: 2.18 10*3/MM3 (ref 0.1–0.9)
MYELOCYTES NFR BLD MANUAL: 2.1 % (ref 0–0)
NEUTROPHILS # BLD AUTO: 18.35 10*3/MM3 (ref 1.7–7)
NEUTROPHILS NFR BLD MANUAL: 79.2 % (ref 42.7–76)
NRBC BLD AUTO-RTO: 0 /100 WBC (ref 0–0.2)
PLAT MORPH BLD: NORMAL
PLATELET # BLD AUTO: 274 10*3/MM3 (ref 140–450)
PMV BLD AUTO: 9.9 FL (ref 6–12)
POIKILOCYTOSIS BLD QL SMEAR: ABNORMAL
RBC # BLD AUTO: 4.57 10*6/MM3 (ref 4.14–5.8)
TSH SERPL DL<=0.05 MIU/L-ACNC: 0.54 UIU/ML (ref 0.27–4.2)
VARIANT LYMPHS NFR BLD MANUAL: 6.3 % (ref 19.6–45.3)
WBC MORPH BLD: NORMAL
WBC NRBC COR # BLD AUTO: 23.17 10*3/MM3 (ref 3.4–10.8)

## 2025-08-28 ENCOUNTER — OFFICE VISIT (OUTPATIENT)
Dept: ONCOLOGY | Facility: HOSPITAL | Age: 85
End: 2025-08-28
Payer: MEDICARE

## 2025-08-28 ENCOUNTER — TELEPHONE (OUTPATIENT)
Dept: ORTHOPEDIC SURGERY | Facility: CLINIC | Age: 85
End: 2025-08-28
Payer: MEDICARE

## 2025-08-28 VITALS
TEMPERATURE: 97 F | DIASTOLIC BLOOD PRESSURE: 92 MMHG | RESPIRATION RATE: 18 BRPM | SYSTOLIC BLOOD PRESSURE: 135 MMHG | OXYGEN SATURATION: 97 % | BODY MASS INDEX: 21.05 KG/M2 | HEART RATE: 95 BPM | HEIGHT: 70 IN | WEIGHT: 147 LBS

## 2025-08-28 DIAGNOSIS — D50.9 IRON DEFICIENCY ANEMIA, UNSPECIFIED IRON DEFICIENCY ANEMIA TYPE: Primary | ICD-10-CM

## 2025-08-29 LAB
ALBUMIN 24H MFR UR ELPH: 59.7 %
ALBUMIN SERPL ELPH-MCNC: 3.6 G/DL (ref 2.9–4.4)
ALBUMIN/GLOB SERPL: 1.9 {RATIO} (ref 0.7–1.7)
ALPHA1 GLOB 24H MFR UR ELPH: 1.3 %
ALPHA1 GLOB SERPL ELPH-MCNC: 0.1 G/DL (ref 0–0.4)
ALPHA2 GLOB 24H MFR UR ELPH: 11 %
ALPHA2 GLOB SERPL ELPH-MCNC: 0.7 G/DL (ref 0.4–1)
B-GLOBULIN MFR UR ELPH: 19.1 %
B-GLOBULIN SERPL ELPH-MCNC: 0.8 G/DL (ref 0.7–1.3)
GAMMA GLOB 24H MFR UR ELPH: 8.9 %
GAMMA GLOB SERPL ELPH-MCNC: 0.5 G/DL (ref 0.4–1.8)
GLOBULIN SER-MCNC: 2 G/DL (ref 2.2–3.9)
IGA SERPL-MCNC: 158 MG/DL (ref 61–437)
IGG SERPL-MCNC: 558 MG/DL (ref 603–1613)
IGM SERPL-MCNC: 34 MG/DL (ref 15–143)
INTERPRETATION SERPL IEP-IMP: ABNORMAL
INTERPRETATION UR IFE-IMP: NORMAL
KAPPA LC FREE SER-MCNC: 12.3 MG/L (ref 3.3–19.4)
KAPPA LC FREE UR-MCNC: 17.5 MG/L (ref 1.17–86.46)
KAPPA LC FREE/LAMBDA FREE SER: 1.13 {RATIO} (ref 0.26–1.65)
KAPPA LC FREE/LAMBDA FREE UR: 4.14 (ref 1.83–14.26)
LABORATORY COMMENT REPORT: ABNORMAL
LAMBDA LC FREE SERPL-MCNC: 10.9 MG/L (ref 5.7–26.3)
LAMBDA LC FREE UR-MCNC: 4.23 MG/L (ref 0.27–15.21)
M PROTEIN 24H MFR UR ELPH: NORMAL %
M PROTEIN SERPL ELPH-MCNC: ABNORMAL G/DL
PROT SERPL-MCNC: 5.6 G/DL (ref 6–8.5)
PROT UR-MCNC: 17.7 MG/DL
SERVICE CMNT-IMP: NORMAL

## (undated) DEVICE — PTCA DILATATION CATHETER: Brand: NC QUANTUM APEX™

## (undated) DEVICE — Device: Brand: DEFENDO AIR/WATER/SUCTION AND BIOPSY VALVE

## (undated) DEVICE — BLCK/BITE BLOX WO/DENTL/RIM W/STRAP 54F

## (undated) DEVICE — Device: Brand: ASAHI SION BLUE

## (undated) DEVICE — RADIFOCUS OPTITORQUE ANGIOGRAPHIC CATHETER: Brand: OPTITORQUE

## (undated) DEVICE — SINGLE-USE BIOPSY FORCEPS: Brand: RADIAL JAW 4

## (undated) DEVICE — EGD OR ERCP KIT: Brand: MEDLINE INDUSTRIES, INC.

## (undated) DEVICE — RUNTHROUGH NS EXTRA FLOPPY PTCA GUIDEWIRE: Brand: RUNTHROUGH

## (undated) DEVICE — GLIDESHEATH SLENDER STAINLESS STEEL KIT: Brand: GLIDESHEATH SLENDER

## (undated) DEVICE — THE SINGLE USE ETRAP – POLYP TRAP IS USED FOR SUCTION RETRIEVAL OF ENDOSCOPICALLY REMOVED POLYPS.: Brand: ETRAP

## (undated) DEVICE — SOLIDIFIER LIQLOC PLS 1500CC BT

## (undated) DEVICE — MICROSURGICAL DILATATION DEVICE: Brand: WOLVERINE CORONARY CUTTING BALLOON

## (undated) DEVICE — NC TREK CORONARY DILATATION CATHETER 2.5 MM X 15 MM / RAPID-EXCHANGE: Brand: NC TREK

## (undated) DEVICE — CATH IMG IVUS EAGLE EYE PLATIN RX DIGITAL .014IN 5FR

## (undated) DEVICE — COLON KIT: Brand: MEDLINE INDUSTRIES, INC.

## (undated) DEVICE — Device

## (undated) DEVICE — GW FC FLOP/TP .035 260CM 3MM

## (undated) DEVICE — SNAR E/S POLYP SNAREMASTER OVL/10MM 2.8X2300MM YEL

## (undated) DEVICE — SOL IRRG H2O PL/BG 1000ML STRL

## (undated) DEVICE — CONN JET HYDRA H20 AUXILIARY DISP

## (undated) DEVICE — NC TREK CORONARY DILATATION CATHETER 3.5 MM X 15 MM / RAPID-EXCHANGE: Brand: NC TREK

## (undated) DEVICE — KT MANIFOLD CATHLAB CUST

## (undated) DEVICE — LINER SURG CANSTR SXN S/RIGD 1500CC